# Patient Record
Sex: MALE | Race: WHITE | Employment: OTHER | URBAN - METROPOLITAN AREA
[De-identification: names, ages, dates, MRNs, and addresses within clinical notes are randomized per-mention and may not be internally consistent; named-entity substitution may affect disease eponyms.]

---

## 2021-01-27 ENCOUNTER — HOSPITAL ENCOUNTER (INPATIENT)
Age: 76
LOS: 11 days | Discharge: HOME | DRG: 871 | End: 2021-02-07
Attending: EMERGENCY MEDICINE | Admitting: INTERNAL MEDICINE
Payer: MEDICARE

## 2021-01-27 ENCOUNTER — APPOINTMENT (OUTPATIENT)
Dept: ULTRASOUND IMAGING | Age: 76
DRG: 871 | End: 2021-01-27
Payer: MEDICARE

## 2021-01-27 ENCOUNTER — APPOINTMENT (OUTPATIENT)
Dept: CT IMAGING | Age: 76
DRG: 871 | End: 2021-01-27
Payer: MEDICARE

## 2021-01-27 ENCOUNTER — APPOINTMENT (OUTPATIENT)
Dept: GENERAL RADIOLOGY | Age: 76
DRG: 871 | End: 2021-01-27
Payer: MEDICARE

## 2021-01-27 DIAGNOSIS — R19.7 DIARRHEA, UNSPECIFIED TYPE: Primary | ICD-10-CM

## 2021-01-27 DIAGNOSIS — K80.20 GALLSTONES: ICD-10-CM

## 2021-01-27 DIAGNOSIS — K76.9 LIVER LESION: ICD-10-CM

## 2021-01-27 DIAGNOSIS — I48.91 ATRIAL FIBRILLATION, NEW ONSET (HCC): ICD-10-CM

## 2021-01-27 DIAGNOSIS — E87.6 HYPOKALEMIA: ICD-10-CM

## 2021-01-27 DIAGNOSIS — K80.50 CHOLEDOCHOLITHIASIS: ICD-10-CM

## 2021-01-27 PROBLEM — K59.1 FUNCTIONAL DIARRHEA: Status: ACTIVE | Noted: 2021-01-27

## 2021-01-27 PROBLEM — I10 ESSENTIAL HYPERTENSION: Status: ACTIVE | Noted: 2021-01-27

## 2021-01-27 LAB
ACETAMINOPHEN LEVEL: 49.1 MCG/ML (ref 10–30)
ALBUMIN SERPL-MCNC: 2.8 G/DL (ref 3.5–5.2)
ALP BLD-CCNC: 115 U/L (ref 40–129)
ALT SERPL-CCNC: 24 U/L (ref 0–40)
AMMONIA: 35.3 UMOL/L (ref 16–60)
ANION GAP SERPL CALCULATED.3IONS-SCNC: 11 MMOL/L (ref 7–16)
ANION GAP SERPL CALCULATED.3IONS-SCNC: 7 MMOL/L (ref 7–16)
AST SERPL-CCNC: 22 U/L (ref 0–39)
BACTERIA: NORMAL /HPF
BASOPHILS ABSOLUTE: 0.06 E9/L (ref 0–0.2)
BASOPHILS RELATIVE PERCENT: 0.5 % (ref 0–2)
BILIRUB SERPL-MCNC: 0.9 MG/DL (ref 0–1.2)
BILIRUBIN URINE: NEGATIVE
BLOOD, URINE: NEGATIVE
BUN BLDV-MCNC: 7 MG/DL (ref 8–23)
BUN BLDV-MCNC: 9 MG/DL (ref 8–23)
C DIFF TOXIN/ANTIGEN: NORMAL
C-REACTIVE PROTEIN: 14.6 MG/DL (ref 0–0.4)
CALCIUM SERPL-MCNC: 8.1 MG/DL (ref 8.6–10.2)
CALCIUM SERPL-MCNC: 8.5 MG/DL (ref 8.6–10.2)
CHLORIDE BLD-SCNC: 92 MMOL/L (ref 98–107)
CHLORIDE BLD-SCNC: 95 MMOL/L (ref 98–107)
CHOLESTEROL, TOTAL: 96 MG/DL (ref 0–199)
CLARITY: CLEAR
CO2: 31 MMOL/L (ref 22–29)
CO2: 32 MMOL/L (ref 22–29)
COLOR: YELLOW
CREAT SERPL-MCNC: 0.6 MG/DL (ref 0.7–1.2)
CREAT SERPL-MCNC: 0.7 MG/DL (ref 0.7–1.2)
EKG ATRIAL RATE: 76 BPM
EKG Q-T INTERVAL: 278 MS
EKG QRS DURATION: 84 MS
EKG QTC CALCULATION (BAZETT): 306 MS
EKG R AXIS: 3 DEGREES
EKG T AXIS: 134 DEGREES
EKG VENTRICULAR RATE: 73 BPM
EOSINOPHILS ABSOLUTE: 0.1 E9/L (ref 0.05–0.5)
EOSINOPHILS RELATIVE PERCENT: 0.8 % (ref 0–6)
EPITHELIAL CELLS, UA: NORMAL /HPF
ETHANOL: <10 MG/DL (ref 0–0.08)
GFR AFRICAN AMERICAN: >60
GFR AFRICAN AMERICAN: >60
GFR NON-AFRICAN AMERICAN: >60 ML/MIN/1.73
GFR NON-AFRICAN AMERICAN: >60 ML/MIN/1.73
GIARDIA ANTIGEN STOOL: NORMAL
GLUCOSE BLD-MCNC: 106 MG/DL (ref 74–99)
GLUCOSE BLD-MCNC: 149 MG/DL (ref 74–99)
GLUCOSE URINE: NEGATIVE MG/DL
HBA1C MFR BLD: 5.7 % (ref 4–5.6)
HCT VFR BLD CALC: 37.5 % (ref 37–54)
HDLC SERPL-MCNC: 23 MG/DL
HEMOGLOBIN: 12.8 G/DL (ref 12.5–16.5)
IMMATURE GRANULOCYTES #: 0.11 E9/L
IMMATURE GRANULOCYTES %: 0.8 % (ref 0–5)
INR BLD: 1.3
KETONES, URINE: NEGATIVE MG/DL
LACTIC ACID: 1.3 MMOL/L (ref 0.5–2.2)
LDL CHOLESTEROL CALCULATED: 54 MG/DL (ref 0–99)
LEUKOCYTE ESTERASE, URINE: NEGATIVE
LIPASE: 38 U/L (ref 13–60)
LYMPHOCYTES ABSOLUTE: 0.76 E9/L (ref 1.5–4)
LYMPHOCYTES RELATIVE PERCENT: 5.8 % (ref 20–42)
MAGNESIUM: 1.9 MG/DL (ref 1.6–2.6)
MCH RBC QN AUTO: 30 PG (ref 26–35)
MCHC RBC AUTO-ENTMCNC: 34.1 % (ref 32–34.5)
MCV RBC AUTO: 88 FL (ref 80–99.9)
MONOCYTES ABSOLUTE: 0.79 E9/L (ref 0.1–0.95)
MONOCYTES RELATIVE PERCENT: 6 % (ref 2–12)
NEUTROPHILS ABSOLUTE: 11.38 E9/L (ref 1.8–7.3)
NEUTROPHILS RELATIVE PERCENT: 86.1 % (ref 43–80)
NITRITE, URINE: NEGATIVE
OCCULT BLOOD SCREENING: NORMAL
PDW BLD-RTO: 13.3 FL (ref 11.5–15)
PH UA: 5 (ref 5–9)
PLATELET # BLD: 368 E9/L (ref 130–450)
PMV BLD AUTO: 10.3 FL (ref 7–12)
POTASSIUM REFLEX MAGNESIUM: 2.6 MMOL/L (ref 3.5–5)
POTASSIUM SERPL-SCNC: 3.6 MMOL/L (ref 3.5–5)
PROCALCITONIN: 0.47 NG/ML (ref 0–0.08)
PROTEIN UA: NEGATIVE MG/DL
PROTHROMBIN TIME: 15.4 SEC (ref 9.3–12.4)
RBC # BLD: 4.26 E12/L (ref 3.8–5.8)
RBC UA: NORMAL /HPF (ref 0–2)
ROTAVIRUS ANTIGEN: NORMAL
SALICYLATE, SERUM: <0.3 MG/DL (ref 0–30)
SARS-COV-2, NAAT: NOT DETECTED
SEDIMENTATION RATE, ERYTHROCYTE: 60 MM/HR (ref 0–15)
SODIUM BLD-SCNC: 134 MMOL/L (ref 132–146)
SODIUM BLD-SCNC: 134 MMOL/L (ref 132–146)
SPECIFIC GRAVITY UA: 1.01 (ref 1–1.03)
TOTAL PROTEIN: 6.7 G/DL (ref 6.4–8.3)
TRICYCLIC ANTIDEPRESSANTS SCREEN SERUM: NEGATIVE NG/ML
TRIGL SERPL-MCNC: 94 MG/DL (ref 0–149)
TROPONIN: <0.01 NG/ML (ref 0–0.03)
UROBILINOGEN, URINE: 0.2 E.U./DL
VLDLC SERPL CALC-MCNC: 19 MG/DL
WBC # BLD: 13.2 E9/L (ref 4.5–11.5)
WBC UA: NORMAL /HPF (ref 0–5)

## 2021-01-27 PROCEDURE — 82705 FATS/LIPIDS FECES QUAL: CPT

## 2021-01-27 PROCEDURE — 2580000003 HC RX 258: Performed by: CLINICAL NURSE SPECIALIST

## 2021-01-27 PROCEDURE — 2060000000 HC ICU INTERMEDIATE R&B

## 2021-01-27 PROCEDURE — 6360000002 HC RX W HCPCS: Performed by: STUDENT IN AN ORGANIZED HEALTH CARE EDUCATION/TRAINING PROGRAM

## 2021-01-27 PROCEDURE — 84145 PROCALCITONIN (PCT): CPT

## 2021-01-27 PROCEDURE — 93005 ELECTROCARDIOGRAM TRACING: CPT | Performed by: STUDENT IN AN ORGANIZED HEALTH CARE EDUCATION/TRAINING PROGRAM

## 2021-01-27 PROCEDURE — 81001 URINALYSIS AUTO W/SCOPE: CPT

## 2021-01-27 PROCEDURE — 82140 ASSAY OF AMMONIA: CPT

## 2021-01-27 PROCEDURE — 87329 GIARDIA AG IA: CPT

## 2021-01-27 PROCEDURE — 80179 DRUG ASSAY SALICYLATE: CPT

## 2021-01-27 PROCEDURE — 99223 1ST HOSP IP/OBS HIGH 75: CPT | Performed by: INTERNAL MEDICINE

## 2021-01-27 PROCEDURE — 84484 ASSAY OF TROPONIN QUANT: CPT

## 2021-01-27 PROCEDURE — 96375 TX/PRO/DX INJ NEW DRUG ADDON: CPT

## 2021-01-27 PROCEDURE — 93010 ELECTROCARDIOGRAM REPORT: CPT | Performed by: INTERNAL MEDICINE

## 2021-01-27 PROCEDURE — 36415 COLL VENOUS BLD VENIPUNCTURE: CPT

## 2021-01-27 PROCEDURE — 87324 CLOSTRIDIUM AG IA: CPT

## 2021-01-27 PROCEDURE — 87425 ROTAVIRUS AG IA: CPT

## 2021-01-27 PROCEDURE — APPSS45 APP SPLIT SHARED TIME 31-45 MINUTES: Performed by: CLINICAL NURSE SPECIALIST

## 2021-01-27 PROCEDURE — 83036 HEMOGLOBIN GLYCOSYLATED A1C: CPT

## 2021-01-27 PROCEDURE — 82077 ASSAY SPEC XCP UR&BREATH IA: CPT

## 2021-01-27 PROCEDURE — 80061 LIPID PANEL: CPT

## 2021-01-27 PROCEDURE — 6370000000 HC RX 637 (ALT 250 FOR IP): Performed by: INTERNAL MEDICINE

## 2021-01-27 PROCEDURE — 99285 EMERGENCY DEPT VISIT HI MDM: CPT

## 2021-01-27 PROCEDURE — 6370000000 HC RX 637 (ALT 250 FOR IP): Performed by: CLINICAL NURSE SPECIALIST

## 2021-01-27 PROCEDURE — 6360000004 HC RX CONTRAST MEDICATION: Performed by: RADIOLOGY

## 2021-01-27 PROCEDURE — 80307 DRUG TEST PRSMV CHEM ANLYZR: CPT

## 2021-01-27 PROCEDURE — 80048 BASIC METABOLIC PNL TOTAL CA: CPT

## 2021-01-27 PROCEDURE — 71045 X-RAY EXAM CHEST 1 VIEW: CPT

## 2021-01-27 PROCEDURE — 96365 THER/PROPH/DIAG IV INF INIT: CPT

## 2021-01-27 PROCEDURE — U0002 COVID-19 LAB TEST NON-CDC: HCPCS

## 2021-01-27 PROCEDURE — 83690 ASSAY OF LIPASE: CPT

## 2021-01-27 PROCEDURE — 80053 COMPREHEN METABOLIC PANEL: CPT

## 2021-01-27 PROCEDURE — 74177 CT ABD & PELVIS W/CONTRAST: CPT

## 2021-01-27 PROCEDURE — 6370000000 HC RX 637 (ALT 250 FOR IP): Performed by: EMERGENCY MEDICINE

## 2021-01-27 PROCEDURE — 92526 ORAL FUNCTION THERAPY: CPT | Performed by: SPEECH-LANGUAGE PATHOLOGIST

## 2021-01-27 PROCEDURE — 85651 RBC SED RATE NONAUTOMATED: CPT

## 2021-01-27 PROCEDURE — 87449 NOS EACH ORGANISM AG IA: CPT

## 2021-01-27 PROCEDURE — 6360000002 HC RX W HCPCS: Performed by: CLINICAL NURSE SPECIALIST

## 2021-01-27 PROCEDURE — 87045 FECES CULTURE AEROBIC BACT: CPT

## 2021-01-27 PROCEDURE — 86140 C-REACTIVE PROTEIN: CPT

## 2021-01-27 PROCEDURE — G0328 FECAL BLOOD SCRN IMMUNOASSAY: HCPCS

## 2021-01-27 PROCEDURE — 92610 EVALUATE SWALLOWING FUNCTION: CPT | Performed by: SPEECH-LANGUAGE PATHOLOGIST

## 2021-01-27 PROCEDURE — 85025 COMPLETE CBC W/AUTO DIFF WBC: CPT

## 2021-01-27 PROCEDURE — 83735 ASSAY OF MAGNESIUM: CPT

## 2021-01-27 PROCEDURE — 85610 PROTHROMBIN TIME: CPT

## 2021-01-27 PROCEDURE — 76705 ECHO EXAM OF ABDOMEN: CPT

## 2021-01-27 PROCEDURE — 80143 DRUG ASSAY ACETAMINOPHEN: CPT

## 2021-01-27 PROCEDURE — 83605 ASSAY OF LACTIC ACID: CPT

## 2021-01-27 RX ORDER — PROMETHAZINE HYDROCHLORIDE 25 MG/1
12.5 TABLET ORAL EVERY 6 HOURS PRN
Status: DISCONTINUED | OUTPATIENT
Start: 2021-01-27 | End: 2021-02-07 | Stop reason: HOSPADM

## 2021-01-27 RX ORDER — SODIUM CHLORIDE 0.9 % (FLUSH) 0.9 %
10 SYRINGE (ML) INJECTION EVERY 12 HOURS SCHEDULED
Status: DISCONTINUED | OUTPATIENT
Start: 2021-01-27 | End: 2021-02-07 | Stop reason: HOSPADM

## 2021-01-27 RX ORDER — LORAZEPAM 1 MG/1
2 TABLET ORAL
Status: DISCONTINUED | OUTPATIENT
Start: 2021-01-27 | End: 2021-02-07 | Stop reason: HOSPADM

## 2021-01-27 RX ORDER — LORAZEPAM 1 MG/1
4 TABLET ORAL
Status: DISCONTINUED | OUTPATIENT
Start: 2021-01-27 | End: 2021-02-07 | Stop reason: HOSPADM

## 2021-01-27 RX ORDER — LORAZEPAM 2 MG/ML
3 INJECTION INTRAMUSCULAR
Status: DISCONTINUED | OUTPATIENT
Start: 2021-01-27 | End: 2021-02-07 | Stop reason: HOSPADM

## 2021-01-27 RX ORDER — ACETAMINOPHEN 650 MG/1
650 SUPPOSITORY RECTAL EVERY 6 HOURS PRN
Status: DISCONTINUED | OUTPATIENT
Start: 2021-01-27 | End: 2021-02-07 | Stop reason: HOSPADM

## 2021-01-27 RX ORDER — FOLIC ACID 1 MG/1
1 TABLET ORAL DAILY
Status: DISCONTINUED | OUTPATIENT
Start: 2021-01-27 | End: 2021-02-07 | Stop reason: HOSPADM

## 2021-01-27 RX ORDER — LISINOPRIL 10 MG/1
10 TABLET ORAL DAILY
Status: DISCONTINUED | OUTPATIENT
Start: 2021-01-27 | End: 2021-02-07 | Stop reason: HOSPADM

## 2021-01-27 RX ORDER — ACETAMINOPHEN 325 MG/1
650 TABLET ORAL EVERY 6 HOURS PRN
Status: DISCONTINUED | OUTPATIENT
Start: 2021-01-27 | End: 2021-02-07 | Stop reason: HOSPADM

## 2021-01-27 RX ORDER — LORAZEPAM 1 MG/1
3 TABLET ORAL
Status: DISCONTINUED | OUTPATIENT
Start: 2021-01-27 | End: 2021-02-07 | Stop reason: HOSPADM

## 2021-01-27 RX ORDER — SODIUM CHLORIDE 9 MG/ML
INJECTION, SOLUTION INTRAVENOUS CONTINUOUS
Status: DISCONTINUED | OUTPATIENT
Start: 2021-01-27 | End: 2021-01-28

## 2021-01-27 RX ORDER — LORAZEPAM 1 MG/1
1 TABLET ORAL
Status: DISCONTINUED | OUTPATIENT
Start: 2021-01-27 | End: 2021-02-07 | Stop reason: HOSPADM

## 2021-01-27 RX ORDER — POTASSIUM CHLORIDE 7.45 MG/ML
10 INJECTION INTRAVENOUS
Status: DISPENSED | OUTPATIENT
Start: 2021-01-27 | End: 2021-01-27

## 2021-01-27 RX ORDER — POLYETHYLENE GLYCOL 3350 17 G/17G
17 POWDER, FOR SOLUTION ORAL DAILY PRN
Status: DISCONTINUED | OUTPATIENT
Start: 2021-01-27 | End: 2021-02-07 | Stop reason: HOSPADM

## 2021-01-27 RX ORDER — MAGNESIUM SULFATE IN WATER 40 MG/ML
2000 INJECTION, SOLUTION INTRAVENOUS ONCE
Status: COMPLETED | OUTPATIENT
Start: 2021-01-27 | End: 2021-01-27

## 2021-01-27 RX ORDER — SODIUM CHLORIDE 0.9 % (FLUSH) 0.9 %
10 SYRINGE (ML) INJECTION PRN
Status: DISCONTINUED | OUTPATIENT
Start: 2021-01-27 | End: 2021-02-07 | Stop reason: HOSPADM

## 2021-01-27 RX ORDER — MULTIVITAMIN WITH IRON
1 TABLET ORAL DAILY
Status: DISCONTINUED | OUTPATIENT
Start: 2021-01-27 | End: 2021-02-07 | Stop reason: HOSPADM

## 2021-01-27 RX ORDER — HYDRALAZINE HYDROCHLORIDE 20 MG/ML
5 INJECTION INTRAMUSCULAR; INTRAVENOUS EVERY 6 HOURS PRN
Status: DISCONTINUED | OUTPATIENT
Start: 2021-01-27 | End: 2021-02-07 | Stop reason: HOSPADM

## 2021-01-27 RX ORDER — LANOLIN ALCOHOL/MO/W.PET/CERES
100 CREAM (GRAM) TOPICAL DAILY
Status: DISCONTINUED | OUTPATIENT
Start: 2021-01-27 | End: 2021-02-07 | Stop reason: HOSPADM

## 2021-01-27 RX ORDER — HYDROCODONE BITARTRATE AND ACETAMINOPHEN 5; 325 MG/1; MG/1
1 TABLET ORAL EVERY 6 HOURS PRN
Status: DISCONTINUED | OUTPATIENT
Start: 2021-01-27 | End: 2021-02-07 | Stop reason: HOSPADM

## 2021-01-27 RX ORDER — LORAZEPAM 2 MG/ML
1 INJECTION INTRAMUSCULAR
Status: DISCONTINUED | OUTPATIENT
Start: 2021-01-27 | End: 2021-02-07 | Stop reason: HOSPADM

## 2021-01-27 RX ORDER — ONDANSETRON 2 MG/ML
4 INJECTION INTRAMUSCULAR; INTRAVENOUS EVERY 6 HOURS PRN
Status: DISCONTINUED | OUTPATIENT
Start: 2021-01-27 | End: 2021-02-07 | Stop reason: HOSPADM

## 2021-01-27 RX ORDER — LORAZEPAM 2 MG/ML
2 INJECTION INTRAMUSCULAR
Status: DISCONTINUED | OUTPATIENT
Start: 2021-01-27 | End: 2021-02-07 | Stop reason: HOSPADM

## 2021-01-27 RX ORDER — FENTANYL CITRATE 50 UG/ML
25 INJECTION, SOLUTION INTRAMUSCULAR; INTRAVENOUS ONCE
Status: COMPLETED | OUTPATIENT
Start: 2021-01-27 | End: 2021-01-27

## 2021-01-27 RX ORDER — LORAZEPAM 2 MG/ML
4 INJECTION INTRAMUSCULAR
Status: DISCONTINUED | OUTPATIENT
Start: 2021-01-27 | End: 2021-02-07 | Stop reason: HOSPADM

## 2021-01-27 RX ORDER — POTASSIUM CHLORIDE 20 MEQ/1
40 TABLET, EXTENDED RELEASE ORAL ONCE
Status: COMPLETED | OUTPATIENT
Start: 2021-01-27 | End: 2021-01-27

## 2021-01-27 RX ORDER — MORPHINE SULFATE 4 MG/ML
4 INJECTION, SOLUTION INTRAMUSCULAR; INTRAVENOUS ONCE
Status: COMPLETED | OUTPATIENT
Start: 2021-01-27 | End: 2021-01-27

## 2021-01-27 RX ADMIN — HYDROCODONE BITARTRATE AND ACETAMINOPHEN 1 TABLET: 5; 325 TABLET ORAL at 09:17

## 2021-01-27 RX ADMIN — POTASSIUM CHLORIDE 10 MEQ: 10 INJECTION, SOLUTION INTRAVENOUS at 10:57

## 2021-01-27 RX ADMIN — ENOXAPARIN SODIUM 40 MG: 40 INJECTION SUBCUTANEOUS at 12:54

## 2021-01-27 RX ADMIN — Medication 100 MG: at 12:52

## 2021-01-27 RX ADMIN — SODIUM CHLORIDE: 9 INJECTION, SOLUTION INTRAVENOUS at 11:08

## 2021-01-27 RX ADMIN — Medication 10 ML: at 21:26

## 2021-01-27 RX ADMIN — METOPROLOL TARTRATE 25 MG: 25 TABLET, FILM COATED ORAL at 14:05

## 2021-01-27 RX ADMIN — FENTANYL CITRATE 25 MCG: 50 INJECTION, SOLUTION INTRAMUSCULAR; INTRAVENOUS at 04:54

## 2021-01-27 RX ADMIN — SODIUM CHLORIDE: 9 INJECTION, SOLUTION INTRAVENOUS at 21:26

## 2021-01-27 RX ADMIN — ACETAMINOPHEN 650 MG: 325 TABLET ORAL at 20:18

## 2021-01-27 RX ADMIN — IOPAMIDOL 75 ML: 755 INJECTION, SOLUTION INTRAVENOUS at 05:13

## 2021-01-27 RX ADMIN — MAGNESIUM SULFATE HEPTAHYDRATE 2000 MG: 40 INJECTION, SOLUTION INTRAVENOUS at 05:57

## 2021-01-27 RX ADMIN — METOPROLOL TARTRATE 25 MG: 25 TABLET, FILM COATED ORAL at 21:25

## 2021-01-27 RX ADMIN — MORPHINE SULFATE 4 MG: 4 INJECTION, SOLUTION INTRAMUSCULAR; INTRAVENOUS at 06:23

## 2021-01-27 RX ADMIN — POTASSIUM CHLORIDE 10 MEQ: 10 INJECTION, SOLUTION INTRAVENOUS at 05:59

## 2021-01-27 RX ADMIN — POTASSIUM CHLORIDE 10 MEQ: 10 INJECTION, SOLUTION INTRAVENOUS at 07:12

## 2021-01-27 RX ADMIN — POTASSIUM CHLORIDE 40 MEQ: 20 TABLET, EXTENDED RELEASE ORAL at 06:23

## 2021-01-27 RX ADMIN — MULTIVITAMIN TABLET 1 TABLET: TABLET at 12:54

## 2021-01-27 RX ADMIN — LISINOPRIL 10 MG: 10 TABLET ORAL at 12:52

## 2021-01-27 RX ADMIN — POTASSIUM CHLORIDE 10 MEQ: 10 INJECTION, SOLUTION INTRAVENOUS at 14:02

## 2021-01-27 RX ADMIN — POTASSIUM CHLORIDE 10 MEQ: 10 INJECTION, SOLUTION INTRAVENOUS at 12:41

## 2021-01-27 RX ADMIN — FOLIC ACID 1 MG: 1 TABLET ORAL at 12:54

## 2021-01-27 ASSESSMENT — ENCOUNTER SYMPTOMS
VOMITING: 0
DIARRHEA: 1
ABDOMINAL PAIN: 1
ABDOMINAL DISTENTION: 0
NAUSEA: 0
PHOTOPHOBIA: 0
SHORTNESS OF BREATH: 0
CONSTIPATION: 0
COUGH: 1

## 2021-01-27 ASSESSMENT — PAIN SCALES - GENERAL
PAINLEVEL_OUTOF10: 9
PAINLEVEL_OUTOF10: 2
PAINLEVEL_OUTOF10: 0
PAINLEVEL_OUTOF10: 9

## 2021-01-27 ASSESSMENT — PAIN DESCRIPTION - PAIN TYPE: TYPE: ACUTE PAIN

## 2021-01-27 ASSESSMENT — PAIN DESCRIPTION - FREQUENCY: FREQUENCY: CONTINUOUS

## 2021-01-27 NOTE — ED NOTES
Patient unable to urinate at this time; instructed to use call light when able to urinate; urinal at bedside. Patient unable to provide stool sample at this time; instructed to use call light when able to.      Jo Ann Gallardo RN  01/27/21 0034

## 2021-01-27 NOTE — ED NOTES
Bed: 04  Expected date:   Expected time:   Means of arrival:   Comments:  Atif Hernandez [id number], RN  35/60/85 [id number]

## 2021-01-27 NOTE — ED NOTES
Patient unable to urinate at this time; instructed to use call light when able to urinate; urinal at bedside  Patient unable to provide stool sample at this time; instructed to use call light when able to.      Jo Ann Gallardo RN  01/27/21 6013

## 2021-01-27 NOTE — ED PROVIDER NOTES
Brook Plummer is a 81-year-old male without any known PMH who present emergency department with concern for diffuse abdominal pain and diarrhea has been going on for 8 days and is worsening. Patient states that he is having diffuse abdominal pain that does not radiate. Abdominal pain feels like a cramping stabbing pain. Patient denies any symptoms of dysuria. Patient is also having watery diarrhea. Patient denies any blood in his stool. Patient denies any black stool. Patient does take NSAIDs. Patient also drinks alcohol every day. Patient states he drinks at least 30 pack of beer per week. Patient is eating and drinking less. Patient lives at home with his wife. Patient denies any sick contacts. Patient denies any cough, fever, chills, chest pain, or shortness of breath. Patient does not have history fo afib. Patient has not followed with a PCP in over 12 years. Patient has tried fluids and rest for diarrhea without improvement of symptoms. Patient typically has issues with constipation and uses stool softeners. The history is provided by the patient and medical records. Diarrhea  Quality:  Watery  Severity:  Severe  Onset quality:  Gradual  Number of episodes:  Over 5-10  Duration:  8 days  Timing:  Constant  Progression:  Worsening  Relieved by:  Nothing  Worsened by:  Nothing  Ineffective treatments:  None tried  Associated symptoms: abdominal pain    Associated symptoms: no chills, no diaphoresis, no fever, no headaches, no URI and no vomiting    Risk factors: no recent antibiotic use, no sick contacts, no suspicious food intake and no travel to endemic areas         Review of Systems   Constitutional: Negative for chills, diaphoresis, fatigue and fever. Eyes: Negative for photophobia and visual disturbance. Respiratory: Positive for cough. Negative for shortness of breath. Cardiovascular: Negative for chest pain, palpitations and leg swelling.    Gastrointestinal: Positive for abdominal pain and diarrhea. Negative for abdominal distention, constipation, nausea and vomiting. Genitourinary: Negative for dysuria. Musculoskeletal: Negative for neck pain and neck stiffness. Skin: Negative for pallor and rash. Allergic/Immunologic: Negative for immunocompromised state. Neurological: Negative for headaches. Psychiatric/Behavioral: Negative for confusion. Physical Exam  Vitals signs and nursing note reviewed. Constitutional:       General: He is not in acute distress. Appearance: Normal appearance. He is not ill-appearing or diaphoretic. HENT:      Head: Normocephalic and atraumatic. Eyes:      Pupils: Pupils are equal, round, and reactive to light. Neck:      Musculoskeletal: Normal range of motion and neck supple. No neck rigidity or muscular tenderness. Cardiovascular:      Rate and Rhythm: Normal rate and regular rhythm. Pulmonary:      Effort: Pulmonary effort is normal.      Breath sounds: Normal breath sounds. Abdominal:      General: Bowel sounds are normal. There is no distension. Palpations: Abdomen is soft. Tenderness: There is abdominal tenderness. There is no guarding or rebound. Comments: Diffuse mild tenderness     Musculoskeletal:      Right lower leg: No edema. Left lower leg: No edema. Skin:     General: Skin is warm and dry. Capillary Refill: Capillary refill takes less than 2 seconds. Neurological:      Mental Status: He is alert and oriented to person, place, and time. Psychiatric:         Mood and Affect: Mood normal.          Procedures     MDM  Number of Diagnoses or Management Options  Atrial fibrillation, new onset (HCC)  Diarrhea, unspecified type  Gallstones  Hypokalemia  Liver lesion  Diagnosis management comments: Tess Roth is a 70year old male with history of alcohol abuse who presented to ED with diarrhea and abdominal pain.  Patient was found to have potassium of 2.6, patient given potassium and magnesium replacement in ED. Patient did give stool sample and results pending. Patient will be admitted for diarrhea of unknown etiology with electrolyte disturbance. Patient's abdominal pain is non specific. Patient's CT scan is significant for possible CBD dilation. With normal LFT. RUQ u/s is pending. Discussed results and plan with patient and daughter they are agreeable to admission  Patient was found to have mildly elevated tylenol level, patient reported he was taking normal dose of nyquil before arrival to ED, patient believes there was tylenol in the Nyquil. Patient denies additional tylenol use. Patient stable at time of re-evaluation, patient given morphine for cramping abdominal pain. ED Course as of Jan 27 1626 Wed Jan 27, 2021 0404 EKG: This EKG is signed and interpreted by me. Rate: 73  Rhythm: Atrial fibrillation  Interpretation: A. Fib, new onset, nonspecific T wave abnormalities, normal QTc  Comparison: no previous EKG available      [JA]      ED Course User Index  [JA] Godwin Cheema MD        ED Course as of Jan 27 1626 Wed Jan 27, 2021 0404 EKG: This EKG is signed and interpreted by me. Rate: 73  Rhythm: Atrial fibrillation  Interpretation: A. Fib, new onset, nonspecific T wave abnormalities, normal QTc  Comparison: no previous EKG available      [JA]      ED Course User Index  [LAURA] Godwin Cheema MD       --------------------------------------------- PAST HISTORY ---------------------------------------------  Past Medical History:  has a past medical history of Bleeding from the nose and Skin cancer of face. Past Surgical History:  has a past surgical history that includes other surgical history (Left, 2009). Social History:  reports that he quit smoking about 14 years ago. He has never used smokeless tobacco. He reports current alcohol use of about 30.0 standard drinks of alcohol per week.  He reports that he does not use drugs. Family History: family history is not on file. The patients home medications have been reviewed. Allergies: Patient has no known allergies. -------------------------------------------------- RESULTS -------------------------------------------------    LABS:  Results for orders placed or performed during the hospital encounter of 01/27/21   Clostridium difficile, EIA    Specimen: Stool   Result Value Ref Range    C.diff Toxin/Antigen       C. Difficile Toxin A and/or B NOT detected  Normal Range: Not detected     Giardia antigen    Specimen: Stool   Result Value Ref Range    Giardia Ag, Stl       Giardia antigen test is negative  *  *  Reference range: negative  Giardia antigen testing is performed routinely in place of  [facility] and Parasite Examination on stool specimens. Additional  testing requires consultation with the laboratory. All stool  specimen preservative containers are held 10 days after the  performance of Giardia Antigen to accommodate any additional  testing.      CBC auto differential   Result Value Ref Range    WBC 13.2 (H) 4.5 - 11.5 E9/L    RBC 4.26 3.80 - 5.80 E12/L    Hemoglobin 12.8 12.5 - 16.5 g/dL    Hematocrit 37.5 37.0 - 54.0 %    MCV 88.0 80.0 - 99.9 fL    MCH 30.0 26.0 - 35.0 pg    MCHC 34.1 32.0 - 34.5 %    RDW 13.3 11.5 - 15.0 fL    Platelets 660 111 - 726 E9/L    MPV 10.3 7.0 - 12.0 fL    Neutrophils % 86.1 (H) 43.0 - 80.0 %    Immature Granulocytes % 0.8 0.0 - 5.0 %    Lymphocytes % 5.8 (L) 20.0 - 42.0 %    Monocytes % 6.0 2.0 - 12.0 %    Eosinophils % 0.8 0.0 - 6.0 %    Basophils % 0.5 0.0 - 2.0 %    Neutrophils Absolute 11.38 (H) 1.80 - 7.30 E9/L    Immature Granulocytes # 0.11 E9/L    Lymphocytes Absolute 0.76 (L) 1.50 - 4.00 E9/L    Monocytes Absolute 0.79 0.10 - 0.95 E9/L    Eosinophils Absolute 0.10 0.05 - 0.50 E9/L    Basophils Absolute 0.06 0.00 - 0.20 E9/L   Comprehensive Metabolic Panel w/ Reflex to MG   Result Value Ref Range    Sodium 134 132 - 146 mmol/L Potassium reflex Magnesium 2.6 (LL) 3.5 - 5.0 mmol/L    Chloride 92 (L) 98 - 107 mmol/L    CO2 31 (H) 22 - 29 mmol/L    Anion Gap 11 7 - 16 mmol/L    Glucose 106 (H) 74 - 99 mg/dL    BUN 9 8 - 23 mg/dL    CREATININE 0.7 0.7 - 1.2 mg/dL    GFR Non-African American >60 >=60 mL/min/1.73    GFR African American >60     Calcium 8.1 (L) 8.6 - 10.2 mg/dL    Total Protein 6.7 6.4 - 8.3 g/dL    Albumin 2.8 (L) 3.5 - 5.2 g/dL    Total Bilirubin 0.9 0.0 - 1.2 mg/dL    Alkaline Phosphatase 115 40 - 129 U/L    ALT 24 0 - 40 U/L    AST 22 0 - 39 U/L   Troponin   Result Value Ref Range    Troponin <0.01 0.00 - 0.03 ng/mL   Lactic Acid, Plasma   Result Value Ref Range    Lactic Acid 1.3 0.5 - 2.2 mmol/L   Lipase   Result Value Ref Range    Lipase 38 13 - 60 U/L   Urinalysis with Microscopic   Result Value Ref Range    Color, UA Yellow Straw/Yellow    Clarity, UA Clear Clear    Glucose, Ur Negative Negative mg/dL    Bilirubin Urine Negative Negative    Ketones, Urine Negative Negative mg/dL    Specific Gravity, UA 1.015 1.005 - 1.030    Blood, Urine Negative Negative    pH, UA 5.0 5.0 - 9.0    Protein, UA Negative Negative mg/dL    Urobilinogen, Urine 0.2 <2.0 E.U./dL    Nitrite, Urine Negative Negative    Leukocyte Esterase, Urine Negative Negative    WBC, UA 0-1 0 - 5 /HPF    RBC, UA 0-1 0 - 2 /HPF    Epithelial Cells, UA RARE /HPF    Bacteria, UA NONE SEEN None Seen /HPF   Rotavirus antigen, stool   Result Value Ref Range    Rotavirus       Rotavirus antigen result: Not detected  *  *  Normal range: Not detected     Blood occult stool screen #1   Result Value Ref Range    Occult Blood Screening       Occult Blood test result is negative.   *  *  Normal range: negative     Serum Drug Screen   Result Value Ref Range    Ethanol Lvl <10 mg/dL    Acetaminophen Level 49.1 (H) 10.0 - 71.8 mcg/mL    Salicylate, Serum <5.3 0.0 - 30.0 mg/dL    TCA Scrn NEGATIVE Cutoff:300 ng/mL   COVID-19   Result Value Ref Range    SARS-CoV-2, NAAT Not Detected Not Detected   Protime-INR   Result Value Ref Range    Protime 15.4 (H) 9.3 - 12.4 sec    INR 1.3    Magnesium   Result Value Ref Range    Magnesium 1.9 1.6 - 2.6 mg/dL   Lipid panel   Result Value Ref Range    Cholesterol, Total 96 0 - 199 mg/dL    Triglycerides 94 0 - 149 mg/dL    HDL 23 >40 mg/dL    LDL Calculated 54 0 - 99 mg/dL    VLDL Cholesterol Calculated 19 mg/dL   Hemoglobin A1c   Result Value Ref Range    Hemoglobin A1C 5.7 (H) 4.0 - 5.6 %   Procalcitonin   Result Value Ref Range    Procalcitonin 0.47 (H) 0.00 - 0.08 ng/mL   Sedimentation Rate   Result Value Ref Range    Sed Rate 60 (H) 0 - 15 mm/Hr   C-reactive protein   Result Value Ref Range    CRP 14.6 (H) 0.0 - 0.4 mg/dL   BASIC METABOLIC PANEL   Result Value Ref Range    Sodium 134 132 - 146 mmol/L    Potassium 3.6 3.5 - 5.0 mmol/L    Chloride 95 (L) 98 - 107 mmol/L    CO2 32 (H) 22 - 29 mmol/L    Anion Gap 7 7 - 16 mmol/L    Glucose 149 (H) 74 - 99 mg/dL    BUN 7 (L) 8 - 23 mg/dL    CREATININE 0.6 (L) 0.7 - 1.2 mg/dL    GFR Non-African American >60 >=60 mL/min/1.73    GFR African American >60     Calcium 8.5 (L) 8.6 - 10.2 mg/dL   Ammonia   Result Value Ref Range    Ammonia 35.3 16.0 - 60.0 umol/L   EKG 12 Lead   Result Value Ref Range    Ventricular Rate 73 BPM    Atrial Rate 76 BPM    QRS Duration 84 ms    Q-T Interval 278 ms    QTc Calculation (Bazett) 306 ms    R Axis 3 degrees    T Axis 134 degrees       RADIOLOGY:  US GALLBLADDER RUQ   Final Result   Cholecystolithiasis. Suggestion also of choledocholithiasis with common bile   duct distention   Solid-appearing mass adjacent to the left hepatic lobe, in the christopher hepatis   region, may be artifact from the duodenum. Differential includes enlarged   lymph node   Simple appearing right renal cyst      CT ABDOMEN PELVIS W IV CONTRAST Additional Contrast? None   Final Result   There are numerous liver lesions. Multiple small lesions are possibly   cystic.   Larger lesions in the left lobe are more nonspecific. The right   lobe is also heterogeneous. MRI correlation is recommended for   characterization of these abnormalities. Cholelithiasis. Biliary dilatation and probable multiple common bile duct   stones. Soft tissue nodule adjacent to the diaphragmatic deo and right intrarenal   gland measuring 2 cm. This this may be an adrenal adenoma or lymph node. Sigmoid diverticulosis. No acute diverticulitis. Small right pleural effusion with basilar atelectasis. XR CHEST PORTABLE   Final Result   There is no acute abnormality seen. ------------------------- NURSING NOTES AND VITALS REVIEWED ---------------------------  Date / Time Roomed:  1/27/2021  2:40 AM  ED Bed Assignment:  04/04    The nursing notes within the ED encounter and vital signs as below have been reviewed.      Patient Vitals for the past 24 hrs:   BP Temp Temp src Pulse Resp SpO2 Height Weight   01/27/21 1540 (!) 166/76 -- -- 74 16 95 % -- --   01/27/21 1413 (!) 154/78 -- -- 76 18 97 % -- --   01/27/21 1405 (!) 154/78 -- -- 68 -- -- -- --   01/27/21 1252 (!) 174/88 -- -- -- -- -- -- --   01/27/21 1055 (!) 170/82 -- -- 80 -- 95 % -- --   01/27/21 0919 (!) 149/75 -- -- 75 18 96 % -- --   01/27/21 0731 (!) 156/81 -- -- 71 16 94 % -- --   01/27/21 0623 (!) 160/83 -- -- 69 14 96 % -- --   01/27/21 0554 (!) 176/81 -- -- 70 14 96 % -- --   01/27/21 0453 (!) 156/84 -- -- 70 14 96 % -- --   01/27/21 0427 (!) 168/87 -- -- 67 14 97 % -- --   01/27/21 0348 (!) 165/97 -- -- 75 14 96 % -- --   01/27/21 0256 -- 97.5 °F (36.4 °C) Oral -- -- -- -- --   01/27/21 0250 (!) 160/89 -- -- 73 16 98 % 5' 11\" (1.803 m) 160 lb (72.6 kg)       Oxygen Saturation Interpretation: Normal    ------------------------------------------ PROGRESS NOTES ------------------------------------------  Re-evaluation(s):  Time: 4am  Patients symptoms show no change  Repeat physical examination is not changed    Counseling:  I have spoken with the patient and discussed todays results, in addition to providing specific details for the plan of care and counseling regarding the diagnosis and prognosis. Their questions are answered at this time and they are agreeable with the plan of admission.    --------------------------------- ADDITIONAL PROVIDER NOTES ---------------------------------  Consultations:  Spoke with Dr. Jazzmine Langford. Discussed case. They will admit the patient. This patient's ED course included: a personal history and physicial examination, re-evaluation prior to disposition, multiple bedside re-evaluations, IV medications and cardiac monitoring    This patient has remained hemodynamically stable during their ED course. Diagnosis:  1. Diarrhea, unspecified type    2. Hypokalemia    3. Atrial fibrillation, new onset ([address]    4. Gallstones    5. Liver lesion        Disposition:  Patient's disposition: Admit to telemetry  Patient's condition is stable.             Jose Daniel Garcia MD  Resident  01/27/21 5217

## 2021-01-27 NOTE — PROGRESS NOTES
SPEECH/LANGUAGE PATHOLOGY  CLINICAL ASSESSMENT OF SWALLOWING FUNCTION    PATIENT NAME:  Linwood Favre      :  1945      TODAY'S DATE:  2021  ROOM:      SUMMARY OF EVALUATION    Chart reviewed including chest radiograph which was clear. Pt presents with wet cough at baseline. C/o hx of difficulty swallowing meat due to poor dentition. Pt denies recent pneumonia. DYSPHAGIA DIAGNOSIS:  Oral dysphagia; pharyngeal dysphagia can not be r/o at bedside. Recommend video swallow eval to further assess. DIET RECOMMENDATIONS:  Dental soft (Easy to Chew) solids with  thin liquids as tolerated until video swallow eval completed. FEEDING RECOMMENDATIONS:     Assistance level:  No assistance needed      Compensatory strategies recommended: Small bites/sips and Alternate solids and liquids    THERAPY RECOMMENDATIONS:        A Video Swallow Study (MBSS) is recommended and requires a physician order                 PROCEDURE     Consistencies Administered During the Evaluation   Liquids: thin liquid   Solids:  pureed foods and solid foods      Method of Intake:   cup, straw, spoon  Self fed, Fed by clinician      Position:   Seated, upright                  RESULTS     Oral Stage:         Decreased mastication due to:  poor/missing dentition        Pharyngeal Stage:      Wet respirations were noted after presentation of thin liquid X1, Multiple swallows were noted after presentation of solid foods and Congested cough throughout evaluation                  The Speech Language Pathologist (SLP) completed education with the patient regarding results of evaluation. Explained that Speech Pathology intervention is warranted  at this time   Prognosis for improvements is fair     This plan will be re-evaluated and revised in 1 week  if warranted.     Patient stated goals: Agreed with above,   Treatment goals discussed with Patient   The Patient understand(s) the diagnosis, prognosis and plan of care INTERVENTION/EDUCATION    Pt educated on above results and plan of care. Pt trained on compensatory strategies for safe swallow with good outcome. Pt encouraged to engage in question/answer session. All questions answered and pt verbalized understanding of above. CPT code:  24664  bedside swallow eval, 25262 dysphagia therapy 15 mins      [x]The admitting diagnosis and active problem list, as listed below have been reviewed prior to initiation of this evaluation.      ADMITTING DIAGNOSIS: Diarrhea [R19.7]     ACTIVE PROBLEM LIST:   Patient Active Problem List   Diagnosis    Diarrhea    Essential hypertension    Atrial fibrillation (HCC)    Hypokalemia    Functional diarrhea    Calculus of gallbladder

## 2021-01-27 NOTE — ED NOTES
Speech therapy down for bedside study. Recommends dental soft diet and small sips of thin liquids and swallow study tomorrow.       Meagan Baires RN  01/27/21 4143
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
## 2021-01-28 ENCOUNTER — APPOINTMENT (OUTPATIENT)
Dept: MRI IMAGING | Age: 76
DRG: 871 | End: 2021-01-28
Payer: MEDICARE

## 2021-01-28 LAB
ALBUMIN SERPL-MCNC: 2 G/DL (ref 3.5–5.2)
ALP BLD-CCNC: 103 U/L (ref 40–129)
ALT SERPL-CCNC: 17 U/L (ref 0–40)
ANION GAP SERPL CALCULATED.3IONS-SCNC: 6 MMOL/L (ref 7–16)
AST SERPL-CCNC: 18 U/L (ref 0–39)
BASOPHILS ABSOLUTE: 0.06 E9/L (ref 0–0.2)
BASOPHILS RELATIVE PERCENT: 0.4 % (ref 0–2)
BILIRUB SERPL-MCNC: 0.6 MG/DL (ref 0–1.2)
BUN BLDV-MCNC: 9 MG/DL (ref 8–23)
CALCIUM SERPL-MCNC: 6.8 MG/DL (ref 8.6–10.2)
CEA: 0.8 NG/ML (ref 0–5.2)
CHLORIDE BLD-SCNC: 103 MMOL/L (ref 98–107)
CO2: 27 MMOL/L (ref 22–29)
CREAT SERPL-MCNC: 0.6 MG/DL (ref 0.7–1.2)
EOSINOPHILS ABSOLUTE: 0.02 E9/L (ref 0.05–0.5)
EOSINOPHILS RELATIVE PERCENT: 0.1 % (ref 0–6)
GFR AFRICAN AMERICAN: >60
GFR NON-AFRICAN AMERICAN: >60 ML/MIN/1.73
GLUCOSE BLD-MCNC: 97 MG/DL (ref 74–99)
HCT VFR BLD CALC: 29.3 % (ref 37–54)
HEMOGLOBIN: 9.6 G/DL (ref 12.5–16.5)
IMMATURE GRANULOCYTES #: 0.17 E9/L
IMMATURE GRANULOCYTES %: 1.1 % (ref 0–5)
LV EF: 65 %
LVEF MODALITY: NORMAL
LYMPHOCYTES ABSOLUTE: 1.55 E9/L (ref 1.5–4)
LYMPHOCYTES RELATIVE PERCENT: 9.8 % (ref 20–42)
MAGNESIUM: 1.8 MG/DL (ref 1.6–2.6)
MCH RBC QN AUTO: 29.8 PG (ref 26–35)
MCHC RBC AUTO-ENTMCNC: 32.8 % (ref 32–34.5)
MCV RBC AUTO: 91 FL (ref 80–99.9)
MONOCYTES ABSOLUTE: 1.42 E9/L (ref 0.1–0.95)
MONOCYTES RELATIVE PERCENT: 9 % (ref 2–12)
NEUTROPHILS ABSOLUTE: 12.58 E9/L (ref 1.8–7.3)
NEUTROPHILS RELATIVE PERCENT: 79.6 % (ref 43–80)
PDW BLD-RTO: 13.8 FL (ref 11.5–15)
PLATELET # BLD: 304 E9/L (ref 130–450)
PMV BLD AUTO: 10.1 FL (ref 7–12)
POTASSIUM REFLEX MAGNESIUM: 3.2 MMOL/L (ref 3.5–5)
RBC # BLD: 3.22 E12/L (ref 3.8–5.8)
SODIUM BLD-SCNC: 136 MMOL/L (ref 132–146)
TOTAL PROTEIN: 5.3 G/DL (ref 6.4–8.3)
WBC # BLD: 15.8 E9/L (ref 4.5–11.5)

## 2021-01-28 PROCEDURE — 99223 1ST HOSP IP/OBS HIGH 75: CPT | Performed by: INTERNAL MEDICINE

## 2021-01-28 PROCEDURE — 86301 IMMUNOASSAY TUMOR CA 19-9: CPT

## 2021-01-28 PROCEDURE — 6370000000 HC RX 637 (ALT 250 FOR IP): Performed by: INTERNAL MEDICINE

## 2021-01-28 PROCEDURE — 82105 ALPHA-FETOPROTEIN SERUM: CPT

## 2021-01-28 PROCEDURE — 2580000003 HC RX 258: Performed by: CLINICAL NURSE SPECIALIST

## 2021-01-28 PROCEDURE — 99233 SBSQ HOSP IP/OBS HIGH 50: CPT | Performed by: INTERNAL MEDICINE

## 2021-01-28 PROCEDURE — 99222 1ST HOSP IP/OBS MODERATE 55: CPT | Performed by: TRANSPLANT SURGERY

## 2021-01-28 PROCEDURE — 74183 MRI ABD W/O CNTR FLWD CNTR: CPT

## 2021-01-28 PROCEDURE — 2580000003 HC RX 258: Performed by: SURGERY

## 2021-01-28 PROCEDURE — 82378 CARCINOEMBRYONIC ANTIGEN: CPT

## 2021-01-28 PROCEDURE — 83735 ASSAY OF MAGNESIUM: CPT

## 2021-01-28 PROCEDURE — 6370000000 HC RX 637 (ALT 250 FOR IP): Performed by: CLINICAL NURSE SPECIALIST

## 2021-01-28 PROCEDURE — 85025 COMPLETE CBC W/AUTO DIFF WBC: CPT

## 2021-01-28 PROCEDURE — 93306 TTE W/DOPPLER COMPLETE: CPT

## 2021-01-28 PROCEDURE — 86316 IMMUNOASSAY TUMOR OTHER: CPT

## 2021-01-28 PROCEDURE — 6360000002 HC RX W HCPCS: Performed by: CLINICAL NURSE SPECIALIST

## 2021-01-28 PROCEDURE — 80053 COMPREHEN METABOLIC PANEL: CPT

## 2021-01-28 PROCEDURE — 2060000000 HC ICU INTERMEDIATE R&B

## 2021-01-28 PROCEDURE — 36415 COLL VENOUS BLD VENIPUNCTURE: CPT

## 2021-01-28 PROCEDURE — 6360000002 HC RX W HCPCS: Performed by: INTERNAL MEDICINE

## 2021-01-28 PROCEDURE — 93005 ELECTROCARDIOGRAM TRACING: CPT | Performed by: INTERNAL MEDICINE

## 2021-01-28 PROCEDURE — 2500000003 HC RX 250 WO HCPCS: Performed by: SURGERY

## 2021-01-28 PROCEDURE — APPSS30 APP SPLIT SHARED TIME 16-30 MINUTES: Performed by: CLINICAL NURSE SPECIALIST

## 2021-01-28 PROCEDURE — 6360000004 HC RX CONTRAST MEDICATION: Performed by: RADIOLOGY

## 2021-01-28 PROCEDURE — 6360000002 HC RX W HCPCS: Performed by: SURGERY

## 2021-01-28 PROCEDURE — A9577 INJ MULTIHANCE: HCPCS | Performed by: RADIOLOGY

## 2021-01-28 RX ORDER — POTASSIUM CHLORIDE 20 MEQ/1
40 TABLET, EXTENDED RELEASE ORAL ONCE
Status: COMPLETED | OUTPATIENT
Start: 2021-01-28 | End: 2021-01-28

## 2021-01-28 RX ADMIN — METRONIDAZOLE 500 MG: 500 INJECTION, SOLUTION INTRAVENOUS at 23:02

## 2021-01-28 RX ADMIN — METOPROLOL TARTRATE 25 MG: 25 TABLET, FILM COATED ORAL at 12:44

## 2021-01-28 RX ADMIN — GADOBENATE DIMEGLUMINE 15 ML: 529 INJECTION, SOLUTION INTRAVENOUS at 11:48

## 2021-01-28 RX ADMIN — LORAZEPAM 1 MG: 2 INJECTION INTRAMUSCULAR; INTRAVENOUS at 00:37

## 2021-01-28 RX ADMIN — METOPROLOL TARTRATE 25 MG: 25 TABLET, FILM COATED ORAL at 21:04

## 2021-01-28 RX ADMIN — ENOXAPARIN SODIUM 40 MG: 40 INJECTION SUBCUTANEOUS at 12:44

## 2021-01-28 RX ADMIN — LISINOPRIL 10 MG: 10 TABLET ORAL at 12:44

## 2021-01-28 RX ADMIN — WATER 2000 MG: 1 INJECTION INTRAMUSCULAR; INTRAVENOUS; SUBCUTANEOUS at 15:33

## 2021-01-28 RX ADMIN — Medication 10 ML: at 12:45

## 2021-01-28 RX ADMIN — POTASSIUM CHLORIDE 40 MEQ: 20 TABLET, EXTENDED RELEASE ORAL at 12:43

## 2021-01-28 RX ADMIN — METRONIDAZOLE 500 MG: 500 INJECTION, SOLUTION INTRAVENOUS at 15:33

## 2021-01-28 RX ADMIN — Medication 10 ML: at 22:01

## 2021-01-28 RX ADMIN — LIDOCAINE HYDROCHLORIDE: 20 SOLUTION ORAL; TOPICAL at 21:58

## 2021-01-28 RX ADMIN — MULTIVITAMIN TABLET 1 TABLET: TABLET at 12:44

## 2021-01-28 RX ADMIN — FOLIC ACID 1 MG: 1 TABLET ORAL at 12:44

## 2021-01-28 RX ADMIN — Medication 100 MG: at 12:44

## 2021-01-28 RX ADMIN — Medication 10 ML: at 21:04

## 2021-01-28 ASSESSMENT — PAIN DESCRIPTION - PAIN TYPE: TYPE: ACUTE PAIN

## 2021-01-28 ASSESSMENT — PAIN DESCRIPTION - LOCATION: LOCATION: ABDOMEN

## 2021-01-28 ASSESSMENT — PAIN SCALES - GENERAL
PAINLEVEL_OUTOF10: 9
PAINLEVEL_OUTOF10: 0
PAINLEVEL_OUTOF10: 0

## 2021-01-28 ASSESSMENT — PAIN DESCRIPTION - DESCRIPTORS: DESCRIPTORS: SHARP;CRAMPING;DISCOMFORT

## 2021-01-28 NOTE — PROGRESS NOTES
Occupational Therapy      Occupational Therapy referral received.   Attempt x2  AM- out of room - test   PM- patient had just received tray and wanted to eat - daughter present in room

## 2021-01-28 NOTE — PROGRESS NOTES
[facility] Progress Note    Admitting Date and Time: 1/27/2021  2:40 AM  Admit Dx: Diarrhea [R19.7]    Subjective:  Patient is being followed for Diarrhea [R19.7]   Pt post MRCP. Comfortable. Per RN:     ROS: denies fever, chills, cp, sob, n/v, HA unless stated above.  cefTRIAXone (ROCEPHIN) IV  2,000 mg Intravenous Q24H    metroNIDAZOLE  500 mg Intravenous Q8H    sodium chloride flush  10 mL Intravenous 2 times per day    enoxaparin  40 mg Subcutaneous Daily    folic acid  1 mg Oral Daily    thiamine  100 mg Oral Daily    multivitamin  1 tablet Oral Daily    lisinopril  10 mg Oral Daily    metoprolol tartrate  25 mg Oral BID         perflutren lipid microspheres, 1.5 mL, ONCE PRN      HYDROcodone 5 mg - acetaminophen, 1 tablet, Q6H PRN      hydrALAZINE, 5 mg, Q6H PRN      sodium chloride flush, 10 mL, PRN      promethazine, 12.5 mg, Q6H PRN    Or      ondansetron, 4 mg, Q6H PRN      polyethylene glycol, 17 g, Daily PRN      acetaminophen, 650 mg, Q6H PRN    Or      acetaminophen, 650 mg, Q6H PRN      LORazepam, 1 mg, Q1H PRN    Or      LORazepam, 1 mg, Q1H PRN    Or      LORazepam, 2 mg, Q1H PRN    Or      LORazepam, 2 mg, Q1H PRN    Or      LORazepam, 3 mg, Q1H PRN    Or      LORazepam, 3 mg, Q1H PRN    Or      LORazepam, 4 mg, Q1H PRN    Or      LORazepam, 4 mg, Q1H PRN         Objective:    /67   Pulse 78   Temp 98.2 °F (36.8 °C) (Oral)   Resp 20   Ht 5' 11\" (1.803 m)   Wt 160 lb (72.6 kg)   SpO2 97%   BMI 22.32 kg/m²     General Appearance: alert and oriented to person, place and time and in no acute distress resting in bed  Skin: warm and dry.     Head: normocephalic and atraumatic  Eyes: pupils equal, round, and reactive to light, extraocular eye movements intact, conjunctivae normal  Neck: neck supple and non tender without mass   Pulmonary/Chest: clear to auscultation bilaterally- no wheezes, rales or rhonchi, normal air movement, no respiratory distress  Cardiovascular: normal rate, normal S1 and S2 and no carotid bruits  Abdomen: soft, non-tender, non-distended, normal bowel sounds, no masses or organomegaly  Extremities: no cyanosis, no clubbing and no edema  Neurologic: no cranial nerve deficit and speech normal        Recent Labs     01/27/21  0329 01/27/21  1415 01/28/21  0324    134 136   K 2.6* 3.6 3.2*   CL 92* 95* 103   CO2 31* 32* 27   BUN 9 7* 9   CREATININE 0.7 0.6* 0.6*   GLUCOSE 106* 149* 97   CALCIUM 8.1* 8.5* 6.8*       Recent Labs     01/27/21  0329 01/28/21  0324   WBC 13.2* 15.8*   RBC 4.26 3.22*   HGB 12.8 9.6*   HCT 37.5 29.3*   MCV 88.0 91.0   MCH 30.0 29.8   MCHC 34.1 32.8   RDW 13.3 13.8    304   MPV 10.3 10.1          1/27/2021  Lactic acid 1.3  Lipase 3.8  Stool-sent  C. difficile sent  Blood cultures pending  COVID-19 negative  Urine drug screen negative, acetaminophen level elevated at 49.1        Radiology:   CT ABDOMEN PELVIS W IV CONTRAST Additional Contrast? None   Final Result   There are numerous liver lesions. Multiple small lesions are possibly   cystic. Larger lesions in the left lobe are more nonspecific. The right   lobe is also heterogeneous. MRI correlation is recommended for   characterization of these abnormalities. Cholelithiasis. Biliary dilatation and probable multiple common bile duct   stones. Soft tissue nodule adjacent to the diaphragmatic deo and right intrarenal   gland measuring 2 cm. This this may be an adrenal adenoma or lymph node. Sigmoid diverticulosis. No acute diverticulitis.    Small right pleural effusion with basilar atelectasis.       XR CHEST PORTABLE   Final Result   There is no acute abnormality seen.             Assessment:    Active Problems:    Diarrhea    Essential hypertension    Atrial fibrillation (HCC)    Hypokalemia    Functional diarrhea    Calculus of gallbladder    Choledocholithiasis    Chronic obstructive pulmonary disease (HCC)    Alcohol reevaluate           Code Status: Full code  DVT prophylaxis: Lovenox  Diet-n.p.o. for now except meds   -General diet    NOTE: This report was transcribed using voice recognition software. Every effort was made to ensure accuracy; however, inadvertent computerized transcription errors may be present.   Electronically signed by KISHORE Bradley on 1/28/2021 at 3:43 PM

## 2021-01-28 NOTE — CONSULTS
[facility]  CONSULT NOTE            Date: 2021        Patient Name: Veda Rand     YOB: 1945      Age:  70 y.o. Consult by: Dr Elisa Fournier  Consult to: Dr Alyssia Jamison  Reason:  Liver lesions, dilated biliary tree    Chief Complaint     Chief Complaint   Patient presents with    Diarrhea     Pt states having cramps after diarrhea x8 days. Denies fever. History Obtained From   patient, electronic medical record    History of Present Illness   Veda Rand is a 70 y.o. male with ear skin cancer and alcohol use of 30 beers per week who presents for one week of abdominal pain, nausea, and nonbloody light green liquid diarrhea. He was taking laxatives during rather than before the diarrhea started. Pain started in [place] but is now diffuse. Denies tobacco/drugs. He was a  for plumbing. Denies prior liver issues or hepatitis/cirrhosis history that he knows of. Mom  of unknown cancer four years ago. Never had EGD/colonoscopy. Denies melena/hematochezia.     Past Medical History     Past Medical History:   Diagnosis Date    Bleeding from the nose     left nare 16    Skin cancer of face     Dx         Past Surgical History     Past Surgical History:   Procedure Laterality Date    OTHER SURGICAL HISTORY Left 2009    left sided ear surgery        Medications - Prior to Admission and Current Meds     Prior to Admission medications    Not on File        Inpatient:      HYDROcodone-acetaminophen (NORCO) 5-325 MG per tablet 1 tablet, Q6H PRN      hydrALAZINE (APRESOLINE) injection 5 mg, Q6H PRN      sodium chloride flush 0.9 % injection 10 mL, 2 times per day      sodium chloride flush 0.9 % injection 10 mL, PRN      enoxaparin (LOVENOX) injection 40 mg, Daily      promethazine (PHENERGAN) tablet 12.5 mg, Q6H PRN    Or      ondansetron (ZOFRAN) injection 4 mg, Q6H PRN      polyethylene glycol (GLYCOLAX) packet 17 g, Daily PRN      acetaminophen (TYLENOL) tablet 650 mg, Q6H PRN    Or      acetaminophen (TYLENOL) suppository 650 mg, Q6H PRN      0.9 % sodium chloride infusion, Continuous      folic acid (FOLVITE) tablet 1 mg, Daily      thiamine tablet 100 mg, Daily      multivitamin 1 tablet, Daily      lisinopril (PRINIVIL;ZESTRIL) tablet 10 mg, Daily      metoprolol tartrate (LOPRESSOR) tablet 25 mg, BID      LORazepam (ATIVAN) tablet 1 mg, Q1H PRN    Or      LORazepam (ATIVAN) injection 1 mg, Q1H PRN    Or      LORazepam (ATIVAN) tablet 2 mg, Q1H PRN    Or      LORazepam (ATIVAN) injection 2 mg, Q1H PRN    Or      LORazepam (ATIVAN) tablet 3 mg, Q1H PRN    Or      LORazepam (ATIVAN) injection 3 mg, Q1H PRN    Or      LORazepam (ATIVAN) tablet 4 mg, Q1H PRN    Or      LORazepam (ATIVAN) injection 4 mg, Q1H PRN        Allergies   Patient has no known allergies. Social History     Social History     Tobacco History     Smoking Status  Former Smoker Quit date  5/5/2006    Smokeless Tobacco Use  Never Used          Alcohol History     Alcohol Use Status  Yes Drinks/Week  30 Cans of beer per week Amount  30.0 standard drinks of alcohol/wk          Drug Use     Drug Use Status  No          Sexual Activity     Sexually Active  Not Currently Partners  Female                Family History   History reviewed. No pertinent family history. Review of Systems   Pertinent ROS listed in HPI, all others negative    Physical Exam   BP (!) 116/51   Pulse 80   Temp 98 °F (36.7 °C) (Oral)   Resp 16   Ht 5' 11\" (1.803 m)   Wt 160 lb (72.6 kg)   SpO2 92%   BMI 22.32 kg/m²     GENERAL:  No acute distress. Alert and conversational.   HEAD:  Normocephalic, atraumatic. EYES:  No scleral icterus. Conjugate gaze. ENT/NECK:  Trachea midline, mucous membranes dry. LUNGS:  Intermittent cough. Nonlabored breathing on room air. CARDIOVASC:  Normal rate, no cyanosis. ABDOMEN:  Soft, non-distended, non-tender. No guarding / rigidity / rebound. LIMBS:  No deformities, no edema. NEURO:  Face symmetric, moves all extremities  SKIN:  Warm, dry. Labs    CBC  Recent Labs     01/28/21  0324   WBC 15.8*   HGB 9.6*   HCT 29.3*        BMP  Recent Labs     01/28/21  0324      K 3.2*      CO2 27   BUN 9   CREATININE 0.6*   CALCIUM 6.8*     Liver Function  Recent Labs     01/27/21  0329 01/28/21  0324   LIPASE 38  --    BILITOT 0.9 0.6   AST 22 18   ALT 24 17   ALKPHOS 115 103   PROT 6.7 5.3*   LABALBU 2.8* 2.0*     No results for input(s): LACTATE in the last 72 hours. Recent Labs     01/27/21  0446   INR 1.3       Imaging/Diagnostics Last 24 Hours   Ct Abdomen Pelvis W Iv Contrast Additional Contrast? None    Result Date: 1/27/2021  EXAMINATION: CT OF THE ABDOMEN AND PELVIS WITH CONTRAST 1/27/2021 5:13 am TECHNIQUE: CT of the abdomen and pelvis was performed with the administration of intravenous contrast. Multiplanar reformatted images are provided for review. Dose modulation, iterative reconstruction, and/or weight based adjustment of the mA/kV was utilized to reduce the radiation dose to as low as reasonably achievable. COMPARISON: None. HISTORY: ORDERING SYSTEM PROVIDED HISTORY: abdominal pain TECHNOLOGIST PROVIDED HISTORY: Reason for exam:->abdominal pain Additional Contrast?->None FINDINGS: Lower Chest: There is a small right pleural effusion. There is right basilar atelectasis. There are coronary artery calcifications. Organs: There are numerous very small low-density liver lesions which are non-specific. They may represent multiple small cysts. In addition there are 2 low-density lesions in the left lobe which measures 3.3 and 2.6 cm. They is heterogeneous subtle lower density throughout the posterior right hepatic lobe as well. There is a 4.3 cm probable cyst in the caudate lobe. Along the lateral margin of the diaphragmatic deo abutting the right adrenal gland is a low-density nodule measuring about 2 cm. It does not clearly emanate from the adrenal gland. In any case it may be an adrenal adenoma or could be a lymph node or nonspecific cystic lesion. There is cholelithiasis. There is mild intrahepatic and extrahepatic biliary dilatation. CBD stones are suspected. The spleen, pancreas and left adrenal gland are unremarkable. There is bilateral nephrolithiasis. There are several right renal cysts. GI/Bowel: There are no findings of intestinal obstruction. The appendix is normal.  There is sigmoid diverticulosis. There is no diverticulitis seen. Pelvis:  Bladder is unremarkable in appearance. There is no abnormal pelvic mass or fluid collection seen. Peritoneum/Retroperitoneum: There are aortoiliac atherosclerotic calcification. There is no abdominal aortic aneurysm. There is no free intraperitoneal air. There is no abnormal fluid collection. Bones/Soft Tissues: There is no acute bony or soft tissue abnormality seen. There are numerous liver lesions. Multiple small lesions are possibly cystic. Larger lesions in the left lobe are more nonspecific. The right lobe is also heterogeneous. MRI correlation is recommended for characterization of these abnormalities. Cholelithiasis. Biliary dilatation and probable multiple common bile duct stones. Soft tissue nodule adjacent to the diaphragmatic deo and right intrarenal gland measuring 2 cm. This this may be an adrenal adenoma or lymph node. Sigmoid diverticulosis. No acute diverticulitis. Small right pleural effusion with basilar atelectasis. Us Gallbladder Ruq    Result Date: 1/27/2021  EXAMINATION: RIGHT UPPER QUADRANT ULTRASOUND 1/27/2021 10:30 am COMPARISON: AP CT 01/27/2021 HISTORY: ORDERING SYSTEM PROVIDED HISTORY: Diffuse stabbing and cramping abdominal pain and diarrhea on going for 8 days and is worsening TECHNOLOGIST PROVIDED HISTORY: Epigastric abdominal pain. Reason for exam:->evaluate What reading provider will be dictating this exam?->CRC FINDINGS: Normal-appearing pancreas.  No right upper quadrant ascites. Normal aorta and patent IVC. No definite ultrasound evidence of lesion within the liver parenchyma. Nonspecific nodular density in christopher hepatis region, near the left hepatic lobe, may be a 5 cm enlarged lymph node. A right renal non-specific, smoothly marginated, hypoechoic lesion is statistically most likely a cyst.  There is no evidence of mural nodularity, septation, wall thickening, or calcification. It measures 4.7 cm in the lateral midpole region, exophytic. Multiple shadowing gallstones in gallbladder lumen. No gallbladder wall thickening. No pericholecystic fluid. Distended common bile duct up to 12.5 mm diameter. Non-specific echogenic material in the common bile duct lumen may be sludge and/or calculi. Cholecystolithiasis. Suggestion also of choledocholithiasis with common bile duct distention Solid-appearing mass adjacent to the left hepatic lobe, in the christopher hepatis region, may be artifact from the duodenum. Differential includes enlarged lymph node Simple appearing right renal cyst    Xr Chest Portable    Result Date: 1/27/2021  EXAMINATION: ONE XRAY VIEW OF THE CHEST 1/27/2021 5:19 am COMPARISON: None. HISTORY: ORDERING SYSTEM PROVIDED HISTORY: cough TECHNOLOGIST PROVIDED HISTORY: Reason for exam:->cough FINDINGS: Heart size is normal. Pulmonary vasculature is not congested. Mediastinal and hilar contours are acceptable. The lungs are clear. The pleural spaces are clear. There is no pneumothorax. There is no acute abnormality seen.       Assessment/Plan      Hospital Problems           Last Modified POA    Diarrhea 1/27/2021 Yes    Essential hypertension 1/27/2021 Yes    Atrial fibrillation ([address] 1/27/2021 Yes    Hypokalemia 1/27/2021 Yes    Functional diarrhea 1/27/2021 Yes    Calculus of gallbladder 1/27/2021 Yes          70 y.o. male with portal vein thrombosis, choledocholithiasis and hepatic abscess    - IR biopsy - nothing to drain but need to rule out cancer  - ID consultation  - will likely need gallbladder removed electively - choledocholithiasis needs addressed    Electronically signed by Ángela Amezcua MD on 1/29/2021 at 6:54 AM

## 2021-01-28 NOTE — PROGRESS NOTES
3:36 PM  Consult placed to answering service for Dr. Antoine Reyna.   Piedad Trevino, [facility]  1/28/2021

## 2021-01-28 NOTE — CONSULTS
INPATIENT CARDIOLOGY CONSULT    Name: Veda Rand    Age: 70 y.o. Date of Admission: 1/27/2021  2:40 AM    Date of Service: 1/28/2021    Reason for Consultation: Abdominal pain, diarrhea, paroxysmal atrial fibrillation, hypokalemia, chronic obstructive lung disease, ethanol abuse    Referring Physician: Julissa Vera DO    History of Present Illness: The patient is a 82-year-old white male with no association to [id number] and no known structural heart disease. 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 He additionally has a history of significant ethanol consumption in excess of 8 ounces daily as well as that of a significant previous history of tobacco consumption associated chronic obstructive lung disease. He otherwise receives no routine medical care. He is recently developed predominantly left lower quadrant abdominal discomfort as well as the onset of diarrhea potentially contributed to by concerns of constipation and the use of stool softeners and laxatives. On the basis of these symptoms, he presented to the emergency room and during the course of assessment a resting electrocardiogram was performed with review at the time of assessment and findings of atrial fibrillation with acceptable rate control and nonspecific ST changes in the absence of medical management. Chest x-ray again reviewed demonstrated no evidence of cardiomegaly or infiltrate and laboratory studies demonstrating normal cardiac biomarkers with significant hypokalemia upon admission and a potassium of 2.6 mEq/L in the face of normal renal function as well as that of nutritional deficiency. Additional has been included that of a CT scan of the abdomen demonstrating biliary dilatation and cholelithiasis as well as multiple hepatic abnormalities and an abdominal ultrasound suggesting evidence of choledocholithiasis.   In discussion with he and his daughter, he relates no specific cardiovascular symptoms of anginal-like chest discomfort or other ischemic equivalents, decompensated left ventricular systolic dysfunction or volume overload. He has noted no arrhythmia related symptoms nor symptoms of a focal neurologic origin. .    Review of Systems: The remainder of a complete multisystem review including consitutional, central nervous, respiratory, circulatory, gastrointestinal, genitourinary, endocrinologic, hematologic, musculoskeletal and psychiatric are negative. Past Medical History:  Past Medical History:   Diagnosis Date    Bleeding from the nose     left nare 16    Skin cancer of face     Dx 2009       Past Surgical History:  Past Surgical History:   Procedure Laterality Date    OTHER SURGICAL HISTORY Left 2009    left sided ear surgery       Family History:  History reviewed. No pertinent family history. Social History:  Social History     Socioeconomic History    Marital status:      Spouse name: Not on file    Number of children: Not on file    Years of education: Not on file    Highest education level: Not on file   Occupational History    Not on file   Social Needs    Financial resource strain: Not on file    Food insecurity     Worry: Not on file     Inability: Not on file    Transportation needs     Medical: Not on file     Non-medical: Not on file   Tobacco Use    Smoking status: Former Smoker     Quit date: 2006     Years since quittin.7    Smokeless tobacco: Never Used   Substance and Sexual Activity    Alcohol use:  Yes     Alcohol/week: 30.0 standard drinks     Types: 30 Cans of beer per week    Drug use: No    Sexual activity: Not Currently     Partners: Female   Lifestyle    Physical activity     Days per week: Not on file     Minutes per session: Not on file    Stress: Not on file   Relationships    Social connections     Talks on phone: Not on file     Gets together: Not on file Attends Anabaptism service: Not on file     Active member of club or organization: Not on file     Attends meetings of clubs or organizations: Not on file     Relationship status: Not on file    Intimate partner violence     Fear of current or ex partner: Not on file     Emotionally abused: Not on file     Physically abused: Not on file     Forced sexual activity: Not on file   Other Topics Concern    Not on file   Social History Narrative    Not on file       Allergies:  No Known Allergies    Home Medications:  Prior to Admission medications    Not on File       Current Medications:  Current Facility-Administered Medications   Medication Dose Route Frequency Provider Last Rate Last Admin    potassium chloride (KLOR-CON M) extended release tablet 40 mEq  40 mEq Oral Once Leonela Watters APRN - ALECIA        perflutren lipid microspheres (DEFINITY) injection 1.65 mg  1.5 mL Intravenous ONCE PRN Get Cordoba MD        HYDROcodone-acetaminophen [facility]) 5-325 MG per tablet 1 tablet  1 tablet Oral Q6H PRN Rafiq Ascencio MD   1 tablet at 01/27/21 0917    hydrALAZINE (APRESOLINE) injection 5 mg  5 mg Intravenous Q6H PRN Leonela Watters APRN - CNS        sodium chloride flush 0.9 % injection 10 mL  10 mL Intravenous 2 times per day Leonela Watters APRN - CNS   10 mL at 01/27/21 2126    sodium chloride flush 0.9 % injection 10 mL  10 mL Intravenous PRN Leonela Watters APRN - CNS        enoxaparin (LOVENOX) injection 40 mg  40 mg Subcutaneous Daily Leonela Watters APRN - CNS   40 mg at 01/27/21 1254    promethazine (PHENERGAN) tablet 12.5 mg  12.5 mg Oral Q6H PRN KISHORE Zeng - CNS        Or    ondansetron (ZOFRAN) injection 4 mg  4 mg Intravenous Q6H PRN Leonela Watters APRN - ALECIA        polyethylene glycol (GLYCOLAX) packet 17 g  17 g Oral Daily PRN KISHORE Zeng - CNS        acetaminophen (TYLENOL) tablet 650 mg  650 mg Oral Q6H PRN Leonela Watters APRN - CNS   650 mg at 01/27/21 2018    Or    acetaminophen (TYLENOL) suppository 650 mg  650 mg Rectal Q6H PRN Eulogio Vincent, APRN - CNS        0.9 % sodium chloride infusion   Intravenous Continuous Eulogio Vincent APRN -  mL/hr at 01/27/21 2126 New Bag at 99/22/18 2090    folic acid (FOLVITE) tablet 1 mg  1 mg Oral Daily Eulogio Vincent, APRN - CNS   1 mg at 01/27/21 1254    thiamine tablet 100 mg  100 mg Oral Daily Eulogio Vincent, APRN - CNS   100 mg at 01/27/21 1252    multivitamin 1 tablet  1 tablet Oral Daily Eulogio Vincent, APRN - CNS   1 tablet at 01/27/21 1254    lisinopril (PRINIVIL;ZESTRIL) tablet 10 mg  10 mg Oral Daily Eulogio Vincent, APRN - CNS   10 mg at 01/27/21 1252    metoprolol tartrate (LOPRESSOR) tablet 25 mg  25 mg Oral BID Zach De Paz MD   25 mg at 01/27/21 2125    LORazepam (ATIVAN) tablet 1 mg  1 mg Oral Q1H PRN Hume Martinet, DO        Or    LORazepam (ATIVAN) injection 1 mg  1 mg Intravenous Q1H PRN Adam Martinet, DO   1 mg at 01/28/21 0037    Or    LORazepam (ATIVAN) tablet 2 mg  2 mg Oral Q1H PRN Hume Martinet, DO        Or    LORazepam (ATIVAN) injection 2 mg  2 mg Intravenous Q1H PRN Hume Martinet, DO        Or    LORazepam (ATIVAN) tablet 3 mg  3 mg Oral Q1H PRN Hume Martinet, DO        Or    LORazepam (ATIVAN) injection 3 mg  3 mg Intravenous Q1H PRN Hume Martinet, DO        Or    LORazepam (ATIVAN) tablet 4 mg  4 mg Oral Q1H PRN Adam Martinet, DO        Or    LORazepam (ATIVAN) injection 4 mg  4 mg Intravenous Q1H PRN Adam Martinet, DO          sodium chloride 100 mL/hr at 01/27/21 2126         Physical Exam:  BP (!) 162/76   Pulse 86   Temp 98 °F (36.7 °C) (Oral)   Resp 18   Ht 5' 11\" (1.803 m)   Wt 160 lb (72.6 kg)   SpO2 97%   BMI 22.32 kg/m²   Weight change: Wt Readings from Last 3 Encounters:   01/27/21 160 lb (72.6 kg)   05/05/16 180 lb (81.6 kg)     The patient is awake, alert and in no discomfort or distress. A moist cough is noted during assessment.   No gross musculoskeletal deformity or lymphadenopathy are present. No significant skin or nail changes are present. Gross examination of head, eyes, nose and throat are negative beyond that of the removal of his left ear and that of poor dentition. Jugular venous pressure is normal and no carotid bruits are present. No thyromegaly is noted. Normal respiratory effort is noted with no accessory muscle usage present. Lung fields are clear to ascultation. Cardiac examination is notable for a regular rate and rhythm with no palpable thrill. No gallop rhythm or cardiac murmur are identified. A benign abdominal examination is present with no masses or organomegaly. A normal abdominal aortic pulsation is present. Intact pulses are present throughout all extremities and no peripheral edema is present. No focal neurologic deficits are present. Intake/Output:    Intake/Output Summary (Last 24 hours) at 1/28/2021 1106  Last data filed at 1/27/2021 2021  Gross per 24 hour   Intake 2100 ml   Output --   Net 2100 ml     No intake/output data recorded. Laboratory Tests:  Lab Results   Component Value Date    CREATININE 0.6 (L) 01/28/2021    BUN 9 01/28/2021     01/28/2021    K 3.2 (L) 01/28/2021     01/28/2021    CO2 27 01/28/2021     No results for input(s): CKTOTAL, CKMB in the last 72 hours.     Invalid input(s): TROPONONI  No results found for: BNP  Lab Results   Component Value Date    WBC 15.8 01/28/2021    RBC 3.22 01/28/2021    HGB 9.6 01/28/2021    HCT 29.3 01/28/2021    MCV 91.0 01/28/2021    MCH 29.8 01/28/2021    MCHC 32.8 01/28/2021    RDW 13.8 01/28/2021     01/28/2021    MPV 10.1 01/28/2021     Recent Labs     01/27/21  0329 01/28/21  0324   ALKPHOS 115 103   ALT 24 17   AST 22 18   PROT 6.7 5.3*   BILITOT 0.9 0.6   LABALBU 2.8* 2.0*     Lab Results   Component Value Date    MG 1.8 01/28/2021     Lab Results   Component Value Date    PROTIME 15.4 01/27/2021    INR 1.3 01/27/2021     No results found for: TSH  No components found for: CHLPL  Lab Results   Component Value Date    TRIG 94 01/27/2021     Lab Results   Component Value Date    HDL 23 01/27/2021     Lab Results   Component Value Date    LDLCALC 54 01/27/2021         Cardiac Tests:  ECG: An initial electrocardiogram reviewed at time of evaluation demonstrated evidence of atrial fibrillation with a mean ventricular response of approximately 75 bpm with nonspecific ST changes  Telemetry findings reviewed: sinus rhythm, no new tachy/bradyarrhythmias overnight  Chest X-ray: A chest x-ray reviewed at the time evaluation demonstrates no evidence of cardiomegaly with chronic interstitial changes      ASSESSMENT / PLAN: On a clinical basis, the patient's presentation is predominantly related to that of noncardiovascular factors inclusive of abdominal pain and diarrhea with objective evidence suggesting evidence of choledocholithiasis and hepatic abnormalities in the face of his chronic ethanol consumption. This is in addition to his laxative usage and diarrhea have contributed to his metabolic derangements which may have precipitated his atrial fibrillation which is presently spontaneously resolved. He is remained hypertensive throughout his hospitalization with present therapy appropriate for blood pressure regulation as well as that of the use of a beta-blocker additionally providing rate control should recurrent atrial arrhythmias develop. An echocardiogram is presently pending for assessment of the presence or absence of structural heart disease and based on correctable factors and the paroxysmal nature of his atrial fibrillation, at present anticoagulation has not been recommended with needs of arrhythmia follow-up to assess the presence or absence of recurrences and the potential future needs of anticoagulation, especially if hypertension is persistent contributing to a [id number] risk score of 2.   His needs of abstinence of further ethanol consumption have been discussed with he and family. Presently we will wait and recommendations regarding the findings of his magnetic resonance imaging study to determine if additional intervention is necessary and we will further evaluate him during hospitalization should additional cardiovascular difficulties or concerns arise. Thank you for allowing me to participate in your patient's care. Please feel free to contact me if you have any questions or concerns. Note: This report was completed using computerized voice recognition software. Every effort has been made to ensure accuracy, however; inadvertent computerized transcription errors may be present. Brennan Anthony.  Emily Tuttle, [address] [place] [address]

## 2021-01-28 NOTE — CARE COORDINATION
1/28/2021  Social Work Discharge Planning:SW went to see Pt;however, Pt is out for an MRI. Will revisit again later. Pt has no PCP. SW put PCP Pre-service number in followup for Pt to call and get established with a PCP. Pt is on room air. Electronically signed by ANAYELI Silveira on 1/28/2021 at 11:20 AM    1/28/2021  Social Work Discharge Planning:SW went to see Pt;however he is sleeping deeply-SW will see at later time. Electronically signed by ANAYELI Silveira on 1/28/2021 at 2:50 PM

## 2021-01-28 NOTE — PROGRESS NOTES
Physical Therapy    Facility/Department: [address] INTERNAL MEDICINE 2    NAME: Jovana Sharma  : 1945  MRN: [id number]     Chart reviewed and PT eval attempted twice this date. First attempt pt out of the room and second attempt pt eating lunch. Will check back at later time/date.      Roshni Alves, [address]

## 2021-01-28 NOTE — ED NOTES
KEVIN faxed to floor. Spoke with Marilou who stated she received it. Pt ready.      Nirmala Bishop RN  01/27/21 2059

## 2021-01-29 ENCOUNTER — ANESTHESIA (OUTPATIENT)
Dept: ENDOSCOPY | Age: 76
DRG: 871 | End: 2021-01-29
Payer: MEDICARE

## 2021-01-29 ENCOUNTER — APPOINTMENT (OUTPATIENT)
Dept: GENERAL RADIOLOGY | Age: 76
DRG: 871 | End: 2021-01-29
Payer: MEDICARE

## 2021-01-29 ENCOUNTER — ANESTHESIA EVENT (OUTPATIENT)
Dept: ENDOSCOPY | Age: 76
DRG: 871 | End: 2021-01-29
Payer: MEDICARE

## 2021-01-29 VITALS — SYSTOLIC BLOOD PRESSURE: 144 MMHG | DIASTOLIC BLOOD PRESSURE: 75 MMHG | OXYGEN SATURATION: 96 %

## 2021-01-29 LAB
ADENOVIRUS BY PCR: NOT DETECTED
ALBUMIN SERPL-MCNC: 2.4 G/DL (ref 3.5–5.2)
ALP BLD-CCNC: 119 U/L (ref 40–129)
ALT SERPL-CCNC: 20 U/L (ref 0–40)
AMYLASE: 24 U/L (ref 20–100)
ANION GAP SERPL CALCULATED.3IONS-SCNC: 8 MMOL/L (ref 7–16)
AST SERPL-CCNC: 25 U/L (ref 0–39)
BASOPHILS ABSOLUTE: 0.04 E9/L (ref 0–0.2)
BASOPHILS RELATIVE PERCENT: 0.3 % (ref 0–2)
BILIRUB SERPL-MCNC: 0.6 MG/DL (ref 0–1.2)
BORDETELLA PARAPERTUSSIS BY PCR: NOT DETECTED
BORDETELLA PERTUSSIS BY PCR: NOT DETECTED
BUN BLDV-MCNC: 11 MG/DL (ref 8–23)
CALCIUM IONIZED: 1.2 MMOL/L (ref 1.15–1.33)
CALCIUM SERPL-MCNC: 7.9 MG/DL (ref 8.6–10.2)
CHLAMYDOPHILIA PNEUMONIAE BY PCR: NOT DETECTED
CHLORIDE BLD-SCNC: 98 MMOL/L (ref 98–107)
CO2: 25 MMOL/L (ref 22–29)
CORONAVIRUS 229E BY PCR: NOT DETECTED
CORONAVIRUS HKU1 BY PCR: NOT DETECTED
CORONAVIRUS NL63 BY PCR: NOT DETECTED
CORONAVIRUS OC43 BY PCR: NOT DETECTED
CREAT SERPL-MCNC: 0.6 MG/DL (ref 0.7–1.2)
CULTURE, STOOL: NORMAL
EKG ATRIAL RATE: 71 BPM
EKG P AXIS: 3 DEGREES
EKG P-R INTERVAL: 190 MS
EKG Q-T INTERVAL: 400 MS
EKG QRS DURATION: 94 MS
EKG QTC CALCULATION (BAZETT): 434 MS
EKG R AXIS: 23 DEGREES
EKG T AXIS: 44 DEGREES
EKG VENTRICULAR RATE: 71 BPM
EOSINOPHILS ABSOLUTE: 0.01 E9/L (ref 0.05–0.5)
EOSINOPHILS RELATIVE PERCENT: 0.1 % (ref 0–6)
FECAL NEUTRAL FAT: NORMAL
FECAL SPLIT FATS: NORMAL
GFR AFRICAN AMERICAN: >60
GFR NON-AFRICAN AMERICAN: >60 ML/MIN/1.73
GLUCOSE BLD-MCNC: 102 MG/DL (ref 74–99)
HCT VFR BLD CALC: 31.9 % (ref 37–54)
HEMOGLOBIN: 10.5 G/DL (ref 12.5–16.5)
HUMAN METAPNEUMOVIRUS BY PCR: NOT DETECTED
HUMAN RHINOVIRUS/ENTEROVIRUS BY PCR: NOT DETECTED
IMMATURE GRANULOCYTES #: 0.12 E9/L
IMMATURE GRANULOCYTES %: 0.8 % (ref 0–5)
INFLUENZA A BY PCR: NOT DETECTED
INFLUENZA B BY PCR: NOT DETECTED
INR BLD: 1.3
LIPASE: 39 U/L (ref 13–60)
LYMPHOCYTES ABSOLUTE: 1.08 E9/L (ref 1.5–4)
LYMPHOCYTES RELATIVE PERCENT: 7.5 % (ref 20–42)
MAGNESIUM: 1.8 MG/DL (ref 1.6–2.6)
MCH RBC QN AUTO: 29.6 PG (ref 26–35)
MCHC RBC AUTO-ENTMCNC: 32.9 % (ref 32–34.5)
MCV RBC AUTO: 89.9 FL (ref 80–99.9)
MONOCYTES ABSOLUTE: 1.15 E9/L (ref 0.1–0.95)
MONOCYTES RELATIVE PERCENT: 8 % (ref 2–12)
MYCOPLASMA PNEUMONIAE BY PCR: NOT DETECTED
NEUTROPHILS ABSOLUTE: 11.98 E9/L (ref 1.8–7.3)
NEUTROPHILS RELATIVE PERCENT: 83.3 % (ref 43–80)
PARAINFLUENZA VIRUS 1 BY PCR: NOT DETECTED
PARAINFLUENZA VIRUS 2 BY PCR: NOT DETECTED
PARAINFLUENZA VIRUS 3 BY PCR: NOT DETECTED
PARAINFLUENZA VIRUS 4 BY PCR: NOT DETECTED
PDW BLD-RTO: 13.7 FL (ref 11.5–15)
PHOSPHORUS: 2.5 MG/DL (ref 2.5–4.5)
PLATELET # BLD: 377 E9/L (ref 130–450)
PMV BLD AUTO: 10 FL (ref 7–12)
POTASSIUM SERPL-SCNC: 3.7 MMOL/L (ref 3.5–5)
PROTHROMBIN TIME: 15.4 SEC (ref 9.3–12.4)
RBC # BLD: 3.55 E12/L (ref 3.8–5.8)
RESPIRATORY SYNCYTIAL VIRUS BY PCR: NOT DETECTED
SARS-COV-2, PCR: NOT DETECTED
SODIUM BLD-SCNC: 131 MMOL/L (ref 132–146)
TOTAL PROTEIN: 6.1 G/DL (ref 6.4–8.3)
WBC # BLD: 14.4 E9/L (ref 4.5–11.5)

## 2021-01-29 PROCEDURE — 36415 COLL VENOUS BLD VENIPUNCTURE: CPT

## 2021-01-29 PROCEDURE — 82330 ASSAY OF CALCIUM: CPT

## 2021-01-29 PROCEDURE — 3609015200 HC ERCP REMOVE CALCULI/DEBRIS BILIARY/PANCREAS DUCT: Performed by: INTERNAL MEDICINE

## 2021-01-29 PROCEDURE — 6360000002 HC RX W HCPCS: Performed by: INTERNAL MEDICINE

## 2021-01-29 PROCEDURE — 99233 SBSQ HOSP IP/OBS HIGH 50: CPT | Performed by: INTERNAL MEDICINE

## 2021-01-29 PROCEDURE — 2500000003 HC RX 250 WO HCPCS: Performed by: INTERNAL MEDICINE

## 2021-01-29 PROCEDURE — 0FC98ZZ EXTIRPATION OF MATTER FROM COMMON BILE DUCT, VIA NATURAL OR ARTIFICIAL OPENING ENDOSCOPIC: ICD-10-PCS | Performed by: INTERNAL MEDICINE

## 2021-01-29 PROCEDURE — 6360000002 HC RX W HCPCS: Performed by: NURSE ANESTHETIST, CERTIFIED REGISTERED

## 2021-01-29 PROCEDURE — 82105 ALPHA-FETOPROTEIN SERUM: CPT

## 2021-01-29 PROCEDURE — 2580000003 HC RX 258: Performed by: NURSE PRACTITIONER

## 2021-01-29 PROCEDURE — 6370000000 HC RX 637 (ALT 250 FOR IP): Performed by: INTERNAL MEDICINE

## 2021-01-29 PROCEDURE — 0F798DZ DILATION OF COMMON BILE DUCT WITH INTRALUMINAL DEVICE, VIA NATURAL OR ARTIFICIAL OPENING ENDOSCOPIC: ICD-10-PCS | Performed by: INTERNAL MEDICINE

## 2021-01-29 PROCEDURE — 3700000001 HC ADD 15 MINUTES (ANESTHESIA): Performed by: INTERNAL MEDICINE

## 2021-01-29 PROCEDURE — 2580000003 HC RX 258: Performed by: INTERNAL MEDICINE

## 2021-01-29 PROCEDURE — 85610 PROTHROMBIN TIME: CPT

## 2021-01-29 PROCEDURE — 6360000002 HC RX W HCPCS: Performed by: NURSE PRACTITIONER

## 2021-01-29 PROCEDURE — 92526 ORAL FUNCTION THERAPY: CPT | Performed by: SPEECH-LANGUAGE PATHOLOGIST

## 2021-01-29 PROCEDURE — C1874 STENT, COATED/COV W/DEL SYS: HCPCS | Performed by: INTERNAL MEDICINE

## 2021-01-29 PROCEDURE — 2580000003 HC RX 258: Performed by: NURSE ANESTHETIST, CERTIFIED REGISTERED

## 2021-01-29 PROCEDURE — 0202U NFCT DS 22 TRGT SARS-COV-2: CPT

## 2021-01-29 PROCEDURE — 80053 COMPREHEN METABOLIC PANEL: CPT

## 2021-01-29 PROCEDURE — 93010 ELECTROCARDIOGRAM REPORT: CPT | Performed by: INTERNAL MEDICINE

## 2021-01-29 PROCEDURE — 83735 ASSAY OF MAGNESIUM: CPT

## 2021-01-29 PROCEDURE — 2500000003 HC RX 250 WO HCPCS: Performed by: SURGERY

## 2021-01-29 PROCEDURE — 6360000004 HC RX CONTRAST MEDICATION: Performed by: INTERNAL MEDICINE

## 2021-01-29 PROCEDURE — 84100 ASSAY OF PHOSPHORUS: CPT

## 2021-01-29 PROCEDURE — 97161 PT EVAL LOW COMPLEX 20 MIN: CPT

## 2021-01-29 PROCEDURE — 82150 ASSAY OF AMYLASE: CPT

## 2021-01-29 PROCEDURE — 2500000003 HC RX 250 WO HCPCS: Performed by: NURSE ANESTHETIST, CERTIFIED REGISTERED

## 2021-01-29 PROCEDURE — C9113 INJ PANTOPRAZOLE SODIUM, VIA: HCPCS | Performed by: NURSE PRACTITIONER

## 2021-01-29 PROCEDURE — 6360000002 HC RX W HCPCS: Performed by: SURGERY

## 2021-01-29 PROCEDURE — 6370000000 HC RX 637 (ALT 250 FOR IP): Performed by: CLINICAL NURSE SPECIALIST

## 2021-01-29 PROCEDURE — 7100000011 HC PHASE II RECOVERY - ADDTL 15 MIN: Performed by: INTERNAL MEDICINE

## 2021-01-29 PROCEDURE — 74330 X-RAY BILE/PANC ENDOSCOPY: CPT

## 2021-01-29 PROCEDURE — 2580000003 HC RX 258: Performed by: CLINICAL NURSE SPECIALIST

## 2021-01-29 PROCEDURE — 2720000010 HC SURG SUPPLY STERILE: Performed by: INTERNAL MEDICINE

## 2021-01-29 PROCEDURE — 2580000003 HC RX 258: Performed by: SURGERY

## 2021-01-29 PROCEDURE — 2060000000 HC ICU INTERMEDIATE R&B

## 2021-01-29 PROCEDURE — 71045 X-RAY EXAM CHEST 1 VIEW: CPT

## 2021-01-29 PROCEDURE — 3700000000 HC ANESTHESIA ATTENDED CARE: Performed by: INTERNAL MEDICINE

## 2021-01-29 PROCEDURE — 7100000010 HC PHASE II RECOVERY - FIRST 15 MIN: Performed by: INTERNAL MEDICINE

## 2021-01-29 PROCEDURE — 97165 OT EVAL LOW COMPLEX 30 MIN: CPT

## 2021-01-29 PROCEDURE — C1769 GUIDE WIRE: HCPCS | Performed by: INTERNAL MEDICINE

## 2021-01-29 PROCEDURE — 83690 ASSAY OF LIPASE: CPT

## 2021-01-29 PROCEDURE — 99232 SBSQ HOSP IP/OBS MODERATE 35: CPT | Performed by: INTERNAL MEDICINE

## 2021-01-29 PROCEDURE — 2709999900 HC NON-CHARGEABLE SUPPLY: Performed by: INTERNAL MEDICINE

## 2021-01-29 PROCEDURE — 85025 COMPLETE CBC W/AUTO DIFF WBC: CPT

## 2021-01-29 PROCEDURE — 6370000000 HC RX 637 (ALT 250 FOR IP): Performed by: NURSE PRACTITIONER

## 2021-01-29 DEVICE — STENT SYSTEM RMV
Type: IMPLANTABLE DEVICE | Status: NON-FUNCTIONAL
Brand: WALLFLEX BILIARY
Removed: 2021-03-22

## 2021-01-29 RX ORDER — SODIUM CHLORIDE 9 MG/ML
INJECTION, SOLUTION INTRAVENOUS CONTINUOUS PRN
Status: DISCONTINUED | OUTPATIENT
Start: 2021-01-29 | End: 2021-01-29 | Stop reason: SDUPTHER

## 2021-01-29 RX ORDER — ONDANSETRON 2 MG/ML
INJECTION INTRAMUSCULAR; INTRAVENOUS PRN
Status: DISCONTINUED | OUTPATIENT
Start: 2021-01-29 | End: 2021-01-29 | Stop reason: SDUPTHER

## 2021-01-29 RX ORDER — PROPOFOL 10 MG/ML
INJECTION, EMULSION INTRAVENOUS CONTINUOUS PRN
Status: DISCONTINUED | OUTPATIENT
Start: 2021-01-29 | End: 2021-01-29 | Stop reason: SDUPTHER

## 2021-01-29 RX ORDER — PANTOPRAZOLE SODIUM 40 MG/10ML
40 INJECTION, POWDER, LYOPHILIZED, FOR SOLUTION INTRAVENOUS 2 TIMES DAILY
Status: DISCONTINUED | OUTPATIENT
Start: 2021-01-29 | End: 2021-02-07 | Stop reason: HOSPADM

## 2021-01-29 RX ORDER — SODIUM CHLORIDE 9 MG/ML
10 INJECTION INTRAVENOUS 2 TIMES DAILY
Status: DISCONTINUED | OUTPATIENT
Start: 2021-01-29 | End: 2021-02-07 | Stop reason: HOSPADM

## 2021-01-29 RX ORDER — SODIUM CHLORIDE, SODIUM LACTATE, POTASSIUM CHLORIDE, AND CALCIUM CHLORIDE .6; .31; .03; .02 G/100ML; G/100ML; G/100ML; G/100ML
500 INJECTION, SOLUTION INTRAVENOUS ONCE
Status: COMPLETED | OUTPATIENT
Start: 2021-01-30 | End: 2021-01-29

## 2021-01-29 RX ORDER — FUROSEMIDE 10 MG/ML
20 INJECTION INTRAMUSCULAR; INTRAVENOUS ONCE
Status: COMPLETED | OUTPATIENT
Start: 2021-01-29 | End: 2021-01-29

## 2021-01-29 RX ADMIN — Medication 10 ML: at 08:56

## 2021-01-29 RX ADMIN — FOLIC ACID 1 MG: 1 TABLET ORAL at 08:55

## 2021-01-29 RX ADMIN — Medication 10 ML: at 20:37

## 2021-01-29 RX ADMIN — HYDROMORPHONE HYDROCHLORIDE 0.5 MG: 1 INJECTION, SOLUTION INTRAMUSCULAR; INTRAVENOUS; SUBCUTANEOUS at 15:14

## 2021-01-29 RX ADMIN — WATER 2000 MG: 1 INJECTION INTRAMUSCULAR; INTRAVENOUS; SUBCUTANEOUS at 15:14

## 2021-01-29 RX ADMIN — PROPOFOL 100 MCG/KG/MIN: 10 INJECTION, EMULSION INTRAVENOUS at 13:14

## 2021-01-29 RX ADMIN — IOPAMIDOL 14 ML: 612 INJECTION, SOLUTION INTRAVENOUS at 13:46

## 2021-01-29 RX ADMIN — FUROSEMIDE 20 MG: 10 INJECTION, SOLUTION INTRAMUSCULAR; INTRAVENOUS at 11:48

## 2021-01-29 RX ADMIN — METOPROLOL TARTRATE 25 MG: 25 TABLET, FILM COATED ORAL at 08:55

## 2021-01-29 RX ADMIN — INDOMETHACIN 100 MG: 50 SUPPOSITORY RECTAL at 12:03

## 2021-01-29 RX ADMIN — SODIUM CHLORIDE, PRESERVATIVE FREE 10 ML: 5 INJECTION INTRAVENOUS at 16:02

## 2021-01-29 RX ADMIN — SODIUM CHLORIDE, POTASSIUM CHLORIDE, SODIUM LACTATE AND CALCIUM CHLORIDE 125 ML: 600; 310; 30; 20 INJECTION, SOLUTION INTRAVENOUS at 11:58

## 2021-01-29 RX ADMIN — SODIUM CHLORIDE: 9 INJECTION, SOLUTION INTRAVENOUS at 13:09

## 2021-01-29 RX ADMIN — GLUCAGON HYDROCHLORIDE 0.25 MG: KIT at 13:28

## 2021-01-29 RX ADMIN — LISINOPRIL 10 MG: 10 TABLET ORAL at 08:55

## 2021-01-29 RX ADMIN — Medication 100 MG: at 08:58

## 2021-01-29 RX ADMIN — GLUCAGON HYDROCHLORIDE 0.25 MG: KIT at 13:20

## 2021-01-29 RX ADMIN — SODIUM CHLORIDE, PRESERVATIVE FREE 10 ML: 5 INJECTION INTRAVENOUS at 20:37

## 2021-01-29 RX ADMIN — METRONIDAZOLE 500 MG: 500 INJECTION, SOLUTION INTRAVENOUS at 06:48

## 2021-01-29 RX ADMIN — ONDANSETRON 4 MG: 2 INJECTION INTRAMUSCULAR; INTRAVENOUS at 13:17

## 2021-01-29 RX ADMIN — MULTIVITAMIN TABLET 1 TABLET: TABLET at 08:55

## 2021-01-29 RX ADMIN — PANTOPRAZOLE SODIUM 40 MG: 40 INJECTION, POWDER, FOR SOLUTION INTRAVENOUS at 20:37

## 2021-01-29 RX ADMIN — METOPROLOL TARTRATE 25 MG: 25 TABLET, FILM COATED ORAL at 20:37

## 2021-01-29 RX ADMIN — METRONIDAZOLE 500 MG: 500 INJECTION, SOLUTION INTRAVENOUS at 23:38

## 2021-01-29 RX ADMIN — PANTOPRAZOLE SODIUM 40 MG: 40 INJECTION, POWDER, FOR SOLUTION INTRAVENOUS at 16:02

## 2021-01-29 RX ADMIN — METRONIDAZOLE 500 MG: 500 INJECTION, SOLUTION INTRAVENOUS at 15:06

## 2021-01-29 ASSESSMENT — PAIN DESCRIPTION - DESCRIPTORS: DESCRIPTORS: CONSTANT;CRAMPING

## 2021-01-29 ASSESSMENT — PAIN SCALES - GENERAL
PAINLEVEL_OUTOF10: 5
PAINLEVEL_OUTOF10: 0
PAINLEVEL_OUTOF10: 8
PAINLEVEL_OUTOF10: 0

## 2021-01-29 ASSESSMENT — LIFESTYLE VARIABLES: SMOKING_STATUS: 0

## 2021-01-29 ASSESSMENT — PAIN DESCRIPTION - PAIN TYPE: TYPE: ACUTE PAIN

## 2021-01-29 ASSESSMENT — PAIN DESCRIPTION - ONSET: ONSET: ON-GOING

## 2021-01-29 ASSESSMENT — PAIN - FUNCTIONAL ASSESSMENT: PAIN_FUNCTIONAL_ASSESSMENT: ACTIVITIES ARE NOT PREVENTED

## 2021-01-29 NOTE — PROGRESS NOTES
[facility] Hospitalist   Progress Note    Admitting Date and Time: 1/27/2021  2:40 AM  Admit Dx: Diarrhea [R19.7]     Seen for follow on multiple problems as listed below    Subjective  He was seen when he was going for ERCP. Continues with abd discomfort /nausea . No CP or sob. D/w bedside nurse. Uneventful night otherwise. ROS: denies fever, chills, cp, sob, n/v, HA unless stated above.      [START ON 1/30/2021] lactated ringers bolus  500 mL Intravenous Once    cefTRIAXone (ROCEPHIN) IV  2,000 mg Intravenous Q24H    metroNIDAZOLE  500 mg Intravenous Q8H    sodium chloride flush  10 mL Intravenous 2 times per day    enoxaparin  40 mg Subcutaneous Daily    folic acid  1 mg Oral Daily    thiamine  100 mg Oral Daily    multivitamin  1 tablet Oral Daily    lisinopril  10 mg Oral Daily    metoprolol tartrate  25 mg Oral BID         perflutren lipid microspheres, 1.5 mL, ONCE PRN      HYDROcodone 5 mg - acetaminophen, 1 tablet, Q6H PRN      hydrALAZINE, 5 mg, Q6H PRN      sodium chloride flush, 10 mL, PRN      promethazine, 12.5 mg, Q6H PRN    Or      ondansetron, 4 mg, Q6H PRN      polyethylene glycol, 17 g, Daily PRN      acetaminophen, 650 mg, Q6H PRN    Or      acetaminophen, 650 mg, Q6H PRN      LORazepam, 1 mg, Q1H PRN    Or      LORazepam, 1 mg, Q1H PRN    Or      LORazepam, 2 mg, Q1H PRN    Or      LORazepam, 2 mg, Q1H PRN    Or      LORazepam, 3 mg, Q1H PRN    Or      LORazepam, 3 mg, Q1H PRN    Or      LORazepam, 4 mg, Q1H PRN    Or      LORazepam, 4 mg, Q1H PRN         Objective:    /68   Pulse 79   Temp 98.7 °F (37.1 °C) (Axillary)   Resp 16   Ht 5' 11\" (1.803 m)   Wt 163 lb 4.8 oz (74.1 kg)   SpO2 97%   BMI 22.78 kg/m²   General Appearance: alert and oriented to person, place and time, in no acute distress  Skin: warm and dry  Head: normocephalic and atraumatic  Eyes: pupils equal, round, and reactive to light, extraocular eye movements intact, conjunctivae normal  Neck: supple and non-tender without mass  Pulmonary/Chest: clear to auscultation bilaterally  Cardiovascular: normal rate,  normal S1 and S2  Abdomen: soft, non-tender, non-distended, normal bowel sounds, no masses or organomegaly  Extremities: no cyanosis, clubbing   Musculoskeletal: normal range of motion  Neurologic:  no cranial nerve deficit,  speech normal      Recent Labs     01/27/21  1415 01/28/21  0324 01/29/21  0450    136 131*   K 3.6 3.2* 3.7   CL 95* 103 98   CO2 32* 27 25   BUN 7* 9 11   CREATININE 0.6* 0.6* 0.6*   GLUCOSE 149* 97 102*   CALCIUM 8.5* 6.8* 7.9*       Recent Labs     01/27/21  0329 01/28/21  0324 01/29/21  0450   WBC 13.2* 15.8* 14.4*   RBC 4.26 3.22* 3.55*   HGB 12.8 9.6* 10.5*   HCT 37.5 29.3* 31.9*   MCV 88.0 91.0 89.9   MCH 30.0 29.8 29.6   MCHC 34.1 32.8 32.9   RDW 13.3 13.8 13.7    304 377   MPV 10.3 10.1 10.0       Labs and images reviewed     Radiology:   XR CHEST PORTABLE   Final Result   New interstitial and/or vascular prominence may reflect reactive airways   disease, vascular congestion, interstitial edema, viral process, or   interstitial pneumonitis. Slight cardiomegaly      MRI ABDOMEN W WO CONTRAST MRCP   Final Result   1. Choledocholithiasis associated with severe extrahepatic biliary dilation   and mild central intrahepatic biliary dilation. Evaluation for superimposed   cholangitis is limited. 2. Cholelithiasis with no definite findings of cholecystitis. 3. Limited evaluation of the liver due to motion artifact. Signal   abnormalities seen primarily in hepatic segment 7 correspond with the CT   appearance and are suspicious for microabscesses. However, an infiltrative   malignancy could appear similar. Consider evaluation with liver protocol   abdomen CT, which would be less affected by motion.    4. Complicated fluid collections along the caudate lobe of the liver, between   right adrenal gland right diaphragmatic deo, adjacent to the retrohepatic   inferior vena cava, and likely within the christopher hepatis are suspicious for   abscesses given the above findings. 5. Mild dilation of the downstream pancreatic duct potentially due to mass   effect from stones in the distal common bile duct. Alternatively, this could   be related to moderate parenchymal atrophy. 6. Small right pleural effusion. US GALLBLADDER RUQ   Final Result   Cholecystolithiasis. Suggestion also of choledocholithiasis with common bile   duct distention   Solid-appearing mass adjacent to the left hepatic lobe, in the christopher hepatis   region, may be artifact from the duodenum. Differential includes enlarged   lymph node   Simple appearing right renal cyst      CT ABDOMEN PELVIS W IV CONTRAST Additional Contrast? None   Final Result   There are numerous liver lesions. Multiple small lesions are possibly   cystic. Larger lesions in the left lobe are more nonspecific. The right   lobe is also heterogeneous. MRI correlation is recommended for   characterization of these abnormalities. Cholelithiasis. Biliary dilatation and probable multiple common bile duct   stones. Soft tissue nodule adjacent to the diaphragmatic deo and right intrarenal   gland measuring 2 cm. This this may be an adrenal adenoma or lymph node. Sigmoid diverticulosis. No acute diverticulitis. Small right pleural effusion with basilar atelectasis. XR CHEST PORTABLE   Final Result   There is no acute abnormality seen.       IR Interventional Radiology Procedure Request    (Results Pending)   FL ERCP BILIARY AND PANCREATIC S&I    (Results Pending)       Assessment:    Active Problems:    Diarrhea    Essential hypertension    Atrial fibrillation (HCC)    Hypokalemia    Functional diarrhea    Gallstones    Choledocholithiasis    Chronic obstructive pulmonary disease (HCC)    Alcohol abuse    Atrial fibrillation, new onset ([address]    Liver lesion  Resolved Problems:    * No resolved hospital problems. *      Plan:  Abdominal pain with diarrhea-CT multiple liver lesions, cholelithiasis, CBD dilatation concerning for stones. Also has adrenal lesions. Ultrasound of gallbladder with solid appearing mass adjacent to left hepatic lobe. MRCP with cholelithiasis, multiple liver lesions with fluid collection suspicious of microabscesses. GI,GS  and IR consulted following. Continue supportive treatment. As above at present going for ERCP. Liver abscesses as per MRI-ID consulted and following. Has Leukocytosis and elevated pro Niranjan.  On IV ceftriaxone plus Flagyl. He is scheduled  for CT-guided biopsy of liver lesion. Hypokalemia-replete and follow    A. Fib-cardiology is consulted. As per cardiology will follow on echo, present anticoagulation has not been recommended with need of arrhythmia follow-up to assess presence or absence of recurrence and potential future needs of anticoagulation especially if hypertension persist in this will contribute to his [id number] score of 2. He will need outpatient follow-up with cardiology. Hypertension-on lisinopril, hydralazine as needed    EtOH use-on IV fluids, MVI, thiamine, folic acid. Monitor if needed placed on CIWA scale. Dysphagia-speech  therapy consulted. For MBS.     History of squamous cell skin cancer-s/p left ureter resection, follows with [facility]    On Lovenox for dvt prophy  Full code          Electronically signed by Servando Mckeon MD on 1/29/2021 at 1:30 PM

## 2021-01-29 NOTE — PROGRESS NOTES
Nursing Transfer Note    Data:  Summary of patients progress: S/P ERCP  Reason for transfer: inpatient    Action:  Explained reason for transfer to Patient and Family. Report given to: 4th floor RN, using RN Handoff Navigator. Mode of transportation: cart    Response:  RN Recommendations: see orders/notes/MAR.

## 2021-01-29 NOTE — CARE COORDINATION
1/29/2021 Social Work Discharge Planning: This worker met with Pt to discuss  role and transition of care/discharge planning. Pt and his spouse reside together in a 2 story home. Pt stays on the first floor-sleeps on the couch. Pt is on room air and IV ATB. Pt to have a liver biopsy today. Awaiting  PT eval; however, Pt is agreeable to [facility] if needed at discharge. JOSÉ made a referral to  Pt has no DME. Electronically signed by ANAYELI Rush on 1/29/2021 at 9:16 AM      1/29/2021  Social Work Discharge Planning:HC unable to follow. JOSÉ made a referral to karly with [facility]. She will also notify Jennifer Garber with [employer] if necessary. Electronically signed by ANAYELI Rush on 1/29/2021 at 10:39 AM

## 2021-01-29 NOTE — PROGRESS NOTES
Sent for patient to come to department for Liver biopsy. Nurse called from floor stating Per Dr. Almaz Boss ERCP needs to be done prior to Liver biopsy. Pt is scheduled for ERCP at 1300. Per Dr. Renetta Velez will be appropriate to schedule on Monday for biopsy due to post sedation, as well as discussing risk with patient. Dr. Martha Castillo covering for ordering physician for Dr. Pravin Abernathy made aware placed on schedule for Monday 2-1-2021. Floor called and notified, spoke with [facility].

## 2021-01-29 NOTE — PROGRESS NOTES
Occupational Therapy  OCCUPATIONAL THERAPY INITIAL EVALUATION      Date:2021  Patient Name: Celia Iglesias  MRN: [id number]  : 1945  Room: [address] [place] [address]    Referring Provider: KISHORE Mendiola - CNS    Evaluating OT: Hebert Muir OTR/L [id number]    AM-PAC Daily Activity Raw Score: 17/24    Recommended Adaptive Equipment: TBD    Diagnosis: diarrhea. Pt presents to ED from home with abdominal cramping and diarrhea. Pertinent Medical History: ETOH abuse   Precautions:  falls     Home Living: Pt is a questionable historian reports lives with wife in a 2 story home with 1st floor set up, pt reports he sleeps on the couch.  bath 1st floor. Bathroom setup: tub/shower 2nd floor     Prior Level of Function: Per pt report Mod I with ADLs, family assists PRN with IADLs; completed functional mobility with no AD  Driving: Per pt yes    Pain Level: no reported pain    Cognition: A&O: -. Oriented to self and January. Confusion regarding place and situation.     Problem solving:  poor   Judgement/safety:  poor     Functional Assessment:   Initial Eval Status  Date: 21 Treatment session:  Short Term Goals     Feeding SBA     Grooming SBA  Standing sink level for hand hygiene  Independent   UB Dressing Min A  [facility]  Independent   LB Dressing Mod A  Management of B socks  Mod I    Bathing Min A  Mod I   Toileting Min A  Use of grab bar for support in transfer and to maintain standing balance  Assist in posterior jorge care to ensure cleanliness  Mod I   Bed Mobility  Supine to sit: SBA     Functional Transfers STS: SBA  Mod I   Functional Mobility CGA with HHA  Hallway distance  Slow pace  Progressively forward flexed at trunk  Mild unsteadiness  Mod I during ADLs   Balance Sitting: fair plus    Standing: fair/fair minus     Activity Tolerance Fair minus  standing ghassan x6-7 min with fair plus balance during self care tasks             Treatment: Patient educated on techniques for completion of ADL, safe functional transfers and functional mobility. Patient required cues for follow through with proper hand/foot placement, pacing, safety, compensatory strategies, breathing, attention, sequencing and technique in bed mobility, functional transfers, functional mobility, toileting, grooming, UB dressing and LB dressing in preparation for maximum independence in all self care tasks.      Hand Dominance: Right []  Left []   Strength ROM Additional Info:    RUE  4-/5 WFL good  and FMC/dexterity noted during ADL tasks     LUE 4-/5 WFL good  and FMC/dexterity noted during ADL tasks         Hearing: WFL   Vision: WFL   Sensation:  No c/o numbness or tingling   Tone: WFL   Edema: none                             Long Term Goal (1-3 wks): Pt will maximize functional performance in all self care tasks/functional transfers with good follow through of all trained techniques for safe transition to next level of care    Assessment of current deficits   Functional mobility [x]  ADLs [x] Strength [x]  Cognition []  Functional transfers  [x] IADLs [x] Safety Awareness [x]  Endurance [x]  Fine Motor Coordination [] Balance [x] Vision/perception [] Sensation []   Gross Motor Coordination [] ROM [] Delirium []                  Motor Control []    Plan of Care: 2-5 days/week for 1-2 weeks PRN   [x]ADL retraining/adaptive techniques and AE recommendations to increase functional independence within precautions                    [x]Energy conservation techniques to improve tolerance for ADL/IADLs  [x]Functional transfer/mobility training/DME recommendations for increased independence, safety and fall prevention         [x]Patient/family education to increase safety and functional independence during daily routine          [x]Environmental modifications for safe mobility and completion of ADLs                             []Cognitive retraining to improve problem solving skills & safe participation in ADLs/IADLs []Sensory re-education techniques to improve extremity awareness, maintain skin integrity and improve hand function                             []Visual/Perceptual retraining to improve body awareness and safety during transfers and ADLs  []Splinting/positioning needs to maintain joint/skin integrity and contracture prevention  [x]Therapeutic activity to improve functional performance during ADLs                                        [x]Therapeutic exercise to improve tolerance and functional strength for ADLs   [x]Balance retraining/tolerance tasks for facilitation of postural control with dynamic challenges during ADLs  []Neuromuscular re-education to facilitate righting/equilibrium reactions, midline orientation, scapular stability/mobility, normalize muscle tone and facilitate active functional movement                        []Delirium prevention/treatment    [x]Positioning to improve functional independence and decrease risk of skin breakdown  []Other:     Rehab Potential: Good for established goals     Patient/Family Goal: To get home. Patient and/or family were instructed on functional diagnosis, prognosis/goals and OT plan of care. Pt verbalized poor understanding. Upon arrival, patient supine in bed. At end of session, patient supine in bed with call light and phone within reach, all lines and tubes intact. Pt would benefit from continued skilled OT to increase safety and independence with completion of ADL/IADL tasks for functional independence and quality of life.  Bed/chair alarm: ON    Low Evaluation [id number]  Time In: 830   Time Out: 846      Evaluation time includes thorough review of current medical information, gathering information on past medical history/social history and prior level of function, completion of standardized testing/informal observation of tasks, assessment of data, and development of POC/Goals    Nae Bustamante OTR/L  [id number]

## 2021-01-29 NOTE — ANESTHESIA PRE PROCEDURE
Department of Anesthesiology  Preprocedure Note       Name:  Tam Vidal   Age:  70 y.o.  :  1945                                          MRN:  [id number]         Date:  2021      Surgeon: Ed Monahan):  Brad Prader, MD    Procedure: Procedure(s):  ERCP STENT INSERTION    Medications prior to admission:   Prior to Admission medications    Not on File       Current medications:    Current Facility-Administered Medications   Medication Dose Route Frequency Provider Last Rate Last Admin    [START ON 2021] lactated ringers bolus  500 mL Intravenous Once KISHORE Haskins  mL/hr at 21 1158 125 mL at 21 1158    perflutren lipid microspheres (DEFINITY) injection 1.65 mg  1.5 mL Intravenous ONCE PRN Srinivasa Lund MD        cefTRIAXone (ROCEPHIN) 2,000 mg in sterile water 20 mL IV syringe  2,000 mg Intravenous Q24H Cruz Bhatia MD   2,000 mg at 21 1533    metronidazole (FLAGYL) 500 mg in NaCl 100 mL IVPB premix  500 mg Intravenous Q8H Cruz Bhatia MD   Stopped at 21 0748    HYDROcodone-acetaminophen (NORCO) 5-325 MG per tablet 1 tablet  1 tablet Oral Q6H PRN Krystyna Oneil MD   1 tablet at 21 0917    hydrALAZINE (APRESOLINE) injection 5 mg  5 mg Intravenous Q6H PRN KISHORE Herrera - CNS        sodium chloride flush 0.9 % injection 10 mL  10 mL Intravenous 2 times per day KISHORE Herrera   10 mL at 21 0856    sodium chloride flush 0.9 % injection 10 mL  10 mL Intravenous PRN KISHORE Herrera        enoxaparin (LOVENOX) injection 40 mg  40 mg Subcutaneous Daily KISHORE Herrera   Stopped at 21 0900    promethazine (PHENERGAN) tablet 12.5 mg  12.5 mg Oral Q6H PRN KISHORE Herrera        Or    ondansetron (ZOFRAN) injection 4 mg  4 mg Intravenous Q6H PRN KISHORE Herrera        polyethylene glycol (GLYCOLAX) packet 17 g  17 g Oral Daily PRN KISHORE Herrera        acetaminophen (TYLENOL) tablet 650 mg  650 mg Oral Q6H PRN Leonela Watters APRN - CNS   650 mg at 01/27/21 2018    Or    acetaminophen (TYLENOL) suppository 650 mg  650 mg Rectal Q6H PRN Leonela Watters, APRN - CNS        folic acid (FOLVITE) tablet 1 mg  1 mg Oral Daily Leonela Watters, APRN - CNS   1 mg at 01/29/21 9789    thiamine tablet 100 mg  100 mg Oral Daily Leonela Watters, APRN - CNS   100 mg at 01/29/21 3719    multivitamin 1 tablet  1 tablet Oral Daily Leonela Watters, APRN - CNS   1 tablet at 01/29/21 0855    lisinopril (PRINIVIL;ZESTRIL) tablet 10 mg  10 mg Oral Daily Leonela Watters, APRN - CNS   10 mg at 01/29/21 0855    metoprolol tartrate (LOPRESSOR) tablet 25 mg  25 mg Oral BID Rafiq Ascencio MD   25 mg at 01/29/21 0855    LORazepam (ATIVAN) tablet 1 mg  1 mg Oral Q1H PRN Brenda Payer, DO        Or    LORazepam (ATIVAN) injection 1 mg  1 mg Intravenous Q1H PRN Brenda Payer, DO   1 mg at 01/28/21 0037    Or    LORazepam (ATIVAN) tablet 2 mg  2 mg Oral Q1H PRN Brenda Payer, DO        Or    LORazepam (ATIVAN) injection 2 mg  2 mg Intravenous Q1H PRN Brenda Payer, DO        Or    LORazepam (ATIVAN) tablet 3 mg  3 mg Oral Q1H PRN Brenda Payer, DO        Or    LORazepam (ATIVAN) injection 3 mg  3 mg Intravenous Q1H PRN Brenda Payer, DO        Or    LORazepam (ATIVAN) tablet 4 mg  4 mg Oral Q1H PRN Brenda Payer, DO        Or    LORazepam (ATIVAN) injection 4 mg  4 mg Intravenous Q1H PRN Brenda Payer, DO           Allergies:  No Known Allergies    Problem List:    Patient Active Problem List   Diagnosis Code    Diarrhea R19.7    Essential hypertension I10    Atrial fibrillation (HCC) I48.91    Hypokalemia E87.6    Functional diarrhea K59.1    Gallstones K80.20    Choledocholithiasis K80.50    Chronic obstructive pulmonary disease ([facility] J44.9    Alcohol abuse F10.10    [facility] I48.91    Liver lesion K76.9       Past Medical History:        Diagnosis Date    Bleeding from the nose     left nare 16    Skin cancer of face     Dx 2009       Past Surgical History:        Procedure Laterality Date    OTHER SURGICAL HISTORY Left 2009    left sided ear surgery       Social History:    Social History     Tobacco Use    Smoking status: Former Smoker     Quit date: 2006     Years since quittin.7    Smokeless tobacco: Never Used   Substance Use Topics    Alcohol use: Yes     Alcohol/week: 30.0 standard drinks     Types: 30 Cans of beer per week                                Counseling given: Not Answered      Vital Signs (Current):   Vitals:    21 0359 21 0506 21 0824 21 0852   BP:  (!) 146/77  138/68   Pulse:  70 70 79   Resp:       Temp:    98.7 °F (37.1 °C)   TempSrc:    Axillary   SpO2:    97%   Weight: 163 lb 4.8 oz (74.1 kg)      Height:                                                  BP Readings from Last 3 Encounters:   21 138/68   16 138/88       NPO Status:                                                                                 BMI:   Wt Readings from Last 3 Encounters:   21 163 lb 4.8 oz (74.1 kg)   16 180 lb (81.6 kg)     Body mass index is 22.78 kg/m². CBC:   Lab Results   Component Value Date    WBC 14.4 2021    RBC 3.55 2021    HGB 10.5 2021    HCT 31.9 2021    MCV 89.9 2021    RDW 13.7 2021     2021       CMP:   Lab Results   Component Value Date     2021    K 3.7 2021    K 3.2 2021    CL 98 2021    CO2 25 2021    BUN 11 2021    CREATININE 0.6 2021    GFRAA >60 2021    LABGLOM >60 2021    GLUCOSE 102 2021    PROT 6.1 2021    CALCIUM 7.9 2021    BILITOT 0.6 2021    ALKPHOS 119 2021    AST 25 2021    ALT 20 2021       POC Tests: No results for input(s): POCGLU, POCNA, POCK, POCCL, POCBUN, POCHEMO, POCHCT in the last 72 hours.     Coags:   Lab Results Component Value Date    PROTIME 15.4 01/29/2021    INR 1.3 01/29/2021       HCG (If Applicable): No results found for: PREGTESTUR, PREGSERUM, HCG, HCGQUANT     ABGs: No results found for: PHART, PO2ART, OMA1RPA, AZB6NWA, BEART, N5YCPSKD     Type & Screen (If Applicable):  No results found for: LABABO, LABRH    Drug/Infectious Status (If Applicable):  No results found for: HIV, HEPCAB    COVID-19 Screening (If Applicable):   Lab Results   Component Value Date    COVID19 Not Detected 01/27/2021         Anesthesia Evaluation  Patient summary reviewed and Nursing notes reviewed no history of anesthetic complications:   Airway: Mallampati: III  TM distance: >3 FB   Neck ROM: full  Mouth opening: > = 3 FB Dental:      Comment: Missing most    Pulmonary:   (+) COPD:  decreased breath sounds,      (-) not a current smoker                          ROS comment: Chronic cough   Cardiovascular:  Exercise tolerance: poor (<4 METS),   (+) hypertension:, dysrhythmias: atrial fibrillation,         Rhythm: regular  Rate: normal           Beta Blocker:  Not on Beta Blocker         Neuro/Psych:   (+) psychiatric history:            GI/Hepatic/Renal:            ROS comment: gallstones. Endo/Other:    (+) malignancy/cancer. Abdominal:           Vascular:                                        Anesthesia Plan      MAC     ASA 3       Induction: intravenous. Anesthetic plan and risks discussed with patient and child/children.                       Ana Laura Parisi MD   1/29/2021

## 2021-01-29 NOTE — PROGRESS NOTES
IR nurse Milad Dorado says plan for liver biopsy this morning. Updated plans for possible ERCP today.

## 2021-01-29 NOTE — PROGRESS NOTES
Spoke with Dr. Simone Olivarez, patient is medically cleared for ERCP pending cardiac clearance. Sean Bradford NP updated that we need cardiac clearance for possible ERCP today. 1106- Patient given cardiac clearance for ERCP by Dr. Denilson Denise.

## 2021-01-29 NOTE — PROGRESS NOTES
IR notified patient will need ERCP prior to liver biopsy. IR nurse said she spoke with her manager and biopsy will be moved to Monday.

## 2021-01-29 NOTE — PROGRESS NOTES
INPATIENT CARDIOLOGY FOLLOW-UP    Name: Ora Haq    Age: 70 y.o. Date of Admission: 1/27/2021  2:40 AM    Date of Service: 1/29/2021    Chief Complaint: Follow-up for abdominal pain, paroxysmal atrial fibrillation, multifocal atrial tachycardia, chronic obstructive lung disease, ethanol abuse preoperative cardiovascular evaluation    Interim History: The patient presently appears compensated from a cardiovascular standpoint with no recurrent atrial fibrillation and telemetry demonstrating transient episodes of atrial arrhythmias consistent with that of multifocal atrial tachycardia. The results of his gastroenterology evaluation have been noted with evidence of choledocholithiasis and asending cholangitis suggested. He has developed a somewhat moist cough with adequate oxygen saturations and a low-grade fever as well as that of a persistent leukocytosis. Earlier hypokalemia has resolved. An echocardiogram demonstrates normal left ventricular systolic function with right ventricular dilatation in the absence of right ventricular dysfunction or valvular pathology. Review of Systems: The remainder of a complete multisystem review including consitutional, central nervous, respiratory, circulatory, gastrointestinal, genitourinary, endocrinologic, hematologic, musculoskeletal and psychiatric are negative.     Problem List:  Patient Active Problem List   Diagnosis    Diarrhea    Essential hypertension    Atrial fibrillation (HCC)    Hypokalemia    Functional diarrhea    Gallstones    Choledocholithiasis    Chronic obstructive pulmonary disease (HCC)    Alcohol abuse    Atrial fibrillation, new onset (HCC)    Liver lesion       Allergies:  No Known Allergies    Current Medications:  Current Facility-Administered Medications   Medication Dose Route Frequency Provider Last Rate Last Admin    furosemide (LASIX) injection 20 mg  20 mg Intravenous Once Naun Haynes MD        perflutren lipid microspheres (DEFINITY) injection 1.65 mg  1.5 mL Intravenous ONCE PRN Juan Toussaint MD        cefTRIAXone (ROCEPHIN) 2,000 mg in sterile water 20 mL IV syringe  2,000 mg Intravenous Q24H Anibal Santiago MD   2,000 mg at 01/28/21 1533    metronidazole (FLAGYL) 500 mg in NaCl 100 mL IVPB premix  500 mg Intravenous Q8H Anibal Santiago MD   Stopped at 01/29/21 0748    HYDROcodone-acetaminophen (NORCO) 5-325 MG per tablet 1 tablet  1 tablet Oral Q6H PRN Carl Hemphill MD   1 tablet at 01/27/21 0917    hydrALAZINE (APRESOLINE) injection 5 mg  5 mg Intravenous Q6H PRN Marvine Conch, APRN - CNS        sodium chloride flush 0.9 % injection 10 mL  10 mL Intravenous 2 times per day Marvine Conch, APRN - CNS   10 mL at 01/29/21 0856    sodium chloride flush 0.9 % injection 10 mL  10 mL Intravenous PRN Marvine Conch, APRN - CNS        enoxaparin (LOVENOX) injection 40 mg  40 mg Subcutaneous Daily Marvine Conch, APRN - CNS   Stopped at 01/29/21 0900    promethazine (PHENERGAN) tablet 12.5 mg  12.5 mg Oral Q6H PRN Marvine Conch, APRN - CNS        Or    ondansetron (ZOFRAN) injection 4 mg  4 mg Intravenous Q6H PRN Marvine Conch, APRN - CNS        polyethylene glycol (GLYCOLAX) packet 17 g  17 g Oral Daily PRN Marvine Conch, APRN - CNS        acetaminophen (TYLENOL) tablet 650 mg  650 mg Oral Q6H PRN Marvine Conch, APRN - CNS   650 mg at 01/27/21 2018    Or    acetaminophen (TYLENOL) suppository 650 mg  650 mg Rectal Q6H PRN Marvine Conch, APRN - CNS        folic acid (FOLVITE) tablet 1 mg  1 mg Oral Daily Marvine Conch, APRN - CNS   1 mg at 01/29/21 7795    thiamine tablet 100 mg  100 mg Oral Daily Marvine Conch, APRN - CNS   100 mg at 01/29/21 4180    multivitamin 1 tablet  1 tablet Oral Daily Marvine Conch, APRN - CNS   1 tablet at 01/29/21 0855    lisinopril (PRINIVIL;ZESTRIL) tablet 10 mg  10 mg Oral Daily KISHORE Zuniga   10 mg at 01/29/21 0855    metoprolol tartrate (LOPRESSOR) tablet 25 mg  25 mg Oral BID Nina Harris MD   25 mg at 01/29/21 0855    LORazepam (ATIVAN) tablet 1 mg  1 mg Oral Q1H PRN Diannah Greaser, DO        Or    LORazepam (ATIVAN) injection 1 mg  1 mg Intravenous Q1H PRN Diannah Greaser, DO   1 mg at 01/28/21 0037    Or    LORazepam (ATIVAN) tablet 2 mg  2 mg Oral Q1H PRN Diannah Greaser, DO        Or    LORazepam (ATIVAN) injection 2 mg  2 mg Intravenous Q1H PRN Diannah Greaser, DO        Or    LORazepam (ATIVAN) tablet 3 mg  3 mg Oral Q1H PRN Diannah Greaser, DO        Or    LORazepam (ATIVAN) injection 3 mg  3 mg Intravenous Q1H PRN Diannah Greaser, DO        Or    LORazepam (ATIVAN) tablet 4 mg  4 mg Oral Q1H PRN Diannah Greaser, DO        Or    LORazepam (ATIVAN) injection 4 mg  4 mg Intravenous Q1H PRN Diannah Greaser, DO             Physical Exam:  /68   Pulse 79   Temp 98.7 °F (37.1 °C) (Axillary)   Resp 16   Ht 5' 11\" (1.803 m)   Wt 163 lb 4.8 oz (74.1 kg)   SpO2 97%   BMI 22.78 kg/m²   Weight change: Wt Readings from Last 3 Encounters:   01/29/21 163 lb 4.8 oz (74.1 kg)   05/05/16 180 lb (81.6 kg)     The patient is awake, alert and in no discomfort or distress. He appears mildly confused. No gross musculoskeletal deformity is present. No significant skin or nail changes are present. Gross examination of head, eyes, nose and throat are negative with the exception of removal of his left ear. Jugular venous pressure is normal and no carotid bruits are present. Normal respiratory effort is noted with no accessory muscle usage present. Lung fields are clear to ascultation. Cardiac examination is notable for a regular rate and rhythm with no palpable thrill. No gallop rhythm or cardiac murmur are identified. A benign abdominal examination is present with no masses or organomegaly. Intact pulses are present throughout all extremities and no peripheral edema is present. No focal neurologic deficits are present.     Intake/Output:  No intake or output data in the 24 hours ending 01/29/21 1022  No intake/output data recorded. Laboratory Tests:  Lab Results   Component Value Date    CREATININE 0.6 (L) 01/29/2021    BUN 11 01/29/2021     (L) 01/29/2021    K 3.7 01/29/2021    CL 98 01/29/2021    CO2 25 01/29/2021     No results for input(s): CKTOTAL, CKMB in the last 72 hours.     Invalid input(s): TROPONONI  No results found for: BNP  Lab Results   Component Value Date    WBC 14.4 01/29/2021    RBC 3.55 01/29/2021    HGB 10.5 01/29/2021    HCT 31.9 01/29/2021    MCV 89.9 01/29/2021    MCH 29.6 01/29/2021    MCHC 32.9 01/29/2021    RDW 13.7 01/29/2021     01/29/2021    MPV 10.0 01/29/2021     Recent Labs     01/27/21  0329 01/28/21  0324 01/29/21  0450   ALKPHOS 115 103 119   ALT 24 17 20   AST 22 18 25   PROT 6.7 5.3* 6.1*   BILITOT 0.9 0.6 0.6   LABALBU 2.8* 2.0* 2.4*     Lab Results   Component Value Date    MG 1.8 01/29/2021     Lab Results   Component Value Date    PROTIME 15.4 01/29/2021    INR 1.3 01/29/2021     No results found for: TSH  No components found for: CHLPL  Lab Results   Component Value Date    TRIG 94 01/27/2021     Lab Results   Component Value Date    HDL 23 01/27/2021     Lab Results   Component Value Date    LDLCALC 54 01/27/2021       Cardiac Tests:  ECG: A repeat electrocardiogram reviewed at the time of evaluation demonstrates evidence of sinus rhythm with nonspecific ST changes  Telemetry findings reviewed: Sinus rhythm with transient paroxysmal supraventricular tachyarrhythmias including that of suggested multifocal atrial tachycardia, no new tachy/bradyarrhythmias overnight  Chest X-ray: A chest x-ray reviewed at the time of evaluation demonstrates no evidence of cardiomegaly with mild interstitial changes slightly more prominent than time of previous assessment  Last Echocardiogram: An echocardiogram demonstrated evidence of a normal-sized left ventricular chamber with borderline concentric left ventricular hypertrophy and normal left ventricular systolic function with borderline right ventricular dilatation no right ventricular dysfunction or valvular pathology      ASSESSMENT / PLAN: On a clinical basis, the patient presently appears compensated from a cardiovascular standpoint with his productive cough nonspecific in the face of previous chronic obstructive lung disease and no evidence of hypoxia in spite of his interstitial infiltrates with multiple origins for his low-grade fever and leukocytosis present. In view of a positive fluid balance documented on limited maintenance of intake and output a single dose of diuretics will be administered with plans of continuation of existing therapy in the face of his hypertension and atrial arrhythmias with the most recent episode suggestive of multifocal atrial tachycardia and no documented recurrences of atrial fibrillation following correction of his metabolic derangements. On this basis, he would be an acceptable candidate to proceed with his proposed gastroenterology assessment in the face of his cholangitis as well as that of his hepatic biopsy with needs of continued careful monitoring of his volume status. Additional management will be deferred primarily to the primary care and gastroenterology service with cardiovascular assessment during the coming weekend should additional cardiovascular difficulties or concerns arise. At the time of evaluation, his condition was extensively discussed with the gastroenterology service as well as that of family with duration of discussion counseling exceeding 35 minutes      Note: This report was completed utilizing computer voice recognition software. Every effort has been made to ensure accuracy, however; inadvertent computerized transcription errors may be present. Sima Sanchez.  [facility], [id number] [facility]

## 2021-01-29 NOTE — PROGRESS NOTES
SPEECH LANGUAGE PATHOLOGY  DAILY PROGRESS NOTE        PATIENT NAME:  Jovana Sharma      :  1945          TODAY'S DATE:  2021 ROOM:  [address] [place], [place] [address]        SWALLOWING    RN contacted SLP due to pt and daughter having questions regarding video swallow study which will be completed tomorrow. All questions answered. 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 Reports radiation was completed. DYSPHAGIA DIAGNOSIS:  Oral dysphagia; pharyngeal dysphagia can not be r/o at bedside. Recommend video swallow eval to further assess. DIET RECOMMENDATIONS:  Dental soft (Easy to Chew) solids with  thin liquids as tolerated until video swallow eval completed.                  FEEDING RECOMMENDATIONS:                           Assistance level:  No assistance needed                             Compensatory strategies recommended: Small bites/sips and Alternate solids and liquids      Education- Pt educated on results and POC. Pt trained on compensatory strategies for safe swallow with good outcome. Questions answered. Will continue SP intervention as per previously established POC. Susan PRITCHARD CCC/SLP [id number]  Speech Pathologist              CPT code(s) 23547  dysphagia tx  Total minutes :  15 minutes

## 2021-01-29 NOTE — CONSULTS
duodenum. Differential includes enlarged lymph node Simple appearing right renal cyst. Mri Abdomen W Wo Contrast Mrcp-1. Choledocholithiasis associated with severe extrahepatic biliary dilation and mild central intrahepatic biliary dilation. Evaluation for superimposed cholangitis is limited. 2. Cholelithiasis with no definite findings of cholecystitis. 3. Limited evaluation of the liver due to motion artifact. Signal abnormalities seen primarily in hepatic segment 7 correspond with the CT appearance and are suspicious for microabscesses. However, an infiltrative malignancy could appear similar. Consider evaluation with liver protocol abdomen CT, which would be less affected by motion. 4. Complicated fluid collections along the caudate lobe of the liver, between right adrenal gland right diaphragmatic deo, adjacent to the retrohepatic inferior vena cava, and likely within the christopher hepatis are suspicious for abscesses given the above findings. 5. Mild dilation of the downstream pancreatic duct potentially due to mass effect from stones in the distal common bile duct. Alternatively, this could be related to moderate parenchymal atrophy. 6. Small right pleural effusion. Consultation for abdominal pain and diarrhea. Currently, patient reports continued lower abdominal pain rated 3/10. Stating it has been better since he is not eating today. Bowel movement today described as brown and loose. Labs today albumin 2.4, WBC 14.4, hemoglobin 10.5, sodium 131, calcium 7.9, glucose 7.9.     Past Medical History:        Diagnosis Date    Bleeding from the nose     left nare 5/5/16    Skin cancer of face     Dx 2009     Past Surgical History:        Procedure Laterality Date    OTHER SURGICAL HISTORY Left 2009    left sided ear surgery     Current Medications:    Current Facility-Administered Medications: perflutren lipid microspheres (DEFINITY) injection 1.65 mg, 1.5 mL, Intravenous, ONCE PRN  cefTRIAXone (ROCEPHIN) 2,000 mg in sterile water 20 mL IV syringe, 2,000 mg, Intravenous, Q24H  metronidazole (FLAGYL) 500 mg in NaCl 100 mL IVPB premix, 500 mg, Intravenous, Q8H  aluminum & magnesium hydroxide-simethicone (MAALOX) 30 mL, lidocaine viscous hcl (XYLOCAINE) 5 mL (GI COCKTAIL), , Oral, Once PRN  HYDROcodone-acetaminophen (NORCO) 5-325 MG per tablet 1 tablet, 1 tablet, Oral, Q6H PRN  hydrALAZINE (APRESOLINE) injection 5 mg, 5 mg, Intravenous, Q6H PRN  sodium chloride flush 0.9 % injection 10 mL, 10 mL, Intravenous, 2 times per day  sodium chloride flush 0.9 % injection 10 mL, 10 mL, Intravenous, PRN  enoxaparin (LOVENOX) injection 40 mg, 40 mg, Subcutaneous, Daily  promethazine (PHENERGAN) tablet 12.5 mg, 12.5 mg, Oral, Q6H PRN **OR** ondansetron (ZOFRAN) injection 4 mg, 4 mg, Intravenous, Q6H PRN  polyethylene glycol (GLYCOLAX) packet 17 g, 17 g, Oral, Daily PRN  acetaminophen (TYLENOL) tablet 650 mg, 650 mg, Oral, Q6H PRN **OR** acetaminophen (TYLENOL) suppository 650 mg, 650 mg, Rectal, Q3X PRN  folic acid (FOLVITE) tablet 1 mg, 1 mg, Oral, Daily  thiamine tablet 100 mg, 100 mg, Oral, Daily  multivitamin 1 tablet, 1 tablet, Oral, Daily  lisinopril (PRINIVIL;ZESTRIL) tablet 10 mg, 10 mg, Oral, Daily  metoprolol tartrate (LOPRESSOR) tablet 25 mg, 25 mg, Oral, BID  LORazepam (ATIVAN) tablet 1 mg, 1 mg, Oral, Q1H PRN **OR** LORazepam (ATIVAN) injection 1 mg, 1 mg, Intravenous, Q1H PRN **OR** LORazepam (ATIVAN) tablet 2 mg, 2 mg, Oral, Q1H PRN **OR** LORazepam (ATIVAN) injection 2 mg, 2 mg, Intravenous, Q1H PRN **OR** LORazepam (ATIVAN) tablet 3 mg, 3 mg, Oral, Q1H PRN **OR** LORazepam (ATIVAN) injection 3 mg, 3 mg, Intravenous, Q1H PRN **OR** LORazepam (ATIVAN) tablet 4 mg, 4 mg, Oral, Q1H PRN **OR** LORazepam (ATIVAN) injection 4 mg, 4 mg, Intravenous, Q1H PRN    Allergies:  Patient has no known allergies. Social History:    Tobacco:  Pt reports quit 1 years ago.  Previously smoked 1 pack a day for 3 years  Alcohol:  Pt reports heavy consumption daily for 4 years. For the last 4 years drinks a 30 pack of beer a week  Illicit Drugs: Pt dnies     Family History: Mother: Razia Avila; [place]-unknown cause  Father: ; epilepsy  4 brother- all , unknown causes  3 Children- all alive and healthy      REVIEW OF SYSTEMS:    Aside from what was mentioned in the PMH and HPI, essentially unremarkable, all others negative. PHYSICAL EXAM:      Vitals:    /67   Pulse 78   Temp 98.2 °F (36.8 °C) (Oral)   Resp 20   Ht 5' 11\" (1.803 m)   Wt 160 lb (72.6 kg)   SpO2 97%   BMI 22.32 kg/m²       CONSTITUTIONAL: awake, fatigued, ill appreaing, cooperative, no apparent distress, and appears stated age  EYES:  pupils equal, round and reactive to light, sclera anicteric and conjunctiva normal  ENT:  normocephalic, oral pharynx with moist mucous membranes  NECK:  supple   HEMATOLOGIC/LYMPHATICS:  no cervical lymphadenopathy and no supraclavicular lymphadenopathy  LUNGS:  No increased work of breathing, good air exchange. Scattered rhonchi throughout with diminished bases.  Moist, productive cough  CARDIOVASCULAR:  Normal apical impulse, regular rate and rhythm, no murmur noted; 2+ pulses; no edema  ABDOMEN:  normal bowel sounds, softly distended, tender upon palpatiion, no masses palpated, no hepatosplenomegally  MUSCULOSKELETAL:  full range of motion noted  motor strength is 5 out of 5 all extremities bilaterally  NEUROLOGIC:  Mental Status Exam: Level of Alertness:  awake  Orientation:  person, place, time  Motor Exam:  Motor exam is symmetrical 5 out of 5 all extremities bilaterally  SKIN:  Pale and dry with tenting tugor    DATA:    CBC with Differential:    Lab Results   Component Value Date    WBC 15.8 2021    RBC 3.22 2021    HGB 9.6 2021    HCT 29.3 2021     2021    MCV 91.0 2021    MCH 29.8 2021    MCHC 32.8 2021    RDW 13.8 2021    LYMPHOPCT 9.8 2021 MONOPCT 9.0 01/28/2021    BASOPCT 0.4 01/28/2021    MONOSABS 1.42 01/28/2021    LYMPHSABS 1.55 01/28/2021    EOSABS 0.02 01/28/2021    BASOSABS 0.06 01/28/2021     CMP:    Lab Results   Component Value Date     01/28/2021    K 3.2 01/28/2021     01/28/2021    CO2 27 01/28/2021    BUN 9 01/28/2021    CREATININE 0.6 01/28/2021    GFRAA >60 01/28/2021    LABGLOM >60 01/28/2021    GLUCOSE 97 01/28/2021    PROT 5.3 01/28/2021    LABALBU 2.0 01/28/2021    CALCIUM 6.8 01/28/2021    BILITOT 0.6 01/28/2021    ALKPHOS 103 01/28/2021    AST 18 01/28/2021    ALT 17 01/28/2021     Hepatic Function Panel:    Lab Results   Component Value Date    ALKPHOS 103 01/28/2021    ALT 17 01/28/2021    AST 18 01/28/2021    PROT 5.3 01/28/2021    BILITOT 0.6 01/28/2021    LABALBU 2.0 01/28/2021     PT/INR:    Lab Results   Component Value Date    PROTIME 15.4 01/27/2021    INR 1.3 01/27/2021     PTT:  No results found for: APTT, PTT[APTT}  Last 3 Troponin:    Lab Results   Component Value Date    TROPONINI <0.01 01/27/2021     Component Value Date    HDL 23 01/27/2021     Lab Results   Component Value Date    LDLCALC 54 01/27/2021     Lab Results   Component Value Date    LABVLDL 19 01/27/2021        Echo Complete    Result Date: 1/28/2021  Transthoracic Echocardiography Report (TTE)  Demographics   Patient Name    Kyrie Jimenez Gender            Male                  K   Medical Record  [id number]      Room Number       2852  Number   Account #       [de-identified]     Procedure Date    01/28/2021   Corporate ID                  Ordering          Marisela Bridges MD                                Physician   Accession       [id number]    Referring  Number                        Physician   Date of Birth   1945    Sonographer       Roel Rodriguez RCS   Age             70 year(s)    Interpreting      [address] [place] [address]                                Physician         Physician Cardiology Separation: 1.3 cmLA  Dimension: 3.7  cm                           Dimension: 4.5 cmAO Root  cm              LV Volume Diastolic: 27.0 ml Dimension: 2.8 cm  LV FS:35.1 %    LV Volume Systolic: 59.5 ml  LV PW           LV EDV/LV EDV Index: 70.4  Diastolic: 1.2  IT/95 GD/G^7IN ESV/LV ESV  cm              Index: 20.3 ml/11ml/ m^2     RV Diastolic Dimension: 3.3  LV PW Systolic: EF Calculated: 93.2 %        cm  1.4 cm          LV Mass Index: 82 l/min*m^2  Septum                                       LA/Aorta: 0.58  Diastolic: 1.3                               Ascending Aorta: 3.4 cm  cm              LVOT: 2 cm                   LA volume/Index: 51 ml  Septum                                       /92AN/P^1  Systolic: 1.4                                RA Area: 17.7 cm^2  cm  CO: 5.62 l/min                               IVC Expiration: 1.3 cm  CI: 2.96  l/m*m^2  LV Mass: 156.75  g  Doppler Measurements & Calculations   MV Peak E-Wave: 1.17 AV Peak Velocity: 1.66 LVOT Peak Velocity: 1.33 m/s  m/s                  m/s                    LVOT Mean Velocity: 0.93 m/s  MV Peak A-Wave: 0.88 AV Peak Gradient:      LVOT Peak Gradient: 7.1  m/s                  10.98 mmHg             mmHgLVOT Mean Gradient: 3.9  MV E/A Ratio: 1.34   AV Mean Velocity: 1.1  mmHg  MV Peak Gradient:    m/s                    Estimated RVSP: 37.2 mmHg  7.9 mmHg             AV Mean Gradient: 5.8  Estimated RAP:3 mmHg  MV Mean Gradient:    mmHg  2.8 mmHg             AV VTI: 29.4 cm  MV Mean Velocity:    AV Area                TR Velocity:2.92 m/s  0.78 m/s             (Continuity):2.69 cm^2 TR Gradient:34.2 mmHg  MV Deceleration                             PV Peak Velocity: 1.32 m/s  Time: 281.7 msec     LVOT VTI: 25.2 cm      PV Peak Gradient: 6.99 mmHg  MV P1/2t: 104.1 msec IVRT: 64.6 msec        PV Mean Velocity: 0.94 m/s  MVA by PHT:2.11 cm^2 Estimated PASP: 37.2   PV Mean Gradient: 3.9 mmHg  MV Area              mmHg  (continuity): 2.1  cm^2  MV E' obstruction. The appendix is normal.  There is sigmoid diverticulosis. There is no diverticulitis seen. Pelvis:  Bladder is unremarkable in appearance. There is no abnormal pelvic mass or fluid collection seen. Peritoneum/Retroperitoneum: There are aortoiliac atherosclerotic calcification. There is no abdominal aortic aneurysm. There is no free intraperitoneal air. There is no abnormal fluid collection. Bones/Soft Tissues: There is no acute bony or soft tissue abnormality seen. There are numerous liver lesions. Multiple small lesions are possibly cystic. Larger lesions in the left lobe are more nonspecific. The right lobe is also heterogeneous. MRI correlation is recommended for characterization of these abnormalities. Cholelithiasis. Biliary dilatation and probable multiple common bile duct stones. Soft tissue nodule adjacent to the diaphragmatic deo and right intrarenal gland measuring 2 cm. This this may be an adrenal adenoma or lymph node. Sigmoid diverticulosis. No acute diverticulitis. Small right pleural effusion with basilar atelectasis. Us Gallbladder Ruq    Result Date: 1/27/2021  EXAMINATION: RIGHT UPPER QUADRANT ULTRASOUND 1/27/2021 10:30 am COMPARISON: AP CT 01/27/2021 HISTORY: ORDERING SYSTEM PROVIDED HISTORY: Diffuse stabbing and cramping abdominal pain and diarrhea on going for 8 days and is worsening TECHNOLOGIST PROVIDED HISTORY: Epigastric abdominal pain. Reason for exam:->evaluate What reading provider will be dictating this exam?->CRC FINDINGS: Normal-appearing pancreas. No right upper quadrant ascites. Normal aorta and patent IVC. No definite ultrasound evidence of lesion within the liver parenchyma. Nonspecific nodular density in christopher hepatis region, near the left hepatic lobe, may be a 5 cm enlarged lymph node.  A right renal non-specific, smoothly marginated, hypoechoic lesion is statistically most likely a cyst.  There is no evidence of mural nodularity, septation, wall thickening, or calcification. It measures 4.7 cm in the lateral midpole region, exophytic. Multiple shadowing gallstones in gallbladder lumen. No gallbladder wall thickening. No pericholecystic fluid. Distended common bile duct up to 12.5 mm diameter. Non-specific echogenic material in the common bile duct lumen may be sludge and/or calculi. Cholecystolithiasis. Suggestion also of choledocholithiasis with common bile duct distention Solid-appearing mass adjacent to the left hepatic lobe, in the christopher hepatis region, may be artifact from the duodenum. Differential includes enlarged lymph node Simple appearing right renal cyst    Xr Chest Portable    Result Date: 1/27/2021  EXAMINATION: ONE XRAY VIEW OF THE CHEST 1/27/2021 5:19 am COMPARISON: None. HISTORY: ORDERING SYSTEM PROVIDED HISTORY: cough TECHNOLOGIST PROVIDED HISTORY: Reason for exam:->cough FINDINGS: Heart size is normal. Pulmonary vasculature is not congested. Mediastinal and hilar contours are acceptable. The lungs are clear. The pleural spaces are clear. There is no pneumothorax. There is no acute abnormality seen. Mri Abdomen W Wo Contrast Mrcp    Result Date: 1/28/2021  EXAMINATION: MRI OF THE ABDOMEN WITH AND WITHOUT CONTRAST AND MRCP 1/28/2021 11:00 am TECHNIQUE: Multiplanar multisequence MRI of the abdomen was performed with and without the administration of intravenous contrast. COMPARISON: Limited abdominal sonogram 01/27/2021, abdomen and pelvis CT 01/27/2021 HISTORY: ORDERING SYSTEM PROVIDED HISTORY: evaluate liver lesions- body radiologist to read only TECHNOLOGIST PROVIDED HISTORY: Reason for exam:->evaluate liver lesions- body radiologist to read only FINDINGS: Patient motion in some areas, partially limiting evaluation of those areas. LOWER CHEST:  Small right pleural effusion with associated minimal passive atelectasis in the right lower lobe. Minimal gravity dependent atelectasis in the left lower lobe. Normal heart size.   No pericardial nor left pleural effusions. LIVER:  Mildly enlarged. Indistinct intermediate T2 signal primarily in segment 7 with associated hypoenhancement, diffusion restriction, and possible washout. No definite steatosis nor findings of cirrhosis. Likely subcapsular thick-walled fluid collection with suspected proteinaceous fluid along the medial portion segment. Nearby collection of similar appearance between the right adrenal gland and right diaphragmatic deo, measuring approximately 2.3 cm x 1.9 cm x 2.6 cm. Additional collections suspected but not well seen adjacent to the retrohepatic inferior vena cava and in the christopher hepatis near the portal veins. GALLBLADDER/BILE DUCTS:  Gallstones filling much of the partially decompressed gallbladder lumen. No gallbladder wall thickening nor pericholecystic stranding. Mild central intrahepatic biliary dilation and severe extrahepatic biliary dilation. Multiple stones in the common duct to the level of the ampulla. PANCREAS:  Typical duct configuration. Mild dilation of the downstream duct. Moderate parenchymal atrophy. SPLEEN:  No obvious abnormality. ADRENAL GLANDS:  No obvious abnormality. KIDNEYS:  4.1 cm exophytic cyst arising from the lateral right upper pole with layering blood products (Bosniak category 2). 3.0 cm exophytic simple cyst arising from the posterior right lower pole (Bosniak category 1). No obvious abnormality of the left kidney. GI/BOWEL:  Normal course and caliber of the stomach, small bowel, and colon without obstruction. Normal appendix. At least minimal colonic diverticulosis with no evident findings of acute diverticulitis. PELVIS:  Not included. PERITONEUM/RETROPERITONEUM:  No abdominal lymphadenopathy. No free intraperitoneal fluid. VESSELS:  Suspected typical hepatic arterial anatomy better seen CT. Limited evaluation of the portal and hepatic veins. Apparent patency of the inferior vena cava.  SOFT TISSUES/BONES:  No suspicious marrow lesions. Multilevel degenerative disc disease. 1. Choledocholithiasis associated with severe extrahepatic biliary dilation and mild central intrahepatic biliary dilation. Evaluation for superimposed cholangitis is limited. 2. Cholelithiasis with no definite findings of cholecystitis. 3. Limited evaluation of the liver due to motion artifact. Signal abnormalities seen primarily in hepatic segment 7 correspond with the CT appearance and are suspicious for microabscesses. However, an infiltrative malignancy could appear similar. Consider evaluation with liver protocol abdomen CT, which would be less affected by motion. 4. Complicated fluid collections along the caudate lobe of the liver, between right adrenal gland right diaphragmatic deo, adjacent to the retrohepatic inferior vena cava, and likely within the christopher hepatis are suspicious for abscesses given the above findings. 5. Mild dilation of the downstream pancreatic duct potentially due to mass effect from stones in the distal common bile duct. Alternatively, this could be related to moderate parenchymal atrophy. 6. Small right pleural effusion. IMPRESSION:  · Choledocholithiasis with ascending cholangitis  · Cholelithiasis- surgery following   · Liver lesions, suspicious for microabscesses- ID and surgery following  · Abdominal pain-lower  · Diarrhea  · Portal vein thrombosis- recommend vascular consult  · Dysphagia  · Anemia, normocytic  · Leukocytosis- secondary to above  · Alcohol abuse  · Hx of skin cancer    RECOMMENDATIONS:    · CXR STAT  · ERCP to be done by Dr. Tono Waite, today when cleared with PCP and cardiology. Procedure details for ERCP drawn in detail. Complications including but not limited to pancreatitis, perforation, bleeding or infection are discussed in great detail. Risks, benefits, and alternatives have been explained. Pt and daughter has understood the information and has agreed to proceed.   · Amylase and lipase now  · Liver biopsy today as ordered per surgery- to be done after ERCP to prevent risk of bleeding with position of procedure  · Antibiotics per ID  · IV fluids per PCP  · Medical management per PCP  · Monitor amylase, lipase CMP and CBC daily  · Supportive Care  · Continue to monitor    Note: This report was completed utilizing computer voice recognition software. Every effort has been made to ensure accuracy, however; inadvertent computerized transcription errors may be present. Thank you very much for your consultation. We will follow closely with you.     Discussed with Dr. Vidhya Sanders developed by Dr. Zahira Gar, APRN, NP-C 1/28/2021 8:51 PM for Dr. Tono Waite

## 2021-01-29 NOTE — PROGRESS NOTES
Speech Language Pathology      NAME:  Ora Hqa  :  1945  DATE: 2021  ROOM:  [address] [place] [address]A    Pt unavailable at this time d/t other procedure. Did discuss with charge RN and floor RN, recommended MBSS on initial eval. Order for MBSS placed, to be completed as Pt is able to have PO not conflicting with other procedures.      Diarrhea [R19.7]

## 2021-01-29 NOTE — PROGRESS NOTES
Patient is cleared from a cardiac standpoint and is not an excessive risk for this procedure as discussed with Dr. Lc Lofton

## 2021-01-29 NOTE — PROGRESS NOTES
[facility] SURGERY  DAILY PROGRESS NOTE  1/29/2021    Subjective:  Reports having abdominal pain this morning. Objective:  BP (!) 146/77   Pulse 70   Temp 98.8 °F (37.1 °C) (Oral)   Resp 16   Ht 5' 11\" (1.803 m)   Wt 163 lb 4.8 oz (74.1 kg)   SpO2 95%   BMI 22.78 kg/m²     GENERAL:  Laying in bed, awake, alert, cooperative, no apparent distress  HEAD: Normocephalic, atraumatic  EYES: No sclera icterus, pupils equal  LUNGS:  No increased work of breathing  CARDIOVASCULAR:  Warm and well perfused  ABDOMEN:  Soft, grossly tender to palpation throughout without rebound or guarding, mildly distended  EXTREMITIES: No edema or swelling  SKIN: Warm and dry    Assessment/Plan:  70 y.o. male with multiple liver lesions. ERCP today, IR biopsy Monday. Further plans/additional recommendations pending the results of these studies. Portal venous thrombosis is seen on imaging.  We recommend anticoagulation for this at some point per the primary team.    Electronically signed by Ulysses Cid, MD on 1/29/2021 at 6:43 AM

## 2021-01-29 NOTE — CONSULTS
[address] [place] [address] [facility]  Consultation Note     Admit Date: 1/27/2021  2:40 AM    Reason for Consult: Liver abscess    Attending Physician:  Richard Mcmahon MD    HISTORY OF PRESENT ILLNESS:             The history is obtained from extensive review of available past medical records. The patient is a 70 y.o. male who presented to the ED on 1/27/2029 complaining of diffuse abdominal pain and diarrhea for over a week. C. difficile was negative. White count was elevated. CT of the abdomen and pelvis showed multiple small lesions. He was seen by oncology. They felt he had a liver abscess and recommended ID consultation. Gastroenterology is following him for ERCP. Seen by cardiology and cleared for ERCP. patient is confused and cannot give reliable information. Past Medical History:        Diagnosis Date    Bleeding from the nose     left nare 5/5/16    Skin cancer of face     Dx 2009     Past Surgical History:        Procedure Laterality Date    OTHER SURGICAL HISTORY Left 2009    left sided ear surgery     Current Medications:   Scheduled Meds:   cefTRIAXone (ROCEPHIN) IV  2,000 mg Intravenous Q24H    metroNIDAZOLE  500 mg Intravenous Q8H    sodium chloride flush  10 mL Intravenous 2 times per day    enoxaparin  40 mg Subcutaneous Daily    folic acid  1 mg Oral Daily    thiamine  100 mg Oral Daily    multivitamin  1 tablet Oral Daily    lisinopril  10 mg Oral Daily    metoprolol tartrate  25 mg Oral BID     Continuous Infusions:  PRN Meds:perflutren lipid microspheres, HYDROcodone 5 mg - acetaminophen, hydrALAZINE, sodium chloride flush, promethazine **OR** ondansetron, polyethylene glycol, acetaminophen **OR** acetaminophen, LORazepam **OR** LORazepam **OR** LORazepam **OR** LORazepam **OR** LORazepam **OR** LORazepam **OR** LORazepam **OR** LORazepam    Allergies:  Patient has no known allergies.     Social History:   Social History     Socioeconomic History    Marital status:      Spouse name: None    Number of children: None    Years of education: None    Highest education level: None   Occupational History    None   Social Needs    Financial resource strain: None    Food insecurity     Worry: None     Inability: None    Transportation needs     Medical: None     Non-medical: None   Tobacco Use    Smoking status: Former Smoker     Quit date: 2006     Years since quittin.7    Smokeless tobacco: Never Used   Substance and Sexual Activity    Alcohol use: Yes     Alcohol/week: 30.0 standard drinks     Types: 30 Cans of beer per week    Drug use: No    Sexual activity: Not Currently     Partners: Female   Lifestyle    Physical activity     Days per week: None     Minutes per session: None    Stress: None   Relationships    Social connections     Talks on phone: None     Gets together: None     Attends Hindu service: None     Active member of club or organization: None     Attends meetings of clubs or organizations: None     Relationship status: None    Intimate partner violence     Fear of current or ex partner: None     Emotionally abused: None     Physically abused: None     Forced sexual activity: None   Other Topics Concern    None   Social History Narrative    None      Pets: None  Travel: None  The patient retired as a     Family History:   History reviewed. No pertinent family history. . Otherwise non-pertinent to the chief complaint. REVIEW OF SYSTEMS:    A 10 point review of systems cannot be obtained from the patient given his status of confusion and withdrawal from alcohol. PHYSICAL EXAM:    Vitals:   /68   Pulse 79   Temp 98.7 °F (37.1 °C) (Axillary)   Resp 16   Ht 5' 11\" (1.803 m)   Wt 163 lb 4.8 oz (74.1 kg)   SpO2 97%   BMI 22.78 kg/m²   Constitutional: The patient is awake, alert, and partially oriented. Daughter in room. Skin: Warm and dry. No rashes were noted.    HEENT: Eyes show round, and reactive pupils. No jaundice. Moist mucous membranes, no ulcerations, no thrush. Poor dentition. Neck: Supple to movements. No lymphadenopathy. Chest: No use of accessory muscles to breathe. Symmetrical expansion. Auscultation reveals no wheezing, crackles, or rhonchi. Cardiovascular: S1 and S2 are rhythmic and regular. No murmurs appreciated. Abdomen: Positive bowel sounds to auscultation. Soft and relatively benign to palpation. Liver slightly enlarged. Extremities: No clubbing, no cyanosis, no edema.   Lines: peripheral      CBC+dif:  Recent Labs     01/27/21  0329 01/28/21  0324 01/29/21  0450   WBC 13.2* 15.8* 14.4*   HGB 12.8 9.6* 10.5*   HCT 37.5 29.3* 31.9*   MCV 88.0 91.0 89.9    304 377   NEUTROABS 11.38* 12.58* 11.98*     Lab Results   Component Value Date    CRP 14.6 (H) 01/27/2021      No results found for: CRPHS  Lab Results   Component Value Date    SEDRATE 60 (H) 01/27/2021     Lab Results   Component Value Date    ALT 20 01/29/2021    AST 25 01/29/2021    ALKPHOS 119 01/29/2021    BILITOT 0.6 01/29/2021     Lab Results   Component Value Date     01/29/2021    K 3.7 01/29/2021    K 3.2 01/28/2021    CL 98 01/29/2021    CO2 25 01/29/2021    BUN 11 01/29/2021    CREATININE 0.6 01/29/2021    GFRAA >60 01/29/2021    LABGLOM >60 01/29/2021    GLUCOSE 102 01/29/2021    PROT 6.1 01/29/2021    LABALBU 2.4 01/29/2021    CALCIUM 7.9 01/29/2021    BILITOT 0.6 01/29/2021    ALKPHOS 119 01/29/2021    AST 25 01/29/2021    ALT 20 01/29/2021       Lab Results   Component Value Date    PROTIME 15.4 01/29/2021    INR 1.3 01/29/2021       No results found for: TSH    Lab Results   Component Value Date    COLORU Yellow 01/27/2021    PHUR 5.0 01/27/2021    WBCUA 0-1 01/27/2021    RBCUA 0-1 01/27/2021    BACTERIA NONE SEEN 01/27/2021    CLARITYU Clear 01/27/2021    SPECGRAV 1.015 01/27/2021    LEUKOCYTESUR Negative 01/27/2021    UROBILINOGEN 0.2 01/27/2021    BILIRUBINUR Negative 01/27/2021 BLOODU Negative 01/27/2021    GLUCOSEU Negative 01/27/2021       No results found for: HCO3, BE, O2SAT, PH, THGB, PCO2, PO2, TCO2  Radiology:  Noted    Microbiology:  Pending  No results for input(s): BC in the last 72 hours. No results for input(s): ORG in the last 72 hours. No results for input(s): [place] in the last 72 hours. No results for input(s): STREPNEUMAGU in the last 72 hours. No results for input(s): LP1UAG in the last 72 hours. No results for input(s): ASO in the last 72 hours. No results for input(s): CULTRESP in the last 72 hours. Recent Labs     01/27/21  1054   PROCAL 0.47*       Assessment:  · Multiple liver abscesses, mostly right liver lobe  · Leukocytosis and elevation of procalcitonin associated to the above   · Fever secondary to the above  · Alcoholism, in withdrawal    Plan:    · Continue Ceftriaxone and Metronidazole for now  · Check cultures, baseline ESR, CRP  · CT-guided biopsy and culture of liver lesion today  · Will follow with you    Thank you for having us see this patient in consultation. I will be discussing this case with the treating physicians.     Romayne Evan  9:11 AM  1/29/2021

## 2021-01-29 NOTE — PROGRESS NOTES
Physical Therapy    Facility/Department: [address] INTERNAL MEDICINE 2  Initial Assessment    NAME: Luisa Enriquez  : 1945  MRN: [id number]    Date of Service: 2021    Patient Diagnosis(es): The primary encounter diagnosis was Diarrhea, unspecified type. Diagnoses of Hypokalemia, Atrial fibrillation, new onset ([address] Gallstones, and Liver lesion were also pertinent to this visit. has a past medical history of Bleeding from the nose and Skin cancer of face. has a past surgical history that includes other surgical history (Left, 2009). Referring Provider:  KISHORE Crain CNS      Evaluating Therapist: Mckenzie Ryder PT    Room #: 404  DIAGNOSIS: Diarrhea  PRECAUTIONS: falls    Social:  Pt lives with wife and son in a 2 floor plan. States sleeps on couch on first floor. Prior to admission independent without device     Initial Evaluation  Date: 21 Treatment      Short Term/ Long Term   Goals   Was pt agreeable to Eval/treatment? yes     Does pt have pain? Bed Mobility  Rolling: SBA  Supine to sit: SBA  Sit to supine: SBA  Scooting: SBA  independent   Transfers Sit to stand: CGA  Stand to sit: CGA    independent   Ambulation    120 feet with no device with CGA  200 feet with no devcie with supervison   Stair Negotiation  Ascended and descended  NT   4-12 steps with 1 rail with supervision   LE strength     4-/5    4/5   balance      Fair+     AM-PAC Raw score               18/24         Pt is alert and Oriented to person. LE ROM: WFL  Sensation: intact  Edema: none  Endurance: fair+     ASSESSMENT  Pt displays functional ability as noted in the objective portion of this evaluation. Patient education  Pt educated on PT objectives    Patient response to education:   Pt verbalized understanding Pt demonstrated skill Pt requires further education in this area   yes         Pt's/ family goals   1.  None stated    Patient and or family understand(s) diagnosis, prognosis, and plan of care. no        PLAN OF CARE:    Current Treatment Recommendations     [x] Strengthening     [] ROM   [x] Balance Training   [x] Endurance Training   [x] Transfer Training   [x] Gait Training   [x] Stair Training   [] Positioning   [x] Safety and Education Training   [x] Patient/Caregiver Education   [] HEP  [] Other     Frequency of treatments: 2-5x/week x 5.days    Time in  0830  Time out  0845      Evaluation Time includes thorough review of current medical information, gathering information on past medical history/social history and prior level of function, completion of standardized testing/informal observation of tasks, assessment of data and education on plan of care and goals.     CPT codes:  [x] Low Complexity PT evaluation 99319  [] Moderate Complexity PT evaluation 98645  [] High Complexity PT evaluation 88419  [] PT Re-evaluation 62106  [] Gait training 65423 minutes  [] Manual therapy 71663 minutes  [] Therapeutic activities 17890 minutes  [] Therapeutic exercises 52845 minutes  [] Neuromuscular reeducation 59335 minutes     [facility] PT [id number]

## 2021-01-29 NOTE — BRIEF OP NOTE
Brief Postoperative Note    Jimmy Vela  YOB: 1945  [id number]    Procedure:  ERCP    Anesthesia: [facility]      Surgeon:  Sherie Flores MD      Findings: CBD: MASSIVE DILATATION WITH STRICTURE AT DISTAL CBD. LOW TAKE OFF DILATED CYSTIC DUCT WITH STONES IN IT.MULTIPLE GALL SONES IN GB .                            MULTIPLE GASTRIC ULCERS.                             PAPILLOTOMY WAS DONE AND 3 LARGE STONES WERE REMOVED AS WELL AS LARGE AMOUNT OF SLUDGE AND PUS WERE RECOVERED . 10X60 FULLY COVERED STENT WAS PLACED                      PD: NOT ENTERED                                                    Complications: None      Estimated blood loss: none        Adolph Rollins MD

## 2021-01-30 ENCOUNTER — APPOINTMENT (OUTPATIENT)
Dept: GENERAL RADIOLOGY | Age: 76
DRG: 871 | End: 2021-01-30
Payer: MEDICARE

## 2021-01-30 LAB
AFP-TUMOR MARKER: 2 NG/ML (ref 0–9)
ALBUMIN SERPL-MCNC: 2.3 G/DL (ref 3.5–5.2)
ALP BLD-CCNC: 103 U/L (ref 40–129)
ALT SERPL-CCNC: 16 U/L (ref 0–40)
AMYLASE: 871 U/L (ref 20–100)
ANION GAP SERPL CALCULATED.3IONS-SCNC: 7 MMOL/L (ref 7–16)
AST SERPL-CCNC: 19 U/L (ref 0–39)
BASOPHILS ABSOLUTE: 0.03 E9/L (ref 0–0.2)
BASOPHILS RELATIVE PERCENT: 0.2 % (ref 0–2)
BILIRUB SERPL-MCNC: 0.5 MG/DL (ref 0–1.2)
BILIRUBIN DIRECT: 0.3 MG/DL (ref 0–0.3)
BILIRUBIN, INDIRECT: 0.2 MG/DL (ref 0–1)
BUN BLDV-MCNC: 11 MG/DL (ref 8–23)
CA 19-9: 11 U/ML (ref 0–37)
CALCIUM SERPL-MCNC: 8 MG/DL (ref 8.6–10.2)
CHLORIDE BLD-SCNC: 100 MMOL/L (ref 98–107)
CO2: 27 MMOL/L (ref 22–29)
CREAT SERPL-MCNC: 0.6 MG/DL (ref 0.7–1.2)
EOSINOPHILS ABSOLUTE: 0.01 E9/L (ref 0.05–0.5)
EOSINOPHILS RELATIVE PERCENT: 0.1 % (ref 0–6)
GFR AFRICAN AMERICAN: >60
GFR NON-AFRICAN AMERICAN: >60 ML/MIN/1.73
GLUCOSE BLD-MCNC: 87 MG/DL (ref 74–99)
HCT VFR BLD CALC: 31.7 % (ref 37–54)
HEMOGLOBIN: 10.2 G/DL (ref 12.5–16.5)
IMMATURE GRANULOCYTES #: 0.08 E9/L
IMMATURE GRANULOCYTES %: 0.6 % (ref 0–5)
LIPASE: 2296 U/L (ref 13–60)
LYMPHOCYTES ABSOLUTE: 1.03 E9/L (ref 1.5–4)
LYMPHOCYTES RELATIVE PERCENT: 8.1 % (ref 20–42)
MAGNESIUM: 1.7 MG/DL (ref 1.6–2.6)
MCH RBC QN AUTO: 29.2 PG (ref 26–35)
MCHC RBC AUTO-ENTMCNC: 32.2 % (ref 32–34.5)
MCV RBC AUTO: 90.8 FL (ref 80–99.9)
MONOCYTES ABSOLUTE: 0.75 E9/L (ref 0.1–0.95)
MONOCYTES RELATIVE PERCENT: 5.9 % (ref 2–12)
NEUTROPHILS ABSOLUTE: 10.86 E9/L (ref 1.8–7.3)
NEUTROPHILS RELATIVE PERCENT: 85.1 % (ref 43–80)
PDW BLD-RTO: 13.6 FL (ref 11.5–15)
PHOSPHORUS: 3.9 MG/DL (ref 2.5–4.5)
PLATELET # BLD: 375 E9/L (ref 130–450)
PMV BLD AUTO: 10.1 FL (ref 7–12)
POTASSIUM SERPL-SCNC: 3.2 MMOL/L (ref 3.5–5)
RBC # BLD: 3.49 E12/L (ref 3.8–5.8)
SODIUM BLD-SCNC: 134 MMOL/L (ref 132–146)
TOTAL PROTEIN: 5.7 G/DL (ref 6.4–8.3)
WBC # BLD: 12.8 E9/L (ref 4.5–11.5)

## 2021-01-30 PROCEDURE — 80053 COMPREHEN METABOLIC PANEL: CPT

## 2021-01-30 PROCEDURE — 2580000003 HC RX 258: Performed by: INTERNAL MEDICINE

## 2021-01-30 PROCEDURE — 99232 SBSQ HOSP IP/OBS MODERATE 35: CPT | Performed by: INTERNAL MEDICINE

## 2021-01-30 PROCEDURE — 6360000002 HC RX W HCPCS: Performed by: INTERNAL MEDICINE

## 2021-01-30 PROCEDURE — 2500000003 HC RX 250 WO HCPCS: Performed by: INTERNAL MEDICINE

## 2021-01-30 PROCEDURE — 84100 ASSAY OF PHOSPHORUS: CPT

## 2021-01-30 PROCEDURE — 99232 SBSQ HOSP IP/OBS MODERATE 35: CPT | Performed by: TRANSPLANT SURGERY

## 2021-01-30 PROCEDURE — 6370000000 HC RX 637 (ALT 250 FOR IP): Performed by: INTERNAL MEDICINE

## 2021-01-30 PROCEDURE — 92611 MOTION FLUOROSCOPY/SWALLOW: CPT

## 2021-01-30 PROCEDURE — 82248 BILIRUBIN DIRECT: CPT

## 2021-01-30 PROCEDURE — 6360000002 HC RX W HCPCS: Performed by: NURSE PRACTITIONER

## 2021-01-30 PROCEDURE — 83735 ASSAY OF MAGNESIUM: CPT

## 2021-01-30 PROCEDURE — 82150 ASSAY OF AMYLASE: CPT

## 2021-01-30 PROCEDURE — C9113 INJ PANTOPRAZOLE SODIUM, VIA: HCPCS | Performed by: NURSE PRACTITIONER

## 2021-01-30 PROCEDURE — 36415 COLL VENOUS BLD VENIPUNCTURE: CPT

## 2021-01-30 PROCEDURE — 2500000003 HC RX 250 WO HCPCS: Performed by: RADIOLOGY

## 2021-01-30 PROCEDURE — 74230 X-RAY XM SWLNG FUNCJ C+: CPT

## 2021-01-30 PROCEDURE — 83690 ASSAY OF LIPASE: CPT

## 2021-01-30 PROCEDURE — 2060000000 HC ICU INTERMEDIATE R&B

## 2021-01-30 PROCEDURE — 85025 COMPLETE CBC W/AUTO DIFF WBC: CPT

## 2021-01-30 PROCEDURE — 87040 BLOOD CULTURE FOR BACTERIA: CPT

## 2021-01-30 PROCEDURE — 2580000003 HC RX 258: Performed by: NURSE PRACTITIONER

## 2021-01-30 RX ORDER — SODIUM CHLORIDE 9 MG/ML
INJECTION, SOLUTION INTRAVENOUS CONTINUOUS
Status: DISCONTINUED | OUTPATIENT
Start: 2021-01-30 | End: 2021-01-30

## 2021-01-30 RX ORDER — POTASSIUM BICARBONATE 25 MEQ/1
50 TABLET, EFFERVESCENT ORAL ONCE
Status: DISCONTINUED | OUTPATIENT
Start: 2021-01-30 | End: 2021-01-30 | Stop reason: CLARIF

## 2021-01-30 RX ADMIN — METRONIDAZOLE 500 MG: 500 INJECTION, SOLUTION INTRAVENOUS at 06:16

## 2021-01-30 RX ADMIN — Medication 10 ML: at 21:30

## 2021-01-30 RX ADMIN — FOLIC ACID 1 MG: 1 TABLET ORAL at 09:48

## 2021-01-30 RX ADMIN — PANTOPRAZOLE SODIUM 40 MG: 40 INJECTION, POWDER, FOR SOLUTION INTRAVENOUS at 21:30

## 2021-01-30 RX ADMIN — PANTOPRAZOLE SODIUM 40 MG: 40 INJECTION, POWDER, FOR SOLUTION INTRAVENOUS at 09:48

## 2021-01-30 RX ADMIN — SODIUM CHLORIDE: 9 INJECTION, SOLUTION INTRAVENOUS at 00:15

## 2021-01-30 RX ADMIN — BARIUM SULFATE 10 ML: 400 PASTE ORAL at 09:52

## 2021-01-30 RX ADMIN — SODIUM CHLORIDE, PRESERVATIVE FREE 10 ML: 5 INJECTION INTRAVENOUS at 21:31

## 2021-01-30 RX ADMIN — POTASSIUM BICARBONATE 40 MEQ: 782 TABLET, EFFERVESCENT ORAL at 15:43

## 2021-01-30 RX ADMIN — SODIUM CHLORIDE: 9 INJECTION, SOLUTION INTRAVENOUS at 14:49

## 2021-01-30 RX ADMIN — BARIUM SULFATE 120 ML: 0.81 POWDER, FOR SUSPENSION ORAL at 09:52

## 2021-01-30 RX ADMIN — METRONIDAZOLE 500 MG: 500 INJECTION, SOLUTION INTRAVENOUS at 21:36

## 2021-01-30 RX ADMIN — ACETAMINOPHEN 650 MG: 325 TABLET ORAL at 09:48

## 2021-01-30 RX ADMIN — METOPROLOL TARTRATE 25 MG: 25 TABLET, FILM COATED ORAL at 09:48

## 2021-01-30 RX ADMIN — METOPROLOL TARTRATE 25 MG: 25 TABLET, FILM COATED ORAL at 21:30

## 2021-01-30 RX ADMIN — MULTIVITAMIN TABLET 1 TABLET: TABLET at 11:16

## 2021-01-30 RX ADMIN — BARIUM SULFATE 70 ML: 400 SUSPENSION ORAL at 09:52

## 2021-01-30 RX ADMIN — Medication 100 MG: at 09:48

## 2021-01-30 RX ADMIN — LISINOPRIL 10 MG: 10 TABLET ORAL at 09:48

## 2021-01-30 RX ADMIN — WATER 2000 MG: 1 INJECTION INTRAMUSCULAR; INTRAVENOUS; SUBCUTANEOUS at 15:04

## 2021-01-30 RX ADMIN — METRONIDAZOLE 500 MG: 500 INJECTION, SOLUTION INTRAVENOUS at 15:04

## 2021-01-30 RX ADMIN — ENOXAPARIN SODIUM 40 MG: 40 INJECTION SUBCUTANEOUS at 09:48

## 2021-01-30 ASSESSMENT — PAIN SCALES - GENERAL: PAINLEVEL_OUTOF10: 0

## 2021-01-30 NOTE — PLAN OF CARE
Problem: Falls - Risk of:  Goal: Will remain free from falls  Description: Will remain free from falls  Outcome: Met This Shift  Goal: Absence of physical injury  Description: Absence of physical injury  Outcome: Met This Shift     Problem: Skin Integrity:  Goal: Absence of new skin breakdown  Description: Absence of new skin breakdown  Outcome: Met This Shift     Problem: Pain:  Goal: Pain level will decrease  Description: Pain level will decrease  Outcome: Met This Shift  Goal: Control of acute pain  Description: Control of acute pain  Outcome: Met This Shift  Goal: Control of chronic pain  Description: Control of chronic pain  Outcome: Met This Shift     Problem: Skin Integrity:  Goal: Will show no infection signs and symptoms  Description: Will show no infection signs and symptoms  Outcome: Not Met This Shift     Problem: Skin Integrity:  Goal: Will show no infection signs and symptoms  Description: Will show no infection signs and symptoms  Outcome: Not Met This Shift

## 2021-01-30 NOTE — PROGRESS NOTES
Dr. Yuni Calderon notified of swallow evaluation dietary recommendations, note left on treatment team, as patient in on clears currently

## 2021-01-30 NOTE — PROGRESS NOTES
[address] [place] [address] [facility]  Progress Note    SUBJECTIVE:  Chief Complaint   Patient presents with    Diarrhea     Pt states having cramps after diarrhea x8 days. Denies fever. Nursing reports that the patient had a fever. He denies abdominal pain. Appetite is good. He had ERCP. Review of systems:  As stated above in the chief complaint, otherwise negative. Medications:  Scheduled Meds:   pantoprazole  40 mg Intravenous BID    And    sodium chloride (PF)  10 mL Intravenous BID    cefTRIAXone (ROCEPHIN) IV  2,000 mg Intravenous Q24H    metroNIDAZOLE  500 mg Intravenous Q8H    sodium chloride flush  10 mL Intravenous 2 times per day    enoxaparin  40 mg Subcutaneous Daily    folic acid  1 mg Oral Daily    thiamine  100 mg Oral Daily    multivitamin  1 tablet Oral Daily    lisinopril  10 mg Oral Daily    metoprolol tartrate  25 mg Oral BID     Continuous Infusions:   sodium chloride 75 mL/hr at 21 0015     PRN Meds:HYDROmorphone, perflutren lipid microspheres, HYDROcodone 5 mg - acetaminophen, hydrALAZINE, sodium chloride flush, promethazine **OR** ondansetron, polyethylene glycol, acetaminophen **OR** acetaminophen, LORazepam **OR** LORazepam **OR** LORazepam **OR** LORazepam **OR** LORazepam **OR** LORazepam **OR** LORazepam **OR** LORazepam    OBJECTIVE:  BP (!) 147/68   Pulse 74   Temp 98.4 °F (36.9 °C) (Axillary)   Resp 16   Ht 5' 11\" (1.803 m)   Wt 161 lb (73 kg)   SpO2 92%   BMI 22.45 kg/m²   Temp  Av.7 °F (37.1 °C)  Min: 96.8 °F (36 °C)  Max: 102.8 °F (39.3 °C)  Constitutional: The patient is awake, alert, and better oriented. Sitting up in bed and eating lunch. Skin: Warm and dry. No rashes were noted. HEENT: Round and reactive pupils. Moist mucous membranes. No ulcerations or thrush. Left ear is missing. Neck: Supple to movements. Chest: No use of accessory muscles to breathe. Symmetrical expansion.   No wheezing, crackles or rhonchi. Cardiovascular: S1 and S2 are rhythmic and regular. No murmurs appreciated. Abdomen: Slightly distended. Positive bowel sounds to auscultation. Benign to palpation. Extremities: No clubbing, no cyanosis, no edema. Lines: peripheral    Laboratory and Tests Review:  Lab Results   Component Value Date    WBC 12.8 (H) 01/30/2021    WBC 14.4 (H) 01/29/2021    WBC 15.8 (H) 01/28/2021    HGB 10.2 (L) 01/30/2021    HCT 31.7 (L) 01/30/2021    MCV 90.8 01/30/2021     01/30/2021     Lab Results   Component Value Date    NEUTROABS 10.86 (H) 01/30/2021    NEUTROABS 11.98 (H) 01/29/2021    NEUTROABS 12.58 (H) 01/28/2021     No results found for: CRPHS  Lab Results   Component Value Date    ALT 16 01/30/2021    AST 19 01/30/2021    ALKPHOS 103 01/30/2021    BILITOT 0.5 01/30/2021     Lab Results   Component Value Date     01/30/2021    K 3.2 01/30/2021    K 3.2 01/28/2021     01/30/2021    CO2 27 01/30/2021    BUN 11 01/30/2021    CREATININE 0.6 01/30/2021    CREATININE 0.6 01/29/2021    CREATININE 0.6 01/28/2021    GFRAA >60 01/30/2021    LABGLOM >60 01/30/2021    GLUCOSE 87 01/30/2021    PROT 5.7 01/30/2021    LABALBU 2.3 01/30/2021    CALCIUM 8.0 01/30/2021    BILITOT 0.5 01/30/2021    ALKPHOS 103 01/30/2021    AST 19 01/30/2021    ALT 16 01/30/2021     Lab Results   Component Value Date    CRP 14.6 (H) 01/27/2021     Lab Results   Component Value Date    SEDRATE 60 (H) 01/27/2021     Radiology:      Microbiology:   C. difficile toxin assay: Not detected  Stool cultures: Negative  Giardia: None  Respiratory panel, including SARS-CoV-2: Not detected    No results for input(s): PROCAL in the last 72 hours.     ASSESSMENT:  · Possible liver abscesses  · Choledocholithiasis with ascending cholangitis  · Status post ERCP with papillotomy, stone extraction and Wallstent placement 1/29/2021  · Leukocytosis associated to the above  · Fever associated to the above  · Alcohol withdrawal  · Dysphagia    PLAN:  · Continue Metronidazole and Ceftriaxone  · Blood cultures x2  · Repeat ESR, procalcitonin and CRP  · CT-guided aspiration of liver lesions was postponed    Yung Saunders  12:27 PM  1/30/2021

## 2021-01-30 NOTE — PROGRESS NOTES
[facility] Cardiology Inpatient Progress Note    Patient is a 70 y.o. male of No primary care provider on file. seen in hospital follow up. Chief complaint: ABD pain/PAF/MAT/COPD/Pre-op    HPI: Some SOB. No CP.      Patient Active Problem List   Diagnosis    Diarrhea    Essential hypertension    Atrial fibrillation (HCC)    Hypokalemia    Functional diarrhea    Gallstones    Choledocholithiasis    Chronic obstructive pulmonary disease ([facility]    Alcohol abuse    Atrial fibrillation, new onset ([facility]    Liver lesion       No Known Allergies    Current Facility-Administered Medications   Medication Dose Route Frequency Provider Last Rate Last Admin    0.9 % sodium chloride infusion   Intravenous Continuous KISHORE Sheikh - CNP 75 mL/hr at 01/30/21 0015 New Bag at 01/30/21 0015    HYDROmorphone (DILAUDID) injection 0.5 mg  0.5 mg Intravenous Q4H PRN Dionna Tristan MD   0.5 mg at 01/29/21 1514    pantoprazole (PROTONIX) injection 40 mg  40 mg Intravenous BID KISHORE Sheikh - CNP   40 mg at 01/30/21 0948    And    sodium chloride (PF) 0.9 % injection 10 mL  10 mL Intravenous BID KISHORE Sheikh - CNP   10 mL at 01/29/21 2037    perflutren lipid microspheres (DEFINITY) injection 1.65 mg  1.5 mL Intravenous ONCE PRN Dionna Tristan MD        cefTRIAXone (ROCEPHIN) 2,000 mg in sterile water 20 mL IV syringe  2,000 mg Intravenous Q24H Dionna Tristan MD   2,000 mg at 01/29/21 1514    metronidazole (FLAGYL) 500 mg in NaCl 100 mL IVPB premix  500 mg Intravenous Q8H Dionna Tristan MD   Stopped at 01/30/21 0806    HYDROcodone-acetaminophen (NORCO) 5-325 MG per tablet 1 tablet  1 tablet Oral Q6H PRN Dionna Tristan MD   1 tablet at 01/27/21 0917    hydrALAZINE (APRESOLINE) injection 5 mg  5 mg Intravenous Q6H PRN Dionna Tristan MD        sodium chloride flush 0.9 % injection 10 mL  10 mL Intravenous 2 times per day Dionna Tristan MD   10 mL at 01/29/21 2037    sodium chloride flush 0.9 % injection 10 mL  10 mL Intravenous PRN Tin Peña MD        enoxaparin (LOVENOX) injection 40 mg  40 mg Subcutaneous Daily Tin Peña MD   40 mg at 01/30/21 0948    promethazine (PHENERGAN) tablet 12.5 mg  12.5 mg Oral Q6H PRN Tin Peña MD        Or    ondansetron [facility]) injection 4 mg  4 mg Intravenous Q6H PRN Tin Peña MD        polyethylene glycol [facility]) packet 17 g  17 g Oral Daily PRN Tin Peña MD        acetaminophen (TYLENOL) tablet 650 mg  650 mg Oral Q6H PRN Tin Peña MD   650 mg at 01/30/21 0948    Or    acetaminophen (TYLENOL) suppository 650 mg  650 mg Rectal Q6H PRN Tin Peña MD        folic acid (FOLVITE) tablet 1 mg  1 mg Oral Daily Tin Peña MD   1 mg at 01/30/21 0948    thiamine tablet 100 mg  100 mg Oral Daily Tin Peña MD   100 mg at 01/30/21 0948    multivitamin 1 tablet  1 tablet Oral Daily Tin Peña MD   1 tablet at 01/29/21 0855    lisinopril (PRINIVIL;ZESTRIL) tablet 10 mg  10 mg Oral Daily Tin Peña MD   10 mg at 01/30/21 0948    metoprolol tartrate (LOPRESSOR) tablet 25 mg  25 mg Oral BID Tin Peña MD   25 mg at 01/30/21 0948    LORazepam (ATIVAN) tablet 1 mg  1 mg Oral Q1H PRN Tin Peña MD        Or    LORazepam (ATIVAN) injection 1 mg  1 mg Intravenous Q1H MART Peña MD   1 mg at 01/28/21 0037    Or    LORazepam (ATIVAN) tablet 2 mg  2 mg Oral Q1H PRN Tin Peña MD        Or    LORazepam (ATIVAN) injection 2 mg  2 mg Intravenous Q1H PRN Tin Peña MD        Or    LORazepam (ATIVAN) tablet 3 mg  3 mg Oral Q1H PRN Tin Peña MD        Or    LORazepam (ATIVAN) injection 3 mg  3 mg Intravenous Q1H PRN Tin Peña MD        Or    LORazepam (ATIVAN) tablet 4 mg  4 mg Oral Q1H PRN Tin Peña MD        Or    LORazepam (ATIVAN) injection 4 mg  4 mg Intravenous Q1H PRN Tin Peña MD           Review of systems:   Heart: as above   Lungs: as above   Eyes: denies changes in vision or discharge. Ears: denies changes in hearing or pain. Nose: denies epistaxis or masses   Throat: denies sore throat or trouble swallowing. Neuro: denies numbness, tingling, tremors. Skin: denies rashes or itching. : denies hematuria, dysuria   GI: denies vomiting, diarrhea   Psych: denies mood changed, anxiety, depression. Physical Exam   BP (!) 147/68   Pulse 74   Temp 102.8 °F (39.3 °C) (Oral)   Resp 16   Ht 5' 11\" (1.803 m)   Wt 161 lb (73 kg)   SpO2 92%   BMI 22.45 kg/m²   Constitutional: Oriented to person, place, and time. No distress. Well developed. Head: Normocephalic and atraumatic. Neck: Neck supple. No hepatojugular reflux. No JVD present. Carotid bruit is not present. No tracheal deviation present. No thyromegaly present. Cardiovascular: Normal rate, regular rhythm, normal heart sounds. intact distal pulses. No gallop and no friction rub. No murmur heard. Pulmonary: Breath sounds normal. No respiratory distress. No wheezes. No rales. Chest: Effort normal. No tenderness. Abdominal: Soft. Bowel sounds are normal. No distension or mass. No tenderness, rebound or guarding. Musculoskeletal: . No tenderness. No clubbing or cyanosis. Extremitites: Intact distal pulses. No edema  Neurological: Alert and oriented to person, place, and time. Skin: Skin is warm and dry. No rash noted. Not diaphoretic. No erythema. Psychiatric: Normal mood and affect. Behavior is normal.   Lymphadenopathy: No cervical adenopathy. No groin adenopathy.       CBC:   Lab Results   Component Value Date    WBC 12.8 01/30/2021    RBC 3.49 01/30/2021    HGB 10.2 01/30/2021    HCT 31.7 01/30/2021    MCV 90.8 01/30/2021    MCH 29.2 01/30/2021    MCHC 32.2 01/30/2021    RDW 13.6 01/30/2021     01/30/2021    MPV 10.1 01/30/2021     BMP:   Lab Results   Component Value Date     01/30/2021    K 3.2 01/30/2021    K 3.2 01/28/2021     01/30/2021    CO2 27 01/30/2021

## 2021-01-30 NOTE — OP NOTE
[address] [address]                                OPERATIVE REPORT    PATIENT NAME: Mirian Thrasher                    :        1945  MED REC NO:   [id number]                            ROOM:       0404  ACCOUNT NO:   [de-identified]                           ADMIT DATE: 2021  PROVIDER:     Marine Mena MD    DATE OF PROCEDURE:  2021    PROCEDURES PERFORMED:  ERCP with papillotomy, stones extraction, and  Wallstent placement. PREOPERATIVE DIAGNOSES:  Choledocholithiasis and ascending cholangitis. POSTOPERATIVE DIAGNOSES:  Markedly dilated common bile duct with filling  defect consistent with stones. Papillotomy was done, and multiple three  large stones as well as pus and gravel were extracted. Small area of  stricture in the intrapapillary portion of the common bile duct. A  Wallstent 10 x 60 was placed without difficulty. Excellent drainage was  obtained. ANESTHESIA:  LMAC. ESTIMATED BLOOD LOSS:  N/A    DESCRIPTION OF PROCEDURE:  With the patient in his left lateral  decubitus position, the Olympus side-viewing video duodenoscope was  introduced into the esophagus, advanced through the GE junction into the  gastric body. Evaluation of the gastric body showed multiple, somewhat  large gastric ulcers. The scope was then advanced through the pylorus  into duodenal bulb, second portion of the duodenum where duodenitis was  seen and the papilla was visualized and cannulated into the common bile  duct axis. Evaluation of the common bile duct showed markedly dilated  common bile duct with a haziness and multiple filling defects consistent  with stones. Over the guidewire, the papillotome was then adjusted, and  an adequately sized papillotomy was performed.   Over the guidewire, the  papillotome was then exchanged with number 9 to 12 Filipino balloon, and  balloon sweeping progressively resulted in three large stones extracted,  and a large amount of gravel and purulent drainage was also obtained. Over the guidewire, the balloon was then exchanged with 10-Urdu 60-mm  fully covered Wallstent, which was placed with excellent drainage  continued. The scope was then retrieved, area decompressed, and  procedure was terminated. The patient tolerated the procedure well.         Dada Gipson MD    D: 01/29/2021 17:11:08       T: 01/29/2021 17:20:50     [id number]  Job#: [id number]     Doc#: [id number]    CC:  Melanie Callejas MD

## 2021-01-30 NOTE — PROGRESS NOTES
[facility]   Progress Note    Admitting Date and Time: 1/27/2021  2:40 AM  Admit Dx: Diarrhea [R19.7]     Seen for follow on multiple problems as listed below    Subjective  At present comfortable , denies CP ,sob or abd pain. D/w nursing . ROS: denies fever, chills, cp, sob, n/v, HA unless stated above.      pantoprazole  40 mg Intravenous BID    And    sodium chloride (PF)  10 mL Intravenous BID    cefTRIAXone (ROCEPHIN) IV  2,000 mg Intravenous Q24H    metroNIDAZOLE  500 mg Intravenous Q8H    sodium chloride flush  10 mL Intravenous 2 times per day    enoxaparin  40 mg Subcutaneous Daily    folic acid  1 mg Oral Daily    thiamine  100 mg Oral Daily    multivitamin  1 tablet Oral Daily    lisinopril  10 mg Oral Daily    metoprolol tartrate  25 mg Oral BID         HYDROmorphone, 0.5 mg, Q4H PRN      perflutren lipid microspheres, 1.5 mL, ONCE PRN      HYDROcodone 5 mg - acetaminophen, 1 tablet, Q6H PRN      hydrALAZINE, 5 mg, Q6H PRN      sodium chloride flush, 10 mL, PRN      promethazine, 12.5 mg, Q6H PRN    Or      ondansetron, 4 mg, Q6H PRN      polyethylene glycol, 17 g, Daily PRN      acetaminophen, 650 mg, Q6H PRN    Or      acetaminophen, 650 mg, Q6H PRN      LORazepam, 1 mg, Q1H PRN    Or      LORazepam, 1 mg, Q1H PRN    Or      LORazepam, 2 mg, Q1H PRN    Or      LORazepam, 2 mg, Q1H PRN    Or      LORazepam, 3 mg, Q1H PRN    Or      LORazepam, 3 mg, Q1H PRN    Or      LORazepam, 4 mg, Q1H PRN    Or      LORazepam, 4 mg, Q1H PRN         Objective:    BP (!) 147/68   Pulse 74   Temp 98.4 °F (36.9 °C) (Axillary)   Resp 16   Ht 5' 11\" (1.803 m)   Wt 161 lb (73 kg)   SpO2 92%   BMI 22.45 kg/m²   General Appearance: alert and oriented to person, place and time, in no acute distress  Skin: warm and dry  Head: normocephalic and atraumatic  Eyes: pupils equal, round, and reactive to light, extraocular eye movements intact, conjunctivae superimposed   cholangitis is limited. 2. Cholelithiasis with no definite findings of cholecystitis. 3. Limited evaluation of the liver due to motion artifact. Signal   abnormalities seen primarily in hepatic segment 7 correspond with the CT   appearance and are suspicious for microabscesses. However, an infiltrative   malignancy could appear similar. Consider evaluation with liver protocol   abdomen CT, which would be less affected by motion. 4. Complicated fluid collections along the caudate lobe of the liver, between   right adrenal gland right diaphragmatic deo, adjacent to the retrohepatic   inferior vena cava, and likely within the christopher hepatis are suspicious for   abscesses given the above findings. 5. Mild dilation of the downstream pancreatic duct potentially due to mass   effect from stones in the distal common bile duct. Alternatively, this could   be related to moderate parenchymal atrophy. 6. Small right pleural effusion. US GALLBLADDER RUQ   Final Result   Cholecystolithiasis. Suggestion also of choledocholithiasis with common bile   duct distention   Solid-appearing mass adjacent to the left hepatic lobe, in the christopher hepatis   region, may be artifact from the duodenum. Differential includes enlarged   lymph node   Simple appearing right renal cyst      CT ABDOMEN PELVIS W IV CONTRAST Additional Contrast? None   Final Result   There are numerous liver lesions. Multiple small lesions are possibly   cystic. Larger lesions in the left lobe are more nonspecific. The right   lobe is also heterogeneous. MRI correlation is recommended for   characterization of these abnormalities. Cholelithiasis. Biliary dilatation and probable multiple common bile duct   stones. Soft tissue nodule adjacent to the diaphragmatic deo and right intrarenal   gland measuring 2 cm. This this may be an adrenal adenoma or lymph node. Sigmoid diverticulosis. No acute diverticulitis.    Small right pleural effusion with basilar atelectasis. XR CHEST PORTABLE   Final Result   There is no acute abnormality seen. IR Interventional Radiology Procedure Request    (Results Pending)       Assessment:    Active Problems:    Diarrhea    Essential hypertension    Atrial fibrillation (HCC)    Hypokalemia    Functional diarrhea    Gallstones    Choledocholithiasis    Chronic obstructive pulmonary disease (HCC)    Alcohol abuse    Atrial fibrillation, new onset ([address]    Liver lesion  Resolved Problems:    * No resolved hospital problems. *      Plan:  Abdominal pain with diarrhea sec to  choledocholithiasis , cholangitis,resulting in rt hepatic lobe abscesses & portal vein thrombosis. GI,GS  and IR consulted following. Continue abx , IVF  supportive treatment. s/p ERCP with papillotomy, stones extraction and Wallstent placement on 1/29/2021. As per surg will need elective cholecystectomy when abscesses heals. Diarrhea has resolved. As per GI on clear liquids, once pancreatitis resolves will advance to dental soft if barium swallow allows. Currently lipase still elevated. Liver abscesses as per MRI-ID consulted and following. Has Leukocytosis and elevated pro Niranjan.  On IV ceftriaxone plus Flagyl. He is scheduled  for CT-guided biopsy of liver lesion. Hypokalemia-replete and follow    A. Fib-cardiology is consulted & following , present anticoagulation has not been recommended with need of arrhythmia follow-up to assess presence or absence of recurrence and potential future needs of anticoagulation especially if hypertension persist in this will contribute to his [id number] score of 2. He will need outpatient follow-up with cardiology. Hypertension-on lisinopril, hydralazine as needed    EtOH use-on IV fluids, MVI, thiamine, folic acid. Monitor if needed placed on CIWA scale.     Dysphagia-speech  therapy consulted & following , await MBS     History of squamous cell skin cancer-s/p left ureter

## 2021-01-30 NOTE — PROGRESS NOTES
PROGRESS NOTE    Patient Presents with/Seen in Consultation For      *choledocholithiasis  CHIEF COMPLAINT: Abdominal pain and diarhhea    Subjective:     Patient states he feels fatigued, weak, and tired. Pt denies abdominal pain, nausea, or vomiting. Daughter at [facility]. Discussed ERCP results with pt and daughter with all questions answered. Discussed swallow study in progress, once results back ok to have clears, they agree. Pt with moist non-productive cough, denies shortness of breath. Pt with spike in temp 102.4° this am, Charge RN to call ID for notification. Pt states he was chilling prior to temp. Review of Systems  Aside from what was mentioned in the PMH and HPI, essentially unremarkable, all others negative. Objective:     BP (!) 147/68   Pulse 74   Temp 102.4 °F (39.1 °C)   Resp 16   Ht 5' 11\" (1.803 m)   Wt 161 lb (73 kg)   SpO2 92%   BMI 22.45 kg/m²     General appearance: alert, awake, pale, fatigued and ill appearing, laying in bed with daughter at [facility], and cooperative  Eyes: conjunctiva pale, sclera anicteric. PERRL. Lungs: coarse rhonchi throughout with diffuse wheezing to auscultation bilaterally  Heart: regular rate and rhythm, no murmur, 2+ pulses; no edema  Abdomen: softly distended, non-tender; bowel sounds normal; no masses,  no organomegaly  Extremities: extremities without edema  Pulses: 2+ and symmetric  Skin: Skin color pale, texture, turgor normal.   Neurologic: Alert, oriented x4. BOYKIN weak.         0.9 % sodium chloride infusion, Continuous      HYDROmorphone (DILAUDID) injection 0.5 mg, Q4H PRN      pantoprazole (PROTONIX) injection 40 mg, BID    And      sodium chloride (PF) 0.9 % injection 10 mL, BID      perflutren lipid microspheres (DEFINITY) injection 1.65 mg, ONCE PRN      cefTRIAXone (ROCEPHIN) 2,000 mg in sterile water 20 mL IV syringe, Q24H      metronidazole (FLAGYL) 500 mg in NaCl 100 mL IVPB premix, Q8H      HYDROcodone-acetaminophen (NORCO) 5-325 MG per tablet 1 tablet, Q6H PRN      hydrALAZINE (APRESOLINE) injection 5 mg, Q6H PRN      sodium chloride flush 0.9 % injection 10 mL, 2 times per day      sodium chloride flush 0.9 % injection 10 mL, PRN      enoxaparin (LOVENOX) injection 40 mg, Daily      promethazine (PHENERGAN) tablet 12.5 mg, Q6H PRN    Or      ondansetron (ZOFRAN) injection 4 mg, Q6H PRN      polyethylene glycol (GLYCOLAX) packet 17 g, Daily PRN      acetaminophen (TYLENOL) tablet 650 mg, Q6H PRN    Or      acetaminophen (TYLENOL) suppository 650 mg, F3K PRN      folic acid (FOLVITE) tablet 1 mg, Daily      thiamine tablet 100 mg, Daily      multivitamin 1 tablet, Daily      lisinopril (PRINIVIL;ZESTRIL) tablet 10 mg, Daily      metoprolol tartrate (LOPRESSOR) tablet 25 mg, BID      LORazepam (ATIVAN) tablet 1 mg, Q1H PRN    Or      LORazepam (ATIVAN) injection 1 mg, Q1H PRN    Or      LORazepam (ATIVAN) tablet 2 mg, Q1H PRN    Or      LORazepam (ATIVAN) injection 2 mg, Q1H PRN    Or      LORazepam (ATIVAN) tablet 3 mg, Q1H PRN    Or      LORazepam (ATIVAN) injection 3 mg, Q1H PRN    Or      LORazepam (ATIVAN) tablet 4 mg, Q1H PRN    Or      LORazepam (ATIVAN) injection 4 mg, Q1H PRN         Data Review  CBC:   Lab Results   Component Value Date    WBC 12.8 01/30/2021    RBC 3.49 01/30/2021    HGB 10.2 01/30/2021    HCT 31.7 01/30/2021    MCV 90.8 01/30/2021    MCH 29.2 01/30/2021    MCHC 32.2 01/30/2021    RDW 13.6 01/30/2021     01/30/2021    MPV 10.1 01/30/2021     CMP:    Lab Results   Component Value Date     01/30/2021    K 3.2 01/30/2021    K 3.2 01/28/2021     01/30/2021    CO2 27 01/30/2021    BUN 11 01/30/2021    CREATININE 0.6 01/30/2021    GFRAA >60 01/30/2021    LABGLOM >60 01/30/2021    GLUCOSE 87 01/30/2021    PROT 5.7 01/30/2021    LABALBU 2.3 01/30/2021    CALCIUM 8.0 01/30/2021    BILITOT 0.5 01/30/2021    ALKPHOS 103 01/30/2021    AST 19 01/30/2021    ALT 16 01/30/2021     Hepatic Function if Barium swallow result allows  ? Alcohol cessation  ? Defer comorbidities to others  ? Supportive Care  ? Continue to monitor    Note: This report was completed utilizing computer voice recognition software. Every effort has been made to ensure accuracy, however; inadvertent computerized transcription errors may be present.      Discussed with Dr. Estela Islas per Dr. Carol Pereira [id number], FNP-BC 1/30/2021 11:41 AM For Dr. Willy Phalen

## 2021-01-30 NOTE — PROGRESS NOTES
SPEECH/LANGUAGE PATHOLOGY  VIDEOFLUOROSCOPIC STUDY OF SWALLOWING (MBS)      PATIENT NAME:  Marvin Romero      :  1945      TODAY'S DATE:  2021   ROOM:  [address] [place] [address]    SUMMARY OF EVALUATION     DYSPHAGIA DIAGNOSIS:  Severe pharyngeal dysphasia with straw, good swallow with spoon feed and chin tuck      DIET RECOMMENDATIONS:  Dysphagia 1, Pureed solids with  thin liquids     FEEDING RECOMMENDATIONS:     Assistance level:  Supervision is needed during all oral intake      Compensatory strategies recommended: Double swallow with thicker foods and Chin tuck with all swallows. Spoon feed only. NO STRAW    THERAPY RECOMMENDATIONS:      Dysphagia therapy is recommended 3-5 times per week for LOS or when goals are met. PROCEDURE     Consistencies Administered During the Evaluation   Liquids: thin liquid, nectar thick liquid, honey thick liquid and pudding thick liquid   Solids:  pureed foods      Method of Intake:   straw, spoon  Fed by clinician      Position:   Seated, upright    Current Respiratory Status   room air                RESULTS     ORAL STAGE       The oral stage of swallowing was within functional limits        PHARYNGEAL STAGE           ONSET TIME     Onset time of the pharyngeal swallow was adequate       PHARYNGEAL RESIDUALS          Vallecula/Pharyngeal Wall           No significant residuals were noted in the vallecula      Pyriform Sinuses      Residuals in the pyriform sinuses were noted due to pharyngeal weakness for nectar consistency liquid, honey consistency liquid and pudding consistency liquid  which  mostly cleared with cued double swallow    LARYNGEAL PENETRATION     Laryngeal penetration occurred prior to aspiration. Further details under aspiration section. ASPIRATION    Aspiration occurred DURING the swallow for thin liquid due to  inadequate laryngeal closure . Aspiration was severe and occurred only with use of a straw . an absent cough/throat clear was noted         COMPENSATORY STRATEGIES       Compensatory strategies that were beneficial included Double swallow, Chin tuck, Small bites/sips and No straw      STRUCTURAL/FUNCTIONAL ANOMALIES       No structural/functional anomalies were noted      CERVICAL ESOPHAGEAL STAGE :        Was not assessed                            The Speech Language Pathologist (SLP) completed education with the patient regarding results of evaluation. Explained that Speech Pathology intervention is warranted  at this time   Prognosis for improvements is fair     This plan will be re-evaluated and revised in 1 week  if warranted. Patient stated goals: Did not state,   Treatment goals discussed with Patient   The Patient did not demonstrate understanding of the diagnosis, prognosis and plan of care as the patient was very cold and was not able to talk about anything else. CPT code:  64044  dysphagia study        [x]The admitting diagnosis and active problem list, as listed below have been reviewed prior to initiation of this evaluation.      ADMITTING DIAGNOSIS: Diarrhea [R19.7]     ACTIVE PROBLEM LIST:   Patient Active Problem List   Diagnosis    Diarrhea    Essential hypertension    Atrial fibrillation (HCC)    Hypokalemia    Functional diarrhea    Gallstones    Choledocholithiasis    Chronic obstructive pulmonary disease (HCC)    Alcohol abuse    Atrial fibrillation, new onset ([facility]    Liver lesion

## 2021-01-30 NOTE — PROGRESS NOTES
[facility] SURGERY  DAILY PROGRESS NOTE  1/30/2021    CC: abdominal pain    Subjective:  Reports having abdominal pain this morning. States that he is feeling somewhat better.     Objective:  BP (!) 147/68   Pulse 74   Temp 102.4 °F (39.1 °C)   Resp 16   Ht 5' 11\" (1.803 m)   Wt 161 lb (73 kg)   SpO2 92%   BMI 22.45 kg/m²     GENERAL:  Laying in bed, awake, alert, cooperative, no apparent distress  HEAD: Normocephalic, atraumatic  EYES: No sclera icterus, pupils equal  LUNGS:  No increased work of breathing  CARDIOVASCULAR:  Warm and well perfused  ABDOMEN:  Soft, grossly tender to palpation throughout without rebound or guarding, mildly distended  EXTREMITIES: No edema or swelling  SKIN: Warm and dry    Assessment/Plan:  70 y.o. male with choledocholithiasis and cholangitis resulting in a right hepatic lobe abscess, portal vein thrombosis    - continue antibiotics  - diet per GI  - will need electively cholecystectomy when abscess heals  - will need eliquis 24 hours after liver biopsy performed to rule out underlying cancer    Electronically signed by Rayne Baron MD on 1/30/2021 at 11:53 AM

## 2021-01-31 ENCOUNTER — APPOINTMENT (OUTPATIENT)
Dept: CT IMAGING | Age: 76
DRG: 871 | End: 2021-01-31
Payer: MEDICARE

## 2021-01-31 LAB
AFP-TUMOR MARKER: 2 NG/ML (ref 0–9)
ALBUMIN SERPL-MCNC: 2.3 G/DL (ref 3.5–5.2)
ALP BLD-CCNC: 166 U/L (ref 40–129)
ALT SERPL-CCNC: 15 U/L (ref 0–40)
AMYLASE: 193 U/L (ref 20–100)
ANION GAP SERPL CALCULATED.3IONS-SCNC: 10 MMOL/L (ref 7–16)
AST SERPL-CCNC: 22 U/L (ref 0–39)
BASOPHILS ABSOLUTE: 0.09 E9/L (ref 0–0.2)
BASOPHILS RELATIVE PERCENT: 0.5 % (ref 0–2)
BILIRUB SERPL-MCNC: 0.5 MG/DL (ref 0–1.2)
BUN BLDV-MCNC: 12 MG/DL (ref 8–23)
C-REACTIVE PROTEIN: 12.2 MG/DL (ref 0–0.4)
CALCIUM SERPL-MCNC: 8 MG/DL (ref 8.6–10.2)
CHLORIDE BLD-SCNC: 100 MMOL/L (ref 98–107)
CHROMOGRANIN A: 53 NG/ML (ref 0–103)
CO2: 27 MMOL/L (ref 22–29)
CREAT SERPL-MCNC: 0.7 MG/DL (ref 0.7–1.2)
EOSINOPHILS ABSOLUTE: 0.07 E9/L (ref 0.05–0.5)
EOSINOPHILS RELATIVE PERCENT: 0.4 % (ref 0–6)
GFR AFRICAN AMERICAN: >60
GFR NON-AFRICAN AMERICAN: >60 ML/MIN/1.73
GLUCOSE BLD-MCNC: 105 MG/DL (ref 74–99)
HCT VFR BLD CALC: 32.4 % (ref 37–54)
HEMOGLOBIN: 10.2 G/DL (ref 12.5–16.5)
IMMATURE GRANULOCYTES #: 0.19 E9/L
IMMATURE GRANULOCYTES %: 1.1 % (ref 0–5)
LIPASE: 532 U/L (ref 13–60)
LYMPHOCYTES ABSOLUTE: 1.28 E9/L (ref 1.5–4)
LYMPHOCYTES RELATIVE PERCENT: 7.7 % (ref 20–42)
MAGNESIUM: 1.7 MG/DL (ref 1.6–2.6)
MCH RBC QN AUTO: 28.9 PG (ref 26–35)
MCHC RBC AUTO-ENTMCNC: 31.5 % (ref 32–34.5)
MCV RBC AUTO: 91.8 FL (ref 80–99.9)
MONOCYTES ABSOLUTE: 1.19 E9/L (ref 0.1–0.95)
MONOCYTES RELATIVE PERCENT: 7.2 % (ref 2–12)
NEUTROPHILS ABSOLUTE: 13.8 E9/L (ref 1.8–7.3)
NEUTROPHILS RELATIVE PERCENT: 83.1 % (ref 43–80)
PDW BLD-RTO: 14.1 FL (ref 11.5–15)
PHOSPHORUS: 3.1 MG/DL (ref 2.5–4.5)
PLATELET # BLD: 393 E9/L (ref 130–450)
PMV BLD AUTO: 10.3 FL (ref 7–12)
POTASSIUM SERPL-SCNC: 3.3 MMOL/L (ref 3.5–5)
PROCALCITONIN: 9.14 NG/ML (ref 0–0.08)
RBC # BLD: 3.53 E12/L (ref 3.8–5.8)
SEDIMENTATION RATE, ERYTHROCYTE: 62 MM/HR (ref 0–15)
SODIUM BLD-SCNC: 137 MMOL/L (ref 132–146)
TOTAL PROTEIN: 6.2 G/DL (ref 6.4–8.3)
WBC # BLD: 16.6 E9/L (ref 4.5–11.5)

## 2021-01-31 PROCEDURE — 85651 RBC SED RATE NONAUTOMATED: CPT

## 2021-01-31 PROCEDURE — 83690 ASSAY OF LIPASE: CPT

## 2021-01-31 PROCEDURE — 6360000004 HC RX CONTRAST MEDICATION: Performed by: RADIOLOGY

## 2021-01-31 PROCEDURE — 2580000003 HC RX 258: Performed by: INTERNAL MEDICINE

## 2021-01-31 PROCEDURE — 84145 PROCALCITONIN (PCT): CPT

## 2021-01-31 PROCEDURE — 6370000000 HC RX 637 (ALT 250 FOR IP): Performed by: INTERNAL MEDICINE

## 2021-01-31 PROCEDURE — 6360000002 HC RX W HCPCS: Performed by: INTERNAL MEDICINE

## 2021-01-31 PROCEDURE — 99232 SBSQ HOSP IP/OBS MODERATE 35: CPT | Performed by: TRANSPLANT SURGERY

## 2021-01-31 PROCEDURE — 6360000002 HC RX W HCPCS: Performed by: NURSE PRACTITIONER

## 2021-01-31 PROCEDURE — 36415 COLL VENOUS BLD VENIPUNCTURE: CPT

## 2021-01-31 PROCEDURE — 86140 C-REACTIVE PROTEIN: CPT

## 2021-01-31 PROCEDURE — 84100 ASSAY OF PHOSPHORUS: CPT

## 2021-01-31 PROCEDURE — 85025 COMPLETE CBC W/AUTO DIFF WBC: CPT

## 2021-01-31 PROCEDURE — 2580000003 HC RX 258: Performed by: NURSE PRACTITIONER

## 2021-01-31 PROCEDURE — 2060000000 HC ICU INTERMEDIATE R&B

## 2021-01-31 PROCEDURE — 74177 CT ABD & PELVIS W/CONTRAST: CPT

## 2021-01-31 PROCEDURE — 80053 COMPREHEN METABOLIC PANEL: CPT

## 2021-01-31 PROCEDURE — 2500000003 HC RX 250 WO HCPCS: Performed by: INTERNAL MEDICINE

## 2021-01-31 PROCEDURE — 82150 ASSAY OF AMYLASE: CPT

## 2021-01-31 PROCEDURE — 99232 SBSQ HOSP IP/OBS MODERATE 35: CPT | Performed by: INTERNAL MEDICINE

## 2021-01-31 PROCEDURE — 83735 ASSAY OF MAGNESIUM: CPT

## 2021-01-31 PROCEDURE — C9113 INJ PANTOPRAZOLE SODIUM, VIA: HCPCS | Performed by: NURSE PRACTITIONER

## 2021-01-31 RX ORDER — POTASSIUM CHLORIDE 20 MEQ/1
40 TABLET, EXTENDED RELEASE ORAL ONCE
Status: COMPLETED | OUTPATIENT
Start: 2021-01-31 | End: 2021-01-31

## 2021-01-31 RX ADMIN — FOLIC ACID 1 MG: 1 TABLET ORAL at 08:55

## 2021-01-31 RX ADMIN — LISINOPRIL 10 MG: 10 TABLET ORAL at 08:55

## 2021-01-31 RX ADMIN — IOPAMIDOL 75 ML: 755 INJECTION, SOLUTION INTRAVENOUS at 11:06

## 2021-01-31 RX ADMIN — Medication 100 MG: at 08:55

## 2021-01-31 RX ADMIN — PANTOPRAZOLE SODIUM 40 MG: 40 INJECTION, POWDER, FOR SOLUTION INTRAVENOUS at 08:55

## 2021-01-31 RX ADMIN — METRONIDAZOLE 500 MG: 500 INJECTION, SOLUTION INTRAVENOUS at 15:27

## 2021-01-31 RX ADMIN — ENOXAPARIN SODIUM 40 MG: 40 INJECTION SUBCUTANEOUS at 08:55

## 2021-01-31 RX ADMIN — PANTOPRAZOLE SODIUM 40 MG: 40 INJECTION, POWDER, FOR SOLUTION INTRAVENOUS at 20:29

## 2021-01-31 RX ADMIN — METRONIDAZOLE 500 MG: 500 INJECTION, SOLUTION INTRAVENOUS at 06:55

## 2021-01-31 RX ADMIN — Medication 10 ML: at 08:56

## 2021-01-31 RX ADMIN — Medication 10 ML: at 20:30

## 2021-01-31 RX ADMIN — METOPROLOL TARTRATE 25 MG: 25 TABLET, FILM COATED ORAL at 08:55

## 2021-01-31 RX ADMIN — METRONIDAZOLE 500 MG: 500 INJECTION, SOLUTION INTRAVENOUS at 23:29

## 2021-01-31 RX ADMIN — SODIUM CHLORIDE, PRESERVATIVE FREE 10 ML: 5 INJECTION INTRAVENOUS at 20:29

## 2021-01-31 RX ADMIN — SODIUM CHLORIDE, PRESERVATIVE FREE 10 ML: 5 INJECTION INTRAVENOUS at 08:55

## 2021-01-31 RX ADMIN — WATER 2000 MG: 1 INJECTION INTRAMUSCULAR; INTRAVENOUS; SUBCUTANEOUS at 15:27

## 2021-01-31 RX ADMIN — MULTIVITAMIN TABLET 1 TABLET: TABLET at 08:55

## 2021-01-31 RX ADMIN — POTASSIUM CHLORIDE 40 MEQ: 20 TABLET, EXTENDED RELEASE ORAL at 16:44

## 2021-01-31 RX ADMIN — METOPROLOL TARTRATE 25 MG: 25 TABLET, FILM COATED ORAL at 20:29

## 2021-01-31 ASSESSMENT — PAIN SCALES - GENERAL: PAINLEVEL_OUTOF10: 0

## 2021-01-31 NOTE — PROGRESS NOTES
Rosmery Mitchell Hospitalist   Progress Note    Admitting Date and Time: 1/27/2021  2:40 AM  Admit Dx: Diarrhea [R19.7]     Seen for follow on multiple problems as listed below    Subjective  Denies CP ,sob or abd pain , uneventful night otherwise. ROS: denies fever, chills, cp, sob, n/v, HA unless stated above.      pantoprazole  40 mg Intravenous BID    And    sodium chloride (PF)  10 mL Intravenous BID    cefTRIAXone (ROCEPHIN) IV  2,000 mg Intravenous Q24H    metroNIDAZOLE  500 mg Intravenous Q8H    sodium chloride flush  10 mL Intravenous 2 times per day    enoxaparin  40 mg Subcutaneous Daily    folic acid  1 mg Oral Daily    thiamine  100 mg Oral Daily    multivitamin  1 tablet Oral Daily    lisinopril  10 mg Oral Daily    metoprolol tartrate  25 mg Oral BID         HYDROmorphone, 0.5 mg, Q4H PRN      perflutren lipid microspheres, 1.5 mL, ONCE PRN      HYDROcodone 5 mg - acetaminophen, 1 tablet, Q6H PRN      hydrALAZINE, 5 mg, Q6H PRN      sodium chloride flush, 10 mL, PRN      promethazine, 12.5 mg, Q6H PRN    Or      ondansetron, 4 mg, Q6H PRN      polyethylene glycol, 17 g, Daily PRN      acetaminophen, 650 mg, Q6H PRN    Or      acetaminophen, 650 mg, Q6H PRN      LORazepam, 1 mg, Q1H PRN    Or      LORazepam, 1 mg, Q1H PRN    Or      LORazepam, 2 mg, Q1H PRN    Or      LORazepam, 2 mg, Q1H PRN    Or      LORazepam, 3 mg, Q1H PRN    Or      LORazepam, 3 mg, Q1H PRN    Or      LORazepam, 4 mg, Q1H PRN    Or      LORazepam, 4 mg, Q1H PRN         Objective:    /62   Pulse 61   Temp 98.2 °F (36.8 °C)   Resp 18   Ht 5' 11\" (1.803 m)   Wt 159 lb 9.6 oz (72.4 kg)   SpO2 95%   BMI 22.26 kg/m²   General Appearance: alert and oriented to person, place and time, in no acute distress  Skin: warm and dry  Head: normocephalic and atraumatic  Eyes: pupils equal, round, and reactive to light, extraocular eye movements intact, conjunctivae normal  Neck: supple and non-tender without mass  Pulmonary/Chest: clear to auscultation bilaterally  Cardiovascular: normal rate,  normal S1 and S2  Abdomen: soft, non-tender, non-distended, normal bowel sounds, no masses or organomegaly  Extremities: no cyanosis, clubbing   Musculoskeletal: normal range of motion  Neurologic:  no cranial nerve deficit,  speech normal      Recent Labs     01/29/21 0450 01/30/21 0302 01/31/21  0357   * 134 137   K 3.7 3.2* 3.3*   CL 98 100 100   CO2 25 27 27   BUN 11 11 12   CREATININE 0.6* 0.6* 0.7   GLUCOSE 102* 87 105*   CALCIUM 7.9* 8.0* 8.0*       Recent Labs     01/29/21 0450 01/30/21 0302 01/31/21  0357   WBC 14.4* 12.8* 16.6*   RBC 3.55* 3.49* 3.53*   HGB 10.5* 10.2* 10.2*   HCT 31.9* 31.7* 32.4*   MCV 89.9 90.8 91.8   MCH 29.6 29.2 28.9   MCHC 32.9 32.2 31.5*   RDW 13.7 13.6 14.1    375 393   MPV 10.0 10.1 10.3       Labs and images reviewed     Radiology:   CT ABDOMEN PELVIS W IV CONTRAST Additional Contrast? None   Final Result   1. Redemonstration of multiple foci of hypoattenuation associated with right   hepatic lobe which could suggest multiple abscesses similar in size compared   to prior. 2.  Fluid surrounds intrahepatic IVC appearing similar compared to prior. 3.  Cystic lesion associated with caudate lobe which has increased in size   slightly compared to prior which also may indicate abscess. 4.  Cystic lesion adjacent to left adrenal gland has also increased in size   slightly compared to previous examination. 5.  Cystic lesion with marginal enhancement seen near lesser curvature of the   stomach on axial image number 43 might also suggest abscess. 6.  Status post TIPS placement with pneumobilia in left hepatic lobe and   common bile duct. 7.  Moderate right pleural effusion which has increased in size compared to   prior. New small left pleural effusion. FL MODIFIED BARIUM SWALLOW W VIDEO   Final Result   1.  Significant penetration and aspiration with thin barium while drinking   through a straw only. 2. There is no evidence of aspiration or penetration while intake of the   barium with a spoon. 3. Minimal pooling of the honey and pudding consistency barium within the   piriform sinuses. Please see separate speech pathology report for full discussion of findings   and recommendations. FL ERCP BILIARY AND PANCREATIC S&I   Final Result   Unremarkable ERCP images. Please refer to the procedure report for further   details. XR CHEST PORTABLE   Final Result   New interstitial and/or vascular prominence may reflect reactive airways   disease, vascular congestion, interstitial edema, viral process, or   interstitial pneumonitis. Slight cardiomegaly      MRI ABDOMEN W WO CONTRAST MRCP   Final Result   1. Choledocholithiasis associated with severe extrahepatic biliary dilation   and mild central intrahepatic biliary dilation. Evaluation for superimposed   cholangitis is limited. 2. Cholelithiasis with no definite findings of cholecystitis. 3. Limited evaluation of the liver due to motion artifact. Signal   abnormalities seen primarily in hepatic segment 7 correspond with the CT   appearance and are suspicious for microabscesses. However, an infiltrative   malignancy could appear similar. Consider evaluation with liver protocol   abdomen CT, which would be less affected by motion. 4. Complicated fluid collections along the caudate lobe of the liver, between   right adrenal gland right diaphragmatic deo, adjacent to the retrohepatic   inferior vena cava, and likely within the christopher hepatis are suspicious for   abscesses given the above findings. 5. Mild dilation of the downstream pancreatic duct potentially due to mass   effect from stones in the distal common bile duct. Alternatively, this could   be related to moderate parenchymal atrophy. 6. Small right pleural effusion. US GALLBLADDER RUQ   Final Result   Cholecystolithiasis. Suggestion also of choledocholithiasis with common bile   duct distention   Solid-appearing mass adjacent to the left hepatic lobe, in the christopher hepatis   region, may be artifact from the duodenum. Differential includes enlarged   lymph node   Simple appearing right renal cyst      CT ABDOMEN PELVIS W IV CONTRAST Additional Contrast? None   Final Result   There are numerous liver lesions. Multiple small lesions are possibly   cystic. Larger lesions in the left lobe are more nonspecific. The right   lobe is also heterogeneous. MRI correlation is recommended for   characterization of these abnormalities. Cholelithiasis. Biliary dilatation and probable multiple common bile duct   stones. Soft tissue nodule adjacent to the diaphragmatic deo and right intrarenal   gland measuring 2 cm. This this may be an adrenal adenoma or lymph node. Sigmoid diverticulosis. No acute diverticulitis. Small right pleural effusion with basilar atelectasis. XR CHEST PORTABLE   Final Result   There is no acute abnormality seen. IR Interventional Radiology Procedure Request    (Results Pending)       Assessment:    Active Problems:    Diarrhea    Essential hypertension    Atrial fibrillation (HCC)    Hypokalemia    Functional diarrhea    Gallstones    Choledocholithiasis    Chronic obstructive pulmonary disease (HCC)    Alcohol abuse    Atrial fibrillation, new onset (HCC)    Liver lesion    Hepatic abscess    Portal vein thrombosis  Resolved Problems:    * No resolved hospital problems. *      Plan:  Abdominal pain with diarrhea sec to  choledocholithiasis , cholangitis,resulting in rt hepatic lobe abscesses & portal vein thrombosis. GI,GS  and IR consulted following. Continue IV ceftriax , flagyl , IVF  supportive treatment. s/p ERCP with papillotomy, stones extraction and Wallstent placement on 1/29/2021. As per surg will need elective cholecystectomy when abscesses heals. Diarrhea has resolved.   As per GI on clear liquids, once pancreatitis resolves will advance to dental soft if barium swallow allows. Post ERCP , lipase down trending. Follow up CT as per GS - may need drain . Liver abscesses as per MRI-ID consulted and following. Has Leukocytosis and elevated pro Niranjan.  On IV ceftriaxone plus Flagyl. He is scheduled  for CT-guided biopsy of liver lesion. Hypokalemia-replete and follow    A. Fib/MAT -cardiology is consulted & following , present anticoagulation has not been recommended with need of arrhythmia follow-up to assess presence or absence of recurrence and potential future needs of anticoagulation especially if hypertension persist in this will contribute to his [id number] score of 2. He will need outpatient follow-up with cardiology. Continue BB for rate control. Hypertension-on lisinopril, hydralazine as needed    EtOH use-on IV fluids, MVI, thiamine, folic acid. Monitor if needed placed on CIWA scale. Dysphagia-speech  therapy consulted & following, on pureed diet.     History of squamous cell skin cancer-s/p left ureter resection, follows with [facility]    On Lovenox for dvt prophy  Full code          Electronically signed by Alison Genao MD on 1/31/2021 at 3:40 PM

## 2021-01-31 NOTE — PROGRESS NOTES
[id number] [facility] Cardiology Inpatient Progress Note    Patient is a 70 y.o. male of No primary care provider on file. seen in hospital follow up. Chief complaint: ABD pain/PAF/MAT/COPD/Pre-op    HPI: Some SOB. No CP.      Patient Active Problem List   Diagnosis    Diarrhea    Essential hypertension    Atrial fibrillation (HCC)    Hypokalemia    Functional diarrhea    Gallstones    Choledocholithiasis    Chronic obstructive pulmonary disease ([facility] [address]    Alcohol abuse    Atrial fibrillation, new onset ([facility] [address]    Liver lesion       No Known Allergies    Current Facility-Administered Medications   Medication Dose Route Frequency Provider Last Rate Last Admin    HYDROmorphone (DILAUDID) injection 0.5 mg  0.5 mg Intravenous Q4H PRN Celia Rosales MD   0.5 mg at 01/29/21 1514    pantoprazole (PROTONIX) injection 40 mg  40 mg Intravenous BID Flora Serg, APRN - CNP   40 mg at 01/30/21 2130    And    sodium chloride (PF) 0.9 % injection 10 mL  10 mL Intravenous BID Flora Serg, APRN - CNP   10 mL at 01/30/21 2131    perflutren lipid microspheres (DEFINITY) injection 1.65 mg  1.5 mL Intravenous ONCE PRN Celia Rosales MD        cefTRIAXone (ROCEPHIN) 2,000 mg in sterile water 20 mL IV syringe  2,000 mg Intravenous Q24H Celia Rosales MD   2,000 mg at 01/30/21 1504    metronidazole (FLAGYL) 500 mg in NaCl 100 mL IVPB premix  500 mg Intravenous Q8H Celia Rosales  mL/hr at 01/31/21 0655 500 mg at 01/31/21 0655    HYDROcodone-acetaminophen (NORCO) 5-325 MG per tablet 1 tablet  1 tablet Oral Q6H PRN Celia Rosales MD   1 tablet at 01/27/21 0917    hydrALAZINE (APRESOLINE) injection 5 mg  5 mg Intravenous Q6H PRN Celia Rosales MD        sodium chloride flush 0.9 % injection 10 mL  10 mL Intravenous 2 times per day Celia Rosales MD   10 mL at 01/30/21 2130    sodium chloride flush 0.9 % injection 10 mL  10 mL Intravenous PRN Celia Rosales MD        enoxaparin (LOVENOX) injection 40 mg  40 mg Subcutaneous Daily Dayna Quigley MD   40 mg at 01/30/21 0948    promethazine (PHENERGAN) tablet 12.5 mg  12.5 mg Oral Q6H PRN Dayna Quigley MD        Or    [facility]dansetron [facility] injection 4 mg  4 mg Intravenous Q6H PRN Dayna Quigley MD        polyethylene glycol [facility]) packet 17 g  17 g Oral Daily PRN Dayna Quigley MD        acetaminophen (TYLENOL) tablet 650 mg  650 mg Oral Q6H PRN Dayna Quigley MD   650 mg at 01/30/21 0948    Or    acetaminophen (TYLENOL) suppository 650 mg  650 mg Rectal Q6H PRN Dayna Quigley MD        folic acid (FOLVITE) tablet 1 mg  1 mg Oral Daily Dayna Quigley MD   1 mg at 01/30/21 0948    thiamine tablet 100 mg  100 mg Oral Daily Dayna Quigley MD   100 mg at 01/30/21 0948    multivitamin 1 tablet  1 tablet Oral Daily Dayna Quigley MD   1 tablet at 01/30/21 1116    lisinopril (PRINIVIL;ZESTRIL) tablet 10 mg  10 mg Oral Daily Dayna Quigley MD   10 mg at 01/30/21 0948    metoprolol tartrate (LOPRESSOR) tablet 25 mg  25 mg Oral BID Dayna Quigley MD   25 mg at 01/30/21 2130    LORazepam (ATIVAN) tablet 1 mg  1 mg Oral Q1H PRN Dayna Quigley MD        Or    LORazepam (ATIVAN) injection 1 mg  1 mg Intravenous Q1H PRN Dayna Quigley MD   1 mg at 01/28/21 0037    Or    LORazepam (ATIVAN) tablet 2 mg  2 mg Oral Q1H PRN Dayna Quigley MD        Or    LORazepam (ATIVAN) injection 2 mg  2 mg Intravenous Q1H PRN Dayna Quigley MD        Or    LORazepam (ATIVAN) tablet 3 mg  3 mg Oral Q1H PRN Dayna Quigley MD        Or    LORazepam (ATIVAN) injection 3 mg  3 mg Intravenous Q1H PRN Dayna Quigley MD        Or    LORazepam (ATIVAN) tablet 4 mg  4 mg Oral Q1H PRN Dayna Quigley MD        Or    LORazepam (ATIVAN) injection 4 mg  4 mg Intravenous Q1H PRN Dayna Quigley MD           Review of systems:   Heart: as above   Lungs: as above   Eyes: denies changes in vision or discharge. Ears: denies changes in hearing or pain.    Nose: denies epistaxis or masses Throat: denies sore throat or trouble swallowing. Neuro: denies numbness, tingling, tremors. Skin: denies rashes or itching. : denies hematuria, dysuria   GI: denies vomiting, diarrhea   Psych: denies mood changed, anxiety, depression. Physical Exam   /64   Pulse 64   Temp 98.4 °F (36.9 °C) (Oral)   Resp 16   Ht 5' 11\" (1.803 m)   Wt 159 lb 9.6 oz (72.4 kg)   SpO2 95%   BMI 22.26 kg/m²   Constitutional: Oriented to person, place, and time. No distress. Well developed. Head: Normocephalic and atraumatic. Neck: Neck supple. No hepatojugular reflux. No JVD present. Carotid bruit is not present. No tracheal deviation present. No thyromegaly present. Cardiovascular: Normal rate, regular rhythm, normal heart sounds. intact distal pulses. No gallop and no friction rub. No murmur heard. Pulmonary: Breath sounds normal. No respiratory distress. No wheezes. No rales. Chest: Effort normal. No tenderness. Abdominal: Soft. Bowel sounds are normal. No distension or mass. No tenderness, rebound or guarding. Musculoskeletal: . No tenderness. No clubbing or cyanosis. Extremitites: Intact distal pulses. No edema  Neurological: Alert and oriented to person, place, and time. Skin: Skin is warm and dry. No rash noted. Not diaphoretic. No erythema. Psychiatric: Normal mood and affect. Behavior is normal.   Lymphadenopathy: No cervical adenopathy. No groin adenopathy.       CBC:   Lab Results   Component Value Date    WBC 16.6 01/31/2021    RBC 3.53 01/31/2021    HGB 10.2 01/31/2021    HCT 32.4 01/31/2021    MCV 91.8 01/31/2021    MCH 28.9 01/31/2021    MCHC 31.5 01/31/2021    RDW 14.1 01/31/2021     01/31/2021    MPV 10.3 01/31/2021     BMP:   Lab Results   Component Value Date     01/31/2021    K 3.3 01/31/2021    K 3.2 01/28/2021     01/31/2021    CO2 27 01/31/2021    BUN 12 01/31/2021    LABALBU 2.3 01/31/2021    CREATININE 0.7 01/31/2021    CALCIUM 8.0 01/31/2021 GFRAA >60 01/31/2021    LABGLOM >60 01/31/2021     Magnesium:    Lab Results   Component Value Date    MG 1.7 01/31/2021     Cardiac Enzymes:   Lab Results   Component Value Date    TROPONINI <0.01 01/27/2021      PT/INR:    Lab Results   Component Value Date    PROTIME 15.4 01/29/2021    INR 1.3 01/29/2021     TSH:  No results found for: TSH    Rhythm Strip: sinus tachycardia. Echo Summary 1/28/2021:   LVEF 65%   Left ventricular internal dimensions were normal in diastole and systole. Borderline concentric left ventricular hypertrophy. No regional wall motion abnormalities seen. Normal left ventricular ejection fraction. Borderline dilated right ventricle. ASSESSMENT & PLAN:    Patient Active Problem List   Diagnosis    Diarrhea    Essential hypertension    Atrial fibrillation (HCC)    Hypokalemia    Functional diarrhea    Gallstones    Choledocholithiasis    Chronic obstructive pulmonary disease (HCC)    Alcohol abuse    Atrial fibrillation, new onset ([facility] [address]    Liver lesion     1. PAF/MAT: Continue BB. Monitor. 2. COPD    3. Abd pain/Liver lesions: Per GI.     4. HTN: Observe. 5. Hx of ETOH    Ilene Glasgow D.O.   Cardiologist  Cardiology, [facility]

## 2021-01-31 NOTE — PROGRESS NOTES
PROGRESS NOTE    Patient Presents with/Seen in Consultation For      *choledocholithiasis  CHIEF COMPLAINT: Abdominal pain and diarhhea    Subjective:     Patient seen laying in bed, fatigued, weak, and ill-appearing. Decreased appetite. Denies abdominal pain, nausea, or vomiting. Patient stating he would like to go home. In-depth discussion about liver biopsy to be done tomorrow, Monday, and needs to stay for antibiotic therapy. Review of Systems  Aside from what was mentioned in the PMH and HPI, essentially unremarkable, all others negative. Objective:     BP (!) 144/68   Pulse 71   Temp 97.8 °F (36.6 °C) (Oral)   Resp 18   Ht 5' 11\" (1.803 m)   Wt 159 lb 9.6 oz (72.4 kg)   SpO2 95%   BMI 22.26 kg/m²     General appearance: alert, awake, pale, fatigued and ill appearing, and cooperative  Eyes: conjunctiva pale, sclera anicteric. PERRL. Lungs: coarse rhonchi throughout with diffuse wheezing and diminished to auscultation bilaterally  Heart: regular rate and rhythm, no murmur, 2+ pulses; no edema  Abdomen: softly distended, non-tender; bowel sounds normal; no masses,  no organomegaly  Extremities: extremities without edema  Pulses: 2+ and symmetric  Skin: Skin color pale, texture, turgor normal.   Neurologic: Alert, oriented x4. BOYKIN weak.         HYDROmorphone (DILAUDID) injection 0.5 mg, Q4H PRN      pantoprazole (PROTONIX) injection 40 mg, BID    And      sodium chloride (PF) 0.9 % injection 10 mL, BID      perflutren lipid microspheres (DEFINITY) injection 1.65 mg, ONCE PRN      cefTRIAXone (ROCEPHIN) 2,000 mg in sterile water 20 mL IV syringe, Q24H      metronidazole (FLAGYL) 500 mg in NaCl 100 mL IVPB premix, Q8H      HYDROcodone-acetaminophen (NORCO) 5-325 MG per tablet 1 tablet, Q6H PRN      hydrALAZINE (APRESOLINE) injection 5 mg, Q6H PRN      sodium chloride flush 0.9 % injection 10 mL, 2 times per day      sodium chloride flush 0.9 % injection 10 mL, PRN      enoxaparin LABALBU 2.3 01/31/2021     No components found for: CHLPL    Lab Results   Component Value Date    TRIG 94 01/27/2021       Lab Results   Component Value Date    HDL 23 01/27/2021       Lab Results   Component Value Date    LDLCALC 54 01/27/2021       Lab Results   Component Value Date    LABVLDL 19 01/27/2021      PT/INR:    Lab Results   Component Value Date    PROTIME 15.4 01/29/2021    INR 1.3 01/29/2021         Assessment:   Active Problems:  ? Choledocholithiasis with ascending cholangitis  ? Cholelithiasis- surgery following   ? Liver lesions, suspicious for microabscesses- ID and surgery following  ? Abdominal pain-lower - resolved  ? Diarrhea - resolved  ? Portal vein thrombosis- vascular consult recommended  ? Dysphagia - bedside swallow complete - await barium swallow  ? Anemia, normocytic  ? Leukocytosis- secondary to above  ? Fever and Chills - ID following  ? Alcohol abuse  ? Hx of skin cancer  ? ERCP with papillotomy, stones extraction, and Wallstent placement. 1/29/2021 - Markedly dilated common bile duct with filling defect consistent with stones.  Papillotomy was done, and multiple three large stones as well as pus and gravel were extracted. Small area of stricture in the intrapapillary portion of the common bile duct.  A Wallstent 10 x 60 was placed without difficulty.  Excellent drainage was obtained. ? Post ERCP Pancreatitis (by numbers, pt without pain)- doubt pancreatitis, number likely secondary to stent placement    Plan:     ? Liver biopsy Monday as ordered per surgery  ? Discussed with Dr. Cori Reaves  ? Antibiotics per ID  ? IV fluids per PCP  ? Consult dietary for nutritional supplements-weight loss  ? Monitor amylase, lipase CMP and CBC daily  ? Start low fat diet as tolerated  ? Alcohol cessation  ? Defer comorbidities to others  ? Supportive Care  ? Continue to monitor      Note: This report was completed utilizing computer voice recognition software.  Every effort has been made to ensure accuracy, however; inadvertent computerized transcription errors may be present.      Discussed with Dr. Florecita Hughes per Dr. Chelle Galvez, APRN-NP-C 1/31/2021  10:58 AM For Dr. Alex Pires

## 2021-01-31 NOTE — PROGRESS NOTES
[facility] SURGERY  DAILY PROGRESS NOTE  1/31/2021    CC: abdominal pain    Subjective:  Patient is feeling better but looks worse this morning. Wants to go home.     Objective:  BP (!) 144/68   Pulse 71   Temp 97.8 °F (36.6 °C) (Oral)   Resp 18   Ht 5' 11\" (1.803 m)   Wt 159 lb 9.6 oz (72.4 kg)   SpO2 95%   BMI 22.26 kg/m²     GENERAL:  Laying in bed, awake, alert, cooperative, no apparent distress  HEAD: Normocephalic, atraumatic  EYES: No sclera icterus, pupils equal  LUNGS:  No increased work of breathing  CARDIOVASCULAR:  Warm and well perfused  ABDOMEN:  Soft, minimall tender, mildly distended  EXTREMITIES: No edema or swelling  SKIN: Warm and dry    Assessment/Plan:  70 y.o. male with choledocholithiasis and cholangitis resulting in a right hepatic lobe abscess, portal vein thrombosis    - stat CT scan to evaluate for worsening right lobe abscess  - may need drain tomorrow pending CT results  - likely seeding from ERCP with residual abscess    Electronically signed by Shellie Rae MD on 1/31/2021 at 10:32 AM

## 2021-01-31 NOTE — PROGRESS NOTES
[address] [place] [address] [facility]  [facility]  Progress Note    SUBJECTIVE:  Chief Complaint   Patient presents with    Diarrhea     Pt states having cramps after diarrhea x8 days. Denies fever. Patient still having fevers. Still somewhat confused. No nausea or vomiting. Review of systems:  As stated above in the chief complaint, otherwise negative. Medications:  Scheduled Meds:   pantoprazole  40 mg Intravenous BID    And    sodium chloride (PF)  10 mL Intravenous BID    cefTRIAXone (ROCEPHIN) IV  2,000 mg Intravenous Q24H    metroNIDAZOLE  500 mg Intravenous Q8H    sodium chloride flush  10 mL Intravenous 2 times per day    enoxaparin  40 mg Subcutaneous Daily    folic acid  1 mg Oral Daily    thiamine  100 mg Oral Daily    multivitamin  1 tablet Oral Daily    lisinopril  10 mg Oral Daily    metoprolol tartrate  25 mg Oral BID     Continuous Infusions:    PRN Meds:HYDROmorphone, perflutren lipid microspheres, HYDROcodone 5 mg - acetaminophen, hydrALAZINE, sodium chloride flush, promethazine **OR** ondansetron, polyethylene glycol, acetaminophen **OR** acetaminophen, LORazepam **OR** LORazepam **OR** LORazepam **OR** LORazepam **OR** LORazepam **OR** LORazepam **OR** LORazepam **OR** LORazepam    OBJECTIVE:  BP (!) 144/68   Pulse 71   Temp 97.8 °F (36.6 °C) (Oral)   Resp 18   Ht 5' 11\" (1.803 m)   Wt 159 lb 9.6 oz (72.4 kg)   SpO2 95%   BMI 22.26 kg/m²   Temp  Av.8 °F (37.1 °C)  Min: 97.8 °F (36.6 °C)  Max: 102.4 °F (39.1 °C)  Constitutional: The patient is awake, alert, and partially oriented. Sitting up in bed. Daughter present. Skin: Warm and dry. No rashes were noted. HEENT: Round and reactive pupils. Moist mucous membranes. No ulcerations or thrush. Left ear is missing. Neck: Supple to movements. Chest: No respiratory distress. No crackles. Cardiovascular: Heart sounds rhythmic and regular. Abdomen: Slightly distended.   Positive bowel sounds to above    PLAN:  · Continue Metronidazole and Ceftriaxone  · CT-guided aspiration of liver lesions tomorrow    Shana Hendrickson  10:12 AM  1/31/2021

## 2021-02-01 ENCOUNTER — APPOINTMENT (OUTPATIENT)
Dept: CT IMAGING | Age: 76
DRG: 871 | End: 2021-02-01
Payer: MEDICARE

## 2021-02-01 LAB
ALBUMIN SERPL-MCNC: 2.2 G/DL (ref 3.5–5.2)
ALP BLD-CCNC: 138 U/L (ref 40–129)
ALT SERPL-CCNC: 11 U/L (ref 0–40)
AMMONIA: 11.7 UMOL/L (ref 16–60)
AMYLASE: 61 U/L (ref 20–100)
ANION GAP SERPL CALCULATED.3IONS-SCNC: 8 MMOL/L (ref 7–16)
APTT: 27.5 SEC (ref 24.5–35.1)
APTT: 29.2 SEC (ref 24.5–35.1)
AST SERPL-CCNC: 21 U/L (ref 0–39)
BASOPHILS ABSOLUTE: 0.09 E9/L (ref 0–0.2)
BASOPHILS RELATIVE PERCENT: 0.6 % (ref 0–2)
BILIRUB SERPL-MCNC: 0.5 MG/DL (ref 0–1.2)
BUN BLDV-MCNC: 8 MG/DL (ref 8–23)
CALCIUM SERPL-MCNC: 7.7 MG/DL (ref 8.6–10.2)
CHLORIDE BLD-SCNC: 105 MMOL/L (ref 98–107)
CO2: 25 MMOL/L (ref 22–29)
CREAT SERPL-MCNC: 0.6 MG/DL (ref 0.7–1.2)
EOSINOPHILS ABSOLUTE: 0.09 E9/L (ref 0.05–0.5)
EOSINOPHILS RELATIVE PERCENT: 0.6 % (ref 0–6)
GFR AFRICAN AMERICAN: >60
GFR NON-AFRICAN AMERICAN: >60 ML/MIN/1.73
GLUCOSE BLD-MCNC: 94 MG/DL (ref 74–99)
HCT VFR BLD CALC: 32.4 % (ref 37–54)
HCT VFR BLD CALC: 41 % (ref 37–54)
HEMOGLOBIN: 10.5 G/DL (ref 12.5–16.5)
HEMOGLOBIN: 12.7 G/DL (ref 12.5–16.5)
IMMATURE GRANULOCYTES #: 0.15 E9/L
IMMATURE GRANULOCYTES %: 1 % (ref 0–5)
INR BLD: 1.5
LIPASE: 237 U/L (ref 13–60)
LYMPHOCYTES ABSOLUTE: 1.43 E9/L (ref 1.5–4)
LYMPHOCYTES RELATIVE PERCENT: 9.2 % (ref 20–42)
MAGNESIUM: 1.9 MG/DL (ref 1.6–2.6)
MCH RBC QN AUTO: 28.8 PG (ref 26–35)
MCH RBC QN AUTO: 29.7 PG (ref 26–35)
MCHC RBC AUTO-ENTMCNC: 31 % (ref 32–34.5)
MCHC RBC AUTO-ENTMCNC: 32.4 % (ref 32–34.5)
MCV RBC AUTO: 91.8 FL (ref 80–99.9)
MCV RBC AUTO: 93 FL (ref 80–99.9)
MONOCYTES ABSOLUTE: 1.21 E9/L (ref 0.1–0.95)
MONOCYTES RELATIVE PERCENT: 7.8 % (ref 2–12)
NEUTROPHILS ABSOLUTE: 12.63 E9/L (ref 1.8–7.3)
NEUTROPHILS RELATIVE PERCENT: 80.8 % (ref 43–80)
PDW BLD-RTO: 14 FL (ref 11.5–15)
PDW BLD-RTO: 14.1 FL (ref 11.5–15)
PHOSPHORUS: 3.4 MG/DL (ref 2.5–4.5)
PLATELET # BLD: 409 E9/L (ref 130–450)
PLATELET # BLD: 482 E9/L (ref 130–450)
PMV BLD AUTO: 10.1 FL (ref 7–12)
PMV BLD AUTO: 10.3 FL (ref 7–12)
POTASSIUM SERPL-SCNC: 4.1 MMOL/L (ref 3.5–5)
PROTHROMBIN TIME: 17.7 SEC (ref 9.3–12.4)
RBC # BLD: 3.53 E12/L (ref 3.8–5.8)
RBC # BLD: 4.41 E12/L (ref 3.8–5.8)
SODIUM BLD-SCNC: 138 MMOL/L (ref 132–146)
TOTAL PROTEIN: 5.9 G/DL (ref 6.4–8.3)
WBC # BLD: 15.1 E9/L (ref 4.5–11.5)
WBC # BLD: 15.6 E9/L (ref 4.5–11.5)

## 2021-02-01 PROCEDURE — 85027 COMPLETE CBC AUTOMATED: CPT

## 2021-02-01 PROCEDURE — 2060000000 HC ICU INTERMEDIATE R&B

## 2021-02-01 PROCEDURE — 87075 CULTR BACTERIA EXCEPT BLOOD: CPT

## 2021-02-01 PROCEDURE — 2580000003 HC RX 258: Performed by: INTERNAL MEDICINE

## 2021-02-01 PROCEDURE — 2709999900 CT GUIDED NEEDLE PLACEMENT

## 2021-02-01 PROCEDURE — 2500000003 HC RX 250 WO HCPCS: Performed by: INTERNAL MEDICINE

## 2021-02-01 PROCEDURE — 88313 SPECIAL STAINS GROUP 2: CPT

## 2021-02-01 PROCEDURE — 6360000004 HC RX CONTRAST MEDICATION: Performed by: RADIOLOGY

## 2021-02-01 PROCEDURE — 99232 SBSQ HOSP IP/OBS MODERATE 35: CPT | Performed by: TRANSPLANT SURGERY

## 2021-02-01 PROCEDURE — 84100 ASSAY OF PHOSPHORUS: CPT

## 2021-02-01 PROCEDURE — 0FB13ZX EXCISION OF RIGHT LOBE LIVER, PERCUTANEOUS APPROACH, DIAGNOSTIC: ICD-10-PCS | Performed by: RADIOLOGY

## 2021-02-01 PROCEDURE — 6370000000 HC RX 637 (ALT 250 FOR IP): Performed by: INTERNAL MEDICINE

## 2021-02-01 PROCEDURE — 87070 CULTURE OTHR SPECIMN AEROBIC: CPT

## 2021-02-01 PROCEDURE — 92526 ORAL FUNCTION THERAPY: CPT | Performed by: SPEECH-LANGUAGE PATHOLOGIST

## 2021-02-01 PROCEDURE — 83690 ASSAY OF LIPASE: CPT

## 2021-02-01 PROCEDURE — 2580000003 HC RX 258: Performed by: NURSE PRACTITIONER

## 2021-02-01 PROCEDURE — 82150 ASSAY OF AMYLASE: CPT

## 2021-02-01 PROCEDURE — 47000 NEEDLE BIOPSY OF LIVER PERQ: CPT

## 2021-02-01 PROCEDURE — 85025 COMPLETE CBC W/AUTO DIFF WBC: CPT

## 2021-02-01 PROCEDURE — 6360000002 HC RX W HCPCS: Performed by: STUDENT IN AN ORGANIZED HEALTH CARE EDUCATION/TRAINING PROGRAM

## 2021-02-01 PROCEDURE — 88307 TISSUE EXAM BY PATHOLOGIST: CPT

## 2021-02-01 PROCEDURE — 2700000000 HC OXYGEN THERAPY PER DAY

## 2021-02-01 PROCEDURE — 85730 THROMBOPLASTIN TIME PARTIAL: CPT

## 2021-02-01 PROCEDURE — 82140 ASSAY OF AMMONIA: CPT

## 2021-02-01 PROCEDURE — 83735 ASSAY OF MAGNESIUM: CPT

## 2021-02-01 PROCEDURE — 36415 COLL VENOUS BLD VENIPUNCTURE: CPT

## 2021-02-01 PROCEDURE — 99232 SBSQ HOSP IP/OBS MODERATE 35: CPT | Performed by: INTERNAL MEDICINE

## 2021-02-01 PROCEDURE — 2500000003 HC RX 250 WO HCPCS: Performed by: RADIOLOGY

## 2021-02-01 PROCEDURE — 71260 CT THORAX DX C+: CPT

## 2021-02-01 PROCEDURE — 6360000002 HC RX W HCPCS: Performed by: NURSE PRACTITIONER

## 2021-02-01 PROCEDURE — 87205 SMEAR GRAM STAIN: CPT

## 2021-02-01 PROCEDURE — C9113 INJ PANTOPRAZOLE SODIUM, VIA: HCPCS | Performed by: NURSE PRACTITIONER

## 2021-02-01 PROCEDURE — 6360000002 HC RX W HCPCS: Performed by: INTERNAL MEDICINE

## 2021-02-01 PROCEDURE — 80053 COMPREHEN METABOLIC PANEL: CPT

## 2021-02-01 PROCEDURE — 85610 PROTHROMBIN TIME: CPT

## 2021-02-01 RX ORDER — LIDOCAINE HYDROCHLORIDE 20 MG/ML
10 INJECTION, SOLUTION INFILTRATION; PERINEURAL ONCE
Status: COMPLETED | OUTPATIENT
Start: 2021-02-01 | End: 2021-02-01

## 2021-02-01 RX ORDER — HEPARIN SODIUM 1000 [USP'U]/ML
4000 INJECTION, SOLUTION INTRAVENOUS; SUBCUTANEOUS PRN
Status: DISCONTINUED | OUTPATIENT
Start: 2021-02-01 | End: 2021-02-05 | Stop reason: ALTCHOICE

## 2021-02-01 RX ORDER — HEPARIN SODIUM 10000 [USP'U]/100ML
5-13.6 INJECTION, SOLUTION INTRAVENOUS CONTINUOUS
Status: DISCONTINUED | OUTPATIENT
Start: 2021-02-01 | End: 2021-02-05

## 2021-02-01 RX ORDER — HEPARIN SODIUM 1000 [USP'U]/ML
4000 INJECTION, SOLUTION INTRAVENOUS; SUBCUTANEOUS ONCE
Status: COMPLETED | OUTPATIENT
Start: 2021-02-01 | End: 2021-02-01

## 2021-02-01 RX ORDER — SODIUM CHLORIDE 0.9 % (FLUSH) 0.9 %
10 SYRINGE (ML) INJECTION PRN
Status: DISCONTINUED | OUTPATIENT
Start: 2021-02-01 | End: 2021-02-07 | Stop reason: HOSPADM

## 2021-02-01 RX ORDER — HEPARIN SODIUM 1000 [USP'U]/ML
2000 INJECTION, SOLUTION INTRAVENOUS; SUBCUTANEOUS PRN
Status: DISCONTINUED | OUTPATIENT
Start: 2021-02-01 | End: 2021-02-05 | Stop reason: ALTCHOICE

## 2021-02-01 RX ORDER — SODIUM CHLORIDE 0.9 % (FLUSH) 0.9 %
10 SYRINGE (ML) INJECTION PRN
Status: DISCONTINUED | OUTPATIENT
Start: 2021-02-01 | End: 2021-02-01 | Stop reason: CLARIF

## 2021-02-01 RX ADMIN — LISINOPRIL 10 MG: 10 TABLET ORAL at 09:54

## 2021-02-01 RX ADMIN — HEPARIN SODIUM 17.6 UNITS/KG/HR: 10000 INJECTION, SOLUTION INTRAVENOUS at 22:30

## 2021-02-01 RX ADMIN — HEPARIN SODIUM 4000 UNITS: 1000 INJECTION INTRAVENOUS; SUBCUTANEOUS at 15:47

## 2021-02-01 RX ADMIN — PANTOPRAZOLE SODIUM 40 MG: 40 INJECTION, POWDER, FOR SOLUTION INTRAVENOUS at 21:37

## 2021-02-01 RX ADMIN — SODIUM CHLORIDE, PRESERVATIVE FREE 10 ML: 5 INJECTION INTRAVENOUS at 21:37

## 2021-02-01 RX ADMIN — IOPAMIDOL 75 ML: 755 INJECTION, SOLUTION INTRAVENOUS at 21:20

## 2021-02-01 RX ADMIN — MULTIVITAMIN TABLET 1 TABLET: TABLET at 09:55

## 2021-02-01 RX ADMIN — METRONIDAZOLE 500 MG: 500 INJECTION, SOLUTION INTRAVENOUS at 22:30

## 2021-02-01 RX ADMIN — Medication 100 MG: at 09:54

## 2021-02-01 RX ADMIN — METOPROLOL TARTRATE 25 MG: 25 TABLET, FILM COATED ORAL at 21:37

## 2021-02-01 RX ADMIN — METRONIDAZOLE 500 MG: 500 INJECTION, SOLUTION INTRAVENOUS at 15:31

## 2021-02-01 RX ADMIN — Medication 10 ML: at 21:17

## 2021-02-01 RX ADMIN — Medication 10 ML: at 21:37

## 2021-02-01 RX ADMIN — FOLIC ACID 1 MG: 1 TABLET ORAL at 09:55

## 2021-02-01 RX ADMIN — HEPARIN SODIUM 13.6 UNITS/KG/HR: 10000 INJECTION, SOLUTION INTRAVENOUS at 15:47

## 2021-02-01 RX ADMIN — PANTOPRAZOLE SODIUM 40 MG: 40 INJECTION, POWDER, FOR SOLUTION INTRAVENOUS at 09:54

## 2021-02-01 RX ADMIN — HEPARIN SODIUM 4000 UNITS: 1000 INJECTION INTRAVENOUS; SUBCUTANEOUS at 22:31

## 2021-02-01 RX ADMIN — WATER 2000 MG: 1 INJECTION INTRAMUSCULAR; INTRAVENOUS; SUBCUTANEOUS at 15:37

## 2021-02-01 RX ADMIN — METOPROLOL TARTRATE 25 MG: 25 TABLET, FILM COATED ORAL at 09:55

## 2021-02-01 RX ADMIN — METRONIDAZOLE 500 MG: 500 INJECTION, SOLUTION INTRAVENOUS at 06:34

## 2021-02-01 RX ADMIN — LIDOCAINE HYDROCHLORIDE 10 ML: 20 INJECTION, SOLUTION INFILTRATION; PERINEURAL at 10:47

## 2021-02-01 RX ADMIN — SODIUM CHLORIDE, PRESERVATIVE FREE 10 ML: 5 INJECTION INTRAVENOUS at 09:55

## 2021-02-01 ASSESSMENT — PAIN SCALES - GENERAL
PAINLEVEL_OUTOF10: 0
PAINLEVEL_OUTOF10: 0

## 2021-02-01 NOTE — PROGRESS NOTES
[facility] Cardiology Inpatient Progress Note    Patient is a 70 y.o. male of No primary care provider on file. seen in hospital follow up. Chief complaint: ABD pain/PAF/MAT/COPD/Pre-op    HPI: Some SOB. No CP.      Patient Active Problem List   Diagnosis    Diarrhea    Essential hypertension    Atrial fibrillation (HCC)    Hypokalemia    Functional diarrhea    Gallstones    Choledocholithiasis    Chronic obstructive pulmonary disease (HCC)    Alcohol abuse    Atrial fibrillation, new onset ([facility] [address]    Liver lesion    Hepatic abscess    Portal vein thrombosis       No Known Allergies    Current Facility-Administered Medications   Medication Dose Route Frequency Provider Last Rate Last Admin    HYDROmorphone (DILAUDID) injection 0.5 mg  0.5 mg Intravenous Q4H PRN Shama Valerio MD   0.5 mg at 01/29/21 1514    pantoprazole (PROTONIX) injection 40 mg  40 mg Intravenous BID KISHORE Hoyos - CNP   40 mg at 01/31/21 2029    And    sodium chloride (PF) 0.9 % injection 10 mL  10 mL Intravenous BID KISHORE Hoyos - CNP   10 mL at 01/31/21 2029    perflutren lipid microspheres (DEFINITY) injection 1.65 mg  1.5 mL Intravenous ONCE PRN Shama Valerio MD        cefTRIAXone (ROCEPHIN) 2,000 mg in sterile water 20 mL IV syringe  2,000 mg Intravenous Q24H Shama Valerio MD   2,000 mg at 01/31/21 1527    metronidazole (FLAGYL) 500 mg in NaCl 100 mL IVPB premix  500 mg Intravenous Kristi Chawla MD   Stopped at 02/01/21 0803    HYDROcodone-acetaminophen (NORCO) 5-325 MG per tablet 1 tablet  1 tablet Oral Q6H PRN Shama Valerio MD   1 tablet at 01/27/21 0917    hydrALAZINE (APRESOLINE) injection 5 mg  5 mg Intravenous Q6H PRN Shama Valerio MD        sodium chloride flush 0.9 % injection 10 mL  10 mL Intravenous 2 times per day Shama Valerio MD   10 mL at 01/31/21 2030    sodium chloride flush 0.9 % injection 10 mL  10 mL Intravenous PRN Shama Valerio MD        enoxaparin (LOVENOX) injection 40 mg  40 mg Subcutaneous Daily Ratna Adan MD   40 mg at 01/31/21 0855    promethazine (PHENERGAN) tablet 12.5 mg  12.5 mg Oral Q6H PRN Ratna Adan MD        Or    ondansetron [facility]) injection 4 mg  4 mg Intravenous Q6H PRN Ratna Adan MD        polyethylene glycol [facility]) packet 17 g  17 g Oral Daily PRN Ratna Adan MD        acetaminophen (TYLENOL) tablet 650 mg  650 mg Oral Q6H PRN Ratna Adan MD   650 mg at 01/30/21 0948    Or    acetaminophen (TYLENOL) suppository 650 mg  650 mg Rectal Q6H PRN Ratna Adan MD        folic acid (FOLVITE) tablet 1 mg  1 mg Oral Daily Ratna Adan MD   1 mg at 01/31/21 0855    thiamine tablet 100 mg  100 mg Oral Daily Ratna Adan MD   100 mg at 01/31/21 0855    multivitamin 1 tablet  1 tablet Oral Daily Ratna Adan MD   1 tablet at 01/31/21 0855    lisinopril (PRINIVIL;ZESTRIL) tablet 10 mg  10 mg Oral Daily Ratna Adan MD   10 mg at 01/31/21 0855    metoprolol tartrate (LOPRESSOR) tablet 25 mg  25 mg Oral BID Ratna Adan MD   25 mg at 01/31/21 2029    LORazepam (ATIVAN) tablet 1 mg  1 mg Oral Q1H PRN Ratna Adan MD        Or    LORazepam (ATIVAN) injection 1 mg  1 mg Intravenous Q1H PRN Ratna Adan MD   1 mg at 01/28/21 0037    Or    LORazepam (ATIVAN) tablet 2 mg  2 mg Oral Q1H PRN Ratna Adan MD        Or    LORazepam (ATIVAN) injection 2 mg  2 mg Intravenous Q1H PRN Ratna Adan MD        Or    LORazepam (ATIVAN) tablet 3 mg  3 mg Oral Q1H PRN Ratna Adan MD        Or    LORazepam (ATIVAN) injection 3 mg  3 mg Intravenous Q1H PRN Ratna Adan MD        Or    LORazepam (ATIVAN) tablet 4 mg  4 mg Oral Q1H PRN Ratna Adan MD        Or    LORazepam (ATIVAN) injection 4 mg  4 mg Intravenous Q1H PRN Ratna Adan MD           Review of systems:   Heart: as above   Lungs: as above   Eyes: denies changes in vision or discharge. Ears: denies changes in hearing or pain.    Nose: denies epistaxis or masses   Throat: denies sore throat or trouble swallowing. Neuro: denies numbness, tingling, tremors. Skin: denies rashes or itching. : denies hematuria, dysuria   GI: denies vomiting, diarrhea   Psych: denies mood changed, anxiety, depression. Physical Exam   BP (!) 140/76   Pulse 67   Temp 99.1 °F (37.3 °C) (Oral)   Resp 18   Ht 5' 11\" (1.803 m)   Wt 162 lb 1.6 oz (73.5 kg)   SpO2 95%   BMI 22.61 kg/m²   Constitutional: Oriented to person, place, and time. No distress. Well developed. Head: Normocephalic and atraumatic. Neck: Neck supple. No hepatojugular reflux. No JVD present. Carotid bruit is not present. No tracheal deviation present. No thyromegaly present. Cardiovascular: Normal rate, regular rhythm, normal heart sounds. intact distal pulses. No gallop and no friction rub. No murmur heard. Pulmonary: Breath sounds normal. No respiratory distress. No wheezes. No rales. Chest: Effort normal. No tenderness. Abdominal: Soft. Bowel sounds are normal. No distension or mass. No tenderness, rebound or guarding. Musculoskeletal: . No tenderness. No clubbing or cyanosis. Extremitites: Intact distal pulses. No edema  Neurological: Alert and oriented to person, place, and time. Skin: Skin is warm and dry. No rash noted. Not diaphoretic. No erythema. Psychiatric: Normal mood and affect. Behavior is normal.   Lymphadenopathy: No cervical adenopathy. No groin adenopathy.       CBC:   Lab Results   Component Value Date    WBC 15.6 02/01/2021    RBC 3.53 02/01/2021    HGB 10.5 02/01/2021    HCT 32.4 02/01/2021    MCV 91.8 02/01/2021    MCH 29.7 02/01/2021    MCHC 32.4 02/01/2021    RDW 14.1 02/01/2021     02/01/2021    MPV 10.1 02/01/2021     BMP:   Lab Results   Component Value Date     02/01/2021    K 4.1 02/01/2021    K 3.2 01/28/2021     02/01/2021    CO2 25 02/01/2021    BUN 8 02/01/2021    LABALBU 2.2 02/01/2021    CREATININE 0.6 02/01/2021 CALCIUM 7.7 02/01/2021    GFRAA >60 02/01/2021    LABGLOM >60 02/01/2021     Magnesium:    Lab Results   Component Value Date    MG 1.9 02/01/2021     Cardiac Enzymes:   Lab Results   Component Value Date    TROPONINI <0.01 01/27/2021      PT/INR:    Lab Results   Component Value Date    PROTIME 17.7 02/01/2021    INR 1.5 02/01/2021     TSH:  No results found for: TSH    Rhythm Strip: sinus tachycardia. Echo Summary 1/28/2021:   LVEF 65%   Left ventricular internal dimensions were normal in diastole and systole. Borderline concentric left ventricular hypertrophy. No regional wall motion abnormalities seen. Normal left ventricular ejection fraction. Borderline dilated right ventricle. ASSESSMENT & PLAN:    Patient Active Problem List   Diagnosis    Diarrhea    Essential hypertension    Atrial fibrillation (HCC)    Hypokalemia    Functional diarrhea    Gallstones    Choledocholithiasis    Chronic obstructive pulmonary disease (HCC)    Alcohol abuse    Atrial fibrillation, new onset ([address]    Liver lesion    Hepatic abscess    Portal vein thrombosis     1. PAF/MAT: Continue BB. Monitor. 2. COPD    3. Abd pain/Liver lesions: Per GI.     4. HTN: Observe. 5. Hx of ETOH    Fam Osei D.O.   Cardiologist  Cardiology, [address] [facility]

## 2021-02-01 NOTE — PROGRESS NOTES
PROGRESS NOTE    Patient Presents with/Seen in Consultation For      *choledocholithiasis  CHIEF COMPLAINT: Abdominal pain and diarrhea    Subjective:     Patient seen laying in bed pale, fatigued and ill appearing. No bowel movement today. +flatus. Last bowel movement yesterday described as loose and brown to yellow. NPO this morning for liver biopsy. Denies abdominal pain, nausea, vomiting. In-depth discussion about plan of care. Review of Systems  Aside from what was mentioned in the PMH and HPI, essentially unremarkable, all others negative. Objective:     BP (!) 140/76   Pulse 67   Temp 99.1 °F (37.3 °C) (Oral)   Resp 18   Ht 5' 11\" (1.803 m)   Wt 162 lb 1.6 oz (73.5 kg)   SpO2 95%   BMI 22.61 kg/m²     General appearance: alert, awake, pale, fatigued and ill appearing, and cooperative  Eyes: conjunctiva pale, sclera anicteric. PERRL. Lungs: coarse rhonchi throughout  and diminished to auscultation bilaterally  Heart: regular rate and rhythm, no murmur, 2+ pulses; no edema  Abdomen: softly distended, non-tender; bowel sounds normal; no masses,  no organomegaly  Extremities: extremities without edema  Pulses: 2+ and symmetric  Skin: Skin color pale, texture, turgor normal.   Neurologic: Alert, oriented x4. BOYKIN weak.         HYDROmorphone (DILAUDID) injection 0.5 mg, Q4H PRN      pantoprazole (PROTONIX) injection 40 mg, BID    And      sodium chloride (PF) 0.9 % injection 10 mL, BID      perflutren lipid microspheres (DEFINITY) injection 1.65 mg, ONCE PRN      cefTRIAXone (ROCEPHIN) 2,000 mg in sterile water 20 mL IV syringe, Q24H      metronidazole (FLAGYL) 500 mg in NaCl 100 mL IVPB premix, Q8H      HYDROcodone-acetaminophen (NORCO) 5-325 MG per tablet 1 tablet, Q6H PRN      hydrALAZINE (APRESOLINE) injection 5 mg, Q6H PRN      sodium chloride flush 0.9 % injection 10 mL, 2 times per day      sodium chloride flush 0.9 % injection 10 mL, PRN      enoxaparin (LOVENOX) injection 40 mg, Daily      promethazine (PHENERGAN) tablet 12.5 mg, Q6H PRN    Or      ondansetron (ZOFRAN) injection 4 mg, Q6H PRN      polyethylene glycol (GLYCOLAX) packet 17 g, Daily PRN      acetaminophen (TYLENOL) tablet 650 mg, Q6H PRN    Or      acetaminophen (TYLENOL) suppository 650 mg, D5N PRN      folic acid (FOLVITE) tablet 1 mg, Daily      thiamine tablet 100 mg, Daily      multivitamin 1 tablet, Daily      lisinopril (PRINIVIL;ZESTRIL) tablet 10 mg, Daily      metoprolol tartrate (LOPRESSOR) tablet 25 mg, BID      LORazepam (ATIVAN) tablet 1 mg, Q1H PRN    Or      LORazepam (ATIVAN) injection 1 mg, Q1H PRN    Or      LORazepam (ATIVAN) tablet 2 mg, Q1H PRN    Or      LORazepam (ATIVAN) injection 2 mg, Q1H PRN    Or      LORazepam (ATIVAN) tablet 3 mg, Q1H PRN    Or      LORazepam (ATIVAN) injection 3 mg, Q1H PRN    Or      LORazepam (ATIVAN) tablet 4 mg, Q1H PRN    Or      LORazepam (ATIVAN) injection 4 mg, Q1H PRN         Data Review  CBC:   Lab Results   Component Value Date    WBC 15.6 02/01/2021    RBC 3.53 02/01/2021    HGB 10.5 02/01/2021    HCT 32.4 02/01/2021    MCV 91.8 02/01/2021    MCH 29.7 02/01/2021    MCHC 32.4 02/01/2021    RDW 14.1 02/01/2021     02/01/2021    MPV 10.1 02/01/2021     CMP:    Lab Results   Component Value Date     02/01/2021    K 4.1 02/01/2021    K 3.2 01/28/2021     02/01/2021    CO2 25 02/01/2021    BUN 8 02/01/2021    CREATININE 0.6 02/01/2021    GFRAA >60 02/01/2021    LABGLOM >60 02/01/2021    GLUCOSE 94 02/01/2021    PROT 5.9 02/01/2021    LABALBU 2.2 02/01/2021    CALCIUM 7.7 02/01/2021    BILITOT 0.5 02/01/2021    ALKPHOS 138 02/01/2021    AST 21 02/01/2021    ALT 11 02/01/2021     Hepatic Function Panel:    Lab Results   Component Value Date    ALKPHOS 138 02/01/2021    ALT 11 02/01/2021    AST 21 02/01/2021    PROT 5.9 02/01/2021    BILITOT 0.5 02/01/2021    BILIDIR 0.3 01/30/2021    IBILI 0.2 01/30/2021    LABALBU 2.2 02/01/2021     No components found for: CHLPL    Lab Results   Component Value Date    TRIG 94 01/27/2021       Lab Results   Component Value Date    HDL 23 01/27/2021       Lab Results   Component Value Date    LDLCALC 54 01/27/2021       Lab Results   Component Value Date    LABVLDL 19 01/27/2021      PT/INR:    Lab Results   Component Value Date    PROTIME 17.7 02/01/2021    INR 1.5 02/01/2021       Assessment:   Active Problems:  ? Choledocholithiasis with ascending cholangitis  ? Cholelithiasis- surgery following   ? Liver lesions, suspicious for microabscesses- ID and surgery following  ? Abdominal pain-lower - resolved  ? Diarrhea - resolved  ? Portal vein thrombosis- vascular consult recommended  ? Dysphagia   ? Anemia, normocytic  ? Leukocytosis- secondary to above  ? Fever and Chills - ID following  ? Alcohol abuse  ? Hx of skin cancer  ? ERCP with papillotomy, stones extraction, and Wallstent placement. 1/29/2021 - Markedly dilated common bile duct with filling defect consistent with stones.  Papillotomy was done, and multiple three large stones as well as pus and gravel were extracted. Small area of stricture in the intrapapillary portion of the common bile duct.  A Wallstent 10 x 60 was placed without difficulty.  Excellent drainage was obtained. ? Post ERCP Pancreatitis (by numbers, pt without pain)- doubt pancreatitis, number likely secondary to stent placement      Plan:     ? Liver biopsy today-possible drain placement  ? Discussed plan with Dr. Yamielt Garcia need outpatient cholecystectomy when abscess resolves  ? Antibiotics per ID  ? IV fluids per PCP  ? Consult dietary for nutritional supplements-weight loss  ? Monitor amylase, lipase CMP and CBC daily  ? Start low fat diet as tolerated  ? Alcohol cessation  ? Defer comorbidities to others  ? Supportive Care  ? Continue to monitor    Note: This report was completed utilizing computer voice recognition software.  Every effort has been made to ensure accuracy,

## 2021-02-01 NOTE — PROGRESS NOTES
Physical Therapy    Pt NA this AM, off the floor at a procedure. Will follow on another date/time.     Maria Victoria Agudelo PTA [id number]

## 2021-02-01 NOTE — PROGRESS NOTES
and Wallstent placement 1/29/2021  · Leukocytosis associated to cholangitis and probable liver abscess  · Intermittent fever associated to the above  · Alcohol withdrawal  · Dysphagia  · Right pleural effusion associated to the above    PLAN:  · Continue Metronidazole and Ceftriaxone  · Check liver biopsy and cultures  · The patient will need outpatient cholecystectomy    Spoke with daughter    Woo Tatum  10:33 AM  2/1/2021

## 2021-02-01 NOTE — PROGRESS NOTES
IRFLOWSHEET    Date: 2/1/2021    Time: 9:41 AM     Exam: Image guided liver biopsy    Radiologist Performing Procedure: Dr. Winston Gutiérrez    Nurse Reporting/Phone: [id number]     Nurse Receiving: Shanice Hills    Permit signed:      Yes    ID Band Checklist: Yes    Allergies: Patient has no known allergies. TIME OUT: 1046    PROCEDURE START TIME: 1046    Puncture Site: RUQ    Puncture Time: 9823    Catheters: Bard Endicott 18g x 16cm    LABS:   Lab Results   Component Value Date    INR 1.5 02/01/2021    PROTIME 17.7 (H) 02/01/2021           Lab Results   Component Value Date    CREATININE 0.6 (L) 02/01/2021    BUN 8 02/01/2021          Lab Results   Component Value Date    HGB 10.5 (L) 02/01/2021    HCT 32.4 (L) 02/01/2021     02/01/2021         PROCEDURE END TIME: 1110    Total Contrast: n/a    Fluoroscopy Time: Smartstep accumulated     Complications:  None    Comments:   Patient positioned supine, head first on Cat Scan table with O2,safety and monitoring devices attached. Patient scanned and images reviewed by Dr. Winston Gutiérrez.  Patient prepped and draped. With the guidance of Cat Scan and smart step, needle inserted and attempt made to aspirate, very small amount sent for ordered testing. Core biopsy taken x 4 by Dr. Winston Gutiérrez. Patient re-scanned and images reviewed by Dr. Winston Gutiérrez.    Puncture site cleansed and dry dressing applied. Procedure completed. Report called to floor nurse, Eliza Tamez RN. Patient transported back to floor via transport. Biopsy sample taken to laboratory.

## 2021-02-01 NOTE — PROGRESS NOTES
[facility] [employer] [facility]   Progress Note    Admitting Date and Time: 1/27/2021  2:40 AM  Admit Dx: Diarrhea [R19.7]     Seen for follow on multiple problems as listed below    Subjective  Denies CP ,sob or abd pain. No n/v/d. For IR drain / Bx today. Dtr at bedside and updated. D/w nursing. ROS: denies fever, chills, cp, sob, n/v, HA unless stated above.      pantoprazole  40 mg Intravenous BID    And    sodium chloride (PF)  10 mL Intravenous BID    cefTRIAXone (ROCEPHIN) IV  2,000 mg Intravenous Q24H    metroNIDAZOLE  500 mg Intravenous Q8H    sodium chloride flush  10 mL Intravenous 2 times per day    enoxaparin  40 mg Subcutaneous Daily    folic acid  1 mg Oral Daily    thiamine  100 mg Oral Daily    multivitamin  1 tablet Oral Daily    lisinopril  10 mg Oral Daily    metoprolol tartrate  25 mg Oral BID         HYDROmorphone, 0.5 mg, Q4H PRN      perflutren lipid microspheres, 1.5 mL, ONCE PRN      HYDROcodone 5 mg - acetaminophen, 1 tablet, Q6H PRN      hydrALAZINE, 5 mg, Q6H PRN      sodium chloride flush, 10 mL, PRN      promethazine, 12.5 mg, Q6H PRN    Or      ondansetron, 4 mg, Q6H PRN      polyethylene glycol, 17 g, Daily PRN      acetaminophen, 650 mg, Q6H PRN    Or      acetaminophen, 650 mg, Q6H PRN      LORazepam, 1 mg, Q1H PRN    Or      LORazepam, 1 mg, Q1H PRN    Or      LORazepam, 2 mg, Q1H PRN    Or      LORazepam, 2 mg, Q1H PRN    Or      LORazepam, 3 mg, Q1H PRN    Or      LORazepam, 3 mg, Q1H PRN    Or      LORazepam, 4 mg, Q1H PRN    Or      LORazepam, 4 mg, Q1H PRN         Objective:    BP (!) 140/76   Pulse 67   Temp 99.1 °F (37.3 °C) (Oral)   Resp 18   Ht 5' 11\" (1.803 m)   Wt 162 lb 1.6 oz (73.5 kg)   SpO2 95%   BMI 22.61 kg/m²   General Appearance: alert and oriented to person, place and time, in no acute distress  Skin: warm and dry  Head: normocephalic and atraumatic  Eyes: pupils equal, round, and reactive to light, extraocular eye Result   1. Significant penetration and aspiration with thin barium while drinking   through a straw only. 2. There is no evidence of aspiration or penetration while intake of the   barium with a spoon. 3. Minimal pooling of the honey and pudding consistency barium within the   piriform sinuses. Please see separate speech pathology report for full discussion of findings   and recommendations. FL ERCP BILIARY AND PANCREATIC S&I   Final Result   Unremarkable ERCP images. Please refer to the procedure report for further   details. XR CHEST PORTABLE   Final Result   New interstitial and/or vascular prominence may reflect reactive airways   disease, vascular congestion, interstitial edema, viral process, or   interstitial pneumonitis. Slight cardiomegaly      MRI ABDOMEN W WO CONTRAST MRCP   Final Result   1. Choledocholithiasis associated with severe extrahepatic biliary dilation   and mild central intrahepatic biliary dilation. Evaluation for superimposed   cholangitis is limited. 2. Cholelithiasis with no definite findings of cholecystitis. 3. Limited evaluation of the liver due to motion artifact. Signal   abnormalities seen primarily in hepatic segment 7 correspond with the CT   appearance and are suspicious for microabscesses. However, an infiltrative   malignancy could appear similar. Consider evaluation with liver protocol   abdomen CT, which would be less affected by motion. 4. Complicated fluid collections along the caudate lobe of the liver, between   right adrenal gland right diaphragmatic deo, adjacent to the retrohepatic   inferior vena cava, and likely within the christopher hepatis are suspicious for   abscesses given the above findings. 5. Mild dilation of the downstream pancreatic duct potentially due to mass   effect from stones in the distal common bile duct. Alternatively, this could   be related to moderate parenchymal atrophy. 6. Small right pleural effusion.       [address] [facility] GALLBLADDER RUQ   Final Result   Cholecystolithiasis. Suggestion also of choledocholithiasis with common bile   duct distention   Solid-appearing mass adjacent to the left hepatic lobe, in the christopher hepatis   region, may be artifact from the duodenum. Differential includes enlarged   lymph node   Simple appearing right renal cyst      CT ABDOMEN PELVIS W IV CONTRAST Additional Contrast? None   Final Result   There are numerous liver lesions. Multiple small lesions are possibly   cystic. Larger lesions in the left lobe are more nonspecific. The right   lobe is also heterogeneous. MRI correlation is recommended for   characterization of these abnormalities. Cholelithiasis. Biliary dilatation and probable multiple common bile duct   stones. Soft tissue nodule adjacent to the diaphragmatic deo and right intrarenal   gland measuring 2 cm. This this may be an adrenal adenoma or lymph node. Sigmoid diverticulosis. No acute diverticulitis. Small right pleural effusion with basilar atelectasis. XR CHEST PORTABLE   Final Result   There is no acute abnormality seen. IR Interventional Radiology Procedure Request    (Results Pending)       Assessment:    Active Problems:    Diarrhea    Essential hypertension    Atrial fibrillation (HCC)    Hypokalemia    Functional diarrhea    Gallstones    Choledocholithiasis    Chronic obstructive pulmonary disease (HCC)    Alcohol abuse    Atrial fibrillation, new onset (HCC)    Liver lesion    Hepatic abscess    Portal vein thrombosis  Resolved Problems:    * No resolved hospital problems. *      Plan:  Abdominal pain with diarrhea sec to  choledocholithiasis , cholangitis,resulting in rt hepatic lobe abscesses & portal vein thrombosis. GI,GS  and IR consulted following. Continue IV ceftriax , flagyl , IVF  supportive treatment. s/p ERCP with papillotomy, stones extraction and Wallstent placement on 1/29/2021. As per surg will need elective cholecystectomy when abscesses heals. Diarrhea has resolved. Post ERCP , lipase down trending. Follow up CT reviewed and scheduled for IR drain today. Liver abscesses as per MRI-ID consulted and following. Has Leukocytosis and elevated pro Niranjan.  On IV ceftriaxone plus Flagyl. He is scheduled  for CT-guided IR drain / BX  of liver lesion. Hypokalemia-replete and follow    A. Fib/MAT -cardiology is consulted & following , present anticoagulation has not been recommended with need of arrhythmia follow-up to assess presence or absence of recurrence and potential future needs of anticoagulation especially if hypertension persist in this will contribute to his [id number] score of 2. He will need outpatient follow-up with cardiology. Continue BB for rate control. Hypertension-on lisinopril, hydralazine as needed    EtOH use-on IV fluids, MVI, thiamine, folic acid. Monitor if needed placed on CIWA scale. Dysphagia-speech  therapy consulted & following, on pureed diet.     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

## 2021-02-01 NOTE — CONSULTS
2/1/2021  10:24 AM      Comprehensive Nutrition Assessment    Type and Reason for Visit:  Initial, Positive Nutrition Screen, Consult    Nutrition Recommendations/Plan: ADAT to Low Fat Diet Pureed diet with no Straws(as per SLP rec), when medically feasible and will add HP Ensure ONS (which is low fat)    Nutrition Assessment:  Pt s/p ERCP 1/29 w/stent and stone removal. NPO currently 2/1 for liver bx and poss drain placement for liver abcesses. Pt has had diarrhea, poor appetite PTA and now, also dysphagia per MBS 1/30. Will trial a Low Fat ONS (HP Ensure), when PO resumed. Malnutrition Assessment:  Malnutrition Status: At risk for malnutrition (Comment)    Context:  Acute Illness     Findings of the 6 clinical characteristics of malnutrition:  Energy Intake:  7 - 50% or less of estimated energy requirements for 5 or more days(per pt)  Weight Loss:  Unable to assess(no EMR hx to confirm - subjective loss)     Body Fat Loss:  Unable to assess     Muscle Mass Loss:  Unable to assess    Fluid Accumulation:  No significant fluid accumulation     Strength:  Not Performed    Estimated Daily Nutrient Needs:  Energy (kcal):  ; Weight Used for Energy Requirements:  Current     Protein (g):   (1.3-1.5 g/kg);  Weight Used for Protein Requirements:  Current        Fluid (ml/day):  ; Method Used for Fluid Requirements:  1 ml/kcal      Nutrition Related Findings:  A&Ox4, fatigue/weak, dysphagia, diarrhea, no edema, +I/O 3L,      Wounds:  (reddened heels, buttocks and rash on face)       Current Nutrition Therapies:    Diet NPO, After Midnight    Anthropometric Measures:  · Height: 5' 11\" (180.3 cm)  · Current Body Weight: 162 lb (73.5 kg)(2/1)   · [facility]: [facility]73.9 [facility]    · Usual Body Weight: (now EMR wt hx to confirm)     · Ideal Body Weight: 172 lbs; % Ideal Body Weight 94.2 %   · BMI: 22.6  · BMI Categories: Normal Weight (BMI 22.0 to 24.9) age over 72 Nutrition Diagnosis:   · Inadequate oral intake related to altered GI function(diarrhea and pain PTA) as evidenced by NPO or clear liquid status due to medical condition, poor intake prior to admission, GI abnormality, swallow study results      Nutrition Interventions:   Food and/or Nutrient Delivery:  Continue NPO(Will add HP Ensure ONS, when PO diet resumed)  Nutrition Education/Counseling:  No recommendation at this time   Coordination of Nutrition Care:  Continue to monitor while inpatient    Goals:  PO >75% at meals and ONS when diet adv       Nutrition Monitoring and Evaluation:   Behavioral-Environmental Outcomes:  None Identified   Food/Nutrient Intake Outcomes:  Diet Advancement/Tolerance  Physical Signs/Symptoms Outcomes:  GI Status, Biochemical Data, Fluid Status or Edema, Nutrition Focused Physical Findings, Skin, Weight     Discharge Planning:     Too soon to determine     Electronically signed by Farooq Nicholson RD, CNSC, LD on 2/1/21 at 10:24 AM EST    Contact: [phone]

## 2021-02-01 NOTE — PROGRESS NOTES
HP[facility] SURGERY  DAILY PROGRESS NOTE  2/1/2021    Chief Complaint   Patient presents with    Diarrhea     Pt states having cramps after diarrhea x8 days. Denies fever. Subjective:  Patient states he has been having diarrhea. He denies nausea, vomiting. denies abdominal pain. Objective:  BP (!) 140/76   Pulse 67   Temp 99.1 °F (37.3 °C) (Oral)   Resp 18   Ht 5' 11\" (1.803 m)   Wt 159 lb 9.6 oz (72.4 kg)   SpO2 95%   BMI 22.26 kg/m²     GENERAL:  Laying in bed, awake, alert, cooperative, no apparent distress  HEAD: Normocephalic, atraumatic  EYES: No sclera icterus, pupils equal  LUNGS:  No increased work of breathing, on room air  CARDIOVASCULAR:  Regular rate  ABDOMEN:  Soft, non-tender, non-distended  EXTREMITIES: No edema or swelling  SKIN: Warm and dry    Assessment/Plan:  70 y.o. male male with choledocholithiasis and cholangitis resulting in a right hepatic lobe abscess, portal vein thrombosis    - ID following, recs Flagyl, rocephin  - CT guided liver biopsy by IR  - GI following, diet per GI  - will need eliquis 24h after liver biopsy performed for portal vein thrombosis  - will need electively cholecystectomy when abscess heals    Electronically signed by Diana Chinchilla MD on 2/1/2021 at 6:24 AM     CT scan of his chest - productive cough  Persistent leukocytosis  Will ask Pulmonary to see Mr. Sav Sykes  Had liver biopsy today  Will need to have gallbladder removed with hepatic abscess resolves  Low grade fever    Electronically signed by Agustin Jean MD on 2/1/2021 at 4:19 PM

## 2021-02-01 NOTE — PLAN OF CARE
Problem: Inadequate oral food/beverage intake (NI-2.1)  Goal: Food and/or Nutrient Delivery  Pt tolerate nutrition progression with at least 75% intake  Description: Individualized approach for food/nutrient provision.   Outcome: Ongoing

## 2021-02-01 NOTE — ANESTHESIA POSTPROCEDURE EVALUATION
Department of Anesthesiology  Postprocedure Note    Patient: Lyndsay Harris  MRN: [id number]  YOB: 1945  Date of evaluation: 2/1/2021  Time:  12:09 PM     Procedure Summary     Date: 01/29/21 Room / Location: [facility]    Anesthesia Start: 7185 Anesthesia Stop: 6204    Procedure: ERCP STONE REMOVAL (N/A ) Diagnosis: (Choledocholithiasis)    Surgeons: Vianey Kulkarni MD Responsible Provider: Marky Gill MD    Anesthesia Type: MAC ASA Status: 3          Anesthesia Type: MAC    Cassi Phase I:      Cassi Phase II: Cassi Score: 10    Last vitals: Reviewed and per EMR flowsheets.        Anesthesia Post Evaluation    Patient location during evaluation: PACU  Patient participation: complete - patient participated  Level of consciousness: awake and alert  Airway patency: patent  Nausea & Vomiting: no vomiting and no nausea  Complications: no  Cardiovascular status: blood pressure returned to baseline  Respiratory status: acceptable  Hydration status: euvolemic

## 2021-02-01 NOTE — CARE COORDINATION
2/1/2021  Social Work Discharge [address] 18/24. Pt has no PCP. Liver biopsy today. On IVATB. No DC today. [facility] 18/24. Pt and his spouse reside together in a 2 story home. Pt stays on the first floor-sleeps on the couch. Pt is on room air. [facility] cant follow without PCP.  put PCP Pre-service number in followup for Pt to call to get an appointment to get established with a PCP.  Electronically signed by ANAYELI Ness on 2/1/2021 at 9:31 AM

## 2021-02-02 LAB
ALBUMIN SERPL-MCNC: 2.3 G/DL (ref 3.5–5.2)
ALP BLD-CCNC: 111 U/L (ref 40–129)
ALT SERPL-CCNC: 12 U/L (ref 0–40)
AMMONIA: 28.4 UMOL/L (ref 16–60)
AMYLASE: 41 U/L (ref 20–100)
ANION GAP SERPL CALCULATED.3IONS-SCNC: 8 MMOL/L (ref 7–16)
APTT: 36.6 SEC (ref 24.5–35.1)
APTT: 45.6 SEC (ref 24.5–35.1)
APTT: 49.8 SEC (ref 24.5–35.1)
AST SERPL-CCNC: 17 U/L (ref 0–39)
BASOPHILS ABSOLUTE: 0.11 E9/L (ref 0–0.2)
BASOPHILS RELATIVE PERCENT: 0.8 % (ref 0–2)
BILIRUB SERPL-MCNC: 0.5 MG/DL (ref 0–1.2)
BUN BLDV-MCNC: 5 MG/DL (ref 8–23)
CALCIUM SERPL-MCNC: 7.9 MG/DL (ref 8.6–10.2)
CHLORIDE BLD-SCNC: 103 MMOL/L (ref 98–107)
CO2: 25 MMOL/L (ref 22–29)
CREAT SERPL-MCNC: 0.7 MG/DL (ref 0.7–1.2)
EOSINOPHILS ABSOLUTE: 0.14 E9/L (ref 0.05–0.5)
EOSINOPHILS RELATIVE PERCENT: 1 % (ref 0–6)
GFR AFRICAN AMERICAN: >60
GFR NON-AFRICAN AMERICAN: >60 ML/MIN/1.73
GLUCOSE BLD-MCNC: 99 MG/DL (ref 74–99)
GRAM STAIN ORDERABLE: NORMAL
HCT VFR BLD CALC: 31.1 % (ref 37–54)
HEMOGLOBIN: 10.2 G/DL (ref 12.5–16.5)
IMMATURE GRANULOCYTES #: 0.13 E9/L
IMMATURE GRANULOCYTES %: 0.9 % (ref 0–5)
LIPASE: 171 U/L (ref 13–60)
LYMPHOCYTES ABSOLUTE: 1.68 E9/L (ref 1.5–4)
LYMPHOCYTES RELATIVE PERCENT: 11.9 % (ref 20–42)
MAGNESIUM: 1.6 MG/DL (ref 1.6–2.6)
MCH RBC QN AUTO: 29.6 PG (ref 26–35)
MCHC RBC AUTO-ENTMCNC: 32.8 % (ref 32–34.5)
MCV RBC AUTO: 90.1 FL (ref 80–99.9)
MONOCYTES ABSOLUTE: 1.01 E9/L (ref 0.1–0.95)
MONOCYTES RELATIVE PERCENT: 7.1 % (ref 2–12)
NEUTROPHILS ABSOLUTE: 11.07 E9/L (ref 1.8–7.3)
NEUTROPHILS RELATIVE PERCENT: 78.3 % (ref 43–80)
PDW BLD-RTO: 14.1 FL (ref 11.5–15)
PHOSPHORUS: 3.1 MG/DL (ref 2.5–4.5)
PLATELET # BLD: 407 E9/L (ref 130–450)
PMV BLD AUTO: 10.5 FL (ref 7–12)
POTASSIUM SERPL-SCNC: 3.5 MMOL/L (ref 3.5–5)
RBC # BLD: 3.45 E12/L (ref 3.8–5.8)
SODIUM BLD-SCNC: 136 MMOL/L (ref 132–146)
TOTAL PROTEIN: 5.9 G/DL (ref 6.4–8.3)
WBC # BLD: 14.1 E9/L (ref 4.5–11.5)

## 2021-02-02 PROCEDURE — 82378 CARCINOEMBRYONIC ANTIGEN: CPT

## 2021-02-02 PROCEDURE — 85025 COMPLETE CBC W/AUTO DIFF WBC: CPT

## 2021-02-02 PROCEDURE — 85730 THROMBOPLASTIN TIME PARTIAL: CPT

## 2021-02-02 PROCEDURE — 2060000000 HC ICU INTERMEDIATE R&B

## 2021-02-02 PROCEDURE — 80053 COMPREHEN METABOLIC PANEL: CPT

## 2021-02-02 PROCEDURE — 6360000002 HC RX W HCPCS: Performed by: NURSE PRACTITIONER

## 2021-02-02 PROCEDURE — 2580000003 HC RX 258: Performed by: INTERNAL MEDICINE

## 2021-02-02 PROCEDURE — 6360000002 HC RX W HCPCS: Performed by: INTERNAL MEDICINE

## 2021-02-02 PROCEDURE — 99232 SBSQ HOSP IP/OBS MODERATE 35: CPT | Performed by: TRANSPLANT SURGERY

## 2021-02-02 PROCEDURE — 82150 ASSAY OF AMYLASE: CPT

## 2021-02-02 PROCEDURE — 2500000003 HC RX 250 WO HCPCS: Performed by: INTERNAL MEDICINE

## 2021-02-02 PROCEDURE — 6360000002 HC RX W HCPCS: Performed by: STUDENT IN AN ORGANIZED HEALTH CARE EDUCATION/TRAINING PROGRAM

## 2021-02-02 PROCEDURE — 87077 CULTURE AEROBIC IDENTIFY: CPT

## 2021-02-02 PROCEDURE — 87449 NOS EACH ORGANISM AG IA: CPT

## 2021-02-02 PROCEDURE — 83735 ASSAY OF MAGNESIUM: CPT

## 2021-02-02 PROCEDURE — 6370000000 HC RX 637 (ALT 250 FOR IP): Performed by: INTERNAL MEDICINE

## 2021-02-02 PROCEDURE — 87206 SMEAR FLUORESCENT/ACID STAI: CPT

## 2021-02-02 PROCEDURE — 99232 SBSQ HOSP IP/OBS MODERATE 35: CPT | Performed by: INTERNAL MEDICINE

## 2021-02-02 PROCEDURE — 82140 ASSAY OF AMMONIA: CPT

## 2021-02-02 PROCEDURE — 92526 ORAL FUNCTION THERAPY: CPT | Performed by: SPEECH-LANGUAGE PATHOLOGIST

## 2021-02-02 PROCEDURE — 2580000003 HC RX 258: Performed by: NURSE PRACTITIONER

## 2021-02-02 PROCEDURE — 87186 SC STD MICRODIL/AGAR DIL: CPT

## 2021-02-02 PROCEDURE — 36415 COLL VENOUS BLD VENIPUNCTURE: CPT

## 2021-02-02 PROCEDURE — 83690 ASSAY OF LIPASE: CPT

## 2021-02-02 PROCEDURE — C9113 INJ PANTOPRAZOLE SODIUM, VIA: HCPCS | Performed by: NURSE PRACTITIONER

## 2021-02-02 PROCEDURE — 87070 CULTURE OTHR SPECIMN AEROBIC: CPT

## 2021-02-02 PROCEDURE — 97110 THERAPEUTIC EXERCISES: CPT

## 2021-02-02 PROCEDURE — 97530 THERAPEUTIC ACTIVITIES: CPT

## 2021-02-02 PROCEDURE — 84100 ASSAY OF PHOSPHORUS: CPT

## 2021-02-02 RX ADMIN — PANTOPRAZOLE SODIUM 40 MG: 40 INJECTION, POWDER, FOR SOLUTION INTRAVENOUS at 20:57

## 2021-02-02 RX ADMIN — LISINOPRIL 10 MG: 10 TABLET ORAL at 08:08

## 2021-02-02 RX ADMIN — HEPARIN SODIUM 4000 UNITS: 1000 INJECTION INTRAVENOUS; SUBCUTANEOUS at 05:05

## 2021-02-02 RX ADMIN — METOPROLOL TARTRATE 25 MG: 25 TABLET, FILM COATED ORAL at 08:08

## 2021-02-02 RX ADMIN — MULTIVITAMIN TABLET 1 TABLET: TABLET at 08:08

## 2021-02-02 RX ADMIN — METRONIDAZOLE 500 MG: 500 INJECTION, SOLUTION INTRAVENOUS at 14:45

## 2021-02-02 RX ADMIN — FOLIC ACID 1 MG: 1 TABLET ORAL at 08:08

## 2021-02-02 RX ADMIN — HEPARIN SODIUM 2000 UNITS: 1000 INJECTION INTRAVENOUS; SUBCUTANEOUS at 17:05

## 2021-02-02 RX ADMIN — SODIUM CHLORIDE, PRESERVATIVE FREE 10 ML: 5 INJECTION INTRAVENOUS at 08:08

## 2021-02-02 RX ADMIN — Medication 100 MG: at 08:08

## 2021-02-02 RX ADMIN — Medication 10 ML: at 08:11

## 2021-02-02 RX ADMIN — METOPROLOL TARTRATE 25 MG: 25 TABLET, FILM COATED ORAL at 20:58

## 2021-02-02 RX ADMIN — SODIUM CHLORIDE, PRESERVATIVE FREE 10 ML: 5 INJECTION INTRAVENOUS at 20:57

## 2021-02-02 RX ADMIN — Medication 10 ML: at 21:00

## 2021-02-02 RX ADMIN — HEPARIN SODIUM 21.6 UNITS/KG/HR: 10000 INJECTION, SOLUTION INTRAVENOUS at 05:05

## 2021-02-02 RX ADMIN — METRONIDAZOLE 500 MG: 500 INJECTION, SOLUTION INTRAVENOUS at 06:06

## 2021-02-02 RX ADMIN — HEPARIN SODIUM 21.63 UNITS/KG/HR: 10000 INJECTION, SOLUTION INTRAVENOUS at 11:51

## 2021-02-02 RX ADMIN — PANTOPRAZOLE SODIUM 40 MG: 40 INJECTION, POWDER, FOR SOLUTION INTRAVENOUS at 08:08

## 2021-02-02 RX ADMIN — WATER 2000 MG: 1 INJECTION INTRAMUSCULAR; INTRAVENOUS; SUBCUTANEOUS at 14:45

## 2021-02-02 RX ADMIN — METRONIDAZOLE 500 MG: 500 INJECTION, SOLUTION INTRAVENOUS at 22:40

## 2021-02-02 ASSESSMENT — PAIN SCALES - GENERAL
PAINLEVEL_OUTOF10: 0
PAINLEVEL_OUTOF10: 0

## 2021-02-02 NOTE — PROGRESS NOTES
Occupational Therapy  OT BEDSIDE TREATMENT NOTE      Date:2021  Patient Name: Deep Noyola  MRN: [id number]  : 1945  Room: [address] [place] [address]     Referring Provider: KISHORE Mora - CNS     Evaluating OT: Tiffany Mota OTR/L [id number]     AM-PAC Daily Activity Raw Score: 18/24     Recommended Adaptive Equipment: TBD     Diagnosis: diarrhea. Pt presents to ED from home with abdominal cramping and diarrhea. Pertinent Medical History: ETOH abuse   Precautions:  falls     Home Living: Pt is a questionable historian reports lives with wife in a 2 story home with 1st floor set up, pt reports he sleeps on the couch.  bath 1st floor.   Bathroom setup: tub/shower 2nd floor      Prior Level of Function: Per pt report Mod I with ADLs, family assists PRN with IADLs; completed functional mobility with no AD  Driving: Per pt yes     Pain Level: no c/o pain     Cognition: A&O: Grossly with cues for safety provided                Functional Assessment:    Initial Eval Status  Date: 21 Treatment session: 21 Short Term Goals      Feeding SBA       Grooming SBA  Standing sink level for hand hygiene   Independent   UB Dressing Min A  [facility]/doing [facility] gown   Independent   LB Dressing Mod A  Management of B socks   Mod I    Bathing Min A   Mod I   Toileting Min A  Use of grab bar for support in transfer and to maintain standing balance  Assist in posterior jorge care to ensure cleanliness Pt declined toileting at this time  Mod I   Bed Mobility  Supine to sit: SBA  Supine<> sit: SBA     Functional Transfers STS: SBA  STS: SBA from EOB Mod I   Functional Mobility CGA with HHA  Hallway distance  Slow pace  Progressively forward flexed at trunk  Mild unsteadiness   Mod I during ADLs   Balance Sitting: fair plus     Standing: fair/fair minus  Sitting: Independent  Standing: CGA- Min A     Activity Tolerance Fair minus Fair-  standing ghassan x6-7 min with fair plus balance during self care tasks                     Comments: Upon arrival pt was supine in bed. At end of session pt was transferred to bed per request with alarm on, all lines and tubes intact and call light within reach. Treatment: Instructed pt on B UE AROM exercises requiring mod vc and demos for correct form (1x10 reps). Pt also participated in 2x5 reps of STS exercises from arm chair and functional dynamic standing balance activities facilitating weight shifting to simulate ADLs    Education: Techniques to improve standing balance and benefits of OT to maximize tolerance to ADLs. · Pt has made good progress towards set goals. Plan of Care: 2-5 days/week for 1-2 weeks PRN   [x]? ?ADL retraining/adaptive techniques and AE recommendations to increase functional independence within precautions                    [x]? ? Energy conservation techniques to improve tolerance for ADL/IADLs  [x]? ? Functional transfer/mobility training/DME recommendations for increased independence, safety and fall prevention         [x]? ?Patient/family education to increase safety and functional independence during daily routine          [x]? ? Environmental modifications for safe mobility and completion of ADLs                             []? ? Cognitive retraining to improve problem solving skills & safe participation in ADLs/IADLs     []? ?Sensory re-education techniques to improve extremity awareness, maintain skin integrity and improve hand function                             []? ? Visual/Perceptual retraining to improve body awareness and safety during transfers and ADLs  []? ? Splinting/positioning needs to maintain joint/skin integrity and contracture prevention  [x]? ? Therapeutic activity to improve functional performance during ADLs                                        [x]? ? Therapeutic exercise to improve tolerance and functional strength for ADLs   [x]? ? Balance retraining/tolerance tasks for facilitation of postural control with dynamic challenges during ADLs  []? ?Neuromuscular re-education to facilitate righting/equilibrium reactions, midline orientation, scapular stability/mobility, normalize muscle tone and facilitate active functional movement                        []? ? Delirium prevention/treatment    [x]? Positioning to improve functional independence and decrease risk of skin breakdown  []? ?Other:        Treatment Charges: Mins Units   Ther Ex  24261 15 1   Manual Therapy 01421     Thera Activities 39159     ADL/Home Mgt 00354     Neuro Re-ed 39428     Group Therapy      Orthotic manage/training  13808     Non-Billable Time       Time In: 2:00  Time Out: 2:15  Total Time: [address] MOREIRA/L [id number]

## 2021-02-02 NOTE — PROGRESS NOTES
Spoke with pharmacist regarding ptt 49.8. Recommends no bolus no change on heparin drip and recheck APTT in 6 hours.

## 2021-02-02 NOTE — PROGRESS NOTES
SPEECH LANGUAGE PATHOLOGY  DAILY PROGRESS NOTE        PATIENT NAME:  Jimmy Vela      :  1945          TODAY'S DATE:  2021 ROOM:  [address] [place] [address]        SWALLOWING  Chart reviewed. Pt reports no swallowing problems on current diet. No trials administered during today's session. Noted gastoenterology is currently seeing pt. When reviewing video swallow test completed by covering SLP, pt may benefit from esophageal dilation. 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 After discussion with pt, it appears pt is not consistently following swallowing strategies as recommended. SLP reviewed all strategies with pt and encouraged use with all intake. DYSPHAGIA DIAGNOSIS:  Pharyngeal dysphagia                  DIET RECOMMENDATIONS:  Dysphagia 3, Soft/advanced (Soft & Bite-sized) solids with thin liquids (no straw)                 FEEDING RECOMMENDATIONS:                           Assistance level:  Supervision is needed during all oral intake                                                    Compensatory strategies recommended: Double swallow with thicker foods and Chin tuck with all swallows. Spoon feed only. NO STRAW. Intervention/Education- Pt participated in Shaker exercise with good outcome. Pt educated on results and POC. Pt trained on compensatory strategies via handout for safe swallow with good outcome. Questions answered. Will continue SP intervention as per previously established POC. Sim Banuelos   SLP Student       Oliver PRITCHARD CCC/SLP [id number]  Speech Pathologist              CPT code(s) 06912  dysphagia tx  Total minutes :  15 minutes

## 2021-02-02 NOTE — PROGRESS NOTES
mL/hr at 02/02/21 0606 500 mg at 02/02/21 0606    HYDROcodone-acetaminophen (NORCO) 5-325 MG per tablet 1 tablet  1 tablet Oral Q6H PRN Bao Clark MD   1 tablet at 01/27/21 0917    hydrALAZINE (APRESOLINE) injection 5 mg  5 mg Intravenous Q6H PRN Bao Clark MD        sodium chloride flush 0.9 % injection 10 mL  10 mL Intravenous 2 times per day Bao Clark MD   10 mL at 02/01/21 2137    sodium chloride flush 0.9 % injection 10 mL  10 mL Intravenous PRN Bao Clark MD   10 mL at 02/01/21 2117    promethazine (PHENERGAN) tablet 12.5 mg  12.5 mg Oral Q6H PRN Bao Clark MD        Or    ondansetron [facility]) injection 4 mg  4 mg Intravenous Q6H PRN Bao Clark MD        polyethylene glycol (GLYCOLAX) packet 17 g  17 g Oral Daily PRN Bao Clark MD        acetaminophen (TYLENOL) tablet 650 mg  650 mg Oral Q6H PRN Bao Clark MD   650 mg at 01/30/21 0948    Or    acetaminophen (TYLENOL) suppository 650 mg  650 mg Rectal Q6H PRN Bao Clark MD        folic acid (FOLVITE) tablet 1 mg  1 mg Oral Daily Bao Clark MD   1 mg at 02/01/21 0955    thiamine tablet 100 mg  100 mg Oral Daily Bao Clark MD   100 mg at 02/01/21 0954    multivitamin 1 tablet  1 tablet Oral Daily Bao Clark MD   1 tablet at 02/01/21 0955    lisinopril (PRINIVIL;ZESTRIL) tablet 10 mg  10 mg Oral Daily Bao Clark MD   10 mg at 02/01/21 0954    metoprolol tartrate (LOPRESSOR) tablet 25 mg  25 mg Oral BID Bao Clark MD   25 mg at 02/01/21 2137    LORazepam (ATIVAN) tablet 1 mg  1 mg Oral Q1H PRN Bao Clark MD        Or    LORazepam (ATIVAN) injection 1 mg  1 mg Intravenous Q1H PRN Bao Clark MD   1 mg at 01/28/21 0037    Or    LORazepam (ATIVAN) tablet 2 mg  2 mg Oral Q1H PRN Bao Clark MD        Or    LORazepam (ATIVAN) injection 2 mg  2 mg Intravenous Q1H PRN Bao Clark MD        Or    LORazepam (ATIVAN) tablet 3 mg  3 mg Oral Q1H PRN Bao Clark MD Or    LORazepam (ATIVAN) injection 3 mg  3 mg Intravenous Q1H PRN Bao Clark MD        Or    LORazepam (ATIVAN) tablet 4 mg  4 mg Oral Q1H PRN Bao Clark MD        Or    LORazepam (ATIVAN) injection 4 mg  4 mg Intravenous Q1H PRN Bao Clark MD           Review of systems:   Heart: as above   Lungs: as above   Eyes: denies changes in vision or discharge. Ears: denies changes in hearing or pain. Nose: denies epistaxis or masses   Throat: denies sore throat or trouble swallowing. Neuro: denies numbness, tingling, tremors. Skin: denies rashes or itching. : denies hematuria, dysuria   GI: denies vomiting, diarrhea   Psych: denies mood changed, anxiety, depression. Physical Exam   BP (!) 154/75   Pulse 66   Temp 98 °F (36.7 °C) (Oral)   Resp 18   Ht 5' 11\" (1.803 m)   Wt 162 lb (73.5 kg)   SpO2 96%   BMI 22.59 kg/m²   Constitutional: Oriented to person, place, and time. No distress. Well developed. Head: Normocephalic and atraumatic. Neck: Neck supple. No hepatojugular reflux. No JVD present. Carotid bruit is not present. No tracheal deviation present. No thyromegaly present. Cardiovascular: Normal rate, regular rhythm, normal heart sounds. intact distal pulses. No gallop and no friction rub. No murmur heard. Pulmonary: Breath sounds normal. No respiratory distress. No wheezes. No rales. Chest: Effort normal. No tenderness. Abdominal: Soft. Bowel sounds are normal. No distension or mass. No tenderness, rebound or guarding. Musculoskeletal: . No tenderness. No clubbing or cyanosis. Extremitites: Intact distal pulses. No edema  Neurological: Alert and oriented to person, place, and time. Skin: Skin is warm and dry. No rash noted. Not diaphoretic. No erythema. Psychiatric: Normal mood and affect. Behavior is normal.   Lymphadenopathy: No cervical adenopathy. No groin adenopathy.       CBC:   Lab Results   Component Value Date    WBC 14.1 02/02/2021    RBC 3.45 02/02/2021    HGB 10.2 02/02/2021    HCT 31.1 02/02/2021    MCV 90.1 02/02/2021    MCH 29.6 02/02/2021    MCHC 32.8 02/02/2021    RDW 14.1 02/02/2021     02/02/2021    MPV 10.5 02/02/2021     BMP:   Lab Results   Component Value Date     02/02/2021    K 3.5 02/02/2021    K 3.2 01/28/2021     02/02/2021    CO2 25 02/02/2021    BUN 5 02/02/2021    LABALBU 2.3 02/02/2021    CREATININE 0.7 02/02/2021    CALCIUM 7.9 02/02/2021    GFRAA >60 02/02/2021    LABGLOM >60 02/02/2021     Magnesium:    Lab Results   Component Value Date    MG 1.6 02/02/2021     Cardiac Enzymes:   Lab Results   Component Value Date    TROPONINI <0.01 01/27/2021      PT/INR:    Lab Results   Component Value Date    PROTIME 17.7 02/01/2021    INR 1.5 02/01/2021     TSH:  No results found for: TSH    Rhythm Strip: sinus tachycardia. Echo Summary 1/28/2021:   LVEF 65%   Left ventricular internal dimensions were normal in diastole and systole. Borderline concentric left ventricular hypertrophy. No regional wall motion abnormalities seen. Normal left ventricular ejection fraction. Borderline dilated right ventricle. ASSESSMENT & PLAN:    Patient Active Problem List   Diagnosis    Diarrhea    Essential hypertension    Atrial fibrillation (HCC)    Hypokalemia    Functional diarrhea    Gallstones    Choledocholithiasis    Chronic obstructive pulmonary disease (HCC)    Alcohol abuse    Atrial fibrillation, new onset ([facility] [address]    Liver lesion    Hepatic abscess    Portal vein thrombosis     1. PAF/MAT: Continue BB. Monitor. 2. COPD    3. Abd pain/Liver lesions: Per GI.     4. HTN: Observe. 5. Hx of ETOH    Minna Shah D.O.   Cardiologist  Cardiology, [address] [place] [address]

## 2021-02-02 NOTE — PROGRESS NOTES
Physical Therapy  Facility/Department: [address] INTERNAL MEDICINE 2  Daily Treatment Note  NAME: Marvin Romero  : 1945  MRN: [id number]    Date of Service: 2021    Patient Diagnosis(es): The primary encounter diagnosis was Diarrhea, unspecified type. Diagnoses of Hypokalemia, Atrial fibrillation, new onset ([address] Gallstones, and Liver lesion were also pertinent to this visit. has a past medical history of Bleeding from the nose and Skin cancer of face. has a past surgical history that includes other surgical history (Left, ); ERCP (N/A, 2021); and CT NEEDLE BIOPSY LIVER PERCUTANEOUS (2021). Referring Provider:  KISHORE Hamm Ra - CNS        Evaluating Therapist: Kulwant Pantoja PT     Room #: 721  DIAGNOSIS: Diarrhea  PRECAUTIONS: falls     Social:  Pt lives with wife and son in a 2 floor plan. States sleeps on couch on first floor. Prior to admission independent without device       Initial Evaluation  Date: 21 Treatment      Short Term/ Long Term   Goals   Was pt agreeable to Eval/treatment? yes yes      Does pt have pain? None reported      Bed Mobility  Rolling: SBA  Supine to sit: SBA  Sit to supine: SBA  Scooting: SBA  rolling:  Independent  Supine to sit:   Independent  Sit to supine:  Independent  Scooting:  Independent to sitting EOB independent   Transfers Sit to stand: CGA  Stand to sit: CGA     sit to stand:  Supervision  Sit to supine:  Supervision  Sit to supine:  supervision independent   Ambulation    120 feet with no device with CGA  120 feet without device  feet with no devcie with supervison   Stair Negotiation  Ascended and descended  NT    4-12 steps with 1 rail with supervision   LE strength     4-/5     4/5   balance      Fair+       AM-PAC Raw score                       Patient education  Pt educated on PT objectives during treatment session, calling for assistance    Patient response to education:   Pt verbalized understanding Pt demonstrated skill Pt requires further education in this area   Yes  yes yes     ASSESSMENT:    Comments:  Pt found and left in bed with call light in reach, bed alarm on and visitor present. Treatment:  Patient practiced and was instructed in the following treatment:    Functional mobility performed as documented above. No report of dizziness during functional mobility. Pt used railing at times in the hallway for increased stability. No LOB during ambulation. PLAN:    Patient is making good progress towards established goals. Will continue with current POC.      Time in  1008  Time out  1018    Total Treatment Time  10 minutes     CPT codes:    [x] Therapeutic activities 53085 10minutes  [] Therapeutic exercises 45120  minutes      Andrew Crews, [address]

## 2021-02-02 NOTE — PROGRESS NOTES
injection 10 mL, PRN      promethazine (PHENERGAN) tablet 12.5 mg, Q6H PRN    Or      ondansetron (ZOFRAN) injection 4 mg, Q6H PRN      polyethylene glycol (GLYCOLAX) packet 17 g, Daily PRN      acetaminophen (TYLENOL) tablet 650 mg, Q6H PRN    Or      acetaminophen (TYLENOL) suppository 650 mg, S5Q PRN      folic acid (FOLVITE) tablet 1 mg, Daily      thiamine tablet 100 mg, Daily      multivitamin 1 tablet, Daily      lisinopril (PRINIVIL;ZESTRIL) tablet 10 mg, Daily      metoprolol tartrate (LOPRESSOR) tablet 25 mg, BID      LORazepam (ATIVAN) tablet 1 mg, Q1H PRN    Or      LORazepam (ATIVAN) injection 1 mg, Q1H PRN    Or      LORazepam (ATIVAN) tablet 2 mg, Q1H PRN    Or      LORazepam (ATIVAN) injection 2 mg, Q1H PRN    Or      LORazepam (ATIVAN) tablet 3 mg, Q1H PRN    Or      LORazepam (ATIVAN) injection 3 mg, Q1H PRN    Or      LORazepam (ATIVAN) tablet 4 mg, Q1H PRN    Or      LORazepam (ATIVAN) injection 4 mg, Q1H PRN         Data Review  CBC:   Lab Results   Component Value Date    WBC 14.1 02/02/2021    RBC 3.45 02/02/2021    HGB 10.2 02/02/2021    HCT 31.1 02/02/2021    MCV 90.1 02/02/2021    MCH 29.6 02/02/2021    MCHC 32.8 02/02/2021    RDW 14.1 02/02/2021     02/02/2021    MPV 10.5 02/02/2021     CMP:    Lab Results   Component Value Date     02/02/2021    K 3.5 02/02/2021    K 3.2 01/28/2021     02/02/2021    CO2 25 02/02/2021    BUN 5 02/02/2021    CREATININE 0.7 02/02/2021    GFRAA >60 02/02/2021    LABGLOM >60 02/02/2021    GLUCOSE 99 02/02/2021    PROT 5.9 02/02/2021    LABALBU 2.3 02/02/2021    CALCIUM 7.9 02/02/2021    BILITOT 0.5 02/02/2021    ALKPHOS 111 02/02/2021    AST 17 02/02/2021    ALT 12 02/02/2021     Hepatic Function Panel:    Lab Results   Component Value Date    ALKPHOS 111 02/02/2021    ALT 12 02/02/2021    AST 17 02/02/2021    PROT 5.9 02/02/2021    BILITOT 0.5 02/02/2021    BILIDIR 0.3 01/30/2021    IBILI 0.2 01/30/2021    LABALBU 2.3 02/02/2021     No components found for: [facility]    Lab Results   Component Value Date    TRIG 94 01/27/2021       Lab Results   Component Value Date    HDL 23 01/27/2021       Lab Results   Component Value Date    LDLCALC 54 01/27/2021       Lab Results   Component Value Date    LABVLDL 19 01/27/2021      PT/INR:    Lab Results   Component Value Date    PROTIME 17.7 02/01/2021    INR 1.5 02/01/2021         Assessment:   Active Problems:  ? Choledocholithiasis with ascending cholangitis  ? Cholelithiasis- surgery following   ? Liver lesions, suspicious for microabscesses- ID and surgery following  ? Abdominal pain-lower - resolved  ? Diarrhea - resolved  ? Portal vein thrombosis- vascular consult recommended  ? Dysphagia   ? Anemia, normocytic  ? Leukocytosis- secondary to above  ? Fever and Chills - ID following  ? Alcohol abuse  ? Hx of skin cancer  ? ERCP with papillotomy, stones extraction, and Wallstent placement. 1/29/2021 - Markedly dilated common bile duct with filling defect consistent with stones.  Papillotomy was done, and multiple three large stones as well as pus and gravel were extracted. Small area of stricture in the intrapapillary portion of the common bile duct.  A Wallstent 10 x 60 was placed without difficulty.  Excellent drainage was obtained. ? Post ERCP Pancreatitis (by numbers, pt without pain)- doubt pancreatitis, number likely secondary to stent placement- resolved    Plan:     ? Discussed plan with Dr. Milly Saab need outpatient cholecystectomy when abscess resolves  ? Antibiotics per ID  ? IV fluids per PCP  ? Consult dietary for nutritional supplements-weight loss  ? Monitor amylase, lipase CMP and CBC daily  ? Start low fat diet as tolerated  ? Alcohol cessation  ? Defer comorbidities to others  ? Supportive Care  ? Continue to monitor    Note: This report was completed utilizing computer voice recognition software.  Every effort has been made to ensure accuracy, however; inadvertent computerized transcription errors may be present. Discussed with Dr. Hansa Hendrickson per Dr. Gini Cho, APRN-NP-C 2/2/2021  9:11 AM For Dr. Kwan Lee. I HAD A FACE TO FACE ENCOUNTER WITH THE PATIENT. AGREE WITH THE EXAM, ASSESSMENT, AND PLAN AS OUTLINED ABOVE. ADDITION AND CORRECTIONS WERE DONE AS DEEMED APPROPRIATE. MY EXAM AND PLAN INCLUDE:     APPEARS LESS TOXIC. LIVER BIOPSY WITHOUT PUSS. OVERALL IMPROVEMENT. DISCUSSED PLAN OF CARE WITH PATIENT AND DAUGHTER. WILL FOLLOW.     Carla Noguera M.D. 2/2/2021 5:00 PM

## 2021-02-02 NOTE — PROGRESS NOTES
[facility]  DAILY PROGRESS NOTE  2/2/2021    Chief Complaint   Patient presents with    Diarrhea     Pt states having cramps after diarrhea x8 days. Denies fever. Subjective:  States he doesn't want to eat because then he has a BM. He had a loose BM yesterday.     Objective:  BP (!) 154/75   Pulse 66   Temp 98 °F (36.7 °C) (Oral)   Resp 18   Ht 5' 11\" (1.803 m)   Wt 162 lb (73.5 kg)   SpO2 96%   BMI 22.59 kg/m²     GENERAL:  Laying in bed, awake, alert, cooperative, no apparent distress  HEAD: Normocephalic, atraumatic  EYES: No sclera icterus, pupils equal  LUNGS:  No increased work of breathing, on room air  CARDIOVASCULAR:  Regular rate  ABDOMEN:  Soft, non-tender, non-distended  EXTREMITIES: No edema or swelling  SKIN: Warm and dry    Assessment/Plan:  70 y.o. male male with choledocholithiasis and cholangitis resulting in a right hepatic lobe abscess, portal vein thrombosis    - ID following, recs Flagyl, rocephin  - s/p CT guided liver biopsy by IR  - Pulmonology consult, awaiting recs  - GI following, diet per GI  - on heparin gtt  - will need electively cholecystectomy when abscess heals    Electronically signed by Javier Lopez MD on 2/2/2021 at 6:19 AM    Agree with above  Afebrile for the past 24 hours  Await pathology results to see if this is truly an abscess    Electronically signed by Conchita Anderson MD on 2/2/2021 at 4:15 PM

## 2021-02-02 NOTE — PROGRESS NOTES
[address] [place] [address] [facility]  [facility]  Progress Note  Face to face encounter   SUBJECTIVE:  Chief Complaint   Patient presents with    Diarrhea     Pt states having cramps after diarrhea x8 days. Denies fever. No new complaints today. He had a liver biopsy done-2021. No drains were left in place. Tolerating current antibiotics. On heparin gtt  Daughter at bedside and updated. Had CT scan last night. Review of systems:  As stated above in the chief complaint, otherwise negative. Medications:  Scheduled Meds:   pantoprazole  40 mg Intravenous BID    And    sodium chloride (PF)  10 mL Intravenous BID    cefTRIAXone (ROCEPHIN) IV  2,000 mg Intravenous Q24H    metroNIDAZOLE  500 mg Intravenous Q8H    sodium chloride flush  10 mL Intravenous 2 times per day    folic acid  1 mg Oral Daily    thiamine  100 mg Oral Daily    multivitamin  1 tablet Oral Daily    lisinopril  10 mg Oral Daily    metoprolol tartrate  25 mg Oral BID     Continuous Infusions:   heparin (PORCINE) Infusion 21.6 Units/kg/hr (21 0505)     PRN Meds:sodium chloride flush, heparin (porcine), heparin (porcine), HYDROmorphone, perflutren lipid microspheres, HYDROcodone 5 mg - acetaminophen, hydrALAZINE, sodium chloride flush, promethazine **OR** ondansetron, polyethylene glycol, acetaminophen **OR** acetaminophen, LORazepam **OR** LORazepam **OR** LORazepam **OR** LORazepam **OR** LORazepam **OR** LORazepam **OR** LORazepam **OR** LORazepam    OBJECTIVE:  BP (!) 132/59   Pulse 77   Temp 98.1 °F (36.7 °C) (Oral)   Resp 18   Ht 5' 11\" (1.803 m)   Wt 162 lb (73.5 kg)   SpO2 93%   BMI 22.59 kg/m²   Temp  Av.6 °F (37 °C)  Min: 97.8 °F (36.6 °C)  Max: 100.1 °F (37.8 °C)  Constitutional: The patient is awake, alert, and partially oriented. Sitting up in bed. He is awake and in no distress. Daughter present. Skin: Warm and dry. No rashes were noted. HEENT: Round and reactive pupils.   Moist mucous membranes. No ulcerations or thrush. Left ear is missing. Neck: Supple to movements. Chest: No respiratory distress. No crackles. Cardiovascular: Heart sounds rhythmic and regular. Abdomen: Slightly distended. Positive bowel sounds to auscultation. Benign to palpation. Extremities: No edema. Lines: peripheral    Laboratory and Tests Review:  Lab Results   Component Value Date    WBC 14.1 (H) 02/02/2021    WBC 15.1 (H) 02/01/2021    WBC 15.6 (H) 02/01/2021    HGB 10.2 (L) 02/02/2021    HCT 31.1 (L) 02/02/2021    MCV 90.1 02/02/2021     02/02/2021     Lab Results   Component Value Date    NEUTROABS 11.07 (H) 02/02/2021    NEUTROABS 12.63 (H) 02/01/2021    NEUTROABS 13.80 (H) 01/31/2021     No results found for: [facility]  Lab Results   Component Value Date    ALT 12 02/02/2021    AST 17 02/02/2021    ALKPHOS 111 02/02/2021    BILITOT 0.5 02/02/2021     Lab Results   Component Value Date     02/02/2021    K 3.5 02/02/2021    K 3.2 01/28/2021     02/02/2021    CO2 25 02/02/2021    BUN 5 02/02/2021    CREATININE 0.7 02/02/2021    CREATININE 0.6 02/01/2021    CREATININE 0.7 01/31/2021    GFRAA >60 02/02/2021    LABGLOM >60 02/02/2021    GLUCOSE 99 02/02/2021    PROT 5.9 02/02/2021    LABALBU 2.3 02/02/2021    CALCIUM 7.9 02/02/2021    BILITOT 0.5 02/02/2021    ALKPHOS 111 02/02/2021    AST 17 02/02/2021    ALT 12 02/02/2021     Lab Results   Component Value Date    CRP 12.2 (H) 01/31/2021    CRP 14.6 (H) 01/27/2021     Lab Results   Component Value Date    SEDRATE 62 (H) 01/31/2021    SEDRATE 60 (H) 01/27/2021     Radiology:  CT of chest   Impression:        Moderate patchy infiltrates/atelectasis and pleural effusion the right lower   lobe concerning for pneumonia. Multiple infiltrative nodular densities in the left upper lobe, lingula and   upper lobe.  These are indeterminate.  Malignancy is a consideration.    Heterogeneous appearance of liver with pneumobilia with an indwelling biliary   stent and multiple ring-enhancing hypodense hepatic lesions concerning for   microabscesses versus malignancy. Multiple heterogeneous complex cystic masses in the caudate lobe, and upper   abdomen as noted which may be either inflammatory or malignant.  Surveillance   recommended. Microbiology:   C. difficile toxin assay: Not detected  Stool cultures: Negative  Giardia: None  Respiratory panel, including SARS-CoV-2: Not detected  Blood cultures 1/30/2021: Pending  Liver abscess 2/1/2021: Negative so far  Respiratory culture 2/2/2021: Pending  Urine antigens: Pending    Recent Labs     01/31/21  0357   PROCAL 9.14*       ASSESSMENT:  · Possible liver abscesses versus metastatic disease. Underwent liver biopsy 2/1/2021  · Choledocholithiasis with ascending cholangitis  · Status post ERCP with papillotomy, stone extraction and Wallstent placement 1/29/2021  · Leukocytosis associated to cholangitis and probable liver abscess  · Intermittent fever associated to the above  · Alcohol withdrawal  · Dysphagia  · Right pleural effusion associated to the above    PLAN:  · Continue Metronidazole and Ceftriaxone  · Check liver biopsy and cultures -pending  · Had CT of chest yesterday- Pulmonary to see   · The patient will need outpatient cholecystectomy  · Updated with daughter    Erin England  11:48 AM  2/2/2021    Patient seen and examined. I had a face to face encounter with the patient. Agree with exam.  Assessment and plan as outlined above and directed by me. Addition and corrections were done as deemed appropriate. My exam and plan include: The patient feels well. He has no new complaints. He is awake and alert. Still slightly confused. No family members in the room. He does have some thick brownish sputum and a specimen cup.     Romayne Evan  2/2/2021  1:32 PM

## 2021-02-02 NOTE — CARE COORDINATION
2/2/2021  Social Work Discharge Planning: Pt is on IV ATB. Viraj Valentin referral made to [facility]. [facility] will let SW know after arrangements are made with a phys. to follow. JOSÉ set up a PCP appt for Pt [facility] for Pt to see Dr. Adam Palumbo -[phone] - [address] [facility] on [facility] 2/4/21 at 12:30pm. Waiting to see if  will follow Pt in the interim, due to Viraj Valentin cannot follow without Pt having a PCP and will need IVATB at discharge. Electronically signed by ANAYELI Mcdowell on 2/2/2021 at 8:57 AM

## 2021-02-02 NOTE — PROGRESS NOTES
mL, PRN      promethazine (PHENERGAN) tablet 12.5 mg, Q6H PRN    Or      ondansetron (ZOFRAN) injection 4 mg, Q6H PRN      polyethylene glycol (GLYCOLAX) packet 17 g, Daily PRN      acetaminophen (TYLENOL) tablet 650 mg, Q6H PRN    Or      acetaminophen (TYLENOL) suppository 650 mg, M1R PRN      folic acid (FOLVITE) tablet 1 mg, Daily      thiamine tablet 100 mg, Daily      multivitamin 1 tablet, Daily      lisinopril (PRINIVIL;ZESTRIL) tablet 10 mg, Daily      metoprolol tartrate (LOPRESSOR) tablet 25 mg, BID      LORazepam (ATIVAN) tablet 1 mg, Q1H PRN    Or      LORazepam (ATIVAN) injection 1 mg, Q1H PRN    Or      LORazepam (ATIVAN) tablet 2 mg, Q1H PRN    Or      LORazepam (ATIVAN) injection 2 mg, Q1H PRN    Or      LORazepam (ATIVAN) tablet 3 mg, Q1H PRN    Or      LORazepam (ATIVAN) injection 3 mg, Q1H PRN    Or      LORazepam (ATIVAN) tablet 4 mg, Q1H PRN    Or      LORazepam (ATIVAN) injection 4 mg, Q1H PRN         Data Review  CBC:   Lab Results   Component Value Date    WBC 13.2 02/03/2021    RBC 3.81 02/03/2021    HGB 11.2 02/03/2021    HCT 34.6 02/03/2021    MCV 90.8 02/03/2021    MCH 29.4 02/03/2021    MCHC 32.4 02/03/2021    RDW 14.1 02/03/2021     02/03/2021    MPV 10.4 02/03/2021     CMP:    Lab Results   Component Value Date     02/03/2021    K 4.0 02/03/2021    K 3.2 01/28/2021     02/03/2021    CO2 27 02/03/2021    BUN 4 02/03/2021    CREATININE 0.6 02/03/2021    GFRAA >60 02/03/2021    LABGLOM >60 02/03/2021    GLUCOSE 94 02/03/2021    PROT 6.6 02/03/2021    LABALBU 2.5 02/03/2021    CALCIUM 8.3 02/03/2021    BILITOT 0.4 02/03/2021    ALKPHOS 118 02/03/2021    AST 16 02/03/2021    ALT 12 02/03/2021     Hepatic Function Panel:    Lab Results   Component Value Date    ALKPHOS 118 02/03/2021    ALT 12 02/03/2021    AST 16 02/03/2021    PROT 6.6 02/03/2021    BILITOT 0.4 02/03/2021    BILIDIR 0.3 01/30/2021    IBILI 0.2 01/30/2021    LABALBU 2.5 02/03/2021     No components found for: [facility]  Lab Results   Component Value Date    TRIG 94 01/27/2021   e    LDLCALC 54 01/27/2021             Lab Results   Component Value Date    HDL 23 01/27/2021    D  Lab Results   Component Value Date    LDLCALC 54 01/27/2021   ate    PROTIME 17.7 02/01/2021    INR 1.5 02/01/2021         Assessment:     Active Problems:  ? Choledocholithiasis with ascending cholangitis  ? Cholelithiasis- surgery following   ? Liver lesions, suspicious for microabscesses- ID and surgery following  ? Abdominal pain-lower - resolved  ? Diarrhea - resolved  ? Portal vein thrombosis- vascular consult recommended  ? Dysphagia   ? Anemia, normocytic  ? Leukocytosis- secondary to above  ? Fever and Chills - ID following  ? Alcohol abuse  ? Hx of skin cancer  ? ERCP with papillotomy, stones extraction, and Wallstent placement. 1/29/2021 - Markedly dilated common bile duct with filling defect consistent with stones.  Papillotomy was done, and multiple three large stones as well as pus and gravel were extracted. Small area of stricture in the intrapapillary portion of the common bile duct.  A Wallstent 10 x 60 was placed without difficulty.  Excellent drainage was obtained. ? Post ERCP Pancreatitis (by numbers, pt without pain)- doubt pancreatitis, number likely secondary to stent placement- resolved    Plan:     ? Tereso Medina need outpatient cholecystectomy when abscess resolves  ? Patient for thoracentesis today  ? Antibiotics per ID  ? IV fluids per PCP  ? Consult dietary for nutritional supplements-weight loss  ? Monitor amylase, lipase CMP and CBC daily  ? Low fat diet as tolerated per SLP recommendations   ? Alcohol cessation  ? Defer comorbidities to others  ? Supportive Care  ? Continue to monitor      Note: This report was completed utilizing computer voice recognition software.  Every effort has been made to ensure accuracy, however; inadvertent computerized transcription errors may be present.      Discussed with Dr. Eugenie Reinoso per Dr. Gricel Mesa APRN-NP-C 2/3/2021  10:33 AM For Dr. Nicolasa Jordan

## 2021-02-03 ENCOUNTER — APPOINTMENT (OUTPATIENT)
Dept: ULTRASOUND IMAGING | Age: 76
DRG: 871 | End: 2021-02-03
Payer: MEDICARE

## 2021-02-03 ENCOUNTER — APPOINTMENT (OUTPATIENT)
Dept: GENERAL RADIOLOGY | Age: 76
DRG: 871 | End: 2021-02-03
Payer: MEDICARE

## 2021-02-03 LAB
ALBUMIN SERPL-MCNC: 2.5 G/DL (ref 3.5–5.2)
ALP BLD-CCNC: 118 U/L (ref 40–129)
ALT SERPL-CCNC: 12 U/L (ref 0–40)
AMMONIA: 23.8 UMOL/L (ref 16–60)
AMYLASE FLUID: 51 U/L
AMYLASE: 71 U/L (ref 20–100)
ANION GAP SERPL CALCULATED.3IONS-SCNC: 6 MMOL/L (ref 7–16)
APTT: 53.6 SEC (ref 24.5–35.1)
APTT: 64.4 SEC (ref 24.5–35.1)
APTT: 79.7 SEC (ref 24.5–35.1)
AST SERPL-CCNC: 16 U/L (ref 0–39)
BASOPHILS ABSOLUTE: 0.14 E9/L (ref 0–0.2)
BASOPHILS RELATIVE PERCENT: 1.1 % (ref 0–2)
BILIRUB SERPL-MCNC: 0.4 MG/DL (ref 0–1.2)
BODY FLUID CULTURE, STERILE: NORMAL
BUN BLDV-MCNC: 4 MG/DL (ref 8–23)
CALCIUM SERPL-MCNC: 8.3 MG/DL (ref 8.6–10.2)
CEA,FLUID: 0.5 NG/ML
CEA: 0.9 NG/ML (ref 0–5.2)
CHLORIDE BLD-SCNC: 102 MMOL/L (ref 98–107)
CO2: 27 MMOL/L (ref 22–29)
CREAT SERPL-MCNC: 0.6 MG/DL (ref 0.7–1.2)
EOSINOPHILS ABSOLUTE: 0.22 E9/L (ref 0.05–0.5)
EOSINOPHILS RELATIVE PERCENT: 1.7 % (ref 0–6)
FLUID TYPE: NORMAL
GFR AFRICAN AMERICAN: >60
GFR NON-AFRICAN AMERICAN: >60 ML/MIN/1.73
GLUCOSE BLD-MCNC: 94 MG/DL (ref 74–99)
GLUCOSE, FLUID: 97 MG/DL
GRAM STAIN RESULT: NORMAL
HCT VFR BLD CALC: 34.6 % (ref 37–54)
HEMOGLOBIN: 11.2 G/DL (ref 12.5–16.5)
IMMATURE GRANULOCYTES #: 0.19 E9/L
IMMATURE GRANULOCYTES %: 1.4 % (ref 0–5)
L. PNEUMOPHILA SEROGP 1 UR AG: NORMAL
LD, FLUID: 97 U/L
LIPASE: 259 U/L (ref 13–60)
LYMPHOCYTES ABSOLUTE: 2.16 E9/L (ref 1.5–4)
LYMPHOCYTES RELATIVE PERCENT: 16.4 % (ref 20–42)
MAGNESIUM: 1.7 MG/DL (ref 1.6–2.6)
MCH RBC QN AUTO: 29.4 PG (ref 26–35)
MCHC RBC AUTO-ENTMCNC: 32.4 % (ref 32–34.5)
MCV RBC AUTO: 90.8 FL (ref 80–99.9)
MONOCYTES ABSOLUTE: 1.02 E9/L (ref 0.1–0.95)
MONOCYTES RELATIVE PERCENT: 7.7 % (ref 2–12)
NEUTROPHILS ABSOLUTE: 9.45 E9/L (ref 1.8–7.3)
NEUTROPHILS RELATIVE PERCENT: 71.7 % (ref 43–80)
PDW BLD-RTO: 14.1 FL (ref 11.5–15)
PHOSPHORUS: 3 MG/DL (ref 2.5–4.5)
PLATELET # BLD: 476 E9/L (ref 130–450)
PMV BLD AUTO: 10.4 FL (ref 7–12)
POTASSIUM SERPL-SCNC: 4 MMOL/L (ref 3.5–5)
PROTEIN FLUID: 1.9 G/DL
RBC # BLD: 3.81 E12/L (ref 3.8–5.8)
SODIUM BLD-SCNC: 135 MMOL/L (ref 132–146)
STREP PNEUMONIAE ANTIGEN, URINE: NORMAL
TOTAL PROTEIN: 6.6 G/DL (ref 6.4–8.3)
TRIGLYCERIDES FLUID: 9 MG/DL
WBC # BLD: 13.2 E9/L (ref 4.5–11.5)

## 2021-02-03 PROCEDURE — 2580000003 HC RX 258: Performed by: INTERNAL MEDICINE

## 2021-02-03 PROCEDURE — 82378 CARCINOEMBRYONIC ANTIGEN: CPT

## 2021-02-03 PROCEDURE — 82150 ASSAY OF AMYLASE: CPT

## 2021-02-03 PROCEDURE — 6360000002 HC RX W HCPCS: Performed by: INTERNAL MEDICINE

## 2021-02-03 PROCEDURE — C9113 INJ PANTOPRAZOLE SODIUM, VIA: HCPCS | Performed by: NURSE PRACTITIONER

## 2021-02-03 PROCEDURE — 85025 COMPLETE CBC W/AUTO DIFF WBC: CPT

## 2021-02-03 PROCEDURE — C1729 CATH, DRAINAGE: HCPCS

## 2021-02-03 PROCEDURE — 84100 ASSAY OF PHOSPHORUS: CPT

## 2021-02-03 PROCEDURE — 99232 SBSQ HOSP IP/OBS MODERATE 35: CPT | Performed by: TRANSPLANT SURGERY

## 2021-02-03 PROCEDURE — 80053 COMPREHEN METABOLIC PANEL: CPT

## 2021-02-03 PROCEDURE — 84478 ASSAY OF TRIGLYCERIDES: CPT

## 2021-02-03 PROCEDURE — 2500000003 HC RX 250 WO HCPCS: Performed by: INTERNAL MEDICINE

## 2021-02-03 PROCEDURE — 84157 ASSAY OF PROTEIN OTHER: CPT

## 2021-02-03 PROCEDURE — 83690 ASSAY OF LIPASE: CPT

## 2021-02-03 PROCEDURE — 6360000002 HC RX W HCPCS: Performed by: STUDENT IN AN ORGANIZED HEALTH CARE EDUCATION/TRAINING PROGRAM

## 2021-02-03 PROCEDURE — 82947 ASSAY GLUCOSE BLOOD QUANT: CPT

## 2021-02-03 PROCEDURE — 6370000000 HC RX 637 (ALT 250 FOR IP): Performed by: INTERNAL MEDICINE

## 2021-02-03 PROCEDURE — 87205 SMEAR GRAM STAIN: CPT

## 2021-02-03 PROCEDURE — 87070 CULTURE OTHR SPECIMN AEROBIC: CPT

## 2021-02-03 PROCEDURE — 82140 ASSAY OF AMMONIA: CPT

## 2021-02-03 PROCEDURE — 36415 COLL VENOUS BLD VENIPUNCTURE: CPT

## 2021-02-03 PROCEDURE — 99232 SBSQ HOSP IP/OBS MODERATE 35: CPT | Performed by: INTERNAL MEDICINE

## 2021-02-03 PROCEDURE — 71045 X-RAY EXAM CHEST 1 VIEW: CPT

## 2021-02-03 PROCEDURE — 88112 CYTOPATH CELL ENHANCE TECH: CPT

## 2021-02-03 PROCEDURE — 92526 ORAL FUNCTION THERAPY: CPT | Performed by: SPEECH-LANGUAGE PATHOLOGIST

## 2021-02-03 PROCEDURE — 6360000002 HC RX W HCPCS: Performed by: NURSE PRACTITIONER

## 2021-02-03 PROCEDURE — 83735 ASSAY OF MAGNESIUM: CPT

## 2021-02-03 PROCEDURE — 0W993ZZ DRAINAGE OF RIGHT PLEURAL CAVITY, PERCUTANEOUS APPROACH: ICD-10-PCS | Performed by: RADIOLOGY

## 2021-02-03 PROCEDURE — 2580000003 HC RX 258: Performed by: NURSE PRACTITIONER

## 2021-02-03 PROCEDURE — 85730 THROMBOPLASTIN TIME PARTIAL: CPT

## 2021-02-03 PROCEDURE — 83615 LACTATE (LD) (LDH) ENZYME: CPT

## 2021-02-03 PROCEDURE — 88305 TISSUE EXAM BY PATHOLOGIST: CPT

## 2021-02-03 PROCEDURE — 2060000000 HC ICU INTERMEDIATE R&B

## 2021-02-03 RX ADMIN — HEPARIN SODIUM 23.6 UNITS/KG/HR: 10000 INJECTION, SOLUTION INTRAVENOUS at 01:23

## 2021-02-03 RX ADMIN — HYDROMORPHONE HYDROCHLORIDE 0.5 MG: 1 INJECTION, SOLUTION INTRAMUSCULAR; INTRAVENOUS; SUBCUTANEOUS at 06:08

## 2021-02-03 RX ADMIN — FOLIC ACID 1 MG: 1 TABLET ORAL at 08:37

## 2021-02-03 RX ADMIN — HYDROCODONE BITARTRATE AND ACETAMINOPHEN 1 TABLET: 5; 325 TABLET ORAL at 17:22

## 2021-02-03 RX ADMIN — PANTOPRAZOLE SODIUM 40 MG: 40 INJECTION, POWDER, FOR SOLUTION INTRAVENOUS at 08:36

## 2021-02-03 RX ADMIN — HYDROMORPHONE HYDROCHLORIDE 0.5 MG: 1 INJECTION, SOLUTION INTRAMUSCULAR; INTRAVENOUS; SUBCUTANEOUS at 21:24

## 2021-02-03 RX ADMIN — MULTIVITAMIN TABLET 1 TABLET: TABLET at 08:37

## 2021-02-03 RX ADMIN — METRONIDAZOLE 500 MG: 500 INJECTION, SOLUTION INTRAVENOUS at 06:08

## 2021-02-03 RX ADMIN — METOPROLOL TARTRATE 25 MG: 25 TABLET, FILM COATED ORAL at 21:20

## 2021-02-03 RX ADMIN — WATER 2000 MG: 1 INJECTION INTRAMUSCULAR; INTRAVENOUS; SUBCUTANEOUS at 15:00

## 2021-02-03 RX ADMIN — PANTOPRAZOLE SODIUM 40 MG: 40 INJECTION, POWDER, FOR SOLUTION INTRAVENOUS at 21:20

## 2021-02-03 RX ADMIN — METRONIDAZOLE 500 MG: 500 INJECTION, SOLUTION INTRAVENOUS at 23:28

## 2021-02-03 RX ADMIN — METRONIDAZOLE 500 MG: 500 INJECTION, SOLUTION INTRAVENOUS at 15:30

## 2021-02-03 RX ADMIN — Medication 10 ML: at 08:38

## 2021-02-03 RX ADMIN — Medication 100 MG: at 08:36

## 2021-02-03 RX ADMIN — HEPARIN SODIUM 23.6 UNITS/KG/HR: 10000 INJECTION, SOLUTION INTRAVENOUS at 21:21

## 2021-02-03 RX ADMIN — HYDROMORPHONE HYDROCHLORIDE 0.5 MG: 1 INJECTION, SOLUTION INTRAMUSCULAR; INTRAVENOUS; SUBCUTANEOUS at 01:23

## 2021-02-03 RX ADMIN — HYDROCODONE BITARTRATE AND ACETAMINOPHEN 1 TABLET: 5; 325 TABLET ORAL at 11:05

## 2021-02-03 RX ADMIN — METOPROLOL TARTRATE 25 MG: 25 TABLET, FILM COATED ORAL at 08:36

## 2021-02-03 RX ADMIN — LISINOPRIL 10 MG: 10 TABLET ORAL at 08:37

## 2021-02-03 RX ADMIN — Medication 10 ML: at 21:45

## 2021-02-03 RX ADMIN — SODIUM CHLORIDE, PRESERVATIVE FREE 10 ML: 5 INJECTION INTRAVENOUS at 08:36

## 2021-02-03 RX ADMIN — HEPARIN SODIUM 23.54 UNITS/KG/HR: 10000 INJECTION, SOLUTION INTRAVENOUS at 11:02

## 2021-02-03 RX ADMIN — SODIUM CHLORIDE, PRESERVATIVE FREE 10 ML: 5 INJECTION INTRAVENOUS at 21:45

## 2021-02-03 ASSESSMENT — PAIN DESCRIPTION - PAIN TYPE
TYPE: ACUTE PAIN
TYPE: ACUTE PAIN;SURGICAL PAIN

## 2021-02-03 ASSESSMENT — PAIN DESCRIPTION - LOCATION
LOCATION: ABDOMEN;CHEST
LOCATION: ABDOMEN
LOCATION: CHEST

## 2021-02-03 ASSESSMENT — PAIN DESCRIPTION - PROGRESSION
CLINICAL_PROGRESSION: NOT CHANGED
CLINICAL_PROGRESSION: NOT CHANGED
CLINICAL_PROGRESSION: GRADUALLY WORSENING

## 2021-02-03 ASSESSMENT — PAIN SCALES - GENERAL
PAINLEVEL_OUTOF10: 8
PAINLEVEL_OUTOF10: 0
PAINLEVEL_OUTOF10: 8
PAINLEVEL_OUTOF10: 6
PAINLEVEL_OUTOF10: 8
PAINLEVEL_OUTOF10: 6
PAINLEVEL_OUTOF10: 0
PAINLEVEL_OUTOF10: 0
PAINLEVEL_OUTOF10: 3
PAINLEVEL_OUTOF10: 6

## 2021-02-03 ASSESSMENT — PAIN DESCRIPTION - DESCRIPTORS
DESCRIPTORS: DISCOMFORT
DESCRIPTORS: ACHING;SHARP;DISCOMFORT
DESCRIPTORS: CRAMPING
DESCRIPTORS: ACHING;DISCOMFORT

## 2021-02-03 ASSESSMENT — PAIN DESCRIPTION - ORIENTATION
ORIENTATION: ANTERIOR;RIGHT
ORIENTATION: RIGHT;MID

## 2021-02-03 ASSESSMENT — PAIN DESCRIPTION - ONSET
ONSET: ON-GOING
ONSET: AWAKENED FROM SLEEP
ONSET: ON-GOING
ONSET: ON-GOING

## 2021-02-03 ASSESSMENT — PAIN - FUNCTIONAL ASSESSMENT
PAIN_FUNCTIONAL_ASSESSMENT: PREVENTS OR INTERFERES SOME ACTIVE ACTIVITIES AND ADLS
PAIN_FUNCTIONAL_ASSESSMENT: ACTIVITIES ARE NOT PREVENTED

## 2021-02-03 ASSESSMENT — PAIN DESCRIPTION - FREQUENCY
FREQUENCY: CONTINUOUS
FREQUENCY: INTERMITTENT

## 2021-02-03 NOTE — ADT AUTH CERT
and follow   Hypertension-on lisinopril,metorpolol , hydralazine as needed   EtOH use-on IV fluids, MVI, thiamine, folic acid.  Monitor if needed placed on CIWA scale. Dysphagia-speech  therapy consulted & following, on pureed diet. 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 Choledocholithiasis with ascending cholangitis   ? Cholelithiasis- surgery following    ? Liver lesions, suspicious for microabscesses- ID and surgery following   ? Abdominal pain-lower - resolved   ? Diarrhea - resolved   ? Portal vein thrombosis- vascular consult recommended   ? Dysphagia    ? Anemia, normocytic   ? Leukocytosis- secondary to above   ? Fever and Chills - ID following   ? Alcohol abuse   ? Hx of skin cancer   ? ERCP with papillotomy, stones extraction, and Wallstent placement. 1/29/2021 - Markedly dilated common bile duct with filling defect consistent with stones.  Papillotomy was done, and multiple three large stones as well as pus and gravel were extracted. Small area of stricture in the intrapapillary portion of the common bile duct.  A Wallstent 10 x 60 was placed without difficulty.  Excellent drainage was obtained. ? Post ERCP Pancreatitis (by numbers, pt without pain)- doubt pancreatitis, number likely secondary to stent placement- resolved       Plan:   Arcadio Santos need outpatient cholecystectomy when abscess resolves   ? Patient for thoracentesis today   ? Antibiotics per ID   ? IV fluids per PCP   ? Consult dietary for nutritional supplements-weight loss   ? Monitor amylase, lipase CMP and CBC daily   ? Low fat diet as tolerated per SLP recommendations    ?  Alcohol cessation

## 2021-02-03 NOTE — PROGRESS NOTES
B SURGERY  DAILY PROGRESS NOTE  2/3/2021    Chief Complaint   Patient presents with    Diarrhea     Pt states having cramps after diarrhea x8 days. Denies fever. Subjective:  No new complaints. Had BM yesterday, denies abdominal pain    Objective:  BP (!) 148/79   Pulse 72   Temp 97.1 °F (36.2 °C) (Oral)   Resp 18   Ht 5' 11\" (1.803 m)   Wt 163 lb 8 oz (74.2 kg)   SpO2 95%   BMI 22.80 kg/m²     GENERAL:  Laying in bed, awake, alert, cooperative, no apparent distress  HEAD: Normocephalic, atraumatic  EYES: No sclera icterus, pupils equal  LUNGS:  No increased work of breathing, on room air  CARDIOVASCULAR:  Regular rate  ABDOMEN:  Soft, non-tender, non-distended  EXTREMITIES: No edema or swelling  SKIN: Warm and dry    Assessment/Plan:  70 y.o. male male with choledocholithiasis and cholangitis resulting in a right hepatic lobe abscess, portal vein thrombosis    - ID following, recs Flagyl, rocephin. Afebrile  - s/p CT guided liver biopsy by IR  - pathology results pending, ? abscess v mass  - Pulmonology consult, awaiting recs. Pt states may be planning thoracentesis  - GI following, low fat diet as tolerated per GI.  Recommend nutritional supplements  - on heparin gtt  - will need electively cholecystectomy when abscess heals    Electronically signed by Lyndsay Thompson DO on 2/3/2021 at 7:01 AM    Agree with above, had 5.6L removed from right lung  Path pending    Electronically signed by Jalil Acosta MD on 2/3/2021 at 2:19 PM

## 2021-02-03 NOTE — CARE COORDINATION
2/3/2021  Social Work Discharge [address] 20/24. Pt is on room air and IV ATB-[facility] referral made to [facility]. [facility] will let  know after arrangements are made with a phys. to follow. JOSÉ set up a PCP appt for Pt [facility] for Pt to see Dr. Shayan Kruse -[phone] - [address] [place] on [address] 2/4/21 at 12:30pm-( this will need to be changed if Pt is still here). Waiting to see if  will follow Pt in the interim, due to Viraj 78 cannot follow without Pt having a PCP and will need IVATB at discharge.  Electronically signed by ANAYELI Sibley on 2/3/2021 at 10:36 AM

## 2021-02-03 NOTE — PROGRESS NOTES
Rosmery Mitchell Hospitalist   Progress Note    Admitting Date and Time: 1/27/2021  2:40 AM  Admit Dx: Diarrhea [R19.7]     Seen for follow on multiple problems as listed below    Subjective  Comfortable, sob /cough about same , no new problems overnight, d/w nursing. Denies CP ,n/v/d/or abd pain. ROS: denies fever, chills, cp, n/v, HA unless stated above.      pantoprazole  40 mg Intravenous BID    And    sodium chloride (PF)  10 mL Intravenous BID    cefTRIAXone (ROCEPHIN) IV  2,000 mg Intravenous Q24H    metroNIDAZOLE  500 mg Intravenous Q8H    sodium chloride flush  10 mL Intravenous 2 times per day    folic acid  1 mg Oral Daily    thiamine  100 mg Oral Daily    multivitamin  1 tablet Oral Daily    lisinopril  10 mg Oral Daily    metoprolol tartrate  25 mg Oral BID         sodium chloride flush, 10 mL, PRN      heparin (porcine), 4,000 Units, PRN      heparin (porcine), 2,000 Units, PRN      HYDROmorphone, 0.5 mg, Q4H PRN      perflutren lipid microspheres, 1.5 mL, ONCE PRN      HYDROcodone 5 mg - acetaminophen, 1 tablet, Q6H PRN      hydrALAZINE, 5 mg, Q6H PRN      sodium chloride flush, 10 mL, PRN      promethazine, 12.5 mg, Q6H PRN    Or      ondansetron, 4 mg, Q6H PRN      polyethylene glycol, 17 g, Daily PRN      acetaminophen, 650 mg, Q6H PRN    Or      acetaminophen, 650 mg, Q6H PRN      LORazepam, 1 mg, Q1H PRN    Or      LORazepam, 1 mg, Q1H PRN    Or      LORazepam, 2 mg, Q1H PRN    Or      LORazepam, 2 mg, Q1H PRN    Or      LORazepam, 3 mg, Q1H PRN    Or      LORazepam, 3 mg, Q1H PRN    Or      LORazepam, 4 mg, Q1H PRN    Or      LORazepam, 4 mg, Q1H PRN         Objective:    BP (!) 173/57   Pulse 66   Temp 97.5 °F (36.4 °C) (Oral)   Resp 20   Ht 5' 11\" (1.803 m)   Wt 163 lb 8 oz (74.2 kg)   SpO2 98%   BMI 22.80 kg/m²   General Appearance: alert and oriented to person, place and time, in no acute distress  Skin: warm and dry  Head: normocephalic and atraumatic  Eyes: pupils equal, round, and reactive to light, extraocular eye movements intact, conjunctivae normal  Neck: supple and non-tender without mass  Pulmonary/Chest: clear to auscultation bilaterally  Cardiovascular: normal rate,  normal S1 and S2  Abdomen: soft, non-tender, non-distended, normal bowel sounds, no masses or organomegaly  Extremities: no cyanosis, clubbing   Musculoskeletal: normal range of motion  Neurologic:  no cranial nerve deficit,  speech normal      Recent Labs     02/01/21  0420 02/02/21  0305 02/03/21  0600    136 135   K 4.1 3.5 4.0    103 102   CO2 25 25 27   BUN 8 5* 4*   CREATININE 0.6* 0.7 0.6*   GLUCOSE 94 99 94   CALCIUM 7.7* 7.9* 8.3*       Recent Labs     02/01/21  1418 02/02/21  0305 02/03/21  0600   WBC 15.1* 14.1* 13.2*   RBC 4.41 3.45* 3.81   HGB 12.7 10.2* 11.2*   HCT 41.0 31.1* 34.6*   MCV 93.0 90.1 90.8   MCH 28.8 29.6 29.4   MCHC 31.0* 32.8 32.4   RDW 14.0 14.1 14.1   * 407 476*   MPV 10.3 10.5 10.4       Labs and images reviewed     Radiology:   XR CHEST PORTABLE   Final Result   1. There is no right pneumothorax status post right thoracentesis. US THORACENTESIS Which side should the procedure be performed? Right   Final Result   Successful ultrasound guided thoracentesis. CT CHEST W CONTRAST   Final Result   Moderate patchy infiltrates/atelectasis and pleural effusion the right lower   lobe concerning for pneumonia. Multiple infiltrative nodular densities in the left upper lobe, lingula and   upper lobe. These are indeterminate. Malignancy is a consideration. Heterogeneous appearance of liver with pneumobilia with an indwelling biliary   stent and multiple ring-enhancing hypodense hepatic lesions concerning for   microabscesses versus malignancy. Multiple heterogeneous complex cystic masses in the caudate lobe, and upper   abdomen as noted which may be either inflammatory or malignant. Surveillance   recommended.       CT GUIDED NEEDLE PLACEMENT   Final Result   Successful CT guided core biopsy of lesion in the posterior segment of the   right lobe of the liver. Needle aspiration revealed no evidence of purulence material.  Minimal amount   of bloody fluid was aspirated that was sent for cultures and Gram stain. CT NEEDLE BIOPSY LIVER PERCUTANEOUS   Final Result   Successful CT guided core biopsy of lesion in the posterior segment of the   right lobe of the liver. Needle aspiration revealed no evidence of purulence material.  Minimal amount   of bloody fluid was aspirated that was sent for cultures and Gram stain. CT ABDOMEN PELVIS W IV CONTRAST Additional Contrast? None   Final Result   1. Redemonstration of multiple foci of hypoattenuation associated with right   hepatic lobe which could suggest multiple abscesses similar in size compared   to prior. 2.  Fluid surrounds intrahepatic IVC appearing similar compared to prior. 3.  Cystic lesion associated with caudate lobe which has increased in size   slightly compared to prior which also may indicate abscess. 4.  Cystic lesion adjacent to left adrenal gland has also increased in size   slightly compared to previous examination. 5.  Cystic lesion with marginal enhancement seen near lesser curvature of the   stomach on axial image number 43 might also suggest abscess. 6.  Status post TIPS placement with pneumobilia in left hepatic lobe and   common bile duct. 7.  Moderate right pleural effusion which has increased in size compared to   prior. New small left pleural effusion. FL MODIFIED BARIUM SWALLOW W VIDEO   Final Result   1. Significant penetration and aspiration with thin barium while drinking   through a straw only. 2. There is no evidence of aspiration or penetration while intake of the   barium with a spoon. 3. Minimal pooling of the honey and pudding consistency barium within the   piriform sinuses.    Please see separate speech pathology report for full discussion of findings   and recommendations. FL ERCP BILIARY AND PANCREATIC S&I   Final Result   Unremarkable ERCP images. Please refer to the procedure report for further   details. XR CHEST PORTABLE   Final Result   New interstitial and/or vascular prominence may reflect reactive airways   disease, vascular congestion, interstitial edema, viral process, or   interstitial pneumonitis. Slight cardiomegaly      MRI ABDOMEN W WO CONTRAST MRCP   Final Result   1. Choledocholithiasis associated with severe extrahepatic biliary dilation   and mild central intrahepatic biliary dilation. Evaluation for superimposed   cholangitis is limited. 2. Cholelithiasis with no definite findings of cholecystitis. 3. Limited evaluation of the liver due to motion artifact. Signal   abnormalities seen primarily in hepatic segment 7 correspond with the CT   appearance and are suspicious for microabscesses. However, an infiltrative   malignancy could appear similar. Consider evaluation with liver protocol   abdomen CT, which would be less affected by motion. 4. Complicated fluid collections along the caudate lobe of the liver, between   right adrenal gland right diaphragmatic deo, adjacent to the retrohepatic   inferior vena cava, and likely within the christopher hepatis are suspicious for   abscesses given the above findings. 5. Mild dilation of the downstream pancreatic duct potentially due to mass   effect from stones in the distal common bile duct. Alternatively, this could   be related to moderate parenchymal atrophy. 6. Small right pleural effusion. US GALLBLADDER RUQ   Final Result   Cholecystolithiasis. Suggestion also of choledocholithiasis with common bile   duct distention   Solid-appearing mass adjacent to the left hepatic lobe, in the christopher hepatis   region, may be artifact from the duodenum.   Differential includes enlarged   lymph node   Simple appearing right renal cyst      CT ABDOMEN PELVIS W IV CONTRAST Additional Contrast? None   Final Result   There are numerous liver lesions. Multiple small lesions are possibly   cystic. Larger lesions in the left lobe are more nonspecific. The right   lobe is also heterogeneous. MRI correlation is recommended for   characterization of these abnormalities. Cholelithiasis. Biliary dilatation and probable multiple common bile duct   stones. Soft tissue nodule adjacent to the diaphragmatic deo and right intrarenal   gland measuring 2 cm. This this may be an adrenal adenoma or lymph node. Sigmoid diverticulosis. No acute diverticulitis. Small right pleural effusion with basilar atelectasis. XR CHEST PORTABLE   Final Result   There is no acute abnormality seen. Assessment:    Active Problems:    Diarrhea    Essential hypertension    Atrial fibrillation (HCC)    Hypokalemia    Functional diarrhea    Gallstones    Choledocholithiasis    Chronic obstructive pulmonary disease (HCC)    Alcohol abuse    Atrial fibrillation, new onset (HCC)    Liver lesion    Hepatic abscess    Portal vein thrombosis  Resolved Problems:    * No resolved hospital problems. *      Plan:  Abdominal pain with diarrhea sec to  choledocholithiasis , cholangitis,resulting in rt hepatic lobe abscesses & portal vein thrombosis. GI,GS  and IR consulted &  following. On  IV ceftriax , flagyl ,and other supportive treatment. s/p ERCP with papillotomy, stones extraction and Wallstent placement on 1/29/2021. As per surg will need elective cholecystectomy when abscesses heals. Diarrhea has resolved. Follow up CT reviewed. Further plans as per GI ,GS , ID. Liver abscesses as per MRI-ID consulted and following. Has Leukocytosis and elevated pro Niranjan.  On IV ceftriaxone plus Flagyl. S/p IR  Liver bx to r/o abscess or other path. Bx results pending.     RLL Pneumonia/effusion  as per CT , with cough &sob, Leucocytosis - pulm is consulted and following  , s/p thoracentesis which was done today. On abx as per ID. Portal vein thrombosis-on IV heparin as per general surgery. A. Fib/MAT -cardiology is consulted & following , present anticoagulation has not been recommended with need of arrhythmia follow-up to assess presence or absence of recurrence and potential future needs of anticoagulation especially if hypertension persist in this will contribute to his [id number] score of 2. He will need outpatient follow-up with cardiology. Continue BB for rate control. Hypokalemia-replete and follow    Hypertension-on lisinopril,metorpolol , hydralazine as needed    EtOH use-on IV fluids, MVI, thiamine, folic acid. Monitor if needed placed on CIWA scale. Dysphagia-speech  therapy consulted & following, on pureed diet.     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

## 2021-02-03 NOTE — PROGRESS NOTES
IRFLOWSHEET    Date: 2/3/2021    Time: 9:29 AM     Exam: Ultrasound Guided Right Thoracentesis    Radiologist Performing Procedure: Dr. Norman Madrid    Nurse Reporting/Phone: [phone]     Nurse Receiving: Butch Hameed    Permit signed:      Yes    ID Band Checklist: Yes    Allergies: Patient has no known allergies. TIME OUT: 7086    PROCEDURE START TIME: 6776    Puncture Site: Right Posterior Chest    Puncture Time: 8863    Catheters: 5Fr. LABS:   Lab Results   Component Value Date    INR 1.5 02/01/2021    PROTIME 17.7 (H) 02/01/2021           Lab Results   Component Value Date    CREATININE 0.6 (L) 02/03/2021    BUN 4 (L) 02/03/2021          Lab Results   Component Value Date    HGB 11.2 (L) 02/03/2021    HCT 34.6 (L) 02/03/2021     (H) 02/03/2021         PROCEDURE END TIME: 0948    Total Contrast: N/A    Fluoroscopy Time: N/A    Complications:  None    Comments: Pt arrived to department for thoracentesis from nursing unit. Pt is alert and oriented, pt denies any shortness of breath or chest pain prior to procedure, on room air. History and medications reviewed with patient/chart. Procedure is explained to patient, including possible risks by Dr. Norman Madrid. Pt verbalizes understanding and consent form signed. Pt positioned sitting on side of bed for procedure, ultrasound images taken prior to procedure and reviewed with physician. Procedure done under sterile technique and guidance of ultrasound. A total of 560 ml's of light red colored fluid drained during procedure. Sample obtained and sent to lab for ordered testing. Catheter removed and op-site dressing applied to site with no bleeding. Pt denies any chest pain or shortness of breath after procedure. A stat chest x ray done and read by Dr. Norman Madrid. Pt monitored with no complications. Pt report called to floor, pt transferred back to floor via cart by transport staff.

## 2021-02-03 NOTE — PROGRESS NOTES
SPEECH LANGUAGE PATHOLOGY  DAILY PROGRESS NOTE        PATIENT NAME:  Mere Breen      :  1945          TODAY'S DATE:  2/3/2021 ROOM:  [address] [place] [address]        SWALLOWING  Chart reviewed. Wife present for session. Pt reports no difficulty swallowing on current diet. Pt took thin liquid by cup with no clinical s/s of aspiration during today's session. Pt used chin tuck when SLP present, however pt does not report consistent use. Laryngeal exercises were attempted but pt became verbally aggressive and refused.         DIET RECOMMENDATIONS:  Dysphagia 3, Soft/advanced (Soft & Bite-sized) solids with thin liquids (no straw)                 FEEDING RECOMMENDATIONS:                           Assistance level:  Supervision is needed during all oral intake                                                    Compensatory strategies recommended: Double swallow with thicker foods and Chin tuck with all swallows. Spoon feed only. NO STRAW. Oral- adequate labial seal and A-P transfer, no oral residue      Pharyngeal- No overt s/s of aspiration, no multiple swallows      Intervention/Education- Pt refused to participate in exercises during today's session, encouraged to self-complete. Pt/wife educated on results and POC. Pt/wife trained on compensatory strategies for safe swallow via handouts with good outcome. Questions answered. Will continue SP intervention as per previously established POC. Harjit Alicea   SLP Student       Elsa PRITCHARD CCC/SLP [id number]  Speech Pathologist              CPT code(s) 26809  dysphagia tx  Total minutes :  30 minutes

## 2021-02-03 NOTE — PROGRESS NOTES
[id number] [facility] Cardiology Inpatient Progress Note    Patient is a 70 y.o. male of No primary care provider on file. seen in hospital follow up. Chief complaint: ABD pain/PAF/MAT/COPD/Pre-op    HPI: Some SOB. No CP.      Patient Active Problem List   Diagnosis    Diarrhea    Essential hypertension    Atrial fibrillation (HCC)    Hypokalemia    Functional diarrhea    Gallstones    Choledocholithiasis    Chronic obstructive [facility] disease [facility] [address]    Alcohol abuse    Atrial fibrillation, new onset ([facility] [address]    Liver lesion    Hepatic abscess    Portal vein thrombosis       No Known Allergies    Current Facility-Administered Medications   Medication Dose Route Frequency Provider Last Rate Last Admin    sodium chloride flush 0.9 % injection 10 mL  10 mL Intravenous PRN Renée Morse MD        heparin (porcine) injection 4,000 Units  4,000 Units Intravenous PRN Padmini Chapman MD   4,000 Units at 02/02/21 0505    heparin (porcine) injection 2,000 Units  2,000 Units Intravenous PRN Padmini Chapman MD   2,000 Units at 02/02/21 1705    heparin 25,000 units in dextrose 5% 250 mL (premix) infusion  5-13.6 Units/kg/hr Intravenous Continuous Padmini Chapman MD   Stopped at 02/03/21 0609    HYDROmorphone (DILAUDID) injection 0.5 mg  0.5 mg Intravenous Q4H PRN Vianey Kulkarni MD   0.5 mg at 02/03/21 1406    pantoprazole (PROTONIX) injection 40 mg  40 mg Intravenous BID KISHORE Lam - CNP   40 mg at 02/02/21 2057    And    sodium chloride (PF) 0.9 % injection 10 mL  10 mL Intravenous BID KISHORE Lam CNP   10 mL at 02/02/21 2057    perflutren lipid microspheres (DEFINITY) injection 1.65 mg  1.5 mL Intravenous ONCE PRN Vianey Kulkarni MD        cefTRIAXone (ROCEPHIN) 2,000 mg in sterile water 20 mL IV syringe  2,000 mg Intravenous Q24H Vianey Kulkarni MD   2,000 mg at 02/02/21 1445    metronidazole (FLAGYL) 500 mg in NaCl 100 mL IVPB premix  500 mg Intravenous Q8H Vianey Kulkarni  mL/hr at 02/03/21 0608 500 mg at 02/03/21 0608    HYDROcodone-acetaminophen (NORCO) 5-325 MG per tablet 1 tablet  1 tablet Oral Q6H PRN Shelbi Phillips MD   1 tablet at 01/27/21 0917    hydrALAZINE (APRESOLINE) injection 5 mg  5 mg Intravenous Q6H PRN Shelbi Phillips MD        sodium chloride flush 0.9 % injection 10 mL  10 mL Intravenous 2 times per day Shelbi Phillips MD   10 mL at 02/02/21 2100    sodium chloride flush 0.9 % injection 10 mL  10 mL Intravenous PRN Shelbi Phillips MD   10 mL at 02/01/21 2117    promethazine (PHENERGAN) tablet 12.5 mg  12.5 mg Oral Q6H PRN Shelbi Phillips MD        Or    ondansetron [facility]) injection 4 mg  4 mg Intravenous Q6H PRN Shelbi Phillips MD        polyethylene glycol (GLYCOLAX) packet 17 g  17 g Oral Daily PRN Shelbi Phillips MD        acetaminophen (TYLENOL) tablet 650 mg  650 mg Oral Q6H PRN Shelbi Phillips MD   650 mg at 01/30/21 0948    Or    acetaminophen (TYLENOL) suppository 650 mg  650 mg Rectal Q6H PRN Shelbi Phillips MD        folic acid (FOLVITE) tablet 1 mg  1 mg Oral Daily Shelbi Phillips MD   1 mg at 02/02/21 9829    thiamine tablet 100 mg  100 mg Oral Daily Shelbi Phillips MD   100 mg at 02/02/21 3885    multivitamin 1 tablet  1 tablet Oral Daily Shelbi Phillips MD   1 tablet at 02/02/21 0808    lisinopril (PRINIVIL;ZESTRIL) tablet 10 mg  10 mg Oral Daily Shelbi Phillips MD   10 mg at 02/02/21 6696    metoprolol tartrate (LOPRESSOR) tablet 25 mg  25 mg Oral BID Shelbi Phillips MD   25 mg at 02/02/21 2058    LORazepam (ATIVAN) tablet 1 mg  1 mg Oral Q1H PRN Shelbi Phillips MD        Or    LORazepam (ATIVAN) injection 1 mg  1 mg Intravenous Q1H PRN Shelbi Phillips MD   1 mg at 01/28/21 0037    Or    LORazepam (ATIVAN) tablet 2 mg  2 mg Oral Q1H PRN Shelbi Phillips MD        Or    LORazepam (ATIVAN) injection 2 mg  2 mg Intravenous Q1H PRN Shelbi Phillips MD        Or    LORazepam (ATIVAN) tablet 3 mg  3 mg Oral Q1H PRN Shelbi Phillips MD        Or  LORazepam (ATIVAN) injection 3 mg  3 mg Intravenous Q1H PRN Harjit Roman MD        Or    LORazepam (ATIVAN) tablet 4 mg  4 mg Oral Q1H PRN Harjit Roman MD        Or    LORazepam (ATIVAN) injection 4 mg  4 mg Intravenous Q1H PRN Harjit Roman MD           Review of systems:   Heart: as above   Lungs: as above   Eyes: denies changes in vision or discharge. Ears: denies changes in hearing or pain. Nose: denies epistaxis or masses   Throat: denies sore throat or trouble swallowing. Neuro: denies numbness, tingling, tremors. Skin: denies rashes or itching. : denies hematuria, dysuria   GI: denies vomiting, diarrhea   Psych: denies mood changed, anxiety, depression. Physical Exam   BP (!) 155/84   Pulse 77   Temp 97.1 °F (36.2 °C) (Oral)   Resp 18   Ht 5' 11\" (1.803 m)   Wt 163 lb 8 oz (74.2 kg)   SpO2 96%   BMI 22.80 kg/m²   Constitutional: Oriented to person, place, and time. No distress. Well developed. Head: Normocephalic and atraumatic. Neck: Neck supple. No hepatojugular reflux. No JVD present. Carotid bruit is not present. No tracheal deviation present. No thyromegaly present. Cardiovascular: Normal rate, regular rhythm, normal heart sounds. intact distal pulses. No gallop and no friction rub. No murmur heard. Pulmonary: Breath sounds normal. No respiratory distress. No wheezes. No rales. Chest: Effort normal. No tenderness. Abdominal: Soft. Bowel sounds are normal. No distension or mass. No tenderness, rebound or guarding. Musculoskeletal: . No tenderness. No clubbing or cyanosis. Extremitites: Intact distal pulses. No edema  Neurological: Alert and oriented to person, place, and time. Skin: Skin is warm and dry. No rash noted. Not diaphoretic. No erythema. Psychiatric: Normal mood and affect. Behavior is normal.   Lymphadenopathy: No cervical adenopathy. No groin adenopathy.       CBC:   Lab Results   Component Value Date    WBC 13.2 02/03/2021    RBC 3.81 02/03/2021    HGB 11.2 02/03/2021    HCT 34.6 02/03/2021    MCV 90.8 02/03/2021    MCH 29.4 02/03/2021    MCHC 32.4 02/03/2021    RDW 14.1 02/03/2021     02/03/2021    MPV 10.4 02/03/2021     BMP:   Lab Results   Component Value Date     02/03/2021    K 4.0 02/03/2021    K 3.2 01/28/2021     02/03/2021    CO2 27 02/03/2021    BUN 4 02/03/2021    LABALBU 2.5 02/03/2021    CREATININE 0.6 02/03/2021    CALCIUM 8.3 02/03/2021    GFRAA >60 02/03/2021    LABGLOM >60 02/03/2021     Magnesium:    Lab Results   Component Value Date    MG 1.7 02/03/2021     Cardiac Enzymes:   Lab Results   Component Value Date    TROPONINI <0.01 01/27/2021      PT/INR:    Lab Results   Component Value Date    PROTIME 17.7 02/01/2021    INR 1.5 02/01/2021     TSH:  No results found for: TSH    Rhythm Strip: sinus tachycardia. Echo Summary 1/28/2021:   LVEF 65%   Left ventricular internal dimensions were normal in diastole and systole. Borderline concentric left ventricular hypertrophy. No regional wall motion abnormalities seen. Normal left ventricular ejection fraction. Borderline dilated right ventricle. ASSESSMENT & PLAN:    Patient Active Problem List   Diagnosis    Diarrhea    Essential hypertension    Atrial fibrillation (HCC)    Hypokalemia    Functional diarrhea    Gallstones    Choledocholithiasis    Chronic obstructive pulmonary disease (HCC)    Alcohol abuse    Atrial fibrillation, new onset ([facility] [address]    Liver lesion    Hepatic abscess    Portal vein thrombosis     1. PAF/MAT: Continue BB. Monitor. 2. COPD    3. Abd pain/Liver lesions: Per GI.     4. HTN: Observe. 5. Hx of ETOH    Sherren Creek, D.O.   Cardiologist  Cardiology, [address] [place] [address]

## 2021-02-03 NOTE — CONSULTS
Susi Ortzi M.D.,[facility]  Kevin Noriega D.O., F.A.C.O.I., [place] M.D. Kieran Solis M.D., Dorina Narayanan M.D. Juan Celaya D.O. Patient:  Lucy Saunders 70 y.o. male MRN: [id number]     Date of Service: 2/2/2021      PULMONARY CONSULTATION    Reason for Consultation: Cough leukocytosis  Referring Physician: Godwin Delgado    Communication with the referring physician will be sent via the electronic medical record. Chief Complaint: Abdominal pain nausea and diarrhea    CODE STATUS: Full code    SUBJECTIVE:  HPI:  Lucy Saunders is a 70 y.o. 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 Since his admission patient has been seen by hepatobiliary surgery and also infectious disease. His imaging revealed multiple liver abscesses. He was diagnosed with choledocholithiasis and cholangitis and a right hepatic abscess and portal vein thrombosis he had a hepatic biopsy yesterday. He has been treated with Rocephin and metronidazole per infectious disease recommendations we have been asked to see him for a cough. His CT scan also shows a moderate-sized right pleural effusion. Patient currently laying in bed on room air in no acute respiratory distress has a moist cough with grayish phlegm. .      Past Medical History:   Diagnosis Date    Bleeding from the nose     left nare 5/5/16    Skin cancer of face     Dx 2009       Past Surgical History:   Procedure Laterality Date    CT NEEDLE BIOPSY LIVER PERCUTANEOUS  2/1/2021    CT NEEDLE BIOPSY LIVER PERCUTANEOUS 2/1/2021 [facility] CT    ERCP N/A 1/29/2021    ERCP STONE REMOVAL performed by Iris Morales MD at [address] [place] [address] Left 2009    left sided ear surgery History reviewed. No pertinent family history. Social History:   Social History     Socioeconomic History    Marital status:      Spouse name: Not on file    Number of children: Not on file    Years of education: Not on file    Highest education level: Not on file   Occupational History    Not on file   Social Needs    Financial resource strain: Not on file    Food insecurity     Worry: Not on file     Inability: Not on file    Transportation needs     Medical: Not on file     Non-medical: Not on file   Tobacco Use    Smoking status: Former Smoker     Quit date: 2006     Years since quittin.7    Smokeless tobacco: Never Used   Substance and Sexual Activity    Alcohol use: Yes     Alcohol/week: 30.0 standard drinks     Types: 30 Cans of beer per week    Drug use: No    Sexual activity: Not Currently     Partners: Female   Lifestyle    Physical activity     Days per week: Not on file     Minutes per session: Not on file    Stress: Not on file   Relationships    Social connections     Talks on phone: Not on file     Gets together: Not on file     Attends [employer] service: Not on file     Active member of club or organization: Not on file     Attends meetings of clubs or organizations: Not on file     Relationship status: Not on file    Intimate partner violence     Fear of current or ex partner: Not on file     Emotionally abused: Not on file     Physically abused: Not on file     Forced sexual activity: Not on file   Other Topics Concern    Not on file   Social History Narrative    Not on file     Smoking history: The patient is a Past smoker   . He has at least 90 pack years history of smoking started in his teens quit 14 years ago but tells me he smoked at least 3 packs a day. ETOH:   reports current alcohol use of about 30.0 standard drinks of alcohol per week. Exposures: There  is not history of TB or TB exposure. There is not asbestos or silica dust exposure. The patient reports does not have coal, foundry, quarry or [employer] exposure. Recent travel history none. There is not  history of recreational or IV drug use. There is not hot tub exposure. The patient does not have any exotic pets, turtles or exotic birds. Vaccines: There is no immunization history on file for this patient. Home Meds: No medications prior to admission. CURRENT MEDS :  Scheduled Meds:   pantoprazole  40 mg Intravenous BID    And    sodium chloride (PF)  10 mL Intravenous BID    cefTRIAXone (ROCEPHIN) IV  2,000 mg Intravenous Q24H    metroNIDAZOLE  500 mg Intravenous Q8H    sodium chloride flush  10 mL Intravenous 2 times per day    folic acid  1 mg Oral Daily    thiamine  100 mg Oral Daily    multivitamin  1 tablet Oral Daily    lisinopril  10 mg Oral Daily    metoprolol tartrate  25 mg Oral BID       Continuous Infusions:   heparin (PORCINE) Infusion 23.6 Units/kg/hr (02/02/21 1706)       No Known Allergies    REVIEW OF SYSTEMS:  Constitutional: Denies fever, weight loss, night sweats, and fatigue  Skin: Denies pigmentation, dark lesions, and rashes   HEENT: Denies hearing loss, tinnitus, ear drainage, epistaxis, sore throat, and hoarseness. Cardiovascular: Denies palpitations, chest pain, and chest pressure. Respiratory: Denies cough, dyspnea at rest, hemoptysis, apnea, and choking.   Gastrointestinal: Denies nausea, vomiting, poor appetite, diarrhea, heartburn or reflux  Genitourinary: Denies dysuria, frequency, urgency or hematuria  Musculoskeletal: Denies myalgias, muscle weakness, and bone pain  Neurological: Denies dizziness, vertigo, headache, and focal weakness  Psychological: Denies anxiety and depression  Endocrine: Denies heat intolerance and cold intolerance  Hematopoietic/Lymphatic: Denies bleeding problems and blood transfusions    OBJECTIVE:   BP (!) 132/59   Pulse 77   Temp 98.1 °F (36.7 °C) (Oral)   Resp 18   Ht 5' 11\" (1.803 m)   Wt 162 lb (73.5 kg)   SpO2 93%   BMI 22.59 kg/m²   SpO2 Readings from Last 1 Encounters:   02/02/21 93%        I/O:    Intake/Output Summary (Last 24 hours) at 2/2/2021 2011  Last data filed at 2/2/2021 1215  Gross per 24 hour   Intake 360 ml   Output 1 ml   Net 359 ml     Vent Information  SpO2: 93 %                Physical Exam:    General: The patient is lying in bed comfortably without any distress. Breathing is not labored  HEENT: Pupils are equal round and reactive to light, there are no oral lesions and no post-nasal drip , he has removal of his left ear with significant scarring  Neck: supple without adenopathy  Cardiovascular: regular rate and rhythm without murmur or gallop  Respiratory: Diminished breathing sounds on the left right side with few scattered bronchial rhonchi  abdomen: soft, non-tender, non-distended, normal bowel sounds  Extremities: warm, no edema, no clubbing    Neurologic: CN II-XII grossly intact, no focal deficits        Imaging personally reviewed:    Impression   Moderate patchy infiltrates/atelectasis and pleural effusion the right lower   lobe concerning for pneumonia. Multiple infiltrative nodular densities in the left upper lobe, lingula and   upper lobe.  These are indeterminate.  Malignancy is a consideration. Heterogeneous appearance of liver with pneumobilia with an indwelling biliary   stent and multiple ring-enhancing hypodense hepatic lesions concerning for   microabscesses versus malignancy. Multiple heterogeneous complex cystic masses in the caudate lobe, and upper   abdomen as noted which may be either inflammatory or malignant.  Surveillance   recommended.        Echo:      Labs:  Lab Results   Component Value Date    WBC 14.1 02/02/2021    HGB 10.2 02/02/2021    HCT 31.1 02/02/2021    MCV 90.1 02/02/2021    MCH 29.6 02/02/2021    MCHC 32.8 02/02/2021    RDW 14.1 02/02/2021     02/02/2021    MPV 10.5 02/02/2021     Lab Results   Component Value Date     02/02/2021    K 3.5 02/02/2021    K 3.2 01/28/2021     02/02/2021    CO2 25 02/02/2021    BUN 5 02/02/2021    CREATININE 0.7 02/02/2021    LABALBU 2.3 02/02/2021    CALCIUM 7.9 02/02/2021    GFRAA >60 02/02/2021    LABGLOM >60 02/02/2021     Lab Results   Component Value Date    PROTIME 17.7 02/01/2021    INR 1.5 02/01/2021     No results for input(s): PROBNP in the last 72 hours. No results for input(s): TROPONINI in the last 72 hours. Recent Labs     01/31/21  0357   PROCAL 9.14*     This SmartLink has not been configured with any valid records. Micro:  No results for input(s): CULTRESP in the last 72 hours. Recent Labs     02/01/21  1101   LABGRAM Refer to ordered Gram stain for results     No results for input(s): LEGUR in the last 72 hours. No results for input(s): STREPNEUMAGU in the last 72 hours. No results for input(s): LP1UAG in the last 72 hours. Assessment:  Moderate right side pleural effusion  Chronic cough  Probably underlying COPD unknown severity  PAF/MAT   Multiple hepatic lesions mostly abscesses  Choledocholithiasis with ascending cholangitis  ERCP with papillotomy, stone extractions and Wallstent placement on 1/29/2021. Stable post ERCP pancreatitis          Plan: We will schedule patient for diagnostic paracentesis. We will hold heparin 6 hours prior to the procedure. Orders have been placed. Follow respiratory cultures. Once patient stable will benefit from PFTs  Continue antibiotics per infectious disease  Follow liver biopsy results      Thank you for allowing me to participate in the care of Luisa Enriquez. Please feel free to call with questions. This plan of care was reviewed in collaboration with      Electronically signed by Dianne Liang MD on 2/2/2021 at 8:11 PM      Note: This report was completed utilizing computer voice recognition software.  Every effort has been made to ensure accuracy, however; inadvertent computerized transcription errors may be present      Eve Mccall MD

## 2021-02-03 NOTE — PROGRESS NOTES
Physical Therapy  Facility/Department: [address] INTERNAL MEDICINE 2  Daily Treatment Note  NAME: Celia Iglesias  : 1945  MRN: [id number]    Date of Service: 2/3/2021    Attempted to see pt for PT session. Pt off unit.    Ayleen HENNING [id number]

## 2021-02-04 LAB
ALBUMIN SERPL-MCNC: 2.2 G/DL (ref 3.5–5.2)
ALP BLD-CCNC: 83 U/L (ref 40–129)
ALT SERPL-CCNC: 9 U/L (ref 0–40)
AMMONIA: 32.2 UMOL/L (ref 16–60)
AMYLASE: 50 U/L (ref 20–100)
ANION GAP SERPL CALCULATED.3IONS-SCNC: 7 MMOL/L (ref 7–16)
APTT: 52.2 SEC (ref 24.5–35.1)
AST SERPL-CCNC: 13 U/L (ref 0–39)
BASOPHILS ABSOLUTE: 0.09 E9/L (ref 0–0.2)
BASOPHILS RELATIVE PERCENT: 0.7 % (ref 0–2)
BILIRUB SERPL-MCNC: 0.4 MG/DL (ref 0–1.2)
BLOOD CULTURE, ROUTINE: NORMAL
BUN BLDV-MCNC: 4 MG/DL (ref 8–23)
CALCIUM SERPL-MCNC: 7.8 MG/DL (ref 8.6–10.2)
CHLORIDE BLD-SCNC: 100 MMOL/L (ref 98–107)
CO2: 25 MMOL/L (ref 22–29)
CREAT SERPL-MCNC: 0.6 MG/DL (ref 0.7–1.2)
CULTURE, BLOOD 2: NORMAL
EOSINOPHILS ABSOLUTE: 0.2 E9/L (ref 0.05–0.5)
EOSINOPHILS RELATIVE PERCENT: 1.4 % (ref 0–6)
GFR AFRICAN AMERICAN: >60
GFR NON-AFRICAN AMERICAN: >60 ML/MIN/1.73
GLUCOSE BLD-MCNC: 92 MG/DL (ref 74–99)
GRAM STAIN ORDERABLE: NORMAL
HCT VFR BLD CALC: 31.1 % (ref 37–54)
HEMOGLOBIN: 10.3 G/DL (ref 12.5–16.5)
IMMATURE GRANULOCYTES #: 0.14 E9/L
IMMATURE GRANULOCYTES %: 1 % (ref 0–5)
LIPASE: 161 U/L (ref 13–60)
LYMPHOCYTES ABSOLUTE: 1.39 E9/L (ref 1.5–4)
LYMPHOCYTES RELATIVE PERCENT: 10.1 % (ref 20–42)
MAGNESIUM: 1.7 MG/DL (ref 1.6–2.6)
MCH RBC QN AUTO: 30.1 PG (ref 26–35)
MCHC RBC AUTO-ENTMCNC: 33.1 % (ref 32–34.5)
MCV RBC AUTO: 90.9 FL (ref 80–99.9)
MONOCYTES ABSOLUTE: 0.94 E9/L (ref 0.1–0.95)
MONOCYTES RELATIVE PERCENT: 6.8 % (ref 2–12)
NEUTROPHILS ABSOLUTE: 11.04 E9/L (ref 1.8–7.3)
NEUTROPHILS RELATIVE PERCENT: 80 % (ref 43–80)
PDW BLD-RTO: 14.2 FL (ref 11.5–15)
PHOSPHORUS: 3 MG/DL (ref 2.5–4.5)
PLATELET # BLD: 500 E9/L (ref 130–450)
PMV BLD AUTO: 10.4 FL (ref 7–12)
POTASSIUM SERPL-SCNC: 3.9 MMOL/L (ref 3.5–5)
RBC # BLD: 3.42 E12/L (ref 3.8–5.8)
SODIUM BLD-SCNC: 132 MMOL/L (ref 132–146)
TOTAL PROTEIN: 6 G/DL (ref 6.4–8.3)
WBC # BLD: 13.8 E9/L (ref 4.5–11.5)

## 2021-02-04 PROCEDURE — C9113 INJ PANTOPRAZOLE SODIUM, VIA: HCPCS | Performed by: NURSE PRACTITIONER

## 2021-02-04 PROCEDURE — 2060000000 HC ICU INTERMEDIATE R&B

## 2021-02-04 PROCEDURE — 2500000003 HC RX 250 WO HCPCS: Performed by: INTERNAL MEDICINE

## 2021-02-04 PROCEDURE — 99232 SBSQ HOSP IP/OBS MODERATE 35: CPT | Performed by: TRANSPLANT SURGERY

## 2021-02-04 PROCEDURE — 80053 COMPREHEN METABOLIC PANEL: CPT

## 2021-02-04 PROCEDURE — 82150 ASSAY OF AMYLASE: CPT

## 2021-02-04 PROCEDURE — 2580000003 HC RX 258: Performed by: NURSE PRACTITIONER

## 2021-02-04 PROCEDURE — 83690 ASSAY OF LIPASE: CPT

## 2021-02-04 PROCEDURE — 97535 SELF CARE MNGMENT TRAINING: CPT

## 2021-02-04 PROCEDURE — 99233 SBSQ HOSP IP/OBS HIGH 50: CPT | Performed by: INTERNAL MEDICINE

## 2021-02-04 PROCEDURE — 85025 COMPLETE CBC W/AUTO DIFF WBC: CPT

## 2021-02-04 PROCEDURE — 2580000003 HC RX 258: Performed by: SPECIALIST

## 2021-02-04 PROCEDURE — 2580000003 HC RX 258: Performed by: INTERNAL MEDICINE

## 2021-02-04 PROCEDURE — 6360000002 HC RX W HCPCS: Performed by: NURSE PRACTITIONER

## 2021-02-04 PROCEDURE — 6360000002 HC RX W HCPCS: Performed by: STUDENT IN AN ORGANIZED HEALTH CARE EDUCATION/TRAINING PROGRAM

## 2021-02-04 PROCEDURE — 99232 SBSQ HOSP IP/OBS MODERATE 35: CPT | Performed by: INTERNAL MEDICINE

## 2021-02-04 PROCEDURE — 6360000002 HC RX W HCPCS: Performed by: SPECIALIST

## 2021-02-04 PROCEDURE — 6370000000 HC RX 637 (ALT 250 FOR IP): Performed by: INTERNAL MEDICINE

## 2021-02-04 PROCEDURE — 85730 THROMBOPLASTIN TIME PARTIAL: CPT

## 2021-02-04 PROCEDURE — 82140 ASSAY OF AMMONIA: CPT

## 2021-02-04 PROCEDURE — 94669 MECHANICAL CHEST WALL OSCILL: CPT

## 2021-02-04 PROCEDURE — 83735 ASSAY OF MAGNESIUM: CPT

## 2021-02-04 PROCEDURE — 84100 ASSAY OF PHOSPHORUS: CPT

## 2021-02-04 PROCEDURE — 36415 COLL VENOUS BLD VENIPUNCTURE: CPT

## 2021-02-04 RX ORDER — ALBUTEROL SULFATE 2.5 MG/3ML
2.5 SOLUTION RESPIRATORY (INHALATION) EVERY 4 HOURS
Status: DISCONTINUED | OUTPATIENT
Start: 2021-02-04 | End: 2021-02-07 | Stop reason: HOSPADM

## 2021-02-04 RX ORDER — SODIUM CHLORIDE 9 MG/ML
INJECTION, SOLUTION INTRAVENOUS EVERY 8 HOURS
Status: DISCONTINUED | OUTPATIENT
Start: 2021-02-04 | End: 2021-02-07 | Stop reason: HOSPADM

## 2021-02-04 RX ADMIN — MULTIVITAMIN TABLET 1 TABLET: TABLET at 09:15

## 2021-02-04 RX ADMIN — METOPROLOL TARTRATE 25 MG: 25 TABLET, FILM COATED ORAL at 09:15

## 2021-02-04 RX ADMIN — HYDROCODONE BITARTRATE AND ACETAMINOPHEN 1 TABLET: 5; 325 TABLET ORAL at 10:11

## 2021-02-04 RX ADMIN — FOLIC ACID 1 MG: 1 TABLET ORAL at 09:15

## 2021-02-04 RX ADMIN — METOPROLOL TARTRATE 25 MG: 25 TABLET, FILM COATED ORAL at 21:31

## 2021-02-04 RX ADMIN — HYDROCODONE BITARTRATE AND ACETAMINOPHEN 1 TABLET: 5; 325 TABLET ORAL at 21:31

## 2021-02-04 RX ADMIN — METRONIDAZOLE 500 MG: 500 INJECTION, SOLUTION INTRAVENOUS at 06:15

## 2021-02-04 RX ADMIN — Medication 100 MG: at 09:15

## 2021-02-04 RX ADMIN — HYDROCODONE BITARTRATE AND ACETAMINOPHEN 1 TABLET: 5; 325 TABLET ORAL at 03:58

## 2021-02-04 RX ADMIN — Medication 10 ML: at 21:32

## 2021-02-04 RX ADMIN — LISINOPRIL 10 MG: 10 TABLET ORAL at 09:15

## 2021-02-04 RX ADMIN — PIPERACILLIN AND TAZOBACTAM 3375 MG: 3; .375 INJECTION, POWDER, FOR SOLUTION INTRAVENOUS at 13:09

## 2021-02-04 RX ADMIN — SODIUM CHLORIDE, PRESERVATIVE FREE 10 ML: 5 INJECTION INTRAVENOUS at 21:31

## 2021-02-04 RX ADMIN — PANTOPRAZOLE SODIUM 40 MG: 40 INJECTION, POWDER, FOR SOLUTION INTRAVENOUS at 21:31

## 2021-02-04 RX ADMIN — PIPERACILLIN AND TAZOBACTAM 3375 MG: 3; .375 INJECTION, POWDER, FOR SOLUTION INTRAVENOUS at 21:31

## 2021-02-04 RX ADMIN — PANTOPRAZOLE SODIUM 40 MG: 40 INJECTION, POWDER, FOR SOLUTION INTRAVENOUS at 09:16

## 2021-02-04 RX ADMIN — HEPARIN SODIUM 23.6 UNITS/KG/HR: 10000 INJECTION, SOLUTION INTRAVENOUS at 12:02

## 2021-02-04 RX ADMIN — SODIUM CHLORIDE, PRESERVATIVE FREE 10 ML: 5 INJECTION INTRAVENOUS at 09:16

## 2021-02-04 ASSESSMENT — PAIN DESCRIPTION - PAIN TYPE: TYPE: ACUTE PAIN

## 2021-02-04 ASSESSMENT — PAIN DESCRIPTION - ONSET: ONSET: ON-GOING

## 2021-02-04 ASSESSMENT — PAIN SCALES - GENERAL: PAINLEVEL_OUTOF10: 5

## 2021-02-04 ASSESSMENT — PAIN DESCRIPTION - DESCRIPTORS: DESCRIPTORS: ACHING;CONSTANT;DISCOMFORT

## 2021-02-04 ASSESSMENT — PAIN DESCRIPTION - ORIENTATION: ORIENTATION: RIGHT;MID

## 2021-02-04 ASSESSMENT — PAIN DESCRIPTION - LOCATION: LOCATION: ABDOMEN

## 2021-02-04 ASSESSMENT — PAIN - FUNCTIONAL ASSESSMENT: PAIN_FUNCTIONAL_ASSESSMENT: PREVENTS OR INTERFERES SOME ACTIVE ACTIVITIES AND ADLS

## 2021-02-04 NOTE — PROGRESS NOTES
[facility]  DAILY PROGRESS NOTE  2/4/2021    Chief Complaint   Patient presents with    Diarrhea     Pt states having cramps after diarrhea x8 days. Denies fever.         Subjective:  No new complaints, denies abdominal pain    Objective:  BP (!) 124/59   Pulse 87   Temp 98.2 °F (36.8 °C) (Oral)   Resp 16   Ht 5' 11\" (1.803 m)   Wt 164 lb 4.8 oz (74.5 kg)   SpO2 93%   BMI 22.92 kg/m²     GENERAL:  Laying in bed, awake, alert, cooperative, no apparent distress  HEAD: Normocephalic, atraumatic  EYES: No sclera icterus, pupils equal  LUNGS:  No increased work of breathing, on room air  CARDIOVASCULAR:  Regular rate  ABDOMEN:  Soft, non-tender, non-distended  EXTREMITIES: No edema or swelling  SKIN: Warm and dry    Assessment/Plan:  70 y.o. male male with choledocholithiasis and cholangitis resulting in a right hepatic lobe abscess, portal vein thrombosis    - pathology pending from CT guided biopsy  - thus far no organisms growing  - eventually needs cholecystectomy - however need to rule out malignancy in right lobe as this changes management    Electronically signed by Vivian Cordoba MD on 2/4/2021 at 9:32 AM

## 2021-02-04 NOTE — PROGRESS NOTES
conjunctivae normal  Neck: neck supple and non tender without mass   Pulmonary/Chest: clear to auscultation bilaterally- no wheezes, rales or rhonchi, normal air movement, no respiratory distress  Cardiovascular: normal rate, normal S1 and S2 and no carotid bruits  Abdomen: soft, non-tender, non-distended, normal bowel sounds, no masses or organomegaly  Extremities: no cyanosis, no clubbing and no edema  Neurologic: no cranial nerve deficit and speech normal        Recent Labs     02/02/21  0305 02/03/21  0600 02/04/21  0400    135 132   K 3.5 4.0 3.9    102 100   CO2 25 27 25   BUN 5* 4* 4*   CREATININE 0.7 0.6* 0.6*   GLUCOSE 99 94 92   CALCIUM 7.9* 8.3* 7.8*       Recent Labs     02/02/21  0305 02/03/21  0600 02/04/21  0400   WBC 14.1* 13.2* 13.8*   RBC 3.45* 3.81 3.42*   HGB 10.2* 11.2* 10.3*   HCT 31.1* 34.6* 31.1*   MCV 90.1 90.8 90.9   MCH 29.6 29.4 30.1   MCHC 32.8 32.4 33.1   RDW 14.1 14.1 14.2    476* 500*   MPV 10.5 10.4 10.4       Radiology:   MRCP:  1. Choledocholithiasis associated with severe extrahepatic biliary dilation   and mild central intrahepatic biliary dilation.  Evaluation for superimposed   cholangitis is limited. 2. Cholelithiasis with no definite findings of cholecystitis. 3. Limited evaluation of the liver due to motion artifact.  Signal   abnormalities seen primarily in hepatic segment 7 correspond with the CT   appearance and are suspicious for microabscesses.  However, an infiltrative   malignancy could appear similar.  Consider evaluation with liver protocol   abdomen CT, which would be less affected by motion. 4. Complicated fluid collections along the caudate lobe of the liver, between   right adrenal gland right diaphragmatic deo, adjacent to the retrohepatic   inferior vena cava, and likely within the christopher hepatis are suspicious for   abscesses given the above findings.    5. Mild dilation of the downstream pancreatic duct potentially due to mass effect from stones in the distal common bile duct.  Alternatively, this could   be related to moderate parenchymal atrophy. 6. Small right pleural effusion. Assessment:    Active Problems:    Diarrhea    Essential hypertension    Atrial fibrillation (HCC)    Hypokalemia    Functional diarrhea    Gallstones    Choledocholithiasis    Chronic obstructive pulmonary disease (HCC)    Alcohol abuse    Atrial fibrillation, new onset (HCC)    Liver lesion    Hepatic abscess    Portal vein thrombosis  Resolved Problems:    * No resolved hospital problems. *      Plan:  1. Abd pain, not sure about the etiology, with the presence of diarrhea, ruling out infectious etiology, send stool for c-diff,   2. Dilated biliary tree, no signs of obstruction, no elevated bilirubin, with the presence of liver lesions/cysts, need to rule out malignancy CT guided biopsy was done pending results, MRCP as above, s/p ERCP with papillotomy, stones extraction, and Wallstent placement. 1/29/2021 - Markedly dilated common bile duct with filling defect consistent with stones.  Papillotomy was done, and multiple three large stones as well as pus and gravel were extracted. Small area of stricture in the intrapapillary portion of the common bile duct.  A Wallstent 10 x 60 was placed without difficulty.  Excellent drainage was obtained. consulted surgery, outpatient cholecystectomy. 3.  Post-ERCP pancreatitis, treated with fluids and pain management, improved, will need a repeat ERCP for stent removal as outpatient  3. Afib? MAT, rate is controlled, patient hasn't been following with any doctor, and he is not aware of any medical problems, obtain an echo, start on metoprolol, hold Ac for possible biopsy. 4.  Dysphgia, we obtained speech eval. Dysphagia 3 diet  Low fat diet as tolerated per SLP recommendations   5. Alcohol abuse, about 5 beers a day, monitor for withdrawals.   6.  Liver abscesses appreciate ID input, on flagyl and IV ceftriaxone. Will discuss oral options for discharge. 7.  RLL pneumonia and effusion, s.p thoracentesis with pulm, pending cultures, continue flagyl and ceftriaxone     NOTE: This report was transcribed using voice recognition software. Every effort was made to ensure accuracy; however, inadvertent computerized transcription errors may be present.   Electronically signed by Kevon Nova MD on 2/4/2021 at 10:20 AM

## 2021-02-04 NOTE — PROGRESS NOTES
[address] [place] [address] [facility]  [facility]  Progress Note  Face to face encounter   SUBJECTIVE:  Chief Complaint   Patient presents with    Diarrhea     Pt states having cramps after diarrhea x8 days. Denies fever. Patient admits to having right upper quadrant discomfort. He says this is causing a cough. Tolerating antibiotics. Appetite is good. Review of systems:  As stated above in the chief complaint, otherwise negative. Medications:  Scheduled Meds:   pantoprazole  40 mg Intravenous BID    And    sodium chloride (PF)  10 mL Intravenous BID    cefTRIAXone (ROCEPHIN) IV  2,000 mg Intravenous Q24H    metroNIDAZOLE  500 mg Intravenous Q8H    sodium chloride flush  10 mL Intravenous 2 times per day    folic acid  1 mg Oral Daily    thiamine  100 mg Oral Daily    multivitamin  1 tablet Oral Daily    lisinopril  10 mg Oral Daily    metoprolol tartrate  25 mg Oral BID     Continuous Infusions:   heparin (PORCINE) Infusion 23.6 Units/kg/hr (21)     PRN Meds:sodium chloride flush, heparin (porcine), heparin (porcine), HYDROmorphone, perflutren lipid microspheres, HYDROcodone 5 mg - acetaminophen, hydrALAZINE, sodium chloride flush, promethazine **OR** ondansetron, polyethylene glycol, acetaminophen **OR** acetaminophen, LORazepam **OR** LORazepam **OR** LORazepam **OR** LORazepam **OR** LORazepam **OR** LORazepam **OR** LORazepam **OR** LORazepam    OBJECTIVE:  BP (!) 124/59   Pulse 87   Temp 98.2 °F (36.8 °C) (Oral)   Resp 16   Ht 5' 11\" (1.803 m)   Wt 164 lb 4.8 oz (74.5 kg)   SpO2 93%   BMI 22.92 kg/m²   Temp  Av.1 °F (36.7 °C)  Min: 97.6 °F (36.4 °C)  Max: 98.3 °F (36.8 °C)  Constitutional: The patient is sitting up in bed. Visitor present. He is in no distress. Skin: Warm and dry. No rashes were noted. HEENT: Round and reactive pupils. Moist mucous membranes. No ulcerations or thrush. Left ear is missing. Neck: Supple to movements.    Chest: No respiratory distress. No crackles. Cardiovascular: Heart sounds rhythmic and regular. Abdomen: Slightly distended. Tender right upper quadrant. Extremities: No edema. Lines: peripheral    Laboratory and Tests Review:  Lab Results   Component Value Date    WBC 13.8 (H) 02/04/2021    WBC 13.2 (H) 02/03/2021    WBC 14.1 (H) 02/02/2021    HGB 10.3 (L) 02/04/2021    HCT 31.1 (L) 02/04/2021    MCV 90.9 02/04/2021     (H) 02/04/2021     Lab Results   Component Value Date    NEUTROABS 11.04 (H) 02/04/2021    NEUTROABS 9.45 (H) 02/03/2021    NEUTROABS 11.07 (H) 02/02/2021     No results found for: [facility]  Lab Results   Component Value Date    ALT 9 02/04/2021    AST 13 02/04/2021    ALKPHOS 83 02/04/2021    BILITOT 0.4 02/04/2021     Lab Results   Component Value Date     02/04/2021    K 3.9 02/04/2021    K 3.2 01/28/2021     02/04/2021    CO2 25 02/04/2021    BUN 4 02/04/2021    CREATININE 0.6 02/04/2021    CREATININE 0.6 02/03/2021    CREATININE 0.7 02/02/2021    GFRAA >60 02/04/2021    LABGLOM >60 02/04/2021    GLUCOSE 92 02/04/2021    PROT 6.0 02/04/2021    LABALBU 2.2 02/04/2021    CALCIUM 7.8 02/04/2021    BILITOT 0.4 02/04/2021    ALKPHOS 83 02/04/2021    AST 13 02/04/2021    ALT 9 02/04/2021     Lab Results   Component Value Date    CRP 12.2 (H) 01/31/2021    CRP 14.6 (H) 01/27/2021     Lab Results   Component Value Date    SEDRATE 62 (H) 01/31/2021    SEDRATE 60 (H) 01/27/2021     Radiology:    Microbiology:   C. difficile toxin assay: Not detected  Stool cultures: Negative  Giardia: None  Respiratory panel, including SARS-CoV-2: Not detected  Blood cultures 1/30/2021: Pending  Liver abscess 2/1/2021: Negative so far  Respiratory culture 2/2/2021: Ox + GNR  Streptococcus pneumoniae/Legionella urine Ag: negative   pleural fluid 2/2/2021: Pending    No results for input(s): PROCAL in the last 72 hours. ASSESSMENT:  · Possible liver abscesses versus metastatic disease.   Underwent liver biopsy 2/1/2021  · Choledocholithiasis with ascending cholangitis  · Status post ERCP with papillotomy, stone extraction and Wallstent placement 1/29/2021  · Leukocytosis associated to cholangitis and probable liver abscess  · Intermittent fever associated to the above. These seem to be trending downwards  · Alcohol withdrawal  · Dysphagia  · Right pleural effusion associated to the above.   Underwent thoracentesis    PLAN:  · Consolidate antibiotics to Zosyn  · Check liver biopsy and cultures -pending    Randi Finley  12:02 PM  2/4/2021

## 2021-02-04 NOTE — PROGRESS NOTES
[facility] Inpatient Progress Note    Patient is a 70 y.o. male of No primary care provider on file. seen in hospital follow up. Chief complaint: ABD pain/PAF/MAT/COPD/Pre-op    HPI: Some SOB. No CP.      Patient Active Problem List   Diagnosis    Diarrhea    Essential hypertension    Atrial fibrillation (HCC)    Hypokalemia    Functional diarrhea    Gallstones    Choledocholithiasis    Chronic obstructive pulmonary disease ([facility] [address]    Alcohol abuse    Atrial fibrillation, new onset ([facility] [address]    Liver lesion    Hepatic abscess    Portal vein thrombosis       No Known Allergies    Current Facility-Administered Medications   Medication Dose Route Frequency Provider Last Rate Last Admin    sodium chloride flush 0.9 % injection 10 mL  10 mL Intravenous PRN Fito Harper MD        heparin (porcine) injection 4,000 Units  4,000 Units Intravenous PRN Enrique Licea MD   4,000 Units at 02/02/21 0505    heparin (porcine) injection 2,000 Units  2,000 Units Intravenous PRN Enrique Licea MD   2,000 Units at 02/02/21 1705    heparin 25,000 units in dextrose 5% 250 mL (premix) infusion  5-13.6 Units/kg/hr Intravenous Continuous Enrique Licea MD 17.3 mL/hr at 02/03/21 2121 23.6 Units/kg/hr at 02/03/21 2121    HYDROmorphone (DILAUDID) injection 0.5 mg  0.5 mg Intravenous Q4H PRN Adolph Rollins MD   0.5 mg at 02/03/21 2124    pantoprazole (PROTONIX) injection 40 mg  40 mg Intravenous BID KISHORE Mckinnon - CNP   40 mg at 02/03/21 2120    And    sodium chloride (PF) 0.9 % injection 10 mL  10 mL Intravenous BID KISHORE Mckinnon - CNP   10 mL at 02/03/21 2145    perflutren lipid microspheres (DEFINITY) injection 1.65 mg  1.5 mL Intravenous ONCE PRN Adolph Rollins MD        cefTRIAXone (ROCEPHIN) 2,000 mg in sterile water 20 mL IV syringe  2,000 mg Intravenous Q24H Adolph Rollins MD   2,000 mg at 02/03/21 1500    metronidazole (FLAGYL) 500 mg in NaCl 100 mL IVPB premix  500 mg Intravenous Q8H Shama Valerio MD   Stopped at 02/04/21 0806    HYDROcodone-acetaminophen (NORCO) 5-325 MG per tablet 1 tablet  1 tablet Oral Q6H PRN Shama Valerio MD   1 tablet at 02/04/21 0358    hydrALAZINE (APRESOLINE) injection 5 mg  5 mg Intravenous Q6H PRN Shama Valerio MD        sodium chloride flush 0.9 % injection 10 mL  10 mL Intravenous 2 times per day Shama Valerio MD   10 mL at 02/03/21 2145    sodium chloride flush 0.9 % injection 10 mL  10 mL Intravenous PRN Shama Valerio MD   10 mL at 02/01/21 2117    promethazine (PHENERGAN) tablet 12.5 mg  12.5 mg Oral Q6H PRN Shama Valerio MD        Or    ondansetron [facility]) injection 4 mg  4 mg Intravenous Q6H PRN Shama Valerio MD        polyethylene glycol (GLYCOLAX) packet 17 g  17 g Oral Daily PRN Shama Valerio MD        acetaminophen (TYLENOL) tablet 650 mg  650 mg Oral Q6H PRN Shama Valerio MD   650 mg at 01/30/21 0948    Or    acetaminophen (TYLENOL) suppository 650 mg  650 mg Rectal Q6H PRN Shama Valerio MD        folic acid (FOLVITE) tablet 1 mg  1 mg Oral Daily Shama Valerio MD   1 mg at 02/03/21 3364    thiamine tablet 100 mg  100 mg Oral Daily Shama Valerio MD   100 mg at 02/03/21 0836    multivitamin 1 tablet  1 tablet Oral Daily Shama Valerio MD   1 tablet at 02/03/21 0837    lisinopril (PRINIVIL;ZESTRIL) tablet 10 mg  10 mg Oral Daily Shama Valerio MD   10 mg at 02/03/21 0837    metoprolol tartrate (LOPRESSOR) tablet 25 mg  25 mg Oral BID Shama Valerio MD   25 mg at 02/03/21 2120    LORazepam (ATIVAN) tablet 1 mg  1 mg Oral Q1H PRN Shama Valerio MD        Or    LORazepam (ATIVAN) injection 1 mg  1 mg Intravenous Q1H PRN Shama Valerio MD   1 mg at 01/28/21 0037    Or    LORazepam (ATIVAN) tablet 2 mg  2 mg Oral Q1H PRN Shama Valerio MD        Or    LORazepam (ATIVAN) injection 2 mg  2 mg Intravenous Q1H PRN Shama Valerio MD        Or    LORazepam (ATIVAN) tablet 3 mg  3 mg Oral Q1H PRN Shama Valerio MD Or    LORazepam (ATIVAN) injection 3 mg  3 mg Intravenous Q1H PRN Luis Chapa MD        Or    LORazepam (ATIVAN) tablet 4 mg  4 mg Oral Q1H PRN Luis Chapa MD        Or    LORazepam (ATIVAN) injection 4 mg  4 mg Intravenous Q1H PRN Luis Chapa MD           Review of systems:   Heart: as above   Lungs: as above   Eyes: denies changes in vision or discharge. Ears: denies changes in hearing or pain. Nose: denies epistaxis or masses   Throat: denies sore throat or trouble swallowing. Neuro: denies numbness, tingling, tremors. Skin: denies rashes or itching. : denies hematuria, dysuria   GI: denies vomiting, diarrhea   Psych: denies mood changed, anxiety, depression. Physical Exam   BP (!) 124/59   Pulse 87   Temp 98.2 °F (36.8 °C) (Oral)   Resp 16   Ht 5' 11\" (1.803 m)   Wt 164 lb 4.8 oz (74.5 kg)   SpO2 93%   BMI 22.92 kg/m²   Constitutional: Oriented to person, place, and time. No distress. Well developed. Head: Normocephalic and atraumatic. Neck: Neck supple. No hepatojugular reflux. No JVD present. Carotid bruit is not present. No tracheal deviation present. No thyromegaly present. Cardiovascular: Normal rate, regular rhythm, normal heart sounds. intact distal pulses. No gallop and no friction rub. No murmur heard. Pulmonary: Breath sounds normal. No respiratory distress. No wheezes. No rales. Chest: Effort normal. No tenderness. Abdominal: Soft. Bowel sounds are normal. No distension or mass. No tenderness, rebound or guarding. Musculoskeletal: . No tenderness. No clubbing or cyanosis. Extremitites: Intact distal pulses. No edema  Neurological: Alert and oriented to person, place, and time. Skin: Skin is warm and dry. No rash noted. Not diaphoretic. No erythema. Psychiatric: Normal mood and affect. Behavior is normal.   Lymphadenopathy: No cervical adenopathy. No groin adenopathy.       CBC:   Lab Results   Component Value Date    WBC 13.8 02/04/2021 RBC 3.42 02/04/2021    HGB 10.3 02/04/2021    HCT 31.1 02/04/2021    MCV 90.9 02/04/2021    MCH 30.1 02/04/2021    MCHC 33.1 02/04/2021    RDW 14.2 02/04/2021     02/04/2021    MPV 10.4 02/04/2021     BMP:   Lab Results   Component Value Date     02/04/2021    K 3.9 02/04/2021    K 3.2 01/28/2021     02/04/2021    CO2 25 02/04/2021    BUN 4 02/04/2021    LABALBU 2.2 02/04/2021    CREATININE 0.6 02/04/2021    CALCIUM 7.8 02/04/2021    GFRAA >60 02/04/2021    LABGLOM >60 02/04/2021     Magnesium:    Lab Results   Component Value Date    MG 1.7 02/04/2021     Cardiac Enzymes:   Lab Results   Component Value Date    TROPONINI <0.01 01/27/2021      PT/INR:    Lab Results   Component Value Date    PROTIME 17.7 02/01/2021    INR 1.5 02/01/2021     TSH:  No results found for: TSH    Rhythm Strip: sinus tachycardia. Echo Summary 1/28/2021:   LVEF 65%   Left ventricular internal dimensions were normal in diastole and systole. Borderline concentric left ventricular hypertrophy. No regional wall motion abnormalities seen. Normal left ventricular ejection fraction. Borderline dilated right ventricle. ASSESSMENT & PLAN:    Patient Active Problem List   Diagnosis    Diarrhea    Essential hypertension    Atrial fibrillation (HCC)    Hypokalemia    Functional diarrhea    Gallstones    Choledocholithiasis    Chronic obstructive pulmonary disease (HCC)    Alcohol abuse    Atrial fibrillation, new onset ([facility] [address]    Liver lesion    Hepatic abscess    Portal vein thrombosis     1. PAF/MAT: Continue BB. Monitor. 2. COPD    3. Abd pain/Liver lesions: Per GI.     4. HTN: Observe. 5. Hx of ETOH    Patient stable from CV standpoint. Please call if needed. TYBOBBY. Shasha Garcia D.O.   Cardiologist  Cardiology, [facility]

## 2021-02-04 NOTE — PROGRESS NOTES
Sera Stern M.D.,[facility]  Karyle Seeds, D.O., F.A.C.O.I., Alecia Guillaume M.D. Eduardo Spears M.D., Stevo Santillan M.D. Baron Lucas D.O. Daily Pulmonary Progress Note    Patient:  Veda Rand 70 y.o. male MRN: [id number]     Date of Service: 2/3/2021      Synopsis     We are following patient for fatigue and weakness cough and right pleural effusion    \"CC\" ; abdominal pain and diarrhea    Code status: Full code      Subjective      Patient was seen and examined. Is laying in bed on room air no acute distress. Had thoracentesis earlier today. Review of Systems:  Constitutional: Denies fever, weight loss, night sweats, and fatigue  Skin: Denies pigmentation, dark lesions, and rashes   HEENT: Denies hearing loss, tinnitus, ear drainage, epistaxis, sore throat, and hoarseness. Cardiovascular: Denies palpitations, chest pain, and chest pressure. Respiratory: Denies cough, dyspnea at rest, hemoptysis, apnea, and choking.   Gastrointestinal: Denies nausea, vomiting, poor appetite, diarrhea, heartburn or reflux  Genitourinary: Denies dysuria, frequency, urgency or hematuria  Musculoskeletal: Denies myalgias, muscle weakness, and bone pain  Neurological: Denies dizziness, vertigo, headache, and focal weakness  Psychological: Denies anxiety and depression  Endocrine: Denies heat intolerance and cold intolerance  Hematopoietic/Lymphatic: Denies bleeding problems and blood transfusions    24-hour events:      Objective   Vitals: BP (!) 137/57   Pulse 70   Temp 98.2 °F (36.8 °C) (Oral)   Resp 20   Ht 5' 11\" (1.803 m)   Wt 163 lb 8 oz (74.2 kg)   SpO2 97%   BMI 22.80 kg/m²     I/O:    Intake/Output Summary (Last 24 hours) at 2/3/2021 1957  Last data filed at 2/3/2021 1239  Gross per 24 hour   Intake 120 ml   Output --   Net 120 ml       Vent Information  SpO2: 97 %                CURRENT MEDS :  Scheduled Meds:   pantoprazole  40 mg Intravenous BID    And    sodium chloride (PF)  10 mL Intravenous BID    cefTRIAXone (ROCEPHIN) IV  2,000 mg Intravenous Q24H    metroNIDAZOLE  500 mg Intravenous Q8H    sodium chloride flush  10 mL Intravenous 2 times per day    folic acid  1 mg Oral Daily    thiamine  100 mg Oral Daily    multivitamin  1 tablet Oral Daily    lisinopril  10 mg Oral Daily    metoprolol tartrate  25 mg Oral BID       Physical Exam:  General Appearance: appears comfortable in no acute distress. HEENT: Normocephalic atraumatic without obvious abnormality , has a left ear resection before  Neck: Lips, mucosa, and tongue normal.  Supple, symmetrical, trachea midline, no adenopathy;thyroid:  no enlargement/tenderness/nodules or JVD. Lung: Breath sounds CTA. Respirations   unlabored. Symmetrical expansion. Heart: RRR, normal S1, S2. No MRG  Abdomen: Soft, NT, ND. BS present x 4 quadrants. No bruit or organomegaly. Extremities: Pedal pulses 2+ symmetric b/l. Extremities normal, no cyanosis, clubbing, or edema. Neurologic: Mental status: Alert and Oriented X3 . Pertinent/ New Labs and Imaging Studies           Labs:  Lab Results   Component Value Date    WBC 13.2 02/03/2021    HGB 11.2 02/03/2021    HCT 34.6 02/03/2021    MCV 90.8 02/03/2021    MCH 29.4 02/03/2021    MCHC 32.4 02/03/2021    RDW 14.1 02/03/2021     02/03/2021    MPV 10.4 02/03/2021     Lab Results   Component Value Date     02/03/2021    K 4.0 02/03/2021    K 3.2 01/28/2021     02/03/2021    CO2 27 02/03/2021    BUN 4 02/03/2021    CREATININE 0.6 02/03/2021    LABALBU 2.5 02/03/2021    CALCIUM 8.3 02/03/2021    GFRAA >60 02/03/2021    LABGLOM >60 02/03/2021     Lab Results   Component Value Date    PROTIME 17.7 02/01/2021    INR 1.5 02/01/2021     No results for input(s): PROBNP in the last 72 hours. No results for input(s): PROCAL in the last 72 hours. This SmartLink has not been configured with any valid records.        Micro:  Recent Labs     02/02/21  7008 [address] [facility] Culture in progress     Recent Labs     02/01/21  1101   LABGRAM Refer to ordered Gram stain for results     No results for input(s): LEGUR in the last 72 hours. Recent Labs     02/02/21  1620   STREPNEUMAGU Presumptive negative- suggests no current or recent  pneumococcal infection. Infection due to Strep pneumoniae cannot be  ruled out since the antigen present in the sample  may be below the detection limit of the test.  Normal Range:Presumptive Negative       Recent Labs     02/02/21  1620   LP1UAG Presumptive Negative -suggesting no recent or current infections  with Legionella pneumophila serogroup 1. Infection to Legionella cannot be ruled out since other serogroups  and species may cause infection, antigen may not be present in  early infection, or level of antigen may be below the  detection limit. Normal Range: Presumptive Negative            Assessment:    1. Moderate right side pleural effusion status post thoracentesis  2. Chronic cough  3. Probably underlying COPD unknown severity  4. PAF/MAT   5. Multiple hepatic lesions mostly abscesses  6. Choledocholithiasis with ascending cholangitis  7. ERCP with papillotomy, stone extractions and Wallstent placement on 1/29/2021.  8. Stable post ERCP pancreatitis         Plan:   1. Continue pleural fluid chemistry and cytology  2. Follow respiratory cultures  3. Continue metronidazole and ceftriaxone per Dr. Smith Earing  4.  Incentive spirometry flutter valve       Electronically signed by Codey Bowser MD on 2/3/2021 at 7:57 PM

## 2021-02-04 NOTE — PLAN OF CARE
Problem: Inadequate oral food/beverage intake (NI-2.1)  Goal: Food and/or Nutrient Delivery  PO >75% at meals and ONS  Description: Individualized approach for food/nutrient provision.   Outcome: Met This Shift

## 2021-02-04 NOTE — PROGRESS NOTES
2/4/2021  1:57 PM      Comprehensive Nutrition Assessment    Type and Reason for Visit:  Reassess    Nutrition Recommendations/Plan: Continue diet and ONS, as tolerated and per SLP recommendations    Nutrition Assessment:  Pt tolerating Soft and Bite-sized dysphagia diet/no drinking straw. S/p post ERCP pancreatitis. Probable cholecystectomy after infection/abscess resolved. Will add  HP Ensure ONS, which is low fat and should be well tolerated. Malnutrition Assessment:  Malnutrition Status: At risk for malnutrition (Comment)    Context:  Acute Illness     Findings of the 6 clinical characteristics of malnutrition:  Energy Intake:  7 - 50% or less of estimated energy requirements for 5 or more days(per pt)  Weight Loss:  Unable to assess(no EMR hx to confirm - subjective loss)     Body Fat Loss:  Unable to assess     Muscle Mass Loss:  Unable to assess    Fluid Accumulation:  No significant fluid accumulation     Strength:  Not Performed    Estimated Daily Nutrient Needs:  Energy (kcal):  ; Weight Used for Energy Requirements:  Admission     Protein (g):   (1.3-1.5 g/kg); Weight Used for Protein Requirements:  Admission        Fluid (ml/day):  ; Method Used for Fluid Requirements:  1 ml/kcal      Nutrition Related Findings:  A&Ox4, dysphagia, no edema, +BS, I/O +4.9,      Wounds:  (reddened heels, buttocks and rash on face)       Current Nutrition Therapies:    DIET DYSPHAGIA SOFT AND BITE-SIZED; Dysphagia Soft and Bite-Sized;  No Drinking Straw  Dietary Nutrition Supplements: Low Calorie High Protein Supplement    Anthropometric Measures:  · Height: 5' 11\" (180.3 cm)  · Current Body Weight: 164 lb (74.4 kg)(2/4)   · Admission Body Weight: 163 lb (73.9 kg)(1/29 [facility])    · Usual Body Weight: (no EMR wt hx to confirm)     · Ideal Body Weight: 172 lbs; % Ideal Body Weight 95.3 %   · BMI: 22.9  · BMI Categories: Normal Weight (BMI 22.0 to 24.9) age over 72       Nutrition Diagnosis: · Inadequate oral intake related to altered GI function(diarrhea and pain PTA) as evidenced by NPO or clear liquid status due to medical condition, poor intake prior to admission, GI abnormality, swallow study results      Nutrition Interventions:   Food and/or Nutrient Delivery:  Start Oral Nutrition Supplement, Continue Current Diet  Nutrition Education/Counseling:  No recommendation at this time   Coordination of Nutrition Care:  Continue to monitor while inpatient    Goals:  PO >75% at meals and ONS       Nutrition Monitoring and Evaluation:   Behavioral-Environmental Outcomes:  None Identified   Food/Nutrient Intake Outcomes:  Food and Nutrient Intake, Supplement Intake  Physical Signs/Symptoms Outcomes:  GI Status, Biochemical Data, Chewing or Swallowing, Fluid Status or Edema, Weight, Skin, Nutrition Focused Physical Findings     Discharge Planning:     Too soon to determine     Electronically signed by Dwana Cranker, RD, CNSC, LD on 2/4/21 at 1:58 PM EST    Contact: [phone]

## 2021-02-04 NOTE — PROGRESS NOTES
Physician Progress Note      PATIENT:               Deznel Man  CSN #:                  [id number]  :                       1945  ADMIT DATE:       2021 2:40 AM  [address] DATE:  RESPONDING  PROVIDER #:        Venancio Real MD          QUERY TEXT:    Pt admitted with \". Aftab Dirk Aftab Dirk Choledocholithiasis with ascending cholangitis. Aftab Dirk Aftab Dirk \" per   ID. Pt noted to have temp 102.7; procal 0.47; CRP 14.6; WBC 13.2 on admission. If possible, please document in the progress notes and discharge summary if   you are evaluating and /or treating any of the following: The medical record reflects the following:  Risk Factors: cholangitis;  Clinical Indicators:  temp 102.7; procal 0.47; CRP 14.6; WBC 13.2;  per ID,   \". Aftab Dirk Aftab Dirk Possible liver abscesses versus metastatic disease. Aftab Dirk Aftab Dirk Aftab Dirk Choledocholithiasis   with ascending cholangitis. Aftab Dirk Aftab Dirk Continue Metronidazole and Ceftriaxone. Aftab Dirk Aftab Dirk \";  Treatment:  IV antibiotics; Options provided:  -- Sepsis, present on admission  -- No Sepsis, localized infection only  -- Other - I will add my own diagnosis  -- Disagree - Not applicable / Not valid  -- Disagree - Clinically unable to determine / Unknown  -- Refer to Clinical Documentation Reviewer    PROVIDER RESPONSE TEXT:    This patient has sepsis which was present on admission.     Query created by: Carry Search on 2021 4:25 PM      Electronically signed by:  Venancio Real MD 2021 5:13 PM

## 2021-02-04 NOTE — PROGRESS NOTES
Occupational Therapy  OT BEDSIDE TREATMENT NOTE      Date:2021  Patient Name: Alec Hein  MRN: [id number]  : 1945  Room: [address] [place] [address]-A     Referring Provider: KISHORE Pineda - CNS     Evaluating OT: Eva Aleman OTR/L [id number]     AM-PAC Daily Activity Raw Score: 18/24     Recommended Adaptive Equipment: TBD     Michel Rahul. Pt presents to ED from home with abdominal cramping and diarrhea.      Pertinent Medical History: ETOH abuse   Precautions:  falls     Home Living: Pt is a questionable historian reports lives with wife in a 2 story home with 1st floor set up, pt reports he sleeps on the couch.  bath 1st floor. Bathroom setup: tub/shower 2nd floor      Prior Level of Function: Per pt report Mod I with ADLs, family assists PRN with IADLs; completed functional mobility with no AD  Driving: Per pt yes     Pain Level: Abdomen; R foot- Mild     Cognition: A&O: Grossly with safety awareness deficits noted.                Functional Assessment:    Initial Eval Status  Date: 21 Treatment session: 21 Short Term Goals      Feeding SBA       Grooming SBA  Standing sink level for hand hygiene   Independent   UB Dressing Min A  [facility] gown   Independent   LB Dressing Mod A  Management of B socks   Mod I    Bathing Min A   Mod I   Toileting Min A  Use of grab bar for support in transfer and to maintain standing balance  Assist in posterior jorge care to ensure cleanliness CGA  Standing during clothing management. Hygiene completed seated Mod I   Bed Mobility  Supine to sit: SBA  Supine<> sit: SBA     Functional Transfers STS: SBA  STS: SBA from EOB and commode Mod I   Functional Mobility CGA with HHA  Hallway distance  Slow pace  Progressively forward flexed at trunk  Mild unsteadiness  CGA using IV pole and pt furniture gliding at times.   See comments Mod I during ADLs   Balance Sitting: fair plus     Standing: fair/fair minus  Sitting: Independent  Standing: CGA- Min A     Activity Tolerance Fair minus Fair-  standing ghassan x6-7 min with fair plus balance during self care tasks                       B UE were WFL during tasks. Comments: Upon arrival pt was supine in bed. At end of session pt was transferred to bed with alarm on, all lines and tubes intact and call light within reach. Treatment: Pt encouragement to use ww to decrease pain in L foot and increase safety; however, pt declined AD despite education on benefits of ww from therapist.  Pt declined additional activity due to abdominal pain. Cues for safety during toilet transfer provided. Extended time required during toileting. Education: Safety training during toileting and benefits of ww. · Pt has made good progress towards set goals. Plan of Care: 2-5 days/week for 1-2 weeks PRN   [x]? ?? ADL retraining/adaptive techniques and AE recommendations to increase functional independence within precautions                    [x]? ? ? Energy conservation techniques to improve tolerance for ADL/IADLs  [x]? ? ? Functional transfer/mobility training/DME recommendations for increased independence, safety and fall prevention         [x]? ? ? Patient/family education to increase safety and functional independence during daily routine          [x]? ? ? Environmental modifications for safe mobility and completion of ADLs                             []? ?? Cognitive retraining to improve problem solving skills & safe participation in ADLs/IADLs     []? ? ?Sensory re-education techniques to improve extremity awareness, maintain skin integrity and improve hand function                             []? ? ? Visual/Perceptual retraining to improve body awareness and safety during transfers and ADLs  []? ?? Splinting/positioning needs to maintain joint/skin integrity and contracture prevention  [x]? ? ? Therapeutic activity to improve functional performance during ADLs                                        [x]? ? ? Therapeutic exercise to improve tolerance and functional strength for ADLs   [x]? ? ? Balance retraining/tolerance tasks for facilitation of postural control with dynamic challenges during ADLs  []? ? ?Neuromuscular re-education to facilitate righting/equilibrium reactions, midline orientation, scapular stability/mobility, normalize muscle tone and facilitate active functional movement                        []? ? ? Delirium prevention/treatment    [x]? ? Positioning to improve functional independence and decrease risk of skin breakdown  []? ??Other:     Treatment Charges: Mins Units   Ther Ex  06214     Manual Therapy [id number]     Thera Activities [id number]   ADL/Home Mgt [id number] 10 1   Neuro Re-ed 97100     Group Therapy      Orthotic manage/training  55022     Non-Billable Time       Time In: 2:15  Time Out: 2:30  Total Time: [address] MOREIRA/L [id number]

## 2021-02-04 NOTE — PROGRESS NOTES
PRN      promethazine (PHENERGAN) tablet 12.5 mg, Q6H PRN    Or      ondansetron (ZOFRAN) injection 4 mg, Q6H PRN      polyethylene glycol (GLYCOLAX) packet 17 g, Daily PRN      acetaminophen (TYLENOL) tablet 650 mg, Q6H PRN    Or      acetaminophen (TYLENOL) suppository 650 mg, P0C PRN      folic acid (FOLVITE) tablet 1 mg, Daily      thiamine tablet 100 mg, Daily      multivitamin 1 tablet, Daily      lisinopril (PRINIVIL;ZESTRIL) tablet 10 mg, Daily      metoprolol tartrate (LOPRESSOR) tablet 25 mg, BID      LORazepam (ATIVAN) tablet 1 mg, Q1H PRN    Or      LORazepam (ATIVAN) injection 1 mg, Q1H PRN    Or      LORazepam (ATIVAN) tablet 2 mg, Q1H PRN    Or      LORazepam (ATIVAN) injection 2 mg, Q1H PRN    Or      LORazepam (ATIVAN) tablet 3 mg, Q1H PRN    Or      LORazepam (ATIVAN) injection 3 mg, Q1H PRN    Or      LORazepam (ATIVAN) tablet 4 mg, Q1H PRN    Or      LORazepam (ATIVAN) injection 4 mg, Q1H PRN         Data Review  CBC:   Lab Results   Component Value Date    WBC 13.8 02/04/2021    RBC 3.42 02/04/2021    HGB 10.3 02/04/2021    HCT 31.1 02/04/2021    MCV 90.9 02/04/2021    MCH 30.1 02/04/2021    MCHC 33.1 02/04/2021    RDW 14.2 02/04/2021     02/04/2021    MPV 10.4 02/04/2021     CMP:    Lab Results   Component Value Date     02/04/2021    K 3.9 02/04/2021    K 3.2 01/28/2021     02/04/2021    CO2 25 02/04/2021    BUN 4 02/04/2021    CREATININE 0.6 02/04/2021    GFRAA >60 02/04/2021    LABGLOM >60 02/04/2021    GLUCOSE 92 02/04/2021    PROT 6.0 02/04/2021    LABALBU 2.2 02/04/2021    CALCIUM 7.8 02/04/2021    BILITOT 0.4 02/04/2021    ALKPHOS 83 02/04/2021    AST 13 02/04/2021    ALT 9 02/04/2021     Hepatic Function Panel:    Lab Results   Component Value Date    ALKPHOS 83 02/04/2021    ALT 9 02/04/2021    AST 13 02/04/2021    PROT 6.0 02/04/2021    BILITOT 0.4 02/04/2021    BILIDIR 0.3 01/30/2021    IBILI 0.2 01/30/2021    LABALBU 2.2 02/04/2021     No components found for: CHLPL    Lab Results   Component Value Date    TRIG 94 01/27/2021       Lab Results   Component Value Date    HDL 23 01/27/2021       Lab Results   Component Value Date    LDLCALC 54 01/27/2021       Lab Results   Component Value Date    LABVLDL 19 01/27/2021      PT/INR:    Lab Results   Component Value Date    PROTIME 17.7 02/01/2021    INR 1.5 02/01/2021       Assessment:   Active Problems:  ? Choledocholithiasis with ascending cholangitis  ? Cholelithiasis- surgery following   ? Liver lesions, suspicious for microabscesses- ID and surgery following  ? Abdominal pain-lower - resolved  ? Diarrhea - resolved  ? Portal vein thrombosis- vascular consult recommended  ? Dysphagia   ? Anemia, normocytic  ? Leukocytosis- secondary to above  ? Fever and Chills - ID following  ? Alcohol abuse  ? Hx of skin cancer  ? ERCP with papillotomy, stones extraction, and Wallstent placement. 1/29/2021 - Markedly dilated common bile duct with filling defect consistent with stones.  Papillotomy was done, and multiple three large stones as well as pus and gravel were extracted. Small area of stricture in the intrapapillary portion of the common bile duct.  A Wallstent 10 x 60 was placed without difficulty.  Excellent drainage was obtained. ? Post ERCP Pancreatitis (by numbers, pt without pain)- doubt pancreatitis, number likely secondary to stent placement- resolved      Plan:       ? For outpatient cholecystectomy when abscess resolves with Dr. Jamarcus Adkins  ? Antibiotics per ID  ? IV fluids per PCP  ? Low fat diet as tolerated per SLP recommendations   ? Discharge per PCP, ok from GI POV with follow up visit. Patient to call office to arrange. Please have labs drawn 3 days prior to ERCP with stent removal.      Note: This report was completed utilizing computer voice recognition software. Every effort has been made to ensure accuracy, however; inadvertent computerized transcription errors may be present. Discussed with Dr. Tara Gautam per Dr. aYnique Turcios APRN-NP-C 2/4/2021  7:24 AM For Dr. Analilia Lobato

## 2021-02-04 NOTE — PROGRESS NOTES
Cristel Umanzor M.D.,[facility]  Uriah Day D.O., FJULIAOGloryI., Laura Paz M.D. Rita Soares M.D., Sergey Hernández M.D. Ann Farrell D.O. Daily Pulmonary Progress Note    Patient:  Subhash Santamaria 70 y.o. male MRN: [id number]     Date of Service: 2/4/2021      Synopsis     We are following patient for fatigue and weakness cough and right pleural effusion    \"CC\" ; abdominal pain and diarrhea    Code status: Full code      Subjective      2/3 Patient was seen and examined. Is laying in bed on room air no acute distress. Had thoracentesis earlier today. 2/4 : patient lying in bed, still has most cough. No evnts over night          Review of Systems:  Constitutional: Denies fever, weight loss, night sweats, and fatigue  Skin: Denies pigmentation, dark lesions, and rashes   HEENT: Denies hearing loss, tinnitus, ear drainage, epistaxis, sore throat, and hoarseness. Cardiovascular: Denies palpitations, chest pain, and chest pressure. Respiratory: Denies cough, dyspnea at rest, hemoptysis, apnea, and choking.   Gastrointestinal: Denies nausea, vomiting, poor appetite, diarrhea, heartburn or reflux  Genitourinary: Denies dysuria, frequency, urgency or hematuria  Musculoskeletal: Denies myalgias, muscle weakness, and bone pain  Neurological: Denies dizziness, vertigo, headache, and focal weakness  Psychological: Denies anxiety and depression  Endocrine: Denies heat intolerance and cold intolerance  Hematopoietic/Lymphatic: Denies bleeding problems and blood transfusions    24-hour events:      Objective   Vitals: BP (!) 124/59   Pulse 87   Temp 98.2 °F (36.8 °C) (Oral)   Resp 16   Ht 5' 11\" (1.803 m)   Wt 164 lb 4.8 oz (74.5 kg)   SpO2 93%   BMI 22.92 kg/m²     I/O:    Intake/Output Summary (Last 24 hours) at 2/4/2021 1433  Last data filed at 2/4/2021 0806  Gross per 24 hour   Intake 1220 ml   Output 200 ml   Net 1020 ml       Vent Information  SpO2: 93 % CURRENT MEDS :  Scheduled Meds:   piperacillin-tazobactam  3,375 mg Intravenous Q8H    pantoprazole  40 mg Intravenous BID    And    sodium chloride (PF)  10 mL Intravenous BID    sodium chloride flush  10 mL Intravenous 2 times per day    folic acid  1 mg Oral Daily    thiamine  100 mg Oral Daily    multivitamin  1 tablet Oral Daily    lisinopril  10 mg Oral Daily    metoprolol tartrate  25 mg Oral BID       Physical Exam:  General Appearance: appears comfortable in no acute distress. HEENT: Normocephalic atraumatic without obvious abnormality , has a left ear resection before  Neck: Lips, mucosa, and tongue normal.  Supple, symmetrical, trachea midline, no adenopathy;thyroid:  no enlargement/tenderness/nodules or JVD. Lung: Breath sounds CTA. Respirations   unlabored. Symmetrical expansion. Heart: RRR, normal S1, S2. No MRG  Abdomen: Soft, NT, ND. BS present x 4 quadrants. No bruit or organomegaly. Extremities: Pedal pulses 2+ symmetric b/l. Extremities normal, no cyanosis, clubbing, or edema. Neurologic: Mental status: Alert and Oriented X3 . Pertinent/ New Labs and Imaging Studies           Labs:  Lab Results   Component Value Date    WBC 13.8 02/04/2021    HGB 10.3 02/04/2021    HCT 31.1 02/04/2021    MCV 90.9 02/04/2021    MCH 30.1 02/04/2021    MCHC 33.1 02/04/2021    RDW 14.2 02/04/2021     02/04/2021    MPV 10.4 02/04/2021     Lab Results   Component Value Date     02/04/2021    K 3.9 02/04/2021    K 3.2 01/28/2021     02/04/2021    CO2 25 02/04/2021    BUN 4 02/04/2021    CREATININE 0.6 02/04/2021    LABALBU 2.2 02/04/2021    CALCIUM 7.8 02/04/2021    GFRAA >60 02/04/2021    LABGLOM >60 02/04/2021     Lab Results   Component Value Date    PROTIME 17.7 02/01/2021    INR 1.5 02/01/2021     No results for input(s): PROBNP in the last 72 hours. No results for input(s): PROCAL in the last 72 hours. This SmartLink has not been configured with any valid records. Micro:  Recent Labs     02/02/21  1620   CULTRESP Culture in progress  Oral Pharyngeal Leona reduced  Additional growth present, also evaluating for;  Yeast  *  Light growth  Identification and sensitivity to follow       Recent Labs     02/03/21  0945   LABGRAM Refer to ordered Gram stain for results     No results for input(s): LEGUR in the last 72 hours. Recent Labs     02/02/21  1620   STREPNEUMAGU Presumptive negative- suggests no current or recent  pneumococcal infection. Infection due to Strep pneumoniae cannot be  ruled out since the antigen present in the sample  may be below the detection limit of the test.  Normal Range:Presumptive Negative       Recent Labs     02/02/21  1620   LP1UAG Presumptive Negative -suggesting no recent or current infections  with Legionella pneumophila serogroup 1. Infection to Legionella cannot be ruled out since other serogroups  and species may cause infection, antigen may not be present in  early infection, or level of antigen may be below the  detection limit. Normal Range: Presumptive Negative       CULTURE, RESPIRATORY Abnormal  02/02/2021  4:20 PM [facility]   Culture in progress   Oral Pharyngeal Leona reduced   Additional growth present, also evaluating for;   Yeast    Smear, Respiratory 02/02/2021  4:20 PM [facility]   Group 6: <25 PMN's/LPF and <25 Epithelial cells/LPF   Polymorphonuclear leukocytes not seen   Rare Epithelial cells   Rare Gram negative rods   Few Gram positive rods    Organism Abnormal  02/02/2021  4:20 PM [facility]   GNR oxidase positive    CULTURE, RESPIRATORY 02/02/2021  4:20 PM [facility]   Light growth   Identification and sensitivity to follow          Assessment:    1. Moderate right side pleural effusion status post thoracentesis  2. Chronic cough, respiratory cultures now growing  GNR oxidase positive  3.  Probably underlying COPD unknown

## 2021-02-05 LAB
ALBUMIN SERPL-MCNC: 2.6 G/DL (ref 3.5–5.2)
ALP BLD-CCNC: 97 U/L (ref 40–129)
ALT SERPL-CCNC: 6 U/L (ref 0–40)
AMMONIA: 14.4 UMOL/L (ref 16–60)
AMYLASE: 52 U/L (ref 20–100)
ANION GAP SERPL CALCULATED.3IONS-SCNC: 7 MMOL/L (ref 7–16)
APTT: 43.7 SEC (ref 24.5–35.1)
APTT: 56.3 SEC (ref 24.5–35.1)
AST SERPL-CCNC: 15 U/L (ref 0–39)
BASOPHILS ABSOLUTE: 0.09 E9/L (ref 0–0.2)
BASOPHILS RELATIVE PERCENT: 0.7 % (ref 0–2)
BILIRUB SERPL-MCNC: 0.5 MG/DL (ref 0–1.2)
BUN BLDV-MCNC: 3 MG/DL (ref 8–23)
CALCIUM SERPL-MCNC: 8.4 MG/DL (ref 8.6–10.2)
CHLORIDE BLD-SCNC: 101 MMOL/L (ref 98–107)
CO2: 28 MMOL/L (ref 22–29)
CREAT SERPL-MCNC: 0.7 MG/DL (ref 0.7–1.2)
EOSINOPHILS ABSOLUTE: 0.2 E9/L (ref 0.05–0.5)
EOSINOPHILS RELATIVE PERCENT: 1.6 % (ref 0–6)
GFR AFRICAN AMERICAN: >60
GFR NON-AFRICAN AMERICAN: >60 ML/MIN/1.73
GLUCOSE BLD-MCNC: 123 MG/DL (ref 74–99)
HCT VFR BLD CALC: 31.9 % (ref 37–54)
HEMOGLOBIN: 10.3 G/DL (ref 12.5–16.5)
IMMATURE GRANULOCYTES #: 0.13 E9/L
IMMATURE GRANULOCYTES %: 1 % (ref 0–5)
LIPASE: 138 U/L (ref 13–60)
LYMPHOCYTES ABSOLUTE: 1.76 E9/L (ref 1.5–4)
LYMPHOCYTES RELATIVE PERCENT: 14.1 % (ref 20–42)
MAGNESIUM: 1.6 MG/DL (ref 1.6–2.6)
MCH RBC QN AUTO: 29.3 PG (ref 26–35)
MCHC RBC AUTO-ENTMCNC: 32.3 % (ref 32–34.5)
MCV RBC AUTO: 90.6 FL (ref 80–99.9)
MONOCYTES ABSOLUTE: 0.94 E9/L (ref 0.1–0.95)
MONOCYTES RELATIVE PERCENT: 7.5 % (ref 2–12)
NEUTROPHILS ABSOLUTE: 9.4 E9/L (ref 1.8–7.3)
NEUTROPHILS RELATIVE PERCENT: 75.1 % (ref 43–80)
PDW BLD-RTO: 14.3 FL (ref 11.5–15)
PHOSPHORUS: 3.6 MG/DL (ref 2.5–4.5)
PLATELET # BLD: 532 E9/L (ref 130–450)
PMV BLD AUTO: 10.2 FL (ref 7–12)
POTASSIUM SERPL-SCNC: 4.6 MMOL/L (ref 3.5–5)
RBC # BLD: 3.52 E12/L (ref 3.8–5.8)
SODIUM BLD-SCNC: 136 MMOL/L (ref 132–146)
TOTAL PROTEIN: 6.8 G/DL (ref 6.4–8.3)
WBC # BLD: 12.5 E9/L (ref 4.5–11.5)

## 2021-02-05 PROCEDURE — 6370000000 HC RX 637 (ALT 250 FOR IP): Performed by: INTERNAL MEDICINE

## 2021-02-05 PROCEDURE — 6360000002 HC RX W HCPCS: Performed by: SPECIALIST

## 2021-02-05 PROCEDURE — 2580000003 HC RX 258: Performed by: INTERNAL MEDICINE

## 2021-02-05 PROCEDURE — 99232 SBSQ HOSP IP/OBS MODERATE 35: CPT | Performed by: TRANSPLANT SURGERY

## 2021-02-05 PROCEDURE — 85025 COMPLETE CBC W/AUTO DIFF WBC: CPT

## 2021-02-05 PROCEDURE — 6360000002 HC RX W HCPCS: Performed by: STUDENT IN AN ORGANIZED HEALTH CARE EDUCATION/TRAINING PROGRAM

## 2021-02-05 PROCEDURE — 82140 ASSAY OF AMMONIA: CPT

## 2021-02-05 PROCEDURE — 99233 SBSQ HOSP IP/OBS HIGH 50: CPT | Performed by: INTERNAL MEDICINE

## 2021-02-05 PROCEDURE — 2580000003 HC RX 258: Performed by: SPECIALIST

## 2021-02-05 PROCEDURE — 84100 ASSAY OF PHOSPHORUS: CPT

## 2021-02-05 PROCEDURE — C9113 INJ PANTOPRAZOLE SODIUM, VIA: HCPCS | Performed by: NURSE PRACTITIONER

## 2021-02-05 PROCEDURE — 97530 THERAPEUTIC ACTIVITIES: CPT

## 2021-02-05 PROCEDURE — 92526 ORAL FUNCTION THERAPY: CPT | Performed by: SPEECH-LANGUAGE PATHOLOGIST

## 2021-02-05 PROCEDURE — 6360000002 HC RX W HCPCS: Performed by: INTERNAL MEDICINE

## 2021-02-05 PROCEDURE — 6360000002 HC RX W HCPCS: Performed by: NURSE PRACTITIONER

## 2021-02-05 PROCEDURE — 2060000000 HC ICU INTERMEDIATE R&B

## 2021-02-05 PROCEDURE — 36415 COLL VENOUS BLD VENIPUNCTURE: CPT

## 2021-02-05 PROCEDURE — 80074 ACUTE HEPATITIS PANEL: CPT

## 2021-02-05 PROCEDURE — 82150 ASSAY OF AMYLASE: CPT

## 2021-02-05 PROCEDURE — 80053 COMPREHEN METABOLIC PANEL: CPT

## 2021-02-05 PROCEDURE — 83690 ASSAY OF LIPASE: CPT

## 2021-02-05 PROCEDURE — 83735 ASSAY OF MAGNESIUM: CPT

## 2021-02-05 PROCEDURE — 94640 AIRWAY INHALATION TREATMENT: CPT

## 2021-02-05 PROCEDURE — 2580000003 HC RX 258: Performed by: NURSE PRACTITIONER

## 2021-02-05 PROCEDURE — 85730 THROMBOPLASTIN TIME PARTIAL: CPT

## 2021-02-05 RX ORDER — LIDOCAINE HYDROCHLORIDE 10 MG/ML
5 INJECTION, SOLUTION EPIDURAL; INFILTRATION; INTRACAUDAL; PERINEURAL ONCE
Status: COMPLETED | OUTPATIENT
Start: 2021-02-05 | End: 2021-02-06

## 2021-02-05 RX ORDER — LANOLIN ALCOHOL/MO/W.PET/CERES
100 CREAM (GRAM) TOPICAL DAILY
Qty: 30 TABLET | Refills: 3 | Status: SHIPPED | OUTPATIENT
Start: 2021-02-06 | End: 2021-04-20 | Stop reason: ALTCHOICE

## 2021-02-05 RX ORDER — FOLIC ACID 1 MG/1
1 TABLET ORAL DAILY
Qty: 30 TABLET | Refills: 3 | Status: SHIPPED | OUTPATIENT
Start: 2021-02-06 | End: 2021-06-22

## 2021-02-05 RX ORDER — SODIUM CHLORIDE 0.9 % (FLUSH) 0.9 %
10 SYRINGE (ML) INJECTION PRN
Status: DISCONTINUED | OUTPATIENT
Start: 2021-02-05 | End: 2021-02-07 | Stop reason: HOSPADM

## 2021-02-05 RX ORDER — PIPERACILLIN SODIUM, TAZOBACTAM SODIUM 3; .375 G/15ML; G/15ML
3.38 INJECTION, POWDER, LYOPHILIZED, FOR SOLUTION INTRAVENOUS EVERY 8 HOURS
Qty: 101250 MG | Refills: 0 | Status: SHIPPED | OUTPATIENT
Start: 2021-02-05 | End: 2021-02-15

## 2021-02-05 RX ORDER — MULTIVITAMIN WITH IRON
1 TABLET ORAL DAILY
Qty: 30 TABLET | Refills: 0 | Status: SHIPPED | OUTPATIENT
Start: 2021-02-06 | End: 2021-06-22

## 2021-02-05 RX ORDER — HEPARIN SODIUM (PORCINE) LOCK FLUSH IV SOLN 100 UNIT/ML 100 UNIT/ML
3 SOLUTION INTRAVENOUS EVERY 12 HOURS SCHEDULED
Status: DISCONTINUED | OUTPATIENT
Start: 2021-02-05 | End: 2021-02-07 | Stop reason: HOSPADM

## 2021-02-05 RX ORDER — HEPARIN SODIUM (PORCINE) LOCK FLUSH IV SOLN 100 UNIT/ML 100 UNIT/ML
3 SOLUTION INTRAVENOUS PRN
Status: DISCONTINUED | OUTPATIENT
Start: 2021-02-05 | End: 2021-02-07 | Stop reason: HOSPADM

## 2021-02-05 RX ADMIN — PANTOPRAZOLE SODIUM 40 MG: 40 INJECTION, POWDER, FOR SOLUTION INTRAVENOUS at 09:00

## 2021-02-05 RX ADMIN — METOPROLOL TARTRATE 25 MG: 25 TABLET, FILM COATED ORAL at 20:24

## 2021-02-05 RX ADMIN — SODIUM CHLORIDE, PRESERVATIVE FREE 300 UNITS: 5 INJECTION INTRAVENOUS at 20:24

## 2021-02-05 RX ADMIN — PIPERACILLIN AND TAZOBACTAM 3375 MG: 3; .375 INJECTION, POWDER, FOR SOLUTION INTRAVENOUS at 14:47

## 2021-02-05 RX ADMIN — Medication 10 ML: at 20:25

## 2021-02-05 RX ADMIN — ALBUTEROL SULFATE 2.5 MG: 2.5 SOLUTION RESPIRATORY (INHALATION) at 20:33

## 2021-02-05 RX ADMIN — SODIUM CHLORIDE, PRESERVATIVE FREE 10 ML: 5 INJECTION INTRAVENOUS at 08:45

## 2021-02-05 RX ADMIN — PIPERACILLIN AND TAZOBACTAM 3375 MG: 3; .375 INJECTION, POWDER, FOR SOLUTION INTRAVENOUS at 20:24

## 2021-02-05 RX ADMIN — METOPROLOL TARTRATE 25 MG: 25 TABLET, FILM COATED ORAL at 09:12

## 2021-02-05 RX ADMIN — SODIUM CHLORIDE: 9 INJECTION, SOLUTION INTRAVENOUS at 00:08

## 2021-02-05 RX ADMIN — SODIUM CHLORIDE: 9 INJECTION, SOLUTION INTRAVENOUS at 18:52

## 2021-02-05 RX ADMIN — Medication 10 ML: at 16:46

## 2021-02-05 RX ADMIN — HYDROCODONE BITARTRATE AND ACETAMINOPHEN 1 TABLET: 5; 325 TABLET ORAL at 18:52

## 2021-02-05 RX ADMIN — ALBUTEROL SULFATE 2.5 MG: 2.5 SOLUTION RESPIRATORY (INHALATION) at 12:24

## 2021-02-05 RX ADMIN — MULTIVITAMIN TABLET 1 TABLET: TABLET at 09:12

## 2021-02-05 RX ADMIN — ALBUTEROL SULFATE 2.5 MG: 2.5 SOLUTION RESPIRATORY (INHALATION) at 16:04

## 2021-02-05 RX ADMIN — HEPARIN SODIUM 23.6 UNITS/KG/HR: 10000 INJECTION, SOLUTION INTRAVENOUS at 04:32

## 2021-02-05 RX ADMIN — HYDROMORPHONE HYDROCHLORIDE 0.5 MG: 1 INJECTION, SOLUTION INTRAMUSCULAR; INTRAVENOUS; SUBCUTANEOUS at 16:46

## 2021-02-05 RX ADMIN — APIXABAN 5 MG: 5 TABLET, FILM COATED ORAL at 20:24

## 2021-02-05 RX ADMIN — Medication 100 MG: at 09:12

## 2021-02-05 RX ADMIN — PANTOPRAZOLE SODIUM 40 MG: 40 INJECTION, POWDER, FOR SOLUTION INTRAVENOUS at 20:24

## 2021-02-05 RX ADMIN — HYDROCODONE BITARTRATE AND ACETAMINOPHEN 1 TABLET: 5; 325 TABLET ORAL at 04:30

## 2021-02-05 RX ADMIN — ALBUTEROL SULFATE 2.5 MG: 2.5 SOLUTION RESPIRATORY (INHALATION) at 08:27

## 2021-02-05 RX ADMIN — LISINOPRIL 10 MG: 10 TABLET ORAL at 09:12

## 2021-02-05 RX ADMIN — FOLIC ACID 1 MG: 1 TABLET ORAL at 09:12

## 2021-02-05 RX ADMIN — SODIUM CHLORIDE, PRESERVATIVE FREE 10 ML: 5 INJECTION INTRAVENOUS at 20:24

## 2021-02-05 RX ADMIN — PIPERACILLIN AND TAZOBACTAM 3375 MG: 3; .375 INJECTION, POWDER, FOR SOLUTION INTRAVENOUS at 04:31

## 2021-02-05 RX ADMIN — HYDROMORPHONE HYDROCHLORIDE 0.5 MG: 1 INJECTION, SOLUTION INTRAMUSCULAR; INTRAVENOUS; SUBCUTANEOUS at 11:33

## 2021-02-05 RX ADMIN — HEPARIN SODIUM 2000 UNITS: 1000 INJECTION INTRAVENOUS; SUBCUTANEOUS at 06:49

## 2021-02-05 ASSESSMENT — PAIN SCALES - GENERAL
PAINLEVEL_OUTOF10: 8
PAINLEVEL_OUTOF10: 4

## 2021-02-05 ASSESSMENT — PAIN DESCRIPTION - LOCATION: LOCATION: ABDOMEN

## 2021-02-05 NOTE — PROGRESS NOTES
Four eyes skin assessment completed with primary RN, Donna.   Physical Therapy  Facility/Department: [address] INTERNAL MEDICINE 2  Treatment Note  NAME: Mallory Chaney  : 1945  MRN: [id number]    Date of Service: 2021    Patient Diagnosis(es): The primary encounter diagnosis was Diarrhea, unspecified type. Diagnoses of Hypokalemia, Atrial fibrillation, new onset ([address] Gallstones, Liver lesion, and Choledocholithiasis were also pertinent to this visit. has a past medical history of Bleeding from the nose and Skin cancer of face. has a past surgical history that includes other surgical history (Left, ); ERCP (N/A, 2021); and CT NEEDLE BIOPSY LIVER PERCUTANEOUS (2021). Referring Provider:  KISHORE Cortés CNS        Evaluating Therapist: T[facility] PT     Room #: 821  DIAGNOSIS: Diarrhea  PRECAUTIONS: falls     Social:  Pt lives with wife and son in a 2 floor plan. States sleeps on couch on first floor. Prior to admission independent without device       Initial Evaluation  Date: 21 Treatment    21  Short Term/ Long Term   Goals   Was pt agreeable to Eval/treatment? yes yes      Does pt have pain? R LE pain/edema      Bed Mobility  Rolling: SBA  Supine to sit: SBA  Sit to supine: SBA  Scooting: SBA  rolling:  SBA  Supine to sit:    SBA  Sit to supine:  SBA  Sit to supine: Min A  Scooting: Min A seated to EOB independent   Transfers Sit to stand: CGA  Stand to sit: CGA    Sit to stand:  Min A  Sit to supine:  Min A   independent   Ambulation    120 feet with no device with  feet x 1 using [employer] for support Min A.  See comments 200 feet with no devcie with supervison   Stair Negotiation  Ascended and descended  NT    4-12 steps with 1 rail with supervision   LE strength     4-/5     4/5   balance      Fair+       AM-PAC Raw score                         Pt is alert and able to follow instruction  Balance: poor dynamic using [employer] for support    Patient education  Pt was educated on UE usage to assist with transfers, gait promoting posture and sequence    Patient response to education:   Pt verbalized understanding Pt demonstrated skill Pt requires further education in this area   yes With repeated instruction yes     ASSESSMENT:   Comments: Nurse ok with rx. Pt found in bed, agreed to rx after encouragement, states R LE pain and swollen, visible edema noted. Pt sat EOB SBA. Pt stood off EOB without A/D, was unable to stand upright, required Mod A for standing balance. Gait then performed to the hallway using [employer] for support, tiffanie very slow, inconsistent and laboring, step to pattern used. Pt unsteady throughout, required constant hands on assist for balance and safety. Pt fatigued after activity. Treatment: Pt practiced and was instructed in the following treatment: transfers and gait     Pt was left in bed with call light in reach, R LE elevated for comfort    Chair/bed alarm: bed alarm active    Time in 1035   Time out 1048   Total Treatment Time 13 minutes   CPT codes:     Therapeutic activities 11646 13 minutes   Therapeutic exercises 83277 0 minutes       Pt is showing declining progress toward established Physical Therapy goals as per increased assist for gait with use of a [employer] today. Continue with physical therapy current plan of care.     Hunter Mariscal PTA   License Number: [id number]

## 2021-02-05 NOTE — CARE COORDINATION
2/5/2021  Social Work Discharge [address] 20/24. Pt is on room air and IV ATB. Awaiting ATB plan. Viraj Valentin referral made to [facility]. [facility] will let SW know after arrangements are made with a phys. to follow. JOSÉ rescheduled Pts  PCP appt for Pt with Dr. Rolan Wong -[phone] - [address] [facility] from Fri 2/5/21 at 3pm-to Wed. Feb. at 12:30pm ( this will need to be changed if Pt is still here). Waiting to see if  will follow Pt in the interim, due to Viraj Valentin cannot follow without Pt having a PCP if needs IVATB at discharge. SW requested therapy to assess Pt again if possible. Electronically signed by ANAYELI Whitehead on 2/5/2021 at 10:06 AM    2/5/2021 Social Work Discharge Planning:Referral was made to Josh Chew with [address] [facility]. They can accept and will start precert. Family and Pt are agreeable to [facility] after phys.'s encouragement to go to a [facility]. N-17 generated, hens completed and transport form is in chart.  Electronically signed by ANAYELI Whitehead on 2/5/2021 at 3:13 PM

## 2021-02-05 NOTE — PROGRESS NOTES
sodium chloride flush 0.9 % injection 10 mL, 2 times per day      sodium chloride flush 0.9 % injection 10 mL, PRN      promethazine (PHENERGAN) tablet 12.5 mg, Q6H PRN    Or      ondansetron (ZOFRAN) injection 4 mg, Q6H PRN      polyethylene glycol (GLYCOLAX) packet 17 g, Daily PRN      acetaminophen (TYLENOL) tablet 650 mg, Q6H PRN    Or      acetaminophen (TYLENOL) suppository 650 mg, D9N PRN      folic acid (FOLVITE) tablet 1 mg, Daily      thiamine tablet 100 mg, Daily      multivitamin 1 tablet, Daily      lisinopril (PRINIVIL;ZESTRIL) tablet 10 mg, Daily      metoprolol tartrate (LOPRESSOR) tablet 25 mg, BID      LORazepam (ATIVAN) tablet 1 mg, Q1H PRN    Or      LORazepam (ATIVAN) injection 1 mg, Q1H PRN    Or      LORazepam (ATIVAN) tablet 2 mg, Q1H PRN    Or      LORazepam (ATIVAN) injection 2 mg, Q1H PRN    Or      LORazepam (ATIVAN) tablet 3 mg, Q1H PRN    Or      LORazepam (ATIVAN) injection 3 mg, Q1H PRN    Or      LORazepam (ATIVAN) tablet 4 mg, Q1H PRN    Or      LORazepam (ATIVAN) injection 4 mg, Q1H PRN         Data Review  CBC:   Lab Results   Component Value Date    WBC 12.5 02/05/2021    RBC 3.52 02/05/2021    HGB 10.3 02/05/2021    HCT 31.9 02/05/2021    MCV 90.6 02/05/2021    MCH 29.3 02/05/2021    MCHC 32.3 02/05/2021    RDW 14.3 02/05/2021     02/05/2021    MPV 10.2 02/05/2021     CMP:    Lab Results   Component Value Date     02/05/2021    K 4.6 02/05/2021    K 3.2 01/28/2021     02/05/2021    CO2 28 02/05/2021    BUN 3 02/05/2021    CREATININE 0.7 02/05/2021    GFRAA >60 02/05/2021    LABGLOM >60 02/05/2021    GLUCOSE 123 02/05/2021    PROT 6.8 02/05/2021    LABALBU 2.6 02/05/2021    CALCIUM 8.4 02/05/2021    BILITOT 0.5 02/05/2021    ALKPHOS 97 02/05/2021    AST 15 02/05/2021    ALT 6 02/05/2021     Hepatic Function Panel:    Lab Results   Component Value Date    ALKPHOS 97 02/05/2021    ALT 6 02/05/2021    AST 15 02/05/2021    PROT 6.8 02/05/2021 BILITOT 0.5 02/05/2021    BILIDIR 0.3 01/30/2021    IBILI 0.2 01/30/2021    LABALBU 2.6 02/05/2021     No components found for: CHLPL    Lab Results   Component Value Date    TRIG 94 01/27/2021       Lab Results   Component Value Date    HDL 23 01/27/2021       Lab Results   Component Value Date    LDLCALC 54 01/27/2021       Lab Results   Component Value Date    LABVLDL 19 01/27/2021      PT/INR:    Lab Results   Component Value Date    PROTIME 17.7 02/01/2021    INR 1.5 02/01/2021       Assessment:   Active Problems:  ? Choledocholithiasis with ascending cholangitis  ? Cholelithiasis- surgery following   ? Liver lesions, suspicious for microabscesses- ID and surgery following  ? Abdominal pain-lower - resolved  ? Diarrhea - resolved  ? Portal vein thrombosis- vascular consult recommended  ? Dysphagia   ? Anemia, normocytic  ? Leukocytosis- secondary to above  ? Fever and Chills - ID following  ? Alcohol abuse  ? Hx of skin cancer  ? ERCP with papillotomy, stones extraction, and Wallstent placement. 1/29/2021 - Markedly dilated common bile duct with filling defect consistent with stones.  Papillotomy was done, and multiple three large stones as well as pus and gravel were extracted. Small area of stricture in the intrapapillary portion of the common bile duct.  A Wallstent 10 x 60 was placed without difficulty.  Excellent drainage was obtained. ? Post ERCP Pancreatitis (by numbers, pt without pain)- doubt pancreatitis, number likely secondary to stent placement- resolved      Plan:     ? Will sign off, please call again if needed. ? Discharge per PCP, ok from GI PO with follow up visit. Patient to call office to arrange. Please have labs drawn 3 days prior to ERCP with stent removal.      Note: This report was completed utilizing computer voice recognition software. Every effort has been made to ensure accuracy, however; inadvertent computerized transcription errors may be present.      Discussed with  [address] [place] [address] per Dr. Hung Ryan, APRN-NP-C 2/5/2021  9:46 AM For Dr. Linwood Viramontes

## 2021-02-05 NOTE — PROGRESS NOTES
[address] [place] [address] [facility]  Progress Note  Face to face encounter   SUBJECTIVE:  Chief Complaint   Patient presents with    Diarrhea     Pt states having cramps after diarrhea x8 days. Denies fever. The patient says he has right foot pain. Denies trauma or history of gout. Denies any dyspnea or abdominal pain today  Tolerating antibiotics. Review of systems:  As stated above in the chief complaint, otherwise negative. Medications:  Scheduled Meds:   piperacillin-tazobactam  3,375 mg Intravenous Q8H    albuterol  2.5 mg Nebulization Q4H    pantoprazole  40 mg Intravenous BID    And    sodium chloride (PF)  10 mL Intravenous BID    sodium chloride flush  10 mL Intravenous 2 times per day    folic acid  1 mg Oral Daily    thiamine  100 mg Oral Daily    multivitamin  1 tablet Oral Daily    lisinopril  10 mg Oral Daily    metoprolol tartrate  25 mg Oral BID     Continuous Infusions:   sodium chloride 12.5 mL/hr at 21 0008    heparin (PORCINE) Infusion 25.6 Units/kg/hr (21 0644)     PRN Meds:sodium chloride flush, heparin (porcine), heparin (porcine), HYDROmorphone, perflutren lipid microspheres, HYDROcodone 5 mg - acetaminophen, hydrALAZINE, sodium chloride flush, promethazine **OR** ondansetron, polyethylene glycol, acetaminophen **OR** acetaminophen, LORazepam **OR** LORazepam **OR** LORazepam **OR** LORazepam **OR** LORazepam **OR** LORazepam **OR** LORazepam **OR** LORazepam    OBJECTIVE:  /61   Pulse 78   Temp 98.1 °F (36.7 °C) (Oral)   Resp 16   Ht 5' 11\" (1.803 m)   Wt 165 lb 4.8 oz (75 kg)   SpO2 93%   BMI 23.05 kg/m²   Temp  Av.1 °F (36.7 °C)  Min: 97.8 °F (36.6 °C)  Max: 98.3 °F (36.8 °C)  Constitutional: The patient is sitting up in bed. Visitor present. He is in no distress. Skin: Warm and dry. No rashes were noted. HEENT: Round and reactive pupils. Moist mucous membranes. No ulcerations or thrush.   Left ear is missing. Neck: Supple to movements. Chest: No respiratory distress. No crackles. Cardiovascular: Heart sounds rhythmic and regular. Abdomen: Slightly distended. Tender right upper quadrant. Extremities: Edema over the right foot and ankle noted. No erythema.   Lines: peripheral    Laboratory and Tests Review:  Lab Results   Component Value Date    WBC 12.5 (H) 02/05/2021    WBC 13.8 (H) 02/04/2021    WBC 13.2 (H) 02/03/2021    HGB 10.3 (L) 02/05/2021    HCT 31.9 (L) 02/05/2021    MCV 90.6 02/05/2021     (H) 02/05/2021     Lab Results   Component Value Date    NEUTROABS 9.40 (H) 02/05/2021    NEUTROABS 11.04 (H) 02/04/2021    NEUTROABS 9.45 (H) 02/03/2021     No results found for: [facility]  Lab Results   Component Value Date    ALT 6 02/05/2021    AST 15 02/05/2021    ALKPHOS 97 02/05/2021    BILITOT 0.5 02/05/2021     Lab Results   Component Value Date     02/05/2021    K 4.6 02/05/2021    K 3.2 01/28/2021     02/05/2021    CO2 28 02/05/2021    BUN 3 02/05/2021    CREATININE 0.7 02/05/2021    CREATININE 0.6 02/04/2021    CREATININE 0.6 02/03/2021    GFRAA >60 02/05/2021    LABGLOM >60 02/05/2021    GLUCOSE 123 02/05/2021    PROT 6.8 02/05/2021    LABALBU 2.6 02/05/2021    CALCIUM 8.4 02/05/2021    BILITOT 0.5 02/05/2021    ALKPHOS 97 02/05/2021    AST 15 02/05/2021    ALT 6 02/05/2021     Lab Results   Component Value Date    CRP 12.2 (H) 01/31/2021    CRP 14.6 (H) 01/27/2021     Lab Results   Component Value Date    SEDRATE 62 (H) 01/31/2021    SEDRATE 60 (H) 01/27/2021     Radiology:    Microbiology:   C. difficile toxin assay: Not detected  Stool cultures: Negative  Giardia: None  Respiratory panel, including SARS-CoV-2: Not detected  Blood cultures 1/30/2021: Pending  Liver abscess 2/1/2021: Negative so far  Respiratory culture 2/2/2021: Ox + GNR  Streptococcus pneumoniae/Legionella urine Ag: negative   pleural fluid 2/2/2021: Pending    No results for input(s): PROCAL in the last 72 hours.    ASSESSMENT:  · Possible liver abscesses versus metastatic disease. Underwent liver biopsy 2/1/2021  · Choledocholithiasis with ascending cholangitis  · Status post ERCP with papillotomy, stone extraction and Wallstent placement 1/29/2021  · Leukocytosis associated to cholangitis and probable liver abscess  · Intermittent fever associated to the above. These seem to be trending downwards  · Alcohol withdrawal  · Dysphagia  · Right pleural effusion associated to the above. Underwent thoracentesis  · Right foot and leg edema. Possible DVT    PLAN:  · Consolidate antibiotics to Zosyn for another 10 days at least.  Reconciled  · Biopsy showed acute inflammation with necrosis and reactive change. Portal area showed moderate chronic inflammation. No granulomas. · PICC for IV antibiotics  · The patient can be discharged from ID standpoint.   He is to follow-up with us in the office in a couple of weeks    Martha Dutton  12:03 PM  2/5/2021

## 2021-02-05 NOTE — PROGRESS NOTES
[facility] SURGERY  DAILY PROGRESS NOTE  2/5/2021  CC: abdominal pain    Subjective:  C/o abdominal pain. Denies nausea, vomiting. Sore at site of liver biopsy    Objective:  /68   Pulse 75   Temp 98.2 °F (36.8 °C) (Oral)   Resp 16   Ht 5' 11\" (1.803 m)   Wt 165 lb 4.8 oz (75 kg)   SpO2 95%   BMI 23.05 kg/m²     GENERAL:  Laying in bed, awake, alert, cooperative, no apparent distress  HEAD: Normocephalic, atraumatic  EYES: No sclera icterus, pupils equal  LUNGS:  No increased work of breathing  CARDIOVASCULAR:  Regular rate  ABDOMEN:  Soft, mild TTP at site of liver biopsy. non-distended.   EXTREMITIES: No edema or swelling  SKIN: Warm and dry    Assessment/Plan:  70 y.o. male with choledocholithiasis and cholangitis resulting in a right hepatic lobe abscess, portal vein thrombosis    - liver biopsy showed chronic hepatitis, no malignancy  - no growth present in culture  - eventually needs cholecystectomy, likely outpatient  - f/u with Dr. Emilee Stinson in 2 weeks to discuss cholecystectomy    Electronically signed by Byron Theodore DO on 2/5/2021 at 6:18 AM       Improving  Biopsy shows micro abscesses with hepatitis  Check acute hepatitis panel  Will eventually need cholecystectomy    Electronically signed by Vivian Cordoba MD on 2/5/2021 at 4:09 PM

## 2021-02-05 NOTE — PROGRESS NOTES
Manju Adler M.D.,[facility]  Joby Farley D.O., F.A.C.O.I., Onel Ellis M.D. Beltran Chirinos M.D., Tran Talavera M.D. Kp Ferrell D.O. Daily Pulmonary Progress Note    Patient:  Isabel Flores 70 y.o. male MRN: [id number]     Date of Service: 2/5/2021      Synopsis     We are following patient for fatigue and weakness cough and right pleural effusion    \"CC\" ; abdominal pain and diarrhea    Code status: Full code      Subjective      2/3 Patient was seen and examined. Is laying in bed on room air no acute distress. Had thoracentesis earlier today. 2/4 : patient lying in bed, still has most cough. No evnts over night    2/5: Patient seen and examined. He is sitting up in bed working on his phone to dial a number. He is on room air looks much better and perked up denies any abdominal pain or shortness of breath cough is improved. Only complaint he has is swelling in his right leg with some pain. Review of Systems:  Constitutional: Denies fever, weight loss, night sweats, and fatigue  Skin: Denies pigmentation, dark lesions, and rashes   HEENT: Denies hearing loss, tinnitus, ear drainage, epistaxis, sore throat, and hoarseness. Cardiovascular: Denies palpitations, chest pain, and chest pressure. Respiratory: Denies cough, dyspnea at rest, hemoptysis, apnea, and choking.   Gastrointestinal: Denies nausea, vomiting, poor appetite, diarrhea, heartburn or reflux  Genitourinary: Denies dysuria, frequency, urgency or hematuria  Musculoskeletal: Denies myalgias, muscle weakness, and bone pain  Neurological: Denies dizziness, vertigo, headache, and focal weakness  Psychological: Denies anxiety and depression  Endocrine: Denies heat intolerance and cold intolerance  Hematopoietic/Lymphatic: Denies bleeding problems and blood transfusions    24-hour events:      Objective   Vitals: /61   Pulse 78   Temp 98.1 °F (36.7 °C) (Oral)   Resp 16   Ht 5' 11\" (1.803 m) Wt 165 lb 4.8 oz (75 kg)   SpO2 93%   BMI 23.05 kg/m²     I/O:    Intake/Output Summary (Last 24 hours) at 2/5/2021 1333  Last data filed at 2/5/2021 0429  Gross per 24 hour   Intake 429 ml   Output 150 ml   Net 279 ml       Vent Information  SpO2: 93 %                CURRENT MEDS :  Scheduled Meds:   lidocaine PF  5 mL Intradermal Once    heparin flush  3 mL Intravenous 2 times per day    piperacillin-tazobactam  3,375 mg Intravenous Q8H    albuterol  2.5 mg Nebulization Q4H    pantoprazole  40 mg Intravenous BID    And    sodium chloride (PF)  10 mL Intravenous BID    sodium chloride flush  10 mL Intravenous 2 times per day    folic acid  1 mg Oral Daily    thiamine  100 mg Oral Daily    multivitamin  1 tablet Oral Daily    lisinopril  10 mg Oral Daily    metoprolol tartrate  25 mg Oral BID       Physical Exam:  General Appearance: appears comfortable in no acute distress. HEENT: Normocephalic atraumatic without obvious abnormality , has a left ear resection before  Neck: Lips, mucosa, and tongue normal.  Supple, symmetrical, trachea midline, no adenopathy;thyroid:  no enlargement/tenderness/nodules or JVD. Lung: Breath sounds CTA. Respirations   unlabored. Symmetrical expansion. Heart: RRR, normal S1, S2. No MRG  Abdomen: Soft, NT, ND. BS present x 4 quadrants. No bruit or organomegaly. Extremities: Has edema in right lower extremity positive peripheral pulses equal bilaterally  Neurologic: Mental status: Alert and Oriented X3 .     Pertinent/ New Labs and Imaging Studies           Labs:  Lab Results   Component Value Date    WBC 12.5 02/05/2021    HGB 10.3 02/05/2021    HCT 31.9 02/05/2021    MCV 90.6 02/05/2021    MCH 29.3 02/05/2021    MCHC 32.3 02/05/2021    RDW 14.3 02/05/2021     02/05/2021    MPV 10.2 02/05/2021     Lab Results   Component Value Date     02/05/2021    K 4.6 02/05/2021    K 3.2 01/28/2021     02/05/2021    CO2 28 02/05/2021    BUN 3 02/05/2021 CREATININE 0.7 02/05/2021    LABALBU 2.6 02/05/2021    CALCIUM 8.4 02/05/2021    GFRAA >60 02/05/2021    LABGLOM >60 02/05/2021     Lab Results   Component Value Date    PROTIME 17.7 02/01/2021    INR 1.5 02/01/2021     No results for input(s): PROBNP in the last 72 hours. No results for input(s): PROCAL in the last 72 hours. This SmartLink has not been configured with any valid records. Micro:  Recent Labs     02/02/21  1620   CULTRESP Culture in progress  Oral Pharyngeal Leona reduced  Additional growth present, also evaluating for;  Yeast  Oral Pharyngeal Leona reduced  *  Light growth  Identification and sensitivity to follow       Recent Labs     02/03/21  0945   LABGRAM Refer to ordered Gram stain for results     No results for input(s): LEGUR in the last 72 hours. Recent Labs     02/02/21  1620   STREPNEUMAGU Presumptive negative- suggests no current or recent  pneumococcal infection. Infection due to Strep pneumoniae cannot be  ruled out since the antigen present in the sample  may be below the detection limit of the test.  Normal Range:Presumptive Negative       Recent Labs     02/02/21  1620   LP1UAG Presumptive Negative -suggesting no recent or current infections  with Legionella pneumophila serogroup 1. Infection to Legionella cannot be ruled out since other serogroups  and species may cause infection, antigen may not be present in  early infection, or level of antigen may be below the  detection limit.   Normal Range: Presumptive Negative       CULTURE, RESPIRATORY Abnormal  02/02/2021  4:20 PM [facility] - [address] [facility]   Culture in progress   Oral Pharyngeal Leona reduced   Additional growth present, also evaluating for;   Yeast    Smear, Respiratory 02/02/2021  4:20 PM [facility] - [address] [facility]   Group 6: <25 PMN's/LPF and <25 Epithelial cells/LPF   Polymorphonuclear leukocytes not seen   Rare Epithelial cells   Rare Gram negative rods   Few Gram positive rods    Organism Abnormal  02/02/2021  4:20 PM [facility]   GNR oxidase positive    CULTURE, RESPIRATORY 02/02/2021  4:20 PM [facility]   Light growth   Identification and sensitivity to follow          Assessment:    1. Moderate right side pleural effusion status post thoracentesis  2. Chronic cough, respiratory cultures now growing  GNR oxidase positive  3. Probably underlying COPD unknown severity  4. PAF/MAT   5. Multiple hepatic lesions mostly abscesses  6. Choledocholithiasis with ascending cholangitis  7. ERCP with papillotomy, stone extractions and Wallstent placement on 1/29/2021.  8. Stable post ERCP pancreatitis         Plan:   1. Pleural fluid chemistry consistent with transudate and cytology negative for malignancy  2. Antibiotics consolidated to zosyn per ID for another 10 days. PICC line has been ordered. 3. Will add flutter valve and albuterol  4. Liver biopsy results showed chronic active hepatitis with acute suppurative inflammation and necrosis consistent with abscess negative for malignancy  5. Right lower extremity Doppler has been ordered by primary team , although there is low probability for DVT since patient has been on anticoagulation with heparin    Patient is stable to be discharged from a pulmonary standpoint.     Electronically signed by Lynne Wright MD on 2/5/2021 at 1:33 PM

## 2021-02-05 NOTE — PROGRESS NOTES
[facility] Progress Note    Admitting Date and Time: 1/27/2021  2:40 AM  Admit Dx: Diarrhea [R19.7]    Subjective:  Patient is being followed for Diarrhea [R19.7]   Pt feels ok, denied having abd pain. ROS: denies fever, chills, cp, sob, n/v, HA unless stated above.      piperacillin-tazobactam  3,375 mg Intravenous Q8H    albuterol  2.5 mg Nebulization Q4H    pantoprazole  40 mg Intravenous BID    And    sodium chloride (PF)  10 mL Intravenous BID    sodium chloride flush  10 mL Intravenous 2 times per day    folic acid  1 mg Oral Daily    thiamine  100 mg Oral Daily    multivitamin  1 tablet Oral Daily    lisinopril  10 mg Oral Daily    metoprolol tartrate  25 mg Oral BID         sodium chloride flush, 10 mL, PRN      heparin (porcine), 4,000 Units, PRN      heparin (porcine), 2,000 Units, PRN      HYDROmorphone, 0.5 mg, Q4H PRN      perflutren lipid microspheres, 1.5 mL, ONCE PRN      HYDROcodone 5 mg - acetaminophen, 1 tablet, Q6H PRN      hydrALAZINE, 5 mg, Q6H PRN      sodium chloride flush, 10 mL, PRN      promethazine, 12.5 mg, Q6H PRN    Or      ondansetron, 4 mg, Q6H PRN      polyethylene glycol, 17 g, Daily PRN      acetaminophen, 650 mg, Q6H PRN    Or      acetaminophen, 650 mg, Q6H PRN      LORazepam, 1 mg, Q1H PRN    Or      LORazepam, 1 mg, Q1H PRN    Or      LORazepam, 2 mg, Q1H PRN    Or      LORazepam, 2 mg, Q1H PRN    Or      LORazepam, 3 mg, Q1H PRN    Or      LORazepam, 3 mg, Q1H PRN    Or      LORazepam, 4 mg, Q1H PRN    Or      LORazepam, 4 mg, Q1H PRN         Objective:    /61   Pulse 78   Temp 98.1 °F (36.7 °C) (Oral)   Resp 16   Ht 5' 11\" (1.803 m)   Wt 165 lb 4.8 oz (75 kg)   SpO2 93%   BMI 23.05 kg/m²     General Appearance: alert and oriented to person, place and time  Skin: warm and dry  Head: normocephalic and atraumatic  Eyes: pupils equal, round, and reactive to light, extraocular eye movements intact, conjunctivae normal  Neck: neck supple and non tender without mass   Pulmonary/Chest: clear to auscultation bilaterally- no wheezes, rales or rhonchi, normal air movement, no respiratory distress  Cardiovascular: normal rate, normal S1 and S2 and no carotid bruits  Abdomen: soft, non-tender, non-distended, normal bowel sounds, no masses or organomegaly  Extremities: no cyanosis, no clubbing and no edema  Neurologic: no cranial nerve deficit and speech normal        Recent Labs     02/03/21  0600 02/04/21  0400 02/05/21  0452    132 136   K 4.0 3.9 4.6    100 101   CO2 27 25 28   BUN 4* 4* 3*   CREATININE 0.6* 0.6* 0.7   GLUCOSE 94 92 123*   CALCIUM 8.3* 7.8* 8.4*       Recent Labs     02/03/21  0600 02/04/21  0400 02/05/21  0452   WBC 13.2* 13.8* 12.5*   RBC 3.81 3.42* 3.52*   HGB 11.2* 10.3* 10.3*   HCT 34.6* 31.1* 31.9*   MCV 90.8 90.9 90.6   MCH 29.4 30.1 29.3   MCHC 32.4 33.1 32.3   RDW 14.1 14.2 14.3   * 500* 532*   MPV 10.4 10.4 10.2       Radiology:   MRCP:  1. Choledocholithiasis associated with severe extrahepatic biliary dilation   and mild central intrahepatic biliary dilation.  Evaluation for superimposed   cholangitis is limited. 2. Cholelithiasis with no definite findings of cholecystitis. 3. Limited evaluation of the liver due to motion artifact.  Signal   abnormalities seen primarily in hepatic segment 7 correspond with the CT   appearance and are suspicious for microabscesses.  However, an infiltrative   malignancy could appear similar.  Consider evaluation with liver protocol   abdomen CT, which would be less affected by motion. 4. Complicated fluid collections along the caudate lobe of the liver, between   right adrenal gland right diaphragmatic deo, adjacent to the retrohepatic   inferior vena cava, and likely within the christopher hepatis are suspicious for   abscesses given the above findings.    5. Mild dilation of the downstream pancreatic duct potentially due to mass   effect from  Liver abscesses with cholangitis and choledocholithiasis appreciate ID input, was on flagyl and IV ceftriaxone. Switched to zosyn for 10 more days. Path was negative for malignancy, showed inflammation and necrosis  7.  RLL pneumonia and effusion, s.p thoracentesis with pulm, cultures GNR oxi positive ? pseudo, continue zosyn   8.  ? IVF thormbus, was on heparin, now switching to eliquis. 9.  Dispo, initially patient refused SNF, now agreeable, will need precert    NOTE: This report was transcribed using voice recognition software. Every effort was made to ensure accuracy; however, inadvertent computerized transcription errors may be present.   Electronically signed by Krystyna Oneil MD on 2/5/2021 at 8:57 AM

## 2021-02-05 NOTE — PLAN OF CARE
Problem: Falls - Risk of:  Goal: Will remain free from falls  Description: Will remain free from falls  Outcome: Met This Shift  Goal: Absence of physical injury  Description: Absence of physical injury  Outcome: Met This Shift     Problem: Skin Integrity:  Goal: Will show no infection signs and symptoms  Description: Will show no infection signs and symptoms  Outcome: Met This Shift  Goal: Absence of new skin breakdown  Description: Absence of new skin breakdown  Outcome: Met This Shift     Problem: Pain:  Goal: Pain level will decrease  Description: Pain level will decrease  Outcome: Met This Shift  Goal: Control of acute pain  Description: Control of acute pain  Outcome: Met This Shift  Goal: Control of chronic pain  Description: Control of chronic pain  Outcome: Met This Shift     Problem: Inadequate oral food/beverage intake (NI-2.1)  Goal: Food and/or Nutrient Delivery  Description: Individualized approach for food/nutrient provision.   2/4/2021 1357 by Renato Cheng RD, CNSC, LD  Outcome: Met This Shift

## 2021-02-05 NOTE — DISCHARGE SUMMARY
[facility] Physician Discharge Summary       Dimas Ramires MD  [place] [address]  [phone]    In 2 weeks  discuss cholecystectomy    Rosita Okeefe MD  [address] [place] [address]  [place] [address]  [phone]    Schedule an appointment as soon as possible for a visit in 2 weeks            Patient has a PCP appointment with Jen Clay -[phone] - [facility]. Feb. at 12:30pm . Need to follow up to get established with PCP for Viraj Valentin to continue. [facility] [address] [phone]  Formerly [facility] [address]  [phone]          Activity level: As tolerated     Dispo: home    Condition on discharge: Stable     Patient ID:  Zuleika Griffin  [id number]  89 y.o.  1945    Admit date: 1/27/2021    Discharge date and time:  2/7/2021  3:27 PM    Admission Diagnoses: Active Problems:    Diarrhea    Essential hypertension    Atrial fibrillation (HCC)    Hypokalemia    Functional diarrhea    Gallstones    Choledocholithiasis    Chronic obstructive pulmonary disease (HCC)    Alcohol abuse    Atrial fibrillation, new onset (HCC)    Liver lesion    Hepatic abscess    Portal vein thrombosis  Resolved Problems:    * No resolved hospital problems. *      Discharge Diagnoses: Active Problems:    Diarrhea    Essential hypertension    Atrial fibrillation (HCC)    Hypokalemia    Functional diarrhea    Gallstones    Choledocholithiasis    Chronic obstructive pulmonary disease (HCC)    Alcohol abuse    Atrial fibrillation, new onset (HCC)    Liver lesion    Hepatic abscess    Portal vein thrombosis  Resolved Problems:    * No resolved hospital problems.  *      Consults:  IP CONSULT TO CARDIOLOGY  IP CONSULT TO GENERAL SURGERY  IP CONSULT TO GENERAL SURGERY  IP CONSULT TO GI  IP CONSULT TO INFECTIOUS DISEASES  IP CONSULT TO DIETITIAN  IP CONSULT TO PULMONOLOGY  IP CONSULT TO IV TEAM  IP CONSULT TO IV TEAM  IP CONSULT TO IV TEAM  IP CONSULT TO [facility] Course:   Patient Lyndsay Harris is a 70 y.o. presented with abd pain and  Diarrhea [R19.7]    1.  Abd pain, not sure about the etiology, with the presence of diarrhea, ruled  c-diff, resolved  2.  Dilated biliary tree, no signs of obstruction, no elevated bilirubin, with the presence of liver lesions/cysts, need to rule out malignancy CT guided biopsy was done pending results, MRCP as above, s/p ERCP with papillotomy, stones extraction, and Wallstent placement. 1/29/2021 - Markedly dilated common bile duct with filling defect consistent with stones.  Papillotomy was done, and multiple three large stones as well as pus and gravel were extracted. Small area of stricture in the intrapapillary portion of the common bile duct.  A Wallstent 10 x 60 was placed without difficulty.  Excellent drainage was obtained. consulted surgery, outpatient cholecystectomy. Cholecystectomy as outpatient in 2 weeks  3. Post-ERCP pancreatitis, treated with fluids and pain management, improved, will need a repeat ERCP for stent removal as outpatient  3.  Afib? MAT, rate is controlled, patient hasn't been following with any doctor, and he is not aware of any medical problems, obtained an echo, start on metoprolol, [facility], will switch to eliquis, need to hold eliquis 2 days before cholecystectomy   4. Dysphgia, we obtained speech eval. Dysphagia 3 diet  Low fat diet as tolerated per SLP recommendations   5. Alcohol abuse, about 5 beers a day, monitor for withdrawals. 6.  Liver abscesses with cholangitis and choledocholithiasis appreciate ID input, was on flagyl and IV ceftriaxone. Switched to zosyn for 10 more days. Path was negative for malignancy, showed inflammation and necrosis  7.   RLL pneumonia and effusion, s.p thoracentesis with pulm, cultures [id number]     Procedure Date    01/28/2021   Corporate ID                  Ordering          Delvis Mchugh MD                                Physician   Accession       [id number]    Referring  Number                        Physician   Date of Birth   1945    Sonographer       Anjana FISHER   Age             70 year(s)    Interpreting      [address]                                Physician         Physician Cardiology                                                  Delvis Mchugh MD                                 Any Other  Procedure Type of Study   TTE procedure:Echo Complete W/Doppler & Color Flow. Procedure Date Date: 01/28/2021 Start: 11:48 AM Study Location: Echo Lab Technical Quality: Adequate visualization Indications:Atrial fibrillation. Patient Status: Routine Height: 70 inches Weight: 160 pounds BSA: 1.9 m^2 BMI: 22.96 kg/m^2 HR: 71 bpm BP: 162/76 mmHg  Findings   Left Ventricle  Left ventricular internal dimensions were normal in diastole and systole. Borderline concentric left ventricular hypertrophy. No regional wall motion abnormalities seen. Normal left ventricular ejection fraction. Ejection fraction is visually estimated at 65%. Normal left ventricular diastolic filling pattern. Right Ventricle  Borderline dilated right ventricle. Right ventricle global systolic function is normal .   Left Atrium  Normal sized left atrium. Interatrial septum appears intact. Right Atrium  Normal right atrium size. Mitral Valve  Normal mitral valve structure and function. Tricuspid Valve  The tricuspid valve appears structurally normal.   Aortic Valve  The aortic valve appears mildly sclerotic. Pulmonic Valve  The pulmonic valve was not well visualized. Pericardial Effusion  No evidence of pericardial effusion. Aorta  Aortic root dimension within normal limits. Conclusions   Summary  Left ventricular internal dimensions were normal in diastole and systole.   Borderline concentric left ventricular hypertrophy. No regional wall motion abnormalities seen. Normal left ventricular ejection fraction. Borderline dilated right ventricle.    Signature   ----------------------------------------------------------------  Electronically signed by Goldie Lopez MD(Interpreting  physician) on 01/28/2021 12:41 PM  ----------------------------------------------------------------  M-Mode/2D Measurements & Calculations   LV Diastolic    LV Systolic Dimension: 2.4   AV Cusp Separation: 1.3 cmLA  Dimension: 3.7  cm                           Dimension: 4.5 cmAO Root  cm              LV Volume Diastolic: 51.9 ml Dimension: 2.8 cm  LV FS:35.1 %    LV Volume Systolic: 38.1 ml  LV PW           LV EDV/LV EDV Index: 80.2  Diastolic: 1.2  LG/27 LF/U^9CU ESV/LV ESV  cm              Index: 20.3 ml/11ml/ m^2     RV Diastolic Dimension: 3.3  LV PW Systolic: EF Calculated: 40.0 %        cm  1.4 cm          LV Mass Index: 82 l/min*m^2  Septum                                       LA/Aorta: 1.01  Diastolic: 1.3                               Ascending Aorta: 3.4 cm  cm              LVOT: 2 cm                   LA volume/Index: 51 ml  Septum                                       /44MN/C^7  Systolic: 1.4                                RA Area: 17.7 cm^2  cm  CO: 5.62 l/min                               IVC Expiration: 1.3 cm  CI: 2.96  l/m*m^2  LV Mass: 156.75  g  Doppler Measurements & Calculations   MV Peak E-Wave: 1.17 AV Peak Velocity: 1.66 LVOT Peak Velocity: 1.33 m/s  m/s                  m/s                    LVOT Mean Velocity: 0.93 m/s  MV Peak A-Wave: 0.88 AV Peak Gradient:      LVOT Peak Gradient: 7.1  m/s                  10.98 mmHg             mmHgLVOT Mean Gradient: 3.9  MV E/A Ratio: 1.34   AV Mean Velocity: 1.1  mmHg  MV Peak Gradient:    m/s                    Estimated RVSP: 37.2 mmHg  7.9 mmHg             AV Mean Gradient: 5.8  Estimated RAP:3 mmHg  MV Mean Gradient:    mmHg  2.8 mmHg is a low-density nodule measuring about 2 cm. It does not clearly emanate from the adrenal gland. In any case it may be an adrenal adenoma or could be a lymph node or nonspecific cystic lesion. There is cholelithiasis. There is mild intrahepatic and extrahepatic biliary dilatation. CBD stones are suspected. The spleen, pancreas and left adrenal gland are unremarkable. There is bilateral nephrolithiasis. There are several right renal cysts. GI/Bowel: There are no findings of intestinal obstruction. The appendix is normal.  There is sigmoid diverticulosis. There is no diverticulitis seen. Pelvis:  Bladder is unremarkable in appearance. There is no abnormal pelvic mass or fluid collection seen. Peritoneum/Retroperitoneum: There are aortoiliac atherosclerotic calcification. There is no abdominal aortic aneurysm. There is no free intraperitoneal air. There is no abnormal fluid collection. Bones/Soft Tissues: There is no acute bony or soft tissue abnormality seen. There are numerous liver lesions. Multiple small lesions are possibly cystic. Larger lesions in the left lobe are more nonspecific. The right lobe is also heterogeneous. MRI correlation is recommended for characterization of these abnormalities. Cholelithiasis. Biliary dilatation and probable multiple common bile duct stones. Soft tissue nodule adjacent to the diaphragmatic deo and right intrarenal gland measuring 2 cm. This this may be an adrenal adenoma or lymph node. Sigmoid diverticulosis. No acute diverticulitis. Small right pleural effusion with basilar atelectasis. Us Gallbladder Ruq    Result Date: 1/27/2021  EXAMINATION: RIGHT UPPER QUADRANT ULTRASOUND 1/27/2021 10:30 am COMPARISON: AP CT 01/27/2021 HISTORY: ORDERING SYSTEM PROVIDED HISTORY: Diffuse stabbing and cramping abdominal pain and diarrhea on going for 8 days and is worsening TECHNOLOGIST PROVIDED HISTORY: Epigastric abdominal pain.  Reason for exam:->evaluate What reading provider will be dictating this exam?->CRC FINDINGS: Normal-appearing pancreas. No right upper quadrant ascites. Normal aorta and patent IVC. No definite ultrasound evidence of lesion within the liver parenchyma. Nonspecific nodular density in christopher hepatis region, near the left hepatic lobe, may be a 5 cm enlarged lymph node. A right renal non-specific, smoothly marginated, hypoechoic lesion is statistically most likely a cyst.  There is no evidence of mural nodularity, septation, wall thickening, or calcification. It measures 4.7 cm in the lateral midpole region, exophytic. Multiple shadowing gallstones in gallbladder lumen. No gallbladder wall thickening. No pericholecystic fluid. Distended common bile duct up to 12.5 mm diameter. Non-specific echogenic material in the common bile duct lumen may be sludge and/or calculi. Cholecystolithiasis. Suggestion also of choledocholithiasis with common bile duct distention Solid-appearing mass adjacent to the left hepatic lobe, in the christopher hepatis region, may be artifact from the duodenum. Differential includes enlarged lymph node Simple appearing right renal cyst    Xr Chest Portable    Result Date: 1/27/2021  EXAMINATION: ONE XRAY VIEW OF THE CHEST 1/27/2021 5:19 am COMPARISON: None. HISTORY: ORDERING SYSTEM PROVIDED HISTORY: cough TECHNOLOGIST PROVIDED HISTORY: Reason for exam:->cough FINDINGS: Heart size is normal. Pulmonary vasculature is not congested. Mediastinal and hilar contours are acceptable. The lungs are clear. The pleural spaces are clear. There is no pneumothorax. There is no acute abnormality seen.     Mri Abdomen W Wo Contrast Mrcp    Result Date: 1/28/2021  EXAMINATION: MRI OF THE ABDOMEN WITH AND WITHOUT CONTRAST AND MRCP 1/28/2021 11:00 am TECHNIQUE: Multiplanar multisequence MRI of the abdomen was performed with and without the administration of intravenous contrast. COMPARISON: Limited abdominal sonogram 01/27/2021, abdomen and pelvis CT 01/27/2021 HISTORY: ORDERING SYSTEM PROVIDED HISTORY: evaluate liver lesions- body radiologist to read only TECHNOLOGIST PROVIDED HISTORY: Reason for exam:->evaluate liver lesions- body radiologist to read only FINDINGS: Patient motion in some areas, partially limiting evaluation of those areas. LOWER CHEST:  Small right pleural effusion with associated minimal passive atelectasis in the right lower lobe. Minimal gravity dependent atelectasis in the left lower lobe. Normal heart size. No pericardial nor left pleural effusions. LIVER:  Mildly enlarged. Indistinct intermediate T2 signal primarily in segment 7 with associated hypoenhancement, diffusion restriction, and possible washout. No definite steatosis nor findings of cirrhosis. Likely subcapsular thick-walled fluid collection with suspected proteinaceous fluid along the medial portion segment. Nearby collection of similar appearance between the right adrenal gland and right diaphragmatic deo, measuring approximately 2.3 cm x 1.9 cm x 2.6 cm. Additional collections suspected but not well seen adjacent to the retrohepatic inferior vena cava and in the christopher hepatis near the portal veins. GALLBLADDER/BILE DUCTS:  Gallstones filling much of the partially decompressed gallbladder lumen. No gallbladder wall thickening nor pericholecystic stranding. Mild central intrahepatic biliary dilation and severe extrahepatic biliary dilation. Multiple stones in the common duct to the level of the ampulla. PANCREAS:  Typical duct configuration. Mild dilation of the downstream duct. Moderate parenchymal atrophy. SPLEEN:  No obvious abnormality. ADRENAL GLANDS:  No obvious abnormality. KIDNEYS:  4.1 cm exophytic cyst arising from the lateral right upper pole with layering blood products (Bosniak category 2). 3.0 cm exophytic simple cyst arising from the posterior right lower pole (Bosniak category 1). No obvious abnormality of the left kidney.  GI/BOWEL: Normal course and caliber of the stomach, small bowel, and colon without obstruction. Normal appendix. At least minimal colonic diverticulosis with no evident findings of acute diverticulitis. PELVIS:  Not included. PERITONEUM/RETROPERITONEUM:  No abdominal lymphadenopathy. No free intraperitoneal fluid. VESSELS:  Suspected typical hepatic arterial anatomy better seen CT. Limited evaluation of the portal and hepatic veins. Apparent patency of the inferior vena cava. SOFT TISSUES/BONES:  No suspicious marrow lesions. Multilevel degenerative disc disease. 1. Choledocholithiasis associated with severe extrahepatic biliary dilation and mild central intrahepatic biliary dilation. Evaluation for superimposed cholangitis is limited. 2. Cholelithiasis with no definite findings of cholecystitis. 3. Limited evaluation of the liver due to motion artifact. Signal abnormalities seen primarily in hepatic segment 7 correspond with the CT appearance and are suspicious for microabscesses. However, an infiltrative malignancy could appear similar. Consider evaluation with liver protocol abdomen CT, which would be less affected by motion. 4. Complicated fluid collections along the caudate lobe of the liver, between right adrenal gland right diaphragmatic deo, adjacent to the retrohepatic inferior vena cava, and likely within the christopher hepatis are suspicious for abscesses given the above findings. 5. Mild dilation of the downstream pancreatic duct potentially due to mass effect from stones in the distal common bile duct. Alternatively, this could be related to moderate parenchymal atrophy. 6. Small right pleural effusion.       Patient Instructions:      Medication List      START taking these medications    apixaban 5 MG Tabs tablet  Commonly known as: Eliquis  Take 1 tablet by mouth 2 times daily     folic acid 1 MG tablet  Commonly known as: FOLVITE  Take 1 tablet by mouth daily     multivitamin Tabs tablet  Take 1 tablet by mouth daily     piperacillin-tazobactam 3.375 (3-0.375) g injection  Commonly known as: Zosyn  Infuse 3,375 mg intravenously every 8 hours for 10 days     thiamine 100 MG tablet  Take 1 tablet by mouth daily           Where to Get Your Medications      These medications were sent to [address] [place] [address] [phone]  [facility], [address] [place] [address]    Phone: [phone]   · apixaban 5 MG Tabs tablet  · folic acid 1 MG tablet  · multivitamin Tabs tablet  · thiamine 100 MG tablet     You can get these medications from any pharmacy    Bring a paper prescription for each of these medications  · piperacillin-tazobactam 3.375 (3-0.375) g injection           Note that more than 30 minutes was spent in preparing discharge papers, discussing discharge with patient, medication review, etc.    Signed:  Electronically signed by Marisa Arrieta MD on 2/7/2021 at 3:27 PM

## 2021-02-05 NOTE — PROGRESS NOTES
Occupational Therapy  OT BEDSIDE TREATMENT NOTE      Date:2021  Patient Name: Maurice Kam  MRN: [id number]  : 1945  Room: [address] [place] [address]A     Referring Provider: KISHORE Keith - CNS     Evaluating OT: Aj Perkins OTR/L [id number]     AM-PAC Daily Activity Raw Score: 18/24     Recommended Adaptive Equipment: TBD     Royetta Lek. Pt presents to ED from home with abdominal cramping and diarrhea.      Pertinent Medical History: ETOH abuse   Precautions:  falls     Home Living: Pt is a questionable historian reports lives with wife in a 2 story home with 1st floor set up, pt reports he sleeps on the couch.  bath 1st floor.   Bathroom setup: tub/shower 2nd floor      Prior Level of Function: Per pt report Mod I with ADLs, family assists PRN with IADLs; completed functional mobility with no AD  Driving: Per pt yes     Pain Level: Abdomen; R LE- Mild     Cognition: A&O: 4/                Functional Assessment:    Initial Eval Status  Date: 21 Treatment session: 21 Short Term Goals      Feeding SBA       Grooming SBA  Standing sink level for hand hygiene   Independent   UB Dressing Min A  Donning/doffing hospital gown SBA to manage gown Independent   LB Dressing Mod A  Management of B socks   Mod I    Bathing Min A   Mod I   Toileting Min A  Use of grab bar for support in transfer and to maintain standing balance  Assist in posterior jorge care to ensure cleanliness Pt declined toileting at this time Mod I   Bed Mobility  Supine to sit: SBA  Supine<> sit: SBA     Functional Transfers STS: SBA  STS: Min Afrom EOB Mod I   Functional Mobility CGA with HHA  Hallway distance  Slow pace  Progressively forward flexed at trunk  Mild unsteadiness  Min A using ww household distance Mod I during ADLs   Balance Sitting: fair plus     Standing: fair/fair minus  Sitting: Independent  Standing: Min A     Activity Tolerance Fair minus Fair-  standing ghassan x6-7 min with fair plus balance during self care tasks                   B UE were WFL during tasks. Comments: Upon arrival pt was supine in bed. At end of session pt was transferred back to bed with daughter present, alarm on, all lines and tubes intact and call light within reach. Treatment: Pt c/o increasing R L pain. Per pt and daughter RN is aware. Instructed pt to use ww to decrease pressure on R LE during mobility,  Instruction for AD management provided to prevent falls. Pt R LE was elevated when supine in bed to decrease edema. Education: Benefits of AD, AD management and importance of mobility to increase tolerance to ADLs. · Pt has made good progress towards set goals. Plan of Care: 2-5 days/week for 1-2 weeks PRN   [x]????ADL retraining/adaptive techniques and AE recommendations to increase functional independence within precautions                    [x]? ? ??Energy conservation techniques to improve tolerance for ADL/IADLs  [x]???? Functional transfer/mobility training/DME recommendations for increased independence, safety and fall prevention         [x]????Patient/family education to increase safety and functional independence during daily routine          [x]? ? ??Environmental modifications for safe mobility and completion of ADLs                             []????Cognitive retraining to improve problem solving skills & safe participation in ADLs/IADLs     []?? ??Sensory re-education techniques to improve extremity awareness, maintain skin integrity and improve hand function                             []?? ??Visual/Perceptual retraining to improve body awareness and safety during transfers and ADLs  []????Splinting/positioning needs to maintain joint/skin integrity and contracture prevention  [x]?? ?? Therapeutic activity to improve functional performance during ADLs                                        [x]? ? ?? Therapeutic exercise to improve tolerance and functional strength for ADLs   [x]????Balance retraining/tolerance tasks for facilitation of postural control with dynamic challenges during ADLs  []????Neuromuscular re-education to facilitate righting/equilibrium reactions, midline orientation, scapular stability/mobility, normalize muscle tone and facilitate active functional movement                        []????Delirium prevention/treatment    [x]? ? ? Positioning to improve functional independence and decrease risk of skin breakdown  []????Other:     Treatment Charges: Mins Units   Ther Ex  [id number]     Manual Therapy [id number]     Thera Activities [id number]   ADL/Home Mgt [id number]     Neuro Re-ed [id number]     Group Therapy      Orthotic manage/training  [id number]     Non-Billable Time       Total Time: 1316 [address] MOREIRA/L [id number]

## 2021-02-06 ENCOUNTER — APPOINTMENT (OUTPATIENT)
Dept: ULTRASOUND IMAGING | Age: 76
DRG: 871 | End: 2021-02-06
Payer: MEDICARE

## 2021-02-06 ENCOUNTER — APPOINTMENT (OUTPATIENT)
Dept: GENERAL RADIOLOGY | Age: 76
DRG: 871 | End: 2021-02-06
Payer: MEDICARE

## 2021-02-06 LAB
ALBUMIN SERPL-MCNC: 2.5 G/DL (ref 3.5–5.2)
ALP BLD-CCNC: 81 U/L (ref 40–129)
ALT SERPL-CCNC: 8 U/L (ref 0–40)
AMMONIA: 29.5 UMOL/L (ref 16–60)
AMYLASE: 44 U/L (ref 20–100)
ANAEROBIC CULTURE: NORMAL
ANION GAP SERPL CALCULATED.3IONS-SCNC: 10 MMOL/L (ref 7–16)
APTT: 33.2 SEC (ref 24.5–35.1)
AST SERPL-CCNC: 12 U/L (ref 0–39)
BASOPHILS ABSOLUTE: 0.06 E9/L (ref 0–0.2)
BASOPHILS RELATIVE PERCENT: 0.5 % (ref 0–2)
BILIRUB SERPL-MCNC: 0.6 MG/DL (ref 0–1.2)
BODY FLUID CULTURE, STERILE: NORMAL
BUN BLDV-MCNC: 4 MG/DL (ref 8–23)
CALCIUM SERPL-MCNC: 8.3 MG/DL (ref 8.6–10.2)
CHLORIDE BLD-SCNC: 99 MMOL/L (ref 98–107)
CO2: 25 MMOL/L (ref 22–29)
CREAT SERPL-MCNC: 0.6 MG/DL (ref 0.7–1.2)
CULTURE, RESPIRATORY: ABNORMAL
EOSINOPHILS ABSOLUTE: 0.13 E9/L (ref 0.05–0.5)
EOSINOPHILS RELATIVE PERCENT: 1.1 % (ref 0–6)
GFR AFRICAN AMERICAN: >60
GFR NON-AFRICAN AMERICAN: >60 ML/MIN/1.73
GLUCOSE BLD-MCNC: 95 MG/DL (ref 74–99)
GRAM STAIN RESULT: NORMAL
HCT VFR BLD CALC: 32.4 % (ref 37–54)
HEMOGLOBIN: 10.2 G/DL (ref 12.5–16.5)
IMMATURE GRANULOCYTES #: 0.1 E9/L
IMMATURE GRANULOCYTES %: 0.8 % (ref 0–5)
LIPASE: 104 U/L (ref 13–60)
LYMPHOCYTES ABSOLUTE: 1.73 E9/L (ref 1.5–4)
LYMPHOCYTES RELATIVE PERCENT: 14.5 % (ref 20–42)
MAGNESIUM: 1.8 MG/DL (ref 1.6–2.6)
MCH RBC QN AUTO: 28.7 PG (ref 26–35)
MCHC RBC AUTO-ENTMCNC: 31.5 % (ref 32–34.5)
MCV RBC AUTO: 91.3 FL (ref 80–99.9)
MONOCYTES ABSOLUTE: 1.22 E9/L (ref 0.1–0.95)
MONOCYTES RELATIVE PERCENT: 10.2 % (ref 2–12)
NEUTROPHILS ABSOLUTE: 8.68 E9/L (ref 1.8–7.3)
NEUTROPHILS RELATIVE PERCENT: 72.9 % (ref 43–80)
ORGANISM: ABNORMAL
ORGANISM: ABNORMAL
PDW BLD-RTO: 14.2 FL (ref 11.5–15)
PHOSPHORUS: 3.7 MG/DL (ref 2.5–4.5)
PLATELET # BLD: 539 E9/L (ref 130–450)
PMV BLD AUTO: 9.9 FL (ref 7–12)
POTASSIUM SERPL-SCNC: 4.5 MMOL/L (ref 3.5–5)
RBC # BLD: 3.55 E12/L (ref 3.8–5.8)
SMEAR, RESPIRATORY: ABNORMAL
SODIUM BLD-SCNC: 134 MMOL/L (ref 132–146)
TOTAL PROTEIN: 6.4 G/DL (ref 6.4–8.3)
WBC # BLD: 11.9 E9/L (ref 4.5–11.5)

## 2021-02-06 PROCEDURE — 2060000000 HC ICU INTERMEDIATE R&B

## 2021-02-06 PROCEDURE — 2580000003 HC RX 258: Performed by: SPECIALIST

## 2021-02-06 PROCEDURE — 6360000002 HC RX W HCPCS: Performed by: SPECIALIST

## 2021-02-06 PROCEDURE — 82140 ASSAY OF AMMONIA: CPT

## 2021-02-06 PROCEDURE — 82150 ASSAY OF AMYLASE: CPT

## 2021-02-06 PROCEDURE — 85025 COMPLETE CBC W/AUTO DIFF WBC: CPT

## 2021-02-06 PROCEDURE — 6360000002 HC RX W HCPCS: Performed by: NURSE PRACTITIONER

## 2021-02-06 PROCEDURE — 2500000003 HC RX 250 WO HCPCS: Performed by: SPECIALIST

## 2021-02-06 PROCEDURE — 93970 EXTREMITY STUDY: CPT

## 2021-02-06 PROCEDURE — 85730 THROMBOPLASTIN TIME PARTIAL: CPT

## 2021-02-06 PROCEDURE — 6360000002 HC RX W HCPCS: Performed by: INTERNAL MEDICINE

## 2021-02-06 PROCEDURE — C1751 CATH, INF, PER/CENT/MIDLINE: HCPCS

## 2021-02-06 PROCEDURE — 76937 US GUIDE VASCULAR ACCESS: CPT

## 2021-02-06 PROCEDURE — 83690 ASSAY OF LIPASE: CPT

## 2021-02-06 PROCEDURE — 84100 ASSAY OF PHOSPHORUS: CPT

## 2021-02-06 PROCEDURE — 36415 COLL VENOUS BLD VENIPUNCTURE: CPT

## 2021-02-06 PROCEDURE — C9113 INJ PANTOPRAZOLE SODIUM, VIA: HCPCS | Performed by: NURSE PRACTITIONER

## 2021-02-06 PROCEDURE — 6370000000 HC RX 637 (ALT 250 FOR IP): Performed by: INTERNAL MEDICINE

## 2021-02-06 PROCEDURE — 2580000003 HC RX 258: Performed by: INTERNAL MEDICINE

## 2021-02-06 PROCEDURE — 99232 SBSQ HOSP IP/OBS MODERATE 35: CPT | Performed by: INTERNAL MEDICINE

## 2021-02-06 PROCEDURE — 80053 COMPREHEN METABOLIC PANEL: CPT

## 2021-02-06 PROCEDURE — 2580000003 HC RX 258: Performed by: NURSE PRACTITIONER

## 2021-02-06 PROCEDURE — 02HV33Z INSERTION OF INFUSION DEVICE INTO SUPERIOR VENA CAVA, PERCUTANEOUS APPROACH: ICD-10-PCS | Performed by: INTERNAL MEDICINE

## 2021-02-06 PROCEDURE — 94640 AIRWAY INHALATION TREATMENT: CPT

## 2021-02-06 PROCEDURE — 83735 ASSAY OF MAGNESIUM: CPT

## 2021-02-06 PROCEDURE — 36569 INSJ PICC 5 YR+ W/O IMAGING: CPT

## 2021-02-06 PROCEDURE — 71045 X-RAY EXAM CHEST 1 VIEW: CPT

## 2021-02-06 RX ADMIN — APIXABAN 5 MG: 5 TABLET, FILM COATED ORAL at 09:01

## 2021-02-06 RX ADMIN — PIPERACILLIN AND TAZOBACTAM 3375 MG: 3; .375 INJECTION, POWDER, FOR SOLUTION INTRAVENOUS at 06:00

## 2021-02-06 RX ADMIN — PIPERACILLIN AND TAZOBACTAM 3375 MG: 3; .375 INJECTION, POWDER, FOR SOLUTION INTRAVENOUS at 20:46

## 2021-02-06 RX ADMIN — Medication 10 ML: at 21:00

## 2021-02-06 RX ADMIN — LISINOPRIL 10 MG: 10 TABLET ORAL at 09:01

## 2021-02-06 RX ADMIN — HYDROCODONE BITARTRATE AND ACETAMINOPHEN 1 TABLET: 5; 325 TABLET ORAL at 06:28

## 2021-02-06 RX ADMIN — SODIUM CHLORIDE, PRESERVATIVE FREE 300 UNITS: 5 INJECTION INTRAVENOUS at 21:00

## 2021-02-06 RX ADMIN — METOPROLOL TARTRATE 25 MG: 25 TABLET, FILM COATED ORAL at 20:46

## 2021-02-06 RX ADMIN — ALBUTEROL SULFATE 2.5 MG: 2.5 SOLUTION RESPIRATORY (INHALATION) at 09:19

## 2021-02-06 RX ADMIN — PANTOPRAZOLE SODIUM 40 MG: 40 INJECTION, POWDER, FOR SOLUTION INTRAVENOUS at 09:01

## 2021-02-06 RX ADMIN — ALBUTEROL SULFATE 2.5 MG: 2.5 SOLUTION RESPIRATORY (INHALATION) at 20:09

## 2021-02-06 RX ADMIN — SODIUM CHLORIDE: 9 INJECTION, SOLUTION INTRAVENOUS at 08:59

## 2021-02-06 RX ADMIN — PANTOPRAZOLE SODIUM 40 MG: 40 INJECTION, POWDER, FOR SOLUTION INTRAVENOUS at 20:46

## 2021-02-06 RX ADMIN — APIXABAN 5 MG: 5 TABLET, FILM COATED ORAL at 20:46

## 2021-02-06 RX ADMIN — MULTIVITAMIN TABLET 1 TABLET: TABLET at 09:01

## 2021-02-06 RX ADMIN — SODIUM CHLORIDE, PRESERVATIVE FREE 10 ML: 5 INJECTION INTRAVENOUS at 21:00

## 2021-02-06 RX ADMIN — FOLIC ACID 1 MG: 1 TABLET ORAL at 09:01

## 2021-02-06 RX ADMIN — PIPERACILLIN AND TAZOBACTAM 3375 MG: 3; .375 INJECTION, POWDER, FOR SOLUTION INTRAVENOUS at 12:58

## 2021-02-06 RX ADMIN — METOPROLOL TARTRATE 25 MG: 25 TABLET, FILM COATED ORAL at 09:01

## 2021-02-06 RX ADMIN — LIDOCAINE HYDROCHLORIDE 1 ML: 10 INJECTION, SOLUTION EPIDURAL; INFILTRATION; INTRACAUDAL; PERINEURAL at 17:46

## 2021-02-06 RX ADMIN — SODIUM CHLORIDE, PRESERVATIVE FREE 10 ML: 5 INJECTION INTRAVENOUS at 09:00

## 2021-02-06 RX ADMIN — Medication 100 MG: at 09:01

## 2021-02-06 RX ADMIN — SODIUM CHLORIDE: 9 INJECTION, SOLUTION INTRAVENOUS at 06:55

## 2021-02-06 RX ADMIN — SODIUM CHLORIDE: 9 INJECTION, SOLUTION INTRAVENOUS at 18:35

## 2021-02-06 ASSESSMENT — PAIN SCALES - GENERAL: PAINLEVEL_OUTOF10: 7

## 2021-02-06 NOTE — PROCEDURES
PICC   Catheter insertion date 2/6/2021     Product Number:  CDC [id number] VPS   Lot No: [id number]   Gauge: 17   Lumen: 4fr single   R Basilic    Vein Diameter : 0.49cm   Upper arm circumference (10CM ABOVE AC): 28cm   Catheter Length : 42cm   Internal Length: 42cm   External Catheter Length: 0cm   Ultrasound Used:yes  CXR ordered  : ROSELYN Mohamud RN    CXR shows placement at [facility]. Floor nurse informed ok to use.

## 2021-02-06 NOTE — PROGRESS NOTES
[address] [place] [address] [facility]  [facility]  Progress Note  Face to face encounter   SUBJECTIVE:  Chief Complaint   Patient presents with    Diarrhea     Pt states having cramps after diarrhea x8 days. Denies fever. The patient says he has right foot pain. Denies trauma or history of gout. Denies any dyspnea or abdominal pain today  Tolerating antibiotics. Review of systems:  As stated above in the chief complaint, otherwise negative. Medications:  Scheduled Meds:   lidocaine PF  5 mL Intradermal Once    heparin flush  3 mL Intravenous 2 times per day    apixaban  5 mg Oral BID    piperacillin-tazobactam  3,375 mg Intravenous Q8H    albuterol  2.5 mg Nebulization Q4H    pantoprazole  40 mg Intravenous BID    And    sodium chloride (PF)  10 mL Intravenous BID    sodium chloride flush  10 mL Intravenous 2 times per day    folic acid  1 mg Oral Daily    thiamine  100 mg Oral Daily    multivitamin  1 tablet Oral Daily    lisinopril  10 mg Oral Daily    metoprolol tartrate  25 mg Oral BID     Continuous Infusions:   sodium chloride 12.5 mL/hr at 21 0859     PRN Meds:sodium chloride flush, heparin flush, sodium chloride flush, HYDROmorphone, perflutren lipid microspheres, HYDROcodone 5 mg - acetaminophen, hydrALAZINE, sodium chloride flush, promethazine **OR** ondansetron, polyethylene glycol, acetaminophen **OR** acetaminophen, LORazepam **OR** LORazepam **OR** LORazepam **OR** LORazepam **OR** LORazepam **OR** LORazepam **OR** LORazepam **OR** LORazepam    OBJECTIVE:  /63   Pulse 76   Temp 98.3 °F (36.8 °C) (Temporal)   Resp 18   Ht 5' 11\" (1.803 m)   Wt 166 lb (75.3 kg)   SpO2 90%   BMI 23.15 kg/m²   Temp  Av.8 °F (37.1 °C)  Min: 98.3 °F (36.8 °C)  Max: 99.4 °F (37.4 °C)  Constitutional: The patient is sitting up in bed. Visitor present. He is in no distress. Skin: Warm and dry. No rashes were noted. HEENT: Round and reactive pupils.   Moist mucous membranes. No ulcerations or thrush. Left ear is missing. Neck: Supple to movements. Chest: No respiratory distress. No crackles. Cardiovascular: Heart sounds rhythmic and regular. Abdomen: Slightly distended. Tender right upper quadrant. Extremities: Edema over the right foot and ankle noted. No erythema.   Lines: peripheral    Laboratory and Tests Review:  Lab Results   Component Value Date    WBC 11.9 (H) 02/06/2021    WBC 12.5 (H) 02/05/2021    WBC 13.8 (H) 02/04/2021    HGB 10.2 (L) 02/06/2021    HCT 32.4 (L) 02/06/2021    MCV 91.3 02/06/2021     (H) 02/06/2021     Lab Results   Component Value Date    NEUTROABS 8.68 (H) 02/06/2021    NEUTROABS 9.40 (H) 02/05/2021    NEUTROABS 11.04 (H) 02/04/2021     No results found for: [facility]  Lab Results   Component Value Date    ALT 8 02/06/2021    AST 12 02/06/2021    ALKPHOS 81 02/06/2021    BILITOT 0.6 02/06/2021     Lab Results   Component Value Date     02/06/2021    K 4.5 02/06/2021    K 3.2 01/28/2021    CL 99 02/06/2021    CO2 25 02/06/2021    BUN 4 02/06/2021    CREATININE 0.6 02/06/2021    CREATININE 0.7 02/05/2021    CREATININE 0.6 02/04/2021    GFRAA >60 02/06/2021    LABGLOM >60 02/06/2021    GLUCOSE 95 02/06/2021    PROT 6.4 02/06/2021    LABALBU 2.5 02/06/2021    CALCIUM 8.3 02/06/2021    BILITOT 0.6 02/06/2021    ALKPHOS 81 02/06/2021    AST 12 02/06/2021    ALT 8 02/06/2021     Lab Results   Component Value Date    CRP 12.2 (H) 01/31/2021    CRP 14.6 (H) 01/27/2021     Lab Results   Component Value Date    SEDRATE 62 (H) 01/31/2021    SEDRATE 60 (H) 01/27/2021     Radiology:    Microbiology:   C. difficile toxin assay: Not detected  Stool cultures: Negative  Giardia: None  Respiratory panel, including SARS-CoV-2: Not detected  Blood cultures 1/30/2021: Pending  Liver abscess 2/1/2021: Negative so far  Respiratory culture 2/2/2021: Pansensitive Pseudomonas aeruginosa, Candida albicans  Streptococcus pneumoniae/Legionella urine Ag: negative   pleural fluid 2/2/2021: Pending    No results for input(s): PROCAL in the last 72 hours. ASSESSMENT:  · Possible liver abscesses versus metastatic disease. Underwent liver biopsy 2/1/2021  · Choledocholithiasis with ascending cholangitis  · Status post ERCP with papillotomy, stone extraction and Wallstent placement 1/29/2021  · Leukocytosis associated to cholangitis and probable liver abscess  · Intermittent fever associated to the above, improving  · Alcohol withdrawal  · Dysphagia  · Right pleural effusion associated to the above. Underwent thoracentesis  · Right foot and leg edema. Possible DVT    PLAN:  · Consolidate antibiotics to Zosyn for another 10 days at least.  Reconciled  · Biopsy showed acute inflammation with necrosis and reactive change. Portal area showed moderate chronic inflammation. No granulomas. · PICC for IV antibiotics  · The patient can be discharged from ID standpoint. He is to follow-up with us in the office in a couple of weeks    Spoke with nursing    Informed Consent for PICC:  I explained the risks, benefits, alternative options, and potential complications associated with insertion of Peripherally Inserted Central Catheter (PICC) with the patient prior to the procedure.     Electronically signed by Rubio May MD on 2/6/2021 at 12:19 PM     Rubio May  12:19 PM  2/6/2021

## 2021-02-06 NOTE — PLAN OF CARE
Problem: Falls - Risk of:  Goal: Will remain free from falls  Description: Will remain free from falls  2/6/2021 1000 by Diane Carpenter RN  Outcome: Ongoing  2/6/2021 0220 by Debby Maher RN  Outcome: Met This Shift  Goal: Absence of physical injury  Description: Absence of physical injury  2/6/2021 1000 by Diane Carpenter RN  Outcome: Ongoing  2/6/2021 0220 by Debby Maher RN  Outcome: Met This Shift     Problem: Skin Integrity:  Goal: Will show no infection signs and symptoms  Description: Will show no infection signs and symptoms  2/6/2021 1000 by Diane Carpenter RN  Outcome: Ongoing  2/6/2021 0220 by Debby Maher RN  Outcome: Met This Shift  Goal: Absence of new skin breakdown  Description: Absence of new skin breakdown  2/6/2021 1000 by Diane Carpenter RN  Outcome: Ongoing  2/6/2021 0220 by Debby Maher RN  Outcome: Met This Shift

## 2021-02-06 NOTE — HOME CARE
ORDERED THERAPY AT TIME OF QUOTE: ceftriaxone 2gm iv q24h  COVERAGE: copay for drug; 80% for per akil  TOTAL PT RESPONSIBILITY: $87.48 per week    CALLED OUT TO PATIENTS SPOUSE AND SHE ASKED THAT WE SPEAK WITH DAUGHTER [address] [phone] TO MAKE SURE THEY ARE IN AGREEMENT WITH PRICING . .. LEFT VM AWAITING RETURN CALL.      Rosario Josue LPN   [facility] [address]

## 2021-02-06 NOTE — CARE COORDINATION
Social work / Discharge Planning:       RN updated social work that discharge plan has changed. Family does not want [facility] and prefer home with HC. Per RN, Dr. Sushant Jay is willing to follow for home care orders. Chart reviewed. [facility] had been following prior to plan for [facility]. Social work spoke to Kaity Velez from [facility]. Patient will need 1.) order for home care stating that Dr. Sushant Jay agrees to follow for home care. 2.) script for IV antibiotic needs to be faxed to [facility]#[phone].   3.) Patient still needs PICC line   4.) social work awaiting return call from [facility] to confirm that they are able to staff patient for discharge today. RN updated. Electronically signed by ANAYELI Lane on 2/6/2021 at 11:06 AM           Social work received call from [facility] that they cannot start services until tomorrow. RN updated.   Electronically signed by ANAYELI Lane on 2/6/2021 at 11:21 AM

## 2021-02-06 NOTE — PROGRESS NOTES
[facility] Progress Note    Admitting Date and Time: 1/27/2021  2:40 AM  Admit Dx: Diarrhea [R19.7]    Subjective:  Patient is being followed for Diarrhea [R19.7]   Pt feels ok, denied having abd pain. ROS: denies fever, chills, cp, sob, n/v, HA unless stated above.      lidocaine PF  5 mL Intradermal Once    heparin flush  3 mL Intravenous 2 times per day    apixaban  5 mg Oral BID    piperacillin-tazobactam  3,375 mg Intravenous Q8H    albuterol  2.5 mg Nebulization Q4H    pantoprazole  40 mg Intravenous BID    And    sodium chloride (PF)  10 mL Intravenous BID    sodium chloride flush  10 mL Intravenous 2 times per day    folic acid  1 mg Oral Daily    thiamine  100 mg Oral Daily    multivitamin  1 tablet Oral Daily    lisinopril  10 mg Oral Daily    metoprolol tartrate  25 mg Oral BID         sodium chloride flush, 10 mL, PRN      heparin flush, 3 mL, PRN      sodium chloride flush, 10 mL, PRN      HYDROmorphone, 0.5 mg, Q4H PRN      perflutren lipid microspheres, 1.5 mL, ONCE PRN      HYDROcodone 5 mg - acetaminophen, 1 tablet, Q6H PRN      hydrALAZINE, 5 mg, Q6H PRN      sodium chloride flush, 10 mL, PRN      promethazine, 12.5 mg, Q6H PRN    Or      ondansetron, 4 mg, Q6H PRN      polyethylene glycol, 17 g, Daily PRN      acetaminophen, 650 mg, Q6H PRN    Or      acetaminophen, 650 mg, Q6H PRN      LORazepam, 1 mg, Q1H PRN    Or      LORazepam, 1 mg, Q1H PRN    Or      LORazepam, 2 mg, Q1H PRN    Or      LORazepam, 2 mg, Q1H PRN    Or      LORazepam, 3 mg, Q1H PRN    Or      LORazepam, 3 mg, Q1H PRN    Or      LORazepam, 4 mg, Q1H PRN    Or      LORazepam, 4 mg, Q1H PRN         Objective:    /63   Pulse 76   Temp 98.3 °F (36.8 °C) (Temporal)   Resp 18   Ht 5' 11\" (1.803 m)   Wt 166 lb (75.3 kg)   SpO2 92%   BMI 23.15 kg/m²     General Appearance: alert and oriented to person, place and time  Skin: warm and dry  Head: normocephalic and above findings. 5. Mild dilation of the downstream pancreatic duct potentially due to mass   effect from stones in the distal common bile duct.  Alternatively, this could   be related to moderate parenchymal atrophy. 6. Small right pleural effusion. Assessment:    Active Problems:    Diarrhea    Essential hypertension    Atrial fibrillation (HCC)    Hypokalemia    Functional diarrhea    Gallstones    Choledocholithiasis    Chronic obstructive pulmonary disease (HCC)    Alcohol abuse    Atrial fibrillation, new onset (HCC)    Liver lesion    Hepatic abscess    Portal vein thrombosis  Resolved Problems:    * No resolved hospital problems. *      Plan:  1.  Abd pain, not sure about the etiology, with the presence of diarrhea, ruled  c-diff,   2.  Dilated biliary tree, no signs of obstruction, no elevated bilirubin, with the presence of liver lesions/cysts, need to rule out malignancy CT guided biopsy was done pending results, MRCP as above, s/p ERCP with papillotomy, stones extraction, and Wallstent placement. 1/29/2021 - Markedly dilated common bile duct with filling defect consistent with stones.  Papillotomy was done, and multiple three large stones as well as pus and gravel were extracted. Small area of stricture in the intrapapillary portion of the common bile duct.  A Wallstent 10 x 60 was placed without difficulty.  Excellent drainage was obtained.  consulted surgery, outpatient cholecystectomy. Cholecystectomy as outpatient in 2 weeks  3.  Post-ERCP pancreatitis, treated with fluids and pain management, improved, will need a repeat ERCP for stent removal as outpatient  3.  Afib? MAT, rate is controlled, patient hasn't been following with any doctor, and he is not aware of any medical problems, obtained an echo, start on metoprolol, on heparin IV for [facility], will switch to eliquis, need to hold eliquis 2 days before cholecystectomy   4.  Dysphgia, we obtained speech eval. Dysphagia 3 diet  Low fat diet as tolerated per SLP recommendations   5.  Alcohol abuse, about 5 beers a day, monitor for withdrawals. 6.  Liver abscesses with cholangitis and choledocholithiasis appreciate ID input, was on flagyl and IV ceftriaxone. Switched to zosyn for 10 more days. Path was negative for malignancy, showed inflammation and necrosis  7.  RLL pneumonia and effusion, s.p thoracentesis with pulm, cultures GNR oxi positive ? pseudo, continue zosyn   8.  ? IVC thormbus, was on heparin, now switching to eliquis. 5.  Dispo, family and patient refused SNF, discharge home when home care is arranged    NOTE: This report was transcribed using voice recognition software. Every effort was made to ensure accuracy; however, inadvertent computerized transcription errors may be present.   Electronically signed by Yaakov Hough MD on 2/6/2021 at 8:43 AM

## 2021-02-07 VITALS
TEMPERATURE: 98.1 F | HEIGHT: 71 IN | HEART RATE: 80 BPM | BODY MASS INDEX: 22.54 KG/M2 | RESPIRATION RATE: 18 BRPM | DIASTOLIC BLOOD PRESSURE: 62 MMHG | SYSTOLIC BLOOD PRESSURE: 107 MMHG | OXYGEN SATURATION: 94 % | WEIGHT: 161 LBS

## 2021-02-07 LAB
ALBUMIN SERPL-MCNC: 2.4 G/DL (ref 3.5–5.2)
ALP BLD-CCNC: 79 U/L (ref 40–129)
ALT SERPL-CCNC: 8 U/L (ref 0–40)
AMMONIA: 20.5 UMOL/L (ref 16–60)
AMYLASE: 41 U/L (ref 20–100)
ANION GAP SERPL CALCULATED.3IONS-SCNC: 7 MMOL/L (ref 7–16)
AST SERPL-CCNC: 14 U/L (ref 0–39)
BASOPHILS ABSOLUTE: 0.07 E9/L (ref 0–0.2)
BASOPHILS RELATIVE PERCENT: 0.6 % (ref 0–2)
BILIRUB SERPL-MCNC: 0.6 MG/DL (ref 0–1.2)
BUN BLDV-MCNC: 5 MG/DL (ref 8–23)
CALCIUM SERPL-MCNC: 8.3 MG/DL (ref 8.6–10.2)
CHLORIDE BLD-SCNC: 98 MMOL/L (ref 98–107)
CO2: 26 MMOL/L (ref 22–29)
CREAT SERPL-MCNC: 0.6 MG/DL (ref 0.7–1.2)
EOSINOPHILS ABSOLUTE: 0.1 E9/L (ref 0.05–0.5)
EOSINOPHILS RELATIVE PERCENT: 0.8 % (ref 0–6)
GFR AFRICAN AMERICAN: >60
GFR NON-AFRICAN AMERICAN: >60 ML/MIN/1.73
GLUCOSE BLD-MCNC: 130 MG/DL (ref 74–99)
HCT VFR BLD CALC: 30.1 % (ref 37–54)
HEMOGLOBIN: 10 G/DL (ref 12.5–16.5)
IMMATURE GRANULOCYTES #: 0.1 E9/L
IMMATURE GRANULOCYTES %: 0.8 % (ref 0–5)
LIPASE: 130 U/L (ref 13–60)
LYMPHOCYTES ABSOLUTE: 1.61 E9/L (ref 1.5–4)
LYMPHOCYTES RELATIVE PERCENT: 12.9 % (ref 20–42)
MAGNESIUM: 1.6 MG/DL (ref 1.6–2.6)
MCH RBC QN AUTO: 29.9 PG (ref 26–35)
MCHC RBC AUTO-ENTMCNC: 33.2 % (ref 32–34.5)
MCV RBC AUTO: 90.1 FL (ref 80–99.9)
MONOCYTES ABSOLUTE: 1.4 E9/L (ref 0.1–0.95)
MONOCYTES RELATIVE PERCENT: 11.2 % (ref 2–12)
NEUTROPHILS ABSOLUTE: 9.18 E9/L (ref 1.8–7.3)
NEUTROPHILS RELATIVE PERCENT: 73.7 % (ref 43–80)
PDW BLD-RTO: 14.6 FL (ref 11.5–15)
PHOSPHORUS: 3.3 MG/DL (ref 2.5–4.5)
PLATELET # BLD: 540 E9/L (ref 130–450)
PMV BLD AUTO: 9.7 FL (ref 7–12)
POTASSIUM SERPL-SCNC: 4.1 MMOL/L (ref 3.5–5)
RBC # BLD: 3.34 E12/L (ref 3.8–5.8)
SODIUM BLD-SCNC: 131 MMOL/L (ref 132–146)
TOTAL PROTEIN: 6.4 G/DL (ref 6.4–8.3)
WBC # BLD: 12.5 E9/L (ref 4.5–11.5)

## 2021-02-07 PROCEDURE — 2580000003 HC RX 258: Performed by: SPECIALIST

## 2021-02-07 PROCEDURE — 6360000002 HC RX W HCPCS: Performed by: INTERNAL MEDICINE

## 2021-02-07 PROCEDURE — 84100 ASSAY OF PHOSPHORUS: CPT

## 2021-02-07 PROCEDURE — 6370000000 HC RX 637 (ALT 250 FOR IP): Performed by: INTERNAL MEDICINE

## 2021-02-07 PROCEDURE — 94640 AIRWAY INHALATION TREATMENT: CPT

## 2021-02-07 PROCEDURE — C9113 INJ PANTOPRAZOLE SODIUM, VIA: HCPCS | Performed by: NURSE PRACTITIONER

## 2021-02-07 PROCEDURE — 82150 ASSAY OF AMYLASE: CPT

## 2021-02-07 PROCEDURE — 83735 ASSAY OF MAGNESIUM: CPT

## 2021-02-07 PROCEDURE — 2580000003 HC RX 258: Performed by: INTERNAL MEDICINE

## 2021-02-07 PROCEDURE — 36415 COLL VENOUS BLD VENIPUNCTURE: CPT

## 2021-02-07 PROCEDURE — 6360000002 HC RX W HCPCS: Performed by: NURSE PRACTITIONER

## 2021-02-07 PROCEDURE — 85025 COMPLETE CBC W/AUTO DIFF WBC: CPT

## 2021-02-07 PROCEDURE — 6360000002 HC RX W HCPCS: Performed by: SPECIALIST

## 2021-02-07 PROCEDURE — 97530 THERAPEUTIC ACTIVITIES: CPT

## 2021-02-07 PROCEDURE — 83690 ASSAY OF LIPASE: CPT

## 2021-02-07 PROCEDURE — 82140 ASSAY OF AMMONIA: CPT

## 2021-02-07 PROCEDURE — 80053 COMPREHEN METABOLIC PANEL: CPT

## 2021-02-07 PROCEDURE — 99239 HOSP IP/OBS DSCHRG MGMT >30: CPT | Performed by: INTERNAL MEDICINE

## 2021-02-07 PROCEDURE — 2580000003 HC RX 258: Performed by: NURSE PRACTITIONER

## 2021-02-07 RX ADMIN — APIXABAN 5 MG: 5 TABLET, FILM COATED ORAL at 08:54

## 2021-02-07 RX ADMIN — SODIUM CHLORIDE: 9 INJECTION, SOLUTION INTRAVENOUS at 08:58

## 2021-02-07 RX ADMIN — LISINOPRIL 10 MG: 10 TABLET ORAL at 08:55

## 2021-02-07 RX ADMIN — ALBUTEROL SULFATE 2.5 MG: 2.5 SOLUTION RESPIRATORY (INHALATION) at 08:47

## 2021-02-07 RX ADMIN — PANTOPRAZOLE SODIUM 40 MG: 40 INJECTION, POWDER, FOR SOLUTION INTRAVENOUS at 08:54

## 2021-02-07 RX ADMIN — MULTIVITAMIN TABLET 1 TABLET: TABLET at 08:55

## 2021-02-07 RX ADMIN — Medication 100 MG: at 08:55

## 2021-02-07 RX ADMIN — METOPROLOL TARTRATE 25 MG: 25 TABLET, FILM COATED ORAL at 08:55

## 2021-02-07 RX ADMIN — PIPERACILLIN AND TAZOBACTAM 3375 MG: 3; .375 INJECTION, POWDER, FOR SOLUTION INTRAVENOUS at 13:20

## 2021-02-07 RX ADMIN — SODIUM CHLORIDE, PRESERVATIVE FREE 300 UNITS: 5 INJECTION INTRAVENOUS at 08:55

## 2021-02-07 RX ADMIN — PIPERACILLIN AND TAZOBACTAM 3375 MG: 3; .375 INJECTION, POWDER, FOR SOLUTION INTRAVENOUS at 05:30

## 2021-02-07 RX ADMIN — Medication 10 ML: at 08:56

## 2021-02-07 RX ADMIN — SODIUM CHLORIDE, PRESERVATIVE FREE 10 ML: 5 INJECTION INTRAVENOUS at 08:56

## 2021-02-07 RX ADMIN — FOLIC ACID 1 MG: 1 TABLET ORAL at 08:55

## 2021-02-07 RX ADMIN — ALBUTEROL SULFATE 2.5 MG: 2.5 SOLUTION RESPIRATORY (INHALATION) at 12:24

## 2021-02-07 ASSESSMENT — PAIN SCALES - GENERAL: PAINLEVEL_OUTOF10: 0

## 2021-02-07 NOTE — PLAN OF CARE
Problem: Falls - Risk of:  Goal: Will remain free from falls  Description: Will remain free from falls  2/7/2021 1038 by Kp Nagel RN  Outcome: Ongoing  2/7/2021 0025 by Dom Acevedo RN  Outcome: Met This Shift  Goal: Absence of physical injury  Description: Absence of physical injury  2/7/2021 1038 by Kp Nagel RN  Outcome: Ongoing  2/7/2021 0025 by Dom Acevedo RN  Outcome: Met This Shift     Problem: Skin Integrity:  Goal: Will show no infection signs and symptoms  Description: Will show no infection signs and symptoms  2/7/2021 1038 by Kp Nagel RN  Outcome: Met This Shift  2/7/2021 0025 by Dom Acevedo RN  Outcome: Met This Shift  Goal: Absence of new skin breakdown  Description: Absence of new skin breakdown  Outcome: Met This Shift

## 2021-02-07 NOTE — PROGRESS NOTES
transfers, w/w usage and safety. Patient response to education:   Pt verbalized understanding Pt demonstrated skill Pt requires further education in this area   yes With repeated instruction yes     ASSESSMENT:   Comments:  Pt found and left in bed with call light in reach. Treatment: Functional mobility performed as documented above. No report of dizziness during functional mobility. Cueing required for hand placement during transfers. Pt ambulated to with very slow gait speed and step to pattern. Multiple cues required for w/w usage and safety. PT declined further distance ambulation at this time. Chair/bed alarm: bed alarm active    Time in 1019   Time out 1033    Total Treatment Time 14 minutes     CPT codes:   Therapeutic activities 50856 14 minutes   Therapeutic exercises 43266 0 minutes       Continue with physical therapy current plan of care.     Marc Herrera, [address]

## 2021-02-07 NOTE — PROGRESS NOTES
Home care order and script faxed to [facility]. We are to give 1330 dose of zosyn here and then [facility] will see patient this evening for IV antibiotics.

## 2021-02-07 NOTE — PROGRESS NOTES
[address] [place] [address] [facility]  [facility]  Progress Note  Face to face encounter   SUBJECTIVE:  Chief Complaint   Patient presents with    Diarrhea     Pt states having cramps after diarrhea x8 days. Denies fever. No new complaints. Tolerating antibiotics. Wants to go home. Review of systems:  As stated above in the chief complaint, otherwise negative. Medications:  Scheduled Meds:   heparin flush  3 mL Intravenous 2 times per day    apixaban  5 mg Oral BID    piperacillin-tazobactam  3,375 mg Intravenous Q8H    albuterol  2.5 mg Nebulization Q4H    pantoprazole  40 mg Intravenous BID    And    sodium chloride (PF)  10 mL Intravenous BID    sodium chloride flush  10 mL Intravenous 2 times per day    folic acid  1 mg Oral Daily    thiamine  100 mg Oral Daily    multivitamin  1 tablet Oral Daily    lisinopril  10 mg Oral Daily    metoprolol tartrate  25 mg Oral BID     Continuous Infusions:   sodium chloride 12.5 mL/hr at 21 0858     PRN Meds:sodium chloride flush, heparin flush, sodium chloride flush, HYDROmorphone, perflutren lipid microspheres, HYDROcodone 5 mg - acetaminophen, hydrALAZINE, sodium chloride flush, promethazine **OR** ondansetron, polyethylene glycol, acetaminophen **OR** acetaminophen, LORazepam **OR** LORazepam **OR** LORazepam **OR** LORazepam **OR** LORazepam **OR** LORazepam **OR** LORazepam **OR** LORazepam    OBJECTIVE:  BP (!) 140/70   Pulse 78   Temp 98 °F (36.7 °C) (Temporal)   Resp 18   Ht 5' 11\" (1.803 m)   Wt 161 lb (73 kg)   SpO2 93%   BMI 22.45 kg/m²   Temp  Av.7 °F (37.1 °C)  Min: 98 °F (36.7 °C)  Max: 99.4 °F (37.4 °C)  Constitutional: The patient is lying in bed. He is awake and in no distress. Skin: Warm and dry. No rashes were noted. HEENT: Round and reactive pupils. Moist mucous membranes. No ulcerations or thrush. Left ear is missing. Neck: Supple to movements. Chest: No respiratory distress.   No crackles. Cardiovascular: Heart sounds rhythmic and regular. Abdomen: Slightly distended. Nontender. Positive bowel sounds. Extremities: Edema over the right foot and ankle noted. No erythema. Lines: Right PICC 2/6/2021.     Laboratory and Tests Review:  Lab Results   Component Value Date    WBC 12.5 (H) 02/07/2021    WBC 11.9 (H) 02/06/2021    WBC 12.5 (H) 02/05/2021    HGB 10.0 (L) 02/07/2021    HCT 30.1 (L) 02/07/2021    MCV 90.1 02/07/2021     (H) 02/07/2021     Lab Results   Component Value Date    NEUTROABS 9.18 (H) 02/07/2021    NEUTROABS 8.68 (H) 02/06/2021    NEUTROABS 9.40 (H) 02/05/2021     No results found for: [facility]  Lab Results   Component Value Date    ALT 8 02/07/2021    AST 14 02/07/2021    ALKPHOS 79 02/07/2021    BILITOT 0.6 02/07/2021     Lab Results   Component Value Date     02/07/2021    K 4.1 02/07/2021    K 3.2 01/28/2021    CL 98 02/07/2021    CO2 26 02/07/2021    BUN 5 02/07/2021    CREATININE 0.6 02/07/2021    CREATININE 0.6 02/06/2021    CREATININE 0.7 02/05/2021    GFRAA >60 02/07/2021    LABGLOM >60 02/07/2021    GLUCOSE 130 02/07/2021    PROT 6.4 02/07/2021    LABALBU 2.4 02/07/2021    CALCIUM 8.3 02/07/2021    BILITOT 0.6 02/07/2021    ALKPHOS 79 02/07/2021    AST 14 02/07/2021    ALT 8 02/07/2021     Lab Results   Component Value Date    CRP 12.2 (H) 01/31/2021    CRP 14.6 (H) 01/27/2021     Lab Results   Component Value Date    SEDRATE 62 (H) 01/31/2021    SEDRATE 60 (H) 01/27/2021     Radiology:    Microbiology:   C. difficile toxin assay: Not detected  Stool cultures: Negative  Giardia: None  Respiratory panel, including SARS-CoV-2: Not detected  Blood cultures 1/30/2021: Pending  Liver abscess 2/1/2021: Negative so far  Respiratory culture 2/2/2021: Pansensitive Pseudomonas aeruginosa, Candida albicans  Streptococcus pneumoniae/Legionella urine Ag: negative   pleural fluid 2/2/2021: Pending    No results for input(s): PROCAL in the last 72

## 2021-02-08 ENCOUNTER — HOSPITAL ENCOUNTER (OUTPATIENT)
Age: 76
Discharge: HOME | End: 2021-02-10
Payer: MEDICARE

## 2021-02-08 LAB
HAV IGM SER IA-ACNC: NORMAL
HEPATITIS B CORE IGM ANTIBODY: NORMAL
HEPATITIS B SURFACE ANTIGEN INTERPRETATION: NORMAL
HEPATITIS C ANTIBODY INTERPRETATION: NORMAL

## 2021-02-08 PROCEDURE — 80053 COMPREHEN METABOLIC PANEL: CPT

## 2021-02-08 PROCEDURE — 86140 C-REACTIVE PROTEIN: CPT

## 2021-02-08 PROCEDURE — 83690 ASSAY OF LIPASE: CPT

## 2021-02-08 PROCEDURE — 82150 ASSAY OF AMYLASE: CPT

## 2021-02-08 PROCEDURE — 85651 RBC SED RATE NONAUTOMATED: CPT

## 2021-02-08 PROCEDURE — 85025 COMPLETE CBC W/AUTO DIFF WBC: CPT

## 2021-02-08 PROCEDURE — 36415 COLL VENOUS BLD VENIPUNCTURE: CPT

## 2021-02-08 NOTE — PROGRESS NOTES
CLINICAL PHARMACY NOTE: MEDS TO [address] Select Patient?: No  Total # of Prescriptions Filled: 1   The following medications were delivered to the patient:  · eliquis 5mg  · Folic acid 1mg  Total # of Interventions Completed: 5  Time Spent (min): 45    Additional Documentation:

## 2021-02-10 ENCOUNTER — OFFICE VISIT (OUTPATIENT)
Dept: PRIMARY CARE CLINIC | Age: 76
End: 2021-02-10
Payer: MEDICARE

## 2021-02-10 VITALS
WEIGHT: 157 LBS | SYSTOLIC BLOOD PRESSURE: 136 MMHG | TEMPERATURE: 98.1 F | OXYGEN SATURATION: 91 % | HEART RATE: 62 BPM | HEIGHT: 69 IN | RESPIRATION RATE: 20 BRPM | BODY MASS INDEX: 23.25 KG/M2 | DIASTOLIC BLOOD PRESSURE: 80 MMHG

## 2021-02-10 DIAGNOSIS — J18.9 PNEUMONIA OF RIGHT LUNG DUE TO INFECTIOUS ORGANISM, UNSPECIFIED PART OF LUNG: ICD-10-CM

## 2021-02-10 DIAGNOSIS — Z09 HOSPITAL DISCHARGE FOLLOW-UP: ICD-10-CM

## 2021-02-10 DIAGNOSIS — R05.9 COUGH: ICD-10-CM

## 2021-02-10 DIAGNOSIS — Z85.828 HISTORY OF SQUAMOUS CELL CARCINOMA OF SKIN: ICD-10-CM

## 2021-02-10 DIAGNOSIS — M15.9 GENERALIZED OSTEOARTHRITIS OF MULTIPLE SITES: Primary | ICD-10-CM

## 2021-02-10 DIAGNOSIS — K75.0 HEPATIC ABSCESS: ICD-10-CM

## 2021-02-10 PROCEDURE — 99214 OFFICE O/P EST MOD 30 MIN: CPT | Performed by: FAMILY MEDICINE

## 2021-02-10 PROCEDURE — 1111F DSCHRG MED/CURRENT MED MERGE: CPT | Performed by: FAMILY MEDICINE

## 2021-02-10 RX ORDER — GUAIFENESIN 600 MG/1
600 TABLET, EXTENDED RELEASE ORAL 2 TIMES DAILY
Qty: 30 TABLET | Refills: 0 | Status: SHIPPED | OUTPATIENT
Start: 2021-02-10 | End: 2021-02-25

## 2021-02-10 SDOH — ECONOMIC STABILITY: TRANSPORTATION INSECURITY
IN THE PAST 12 MONTHS, HAS LACK OF TRANSPORTATION KEPT YOU FROM MEETINGS, WORK, OR FROM GETTING THINGS NEEDED FOR DAILY LIVING?: NOT ASKED

## 2021-02-10 SDOH — ECONOMIC STABILITY: TRANSPORTATION INSECURITY
IN THE PAST 12 MONTHS, HAS THE LACK OF TRANSPORTATION KEPT YOU FROM MEDICAL APPOINTMENTS OR FROM GETTING MEDICATIONS?: NOT ASKED

## 2021-02-10 SDOH — ECONOMIC STABILITY: FOOD INSECURITY: WITHIN THE PAST 12 MONTHS, YOU WORRIED THAT YOUR FOOD WOULD RUN OUT BEFORE YOU GOT MONEY TO BUY MORE.: NEVER TRUE

## 2021-02-10 ASSESSMENT — PATIENT HEALTH QUESTIONNAIRE - PHQ9
SUM OF ALL RESPONSES TO PHQ QUESTIONS 1-9: 0
SUM OF ALL RESPONSES TO PHQ QUESTIONS 1-9: 0
2. FEELING DOWN, DEPRESSED OR HOPELESS: 0
SUM OF ALL RESPONSES TO PHQ QUESTIONS 1-9: 0
SUM OF ALL RESPONSES TO PHQ9 QUESTIONS 1 & 2: 0

## 2021-02-10 ASSESSMENT — ENCOUNTER SYMPTOMS
SHORTNESS OF BREATH: 0
COUGH: 1
ABDOMINAL PAIN: 0
DIARRHEA: 0

## 2021-02-10 NOTE — PROGRESS NOTES
2/10/21  Jimmy Vela : 1945 Sex: male  Age: 70 y.o. Assessment and Plan:  Christopher Cannon was seen today for follow-up from hospital and establish care. Diagnoses and all orders for this visit:    Generalized osteoarthritis of multiple sites    Cough  -     guaiFENesin (MUCINEX) 600 MG extended release tablet; Take 1 tablet by mouth 2 times daily for 15 days    History of squamous cell carcinoma of skin    Hospital discharge follow-up  -     [place] DISCHARGE MEDS RECONCILED W/ CURRENT OUTPATIENT MED LIST    Hepatic abscess    Pneumonia of right lung due to infectious organism, unspecified part of lung    Trial of Mucinex at their request.  Continue Zosyn. Follow-up with ID as scheduled. Continue Eliquis. Consider further follow-up with cardiology moving forward depending on how the patient does. Unclear etiology of the patient's transient pains, but I do think it is reasonable to suspect this may improve the more he recovers. We will continue to closely monitor. Follow-up with surgery and gastroenterology as scheduled as well. Total time reviewing patient's chart and medical history before and after visit, updating pt's problem list and direct time with patient was between 30-39 minutes. Return in 1 month (on 3/10/2021). Chief Complaint   Patient presents with    Follow-Up from [address] [place] [address]       HPI  Patient is here today for hospital discharge follow-up. He is with his daughter Mague Herrera. He was admitted after some severe abdominal pain took him to the emergency room. Patient was found to have a liver abscess as well as choledocholithiasis. He had stent placed in his biliary tree; planned removal is for early March. Patient was also found to be in atrial fibrillation and started on Eliquis. He also had pneumonia, with pleural effusion that was drained.   Patient is currently on Zosyn for his infectious issues, and was also started on supplementation for his history of alcohol abuse. Patient has upcoming follow-up with surgery to discuss cholecystectomy, infectious disease, and gastroenterology. Patient has been complaining about transient pain, currently in his hands bilaterally. He had some pain initially in his right lower extremity into the ankle, and more recently also in the left upper extremity in the shoulder. He seems to have some associated weakness from the pain at times, but nothing was ever made his daughter concerned about strokelike activity -he is still able to move the areas, they just hurt. Patient's daughter is wondering whether this is just something that will slowly improve the farther out from the hospitalization that he gets. Patient is currently living at his daughter and son-in-law's. Home nursing is involved in the patient's care, especially given his PICC line placement. He also has PT and OT coming. He reports a good appetite, and states that he is no longer drinking any beer; he recognizes that he has to give it up. Problem list reviewed and updated in full with patient today as necessary. A comprehensive ROS was negative, except as documented above. Review of Systems   Respiratory: Positive for cough. Negative for shortness of breath. Cardiovascular: Negative for chest pain. Gastrointestinal: Negative for abdominal pain and diarrhea. Musculoskeletal: Positive for arthralgias and myalgias.          Current Outpatient Medications:     guaiFENesin (MUCINEX) 600 MG extended release tablet, Take 1 tablet by mouth 2 times daily for 15 days, Disp: 30 tablet, Rfl: 0    piperacillin-tazobactam (ZOSYN) 3.375 (3-0.375) g injection, Infuse 3,375 mg intravenously every 8 hours for 10 days, Disp: [id number] mg, Rfl: 0    folic acid (FOLVITE) 1 MG tablet, Take 1 tablet by mouth daily, Disp: 30 tablet, Rfl: 3    Multiple Vitamin (MULTIVITAMIN) TABS tablet, Take 1 tablet by mouth daily, Disp: 30 tablet, Rfl: 0    thiamine 100 MG tablet, Take 1 tablet by mouth daily, Disp: 30 tablet, Rfl: 3    apixaban (ELIQUIS) 5 MG TABS tablet, Take 1 tablet by mouth 2 times daily, Disp: 180 tablet, Rfl: 1  No Known Allergies    Pt's past medical and surgical history were updated as necessary today   Pt's family and social history were updated as necessary today          Vitals:    02/10/21 1220   BP: 136/80   Site: Left Upper Arm   Position: Sitting   Cuff Size: Medium Adult   Pulse: 62   Resp: 20   Temp: 98.1 °F (36.7 °C)   TempSrc: Temporal   SpO2: 91%   Weight: 157 lb (71.2 kg)   Height: 5' 9\" (1.753 m)       Physical Exam  Constitutional:       Appearance: Normal appearance. HENT:      Head: Normocephalic and atraumatic. Ears:      Comments: Left earring missing    Eyes:      Conjunctiva/sclera: Conjunctivae normal.   Cardiovascular:      Rate and Rhythm: Normal rate and regular rhythm. Heart sounds: Normal heart sounds. Pulmonary:      Effort: Pulmonary effort is normal.      Comments: Moving air well throughout  Abdominal:      Palpations: Abdomen is soft. Tenderness: There is no abdominal tenderness. Musculoskeletal:      Comments: Pt is in wheelchair    Skin:     General: Skin is warm and dry. Neurological:      General: No focal deficit present. Mental Status: He is alert and oriented to person, place, and time.    Psychiatric:         Mood and Affect: Mood normal.         Behavior: Behavior normal.         Seen By:  Zaynab Reyes MD

## 2021-02-11 ENCOUNTER — HOSPITAL ENCOUNTER (OUTPATIENT)
Age: 76
Discharge: HOME | End: 2021-02-13
Payer: MEDICARE

## 2021-02-11 PROCEDURE — 80053 COMPREHEN METABOLIC PANEL: CPT

## 2021-02-11 PROCEDURE — 36415 COLL VENOUS BLD VENIPUNCTURE: CPT

## 2021-02-11 PROCEDURE — 85025 COMPLETE CBC W/AUTO DIFF WBC: CPT

## 2021-02-15 ENCOUNTER — HOSPITAL ENCOUNTER (OUTPATIENT)
Age: 76
Discharge: HOME | End: 2021-02-17
Payer: MEDICARE

## 2021-02-15 PROCEDURE — 36415 COLL VENOUS BLD VENIPUNCTURE: CPT

## 2021-02-15 PROCEDURE — 85025 COMPLETE CBC W/AUTO DIFF WBC: CPT

## 2021-02-15 PROCEDURE — 85651 RBC SED RATE NONAUTOMATED: CPT

## 2021-02-15 PROCEDURE — 80053 COMPREHEN METABOLIC PANEL: CPT

## 2021-02-15 PROCEDURE — 86140 C-REACTIVE PROTEIN: CPT

## 2021-02-17 ENCOUNTER — TELEPHONE (OUTPATIENT)
Dept: PRIMARY CARE CLINIC | Age: 76
End: 2021-02-17

## 2021-02-17 NOTE — TELEPHONE ENCOUNTER
Home health called in and stated theyre going to be discharging pt today from home care any questions they can be reached at [phone]

## 2021-02-18 ENCOUNTER — VIRTUAL VISIT (OUTPATIENT)
Dept: HEMATOLOGY | Age: 76
End: 2021-02-18
Payer: MEDICARE

## 2021-02-18 ENCOUNTER — TELEPHONE (OUTPATIENT)
Dept: HEMATOLOGY | Age: 76
End: 2021-02-18

## 2021-02-18 DIAGNOSIS — K83.09 CHOLANGITIS: Primary | ICD-10-CM

## 2021-02-18 PROCEDURE — 99212 OFFICE O/P EST SF 10 MIN: CPT | Performed by: TRANSPLANT SURGERY

## 2021-02-18 ASSESSMENT — ENCOUNTER SYMPTOMS
SHORTNESS OF BREATH: 0
VOMITING: 0
BACK PAIN: 0
BLOOD IN STOOL: 0
CONSTIPATION: 0
DIARRHEA: 0
NAUSEA: 0
EYE DISCHARGE: 0
ABDOMINAL PAIN: 0
PHOTOPHOBIA: 0
EYE PAIN: 0

## 2021-02-18 NOTE — TELEPHONE ENCOUNTER
I called [facility] and spoke to Jaf and no auth needed for cpt code 54155 with ref# [id number]. I scheduled patient for surgery on 2/25/21 at 8:30am at [facility]. I called patient and gave patient and his daughter the surgery information and she was instructed to hold her Eliquis starting on 1/22/21 until after surgery. I also instructed her to take her father for covid testing on 2/19/21 and gave her the directions for the [employer] [facility] testing site. I did tell her to expect a call from [facility] for further instructions. She verbalized  Understanding and confirmed this surgery date.     Electronically signed by Keisha Babin RN on 2/18/2021 at 4:05 PM

## 2021-02-18 NOTE — PROGRESS NOTES
Hepatobiliary and Pancreatic Surgery Office Note    This encounter was a telemedicine visit due to [address]. Assessment/Plan:   Liver abscess with cholangitis and choledocholithiasis s/p ERCP with stent  - Patient and family were made aware of the risks, benefits, alternatives, and complications of a  laparoscopic robotic cholecystectomy possible cholangiogram possible open and wish to proceed with surgery. - hold eliquis on Monday    10 Minutes of which greater than 50% was spent counseling or coordinating Mr. Agatha Kehr' care. Please send a copy of my note to Dr. Estela Choi    Chief Complaint    HPI: Daniel Okeefe is a 70 y.o. male who presented with a liver abscess and portal vein thrombosis, cholangitis with choledocholithiasis. He has done well with antibiotics and denies any abdominal pain nor diarrhea. He is eating well and has gotten his energy back. Past Surgical History:   Procedure Laterality Date    CT NEEDLE BIOPSY LIVER PERCUTANEOUS  2/1/2021    CT NEEDLE BIOPSY LIVER PERCUTANEOUS 2/1/2021 [facility] CT    ERCP N/A 1/29/2021    ERCP STONE REMOVAL performed by Olivia Santiago MD at [address] [place] [address] 2009    left sided ear surgery    PICC LINE INSERTION NURSE  2/6/2021            Prior to Admission medications    Medication Sig Start Date End Date Taking?  Authorizing Provider   guaiFENesin (MUCINEX) 600 MG extended release tablet Take 1 tablet by mouth 2 times daily for 15 days 2/10/21 2/25/21  Debby Chu MD   folic acid (FOLVITE) 1 MG tablet Take 1 tablet by mouth daily 2/6/21   Dariela Pablo MD   Multiple Vitamin (MULTIVITAMIN) TABS tablet Take 1 tablet by mouth daily 2/6/21 3/8/21  Dariela Pablo MD   thiamine 100 MG tablet Take 1 tablet by mouth daily 2/6/21   Dariela Pablo MD   apixaban Minta Lang) 5 MG TABS tablet Take 1 tablet by mouth 2 times daily 2/5/21   Dariela Pablo MD       No Known Allergies    Social History     Socioeconomic History    Marital status:      Spouse name: Not on file    Number of children: Not on file    Years of education: Not on file    Highest education level: Not on file   Occupational History    Not on file   Social Needs    Financial resource strain: Not hard at all    Food insecurity     Worry: Never true     Inability: Never true   [employer] needs     Medical: Not on file     Non-medical: Not on file   Tobacco Use    Smoking status: Former Smoker     Packs/day: 1.00     Years: 43.00     Pack years: 43.00     Start date: 1963     Quit date: 2006     Years since quittin.8    Smokeless tobacco: Never Used   Substance and Sexual Activity    Alcohol use: Yes     Alcohol/week: 30.0 standard drinks     Types: 30 Cans of beer per week    Drug use: No    Sexual activity: Not Currently     Partners: Female   Lifestyle    Physical activity     Days per week: Not on file     Minutes per session: Not on file    Stress: Not on file   Relationships    Social connections     Talks on phone: Not on file     Gets together: Not on file     Attends Muslim service: Not on file     Active member of club or organization: Not on file     Attends meetings of clubs or organizations: Not on file     Relationship status: Not on file    Intimate partner violence     Fear of current or ex partner: Not on file     Emotionally abused: Not on file     Physically abused: Not on file     Forced sexual activity: Not on file   Other Topics Concern    Not on file   Social History Narrative    Not on file     Family history: denies any cancer. Review of Systems   Constitutional: Negative for chills, diaphoresis and fever. HENT: Negative for congestion, ear discharge, ear pain, hearing loss, nosebleeds and tinnitus. Eyes: Negative for photophobia, pain and discharge. Respiratory: Negative for shortness of breath. Cardiovascular: Negative for palpitations and leg swelling.    Gastrointestinal: Negative for abdominal pain, blood in stool, constipation, diarrhea, nausea and vomiting. Endocrine: Negative for polydipsia. Genitourinary: Negative for frequency, hematuria and urgency. Musculoskeletal: Negative for back pain and neck pain. Skin: Negative for rash. Allergic/Immunologic: Negative for environmental allergies. Neurological: Negative for tremors and seizures. Psychiatric/Behavioral: Negative for hallucinations and suicidal ideas. The patient is not nervous/anxious. Objective:    Physical Exam  Constitutional:       Appearance: Normal appearance. He is normal weight. HENT:      Head: Normocephalic and atraumatic. Right Ear: External ear normal.      Left Ear: External ear normal.      Nose: Nose normal.   Eyes:      Conjunctiva/sclera: Conjunctivae normal.   Neck:      Musculoskeletal: Normal range of motion and neck supple. Pulmonary:      Effort: Pulmonary effort is normal.   Abdominal:      General: Abdomen is flat. Musculoskeletal: Normal range of motion. Neurological:      Mental Status: He is alert. Psychiatric:         Mood and Affect: Mood normal.         Behavior: Behavior normal.         Thought Content: Thought content normal.         Judgment: Judgment normal.         NOTE: This report, in part or full,may have been transcribed using voice recognition software. Every effort was made to ensure accuracy; however, inadvertent computerized transcription errors may be present. Please excuse any transcriptional grammatical or spelling errors that may have escaped my editorial review. TeleMedicine Patient Consent    This visit was performed as a virtual video visit using a synchronous, two-way, audio-video telehealth technology platform. Patient identification was verified at the start of the visit, including the patient's telephone number and physical location. I discussed with the patient the nature of our telehealth visits, that:     1.  Due to the nature of an audio- video modality, the only components of a physical exam that could be done are the elements supported by direct observation. 2. I would evaluate the patient and recommend diagnostics and treatments based on my assessment. 3. If it was felt that the patient should be evaluated in clinic or an emergency room setting, then they would be directed there. 4. Our sessions are not being recorded and that personal health information is protected. 5. Our team would provide follow up care in person if/when the patient needs it. Patient does agree to proceed with telemedicine consultation. Patient's location: home address in [facility]  Physician  Location: work other people involved in call:N/A      This visit was completed virtually using Doxy. me

## 2021-02-19 ENCOUNTER — HOSPITAL ENCOUNTER (OUTPATIENT)
Age: 76
Discharge: HOME | End: 2021-02-21
Payer: MEDICARE

## 2021-02-19 DIAGNOSIS — K83.09 CHOLANGITIS: ICD-10-CM

## 2021-02-19 PROCEDURE — U0003 INFECTIOUS AGENT DETECTION BY NUCLEIC ACID (DNA OR RNA); SEVERE ACUTE RESPIRATORY SYNDROME CORONAVIRUS 2 (SARS-COV-2) (CORONAVIRUS DISEASE [COVID-19]), AMPLIFIED PROBE TECHNIQUE, MAKING USE OF HIGH THROUGHPUT TECHNOLOGIES AS DESCRIBED BY CMS-2020-01-R: HCPCS

## 2021-02-19 NOTE — PROGRESS NOTES
[facility] PRE-ADMISSION TESTING INSTRUCTIONS    The Preadmission Testing patient is instructed accordingly using the following criteria (check applicable):    ARRIVAL INSTRUCTIONS:  [x] Parking the day of Surgery is located in the Main Entrance lot. Upon entering the door, make an immediate right to the surgery reception desk    [x] Bring photo ID and insurance card    [] Bring in a copy of Living will or Durable Power of  papers. [x] Please be sure to arrange transportation to and from the hospital    [x] Please arrange for someone to be with you the remainder of the day due to having anesthesia      GENERAL INSTRUCTIONS:    [x] Nothing by mouth after midnight, including gum, candy, mints or water    [x] You may brush your teeth, but do not swallow any water    [] Take medications as instructed with 1-2 oz of water    [x] Stop herbal supplements and vitamins 5 days prior to procedure    [x] Follow preop dosing of blood thinners per physician instructions    [] Do not take insulin or oral diabetic medications    [] If diabetic and have low blood sugar or feel symptomatic, take 1-2oz apple juice or glucose tablets    [] Bring inhalers day of surgery    [] Bring C-PAP/ Bi-Pap day of surgery    [] Bring urine specimen day of surgery    [x] Antibacterial Soap shower or bath AM of Surgery, no lotion, powders or creams to surgical site    [] Follow bowel prep as instructed per surgeon    [x] No tobacco products within 24 hours of surgery     [x] No alcohol or illegal drug use within 24 hours of surgery.     [x] Jewelry, body piercing's, eyeglasses, contact lenses and dentures are not permitted into surgery (bring cases)      [] Please do not wear any nail polish or make up on the day of surgery    [x] If not already done, you can expect a call from registration    [x] If surgeon requests a time change you will be notified the day prior to surgery    [] If you receive a survey after surgery we would greatly appreciate your comments    [] Parent/guardian of a minor must accompany their child and remain on the premises  the entire time they are under our care     [] Pediatric patients may bring favorite toy, blanket or comfort item with them    [] A caregiver or family member must remain with the patient during their stay if they are mentally handicapped, have dementia, disoriented or unable to use a call light or would be a safety concern if left unattended    [x] Please notify surgeon if you develop any illness between now and time of surgery (cold, cough, sore throat, fever, nausea, vomiting) or any signs of infections  including skin, wounds, and dental.    [] Other instructions    EDUCATIONAL MATERIALS PROVIDED:    [] PAT Preoperative Education Packet/Booklet     [] Medication List    [] Fluoroscopy Information Pamphlet    [] Transfusion bracelet applied with instructions    [] Joint replacement video reviewed    [] Shower with antibacterial soap and use CHG wipes provided the evening before surgery as instructed

## 2021-02-19 NOTE — PROGRESS NOTES
Have you been tested for COVID  Yes           Have you been told you were positive for COVID No  Have you had any known exposure to someone that is positive for COVID No  Do you have a cough                   HAS CHRONIC COUGH             Do you have shortness of breath No                 Do you have a sore throat            No                Are you having chills                    No                Are you having muscle aches. No                    Please come to the hospital wearing a mask and have your significant other wear a mask as well. Both of you should check your temperature before leaving to come here,  if it is 100 or higher please call [phone] for instruction.

## 2021-02-20 LAB — SARS-COV-2, PCR: NOT DETECTED

## 2021-02-24 ENCOUNTER — ANESTHESIA EVENT (OUTPATIENT)
Dept: OPERATING ROOM | Age: 76
End: 2021-02-24
Payer: MEDICARE

## 2021-02-24 NOTE — H&P
Hepatobiliary and Pancreatic Surgery Office Note     This encounter was a telemedicine visit due to [address].     Assessment/Plan:   Liver abscess with cholangitis and choledocholithiasis s/p ERCP with stent  - Patient and family were made aware of the risks, benefits, alternatives, and complications of a  laparoscopic robotic cholecystectomy possible cholangiogram possible open and wish to proceed with surgery. - hold eliquis on Monday     10 Minutes of which greater than 50% was spent counseling or coordinating Mr. Corey Hernandez' care.     Please send a copy of my note to Dr. Michel Olson     HPI: Casie Carrillo is a 70 y.o. male who presented with a liver abscess and portal vein thrombosis, cholangitis with choledocholithiasis. He has done well with antibiotics and denies any abdominal pain nor diarrhea. He is eating well and has gotten his energy back.         Past Surgical History         Past Surgical History:   Procedure Laterality Date    CT NEEDLE BIOPSY LIVER PERCUTANEOUS   2/1/2021     CT NEEDLE BIOPSY LIVER PERCUTANEOUS 2/1/2021 [facility] CT    ERCP N/A 1/29/2021     ERCP STONE REMOVAL performed by Sunday MD Walker at [address] [place] [address] Left 2009     left sided ear surgery    PICC LINE INSERTION NURSE   2/6/2021                  Home Medications           Prior to Admission medications    Medication Sig Start Date End Date Taking?  Authorizing Provider   guaiFENesin (MUCINEX) 600 MG extended release tablet Take 1 tablet by mouth 2 times daily for 15 days 2/10/21 2/25/21   Zach Rodriguez MD   folic acid (FOLVITE) 1 MG tablet Take 1 tablet by mouth daily 2/6/21     Carl Hemphill MD   Multiple Vitamin (MULTIVITAMIN) TABS tablet Take 1 tablet by mouth daily 2/6/21 3/8/21   Carl Hemphill MD   thiamine 100 MG tablet Take 1 tablet by mouth daily 2/6/21     Carl Hemphill MD   apixaban Everlina Martinez) 5 MG TABS tablet Take 1 tablet by mouth 2 times daily 2/5/21     Ally Caballero Danielle Peterson MD            No Known Allergies     Social History   Social History            Socioeconomic History    Marital status:        Spouse name: Not on file    Number of children: Not on file    Years of education: Not on file    Highest education level: Not on file   Occupational History    Not on file   Social Needs    Financial resource strain: Not hard at all   [employer] insecurity       Worry: Never true       Inability: Never true    Transportation needs       Medical: Not on file       Non-medical: Not on file   Tobacco Use    Smoking status: Former Smoker       Packs/day: 1.00       Years: 43.00       Pack years: 43.00       Start date: 1963       Quit date: 2006       Years since quittin.8    Smokeless tobacco: Never Used   Substance and Sexual Activity    Alcohol use: Yes       Alcohol/week: 30.0 standard drinks       Types: 30 Cans of beer per week    Drug use: No    Sexual activity: Not Currently       Partners: Female   Lifestyle    Physical activity       Days per week: Not on file       Minutes per session: Not on file    Stress: Not on file   Relationships    Social connections       Talks on phone: Not on file       Gets together: Not on file       Attends Mu-ism service: Not on file       Active member of club or organization: Not on file       Attends meetings of clubs or organizations: Not on file       Relationship status: Not on file    Intimate partner violence       Fear of current or ex partner: Not on file       Emotionally abused: Not on file       Physically abused: Not on file       Forced sexual activity: Not on file   Other Topics Concern    Not on file   Social History Narrative    Not on file         Family history: denies any cancer.     Review of Systems   Constitutional: Negative for chills, diaphoresis and fever. HENT: Negative for congestion, ear discharge, ear pain, hearing loss, nosebleeds and tinnitus.     Eyes: Negative for photophobia, pain and discharge. Respiratory: Negative for shortness of breath. Cardiovascular: Negative for palpitations and leg swelling. Gastrointestinal: Negative for abdominal pain, blood in stool, constipation, diarrhea, nausea and vomiting. Endocrine: Negative for polydipsia. Genitourinary: Negative for frequency, hematuria and urgency. Musculoskeletal: Negative for back pain and neck pain. Skin: Negative for rash. Allergic/Immunologic: Negative for environmental allergies. Neurological: Negative for tremors and seizures. Psychiatric/Behavioral: Negative for hallucinations and suicidal ideas. The patient is not nervous/anxious.                 Objective:     Physical Exam  Constitutional:       Appearance: Normal appearance. He is normal weight. HENT:      Head: Normocephalic and atraumatic. Right Ear: External ear normal.      Left Ear: External ear normal.      Nose: Nose normal.   Eyes:      Conjunctiva/sclera: Conjunctivae normal.   Neck:      Musculoskeletal: Normal range of motion and neck supple. Pulmonary:      Effort: Pulmonary effort is normal.   Abdominal:      General: Abdomen is flat. Musculoskeletal: Normal range of motion. Neurological:      Mental Status: He is alert. Psychiatric:         Mood and Affect: Mood normal.         Behavior: Behavior normal.         Thought Content: Thought content normal.         Judgment: Judgment normal.            NOTE: This report, in part or full,may have been transcribed using voice recognition software. Every effort was made to ensure accuracy; however, inadvertent computerized transcription errors may be present. Please excuse any transcriptional grammatical or spelling errors that may have escaped my editorial review.     TeleMedicine Patient Consent     This visit was performed as a virtual video visit using a synchronous, two-way, audio-video telehealth technology platform.  Patient identification was verified at the start of the visit, including the patient's telephone number and physical location. I discussed with the patient the nature of our telehealth visits, that:      1. Due to the nature of an audio- video modality, the only components of a physical exam that could be done are the elements supported by direct observation. 2. I would evaluate the patient and recommend diagnostics and treatments based on my assessment. 3. If it was felt that the patient should be evaluated in clinic or an emergency room setting, then they would be directed there. 4. Our sessions are not being recorded and that personal health information is protected. 5. Our team would provide follow up care in person if/when the patient needs it.         Patient does agree to proceed with telemedicine consultation.     Patient's location: home address in [place] [address]  Location: work other people involved in call:N/A        This visit was completed virtually using Doxy. me

## 2021-02-25 ENCOUNTER — APPOINTMENT (OUTPATIENT)
Dept: GENERAL RADIOLOGY | Age: 76
End: 2021-02-25
Attending: TRANSPLANT SURGERY
Payer: MEDICARE

## 2021-02-25 ENCOUNTER — ANESTHESIA (OUTPATIENT)
Dept: OPERATING ROOM | Age: 76
End: 2021-02-25
Payer: MEDICARE

## 2021-02-25 ENCOUNTER — HOSPITAL ENCOUNTER (OUTPATIENT)
Age: 76
Setting detail: OUTPATIENT SURGERY
Discharge: HOME | End: 2021-02-25
Attending: TRANSPLANT SURGERY | Admitting: TRANSPLANT SURGERY
Payer: MEDICARE

## 2021-02-25 VITALS — DIASTOLIC BLOOD PRESSURE: 77 MMHG | TEMPERATURE: 96.1 F | OXYGEN SATURATION: 99 % | SYSTOLIC BLOOD PRESSURE: 133 MMHG

## 2021-02-25 VITALS
TEMPERATURE: 97.5 F | HEIGHT: 69 IN | WEIGHT: 157 LBS | RESPIRATION RATE: 16 BRPM | DIASTOLIC BLOOD PRESSURE: 86 MMHG | HEART RATE: 90 BPM | OXYGEN SATURATION: 96 % | SYSTOLIC BLOOD PRESSURE: 168 MMHG | BODY MASS INDEX: 23.25 KG/M2

## 2021-02-25 DIAGNOSIS — K80.64 CHRONIC CHOLECYSTITIS DUE TO CHOLELITHIASIS WITH CHOLEDOCHOLITHIASIS: Primary | ICD-10-CM

## 2021-02-25 DIAGNOSIS — Z90.49 S/P LAPAROSCOPIC CHOLECYSTECTOMY: ICD-10-CM

## 2021-02-25 PROCEDURE — 6360000002 HC RX W HCPCS: Performed by: ANESTHESIOLOGY

## 2021-02-25 PROCEDURE — 47562 LAPAROSCOPIC CHOLECYSTECTOMY: CPT | Performed by: TRANSPLANT SURGERY

## 2021-02-25 PROCEDURE — 2500000003 HC RX 250 WO HCPCS

## 2021-02-25 PROCEDURE — 3700000001 HC ADD 15 MINUTES (ANESTHESIA): Performed by: TRANSPLANT SURGERY

## 2021-02-25 PROCEDURE — 3600000009 HC SURGERY ROBOT BASE: Performed by: TRANSPLANT SURGERY

## 2021-02-25 PROCEDURE — 7100000000 HC PACU RECOVERY - FIRST 15 MIN: Performed by: TRANSPLANT SURGERY

## 2021-02-25 PROCEDURE — 7100000010 HC PHASE II RECOVERY - FIRST 15 MIN: Performed by: TRANSPLANT SURGERY

## 2021-02-25 PROCEDURE — 2500000003 HC RX 250 WO HCPCS: Performed by: TRANSPLANT SURGERY

## 2021-02-25 PROCEDURE — 3600000019 HC SURGERY ROBOT ADDTL 15MIN: Performed by: TRANSPLANT SURGERY

## 2021-02-25 PROCEDURE — 6360000002 HC RX W HCPCS

## 2021-02-25 PROCEDURE — S2900 ROBOTIC SURGICAL SYSTEM: HCPCS | Performed by: TRANSPLANT SURGERY

## 2021-02-25 PROCEDURE — 6360000002 HC RX W HCPCS: Performed by: TRANSPLANT SURGERY

## 2021-02-25 PROCEDURE — 2580000003 HC RX 258: Performed by: TRANSPLANT SURGERY

## 2021-02-25 PROCEDURE — 2709999900 HC NON-CHARGEABLE SUPPLY: Performed by: TRANSPLANT SURGERY

## 2021-02-25 PROCEDURE — 7100000001 HC PACU RECOVERY - ADDTL 15 MIN: Performed by: TRANSPLANT SURGERY

## 2021-02-25 PROCEDURE — 2780000010 HC IMPLANT OTHER: Performed by: TRANSPLANT SURGERY

## 2021-02-25 PROCEDURE — 3700000000 HC ANESTHESIA ATTENDED CARE: Performed by: TRANSPLANT SURGERY

## 2021-02-25 PROCEDURE — 6370000000 HC RX 637 (ALT 250 FOR IP): Performed by: ANESTHESIOLOGY

## 2021-02-25 PROCEDURE — 2580000003 HC RX 258

## 2021-02-25 PROCEDURE — 7100000011 HC PHASE II RECOVERY - ADDTL 15 MIN: Performed by: TRANSPLANT SURGERY

## 2021-02-25 PROCEDURE — 88304 TISSUE EXAM BY PATHOLOGIST: CPT

## 2021-02-25 RX ORDER — ACETAMINOPHEN 500 MG
500 TABLET ORAL PRN
COMMUNITY
End: 2021-03-12

## 2021-02-25 RX ORDER — FENTANYL CITRATE 50 UG/ML
INJECTION, SOLUTION INTRAMUSCULAR; INTRAVENOUS PRN
Status: DISCONTINUED | OUTPATIENT
Start: 2021-02-25 | End: 2021-02-25 | Stop reason: SDUPTHER

## 2021-02-25 RX ORDER — SODIUM CHLORIDE 0.9 % (FLUSH) 0.9 %
10 SYRINGE (ML) INJECTION EVERY 12 HOURS SCHEDULED
Status: DISCONTINUED | OUTPATIENT
Start: 2021-02-25 | End: 2021-02-25 | Stop reason: HOSPADM

## 2021-02-25 RX ORDER — LABETALOL HYDROCHLORIDE 5 MG/ML
5 INJECTION, SOLUTION INTRAVENOUS ONCE
Status: COMPLETED | OUTPATIENT
Start: 2021-02-25 | End: 2021-02-25

## 2021-02-25 RX ORDER — GLYCOPYRROLATE 1 MG/5 ML
SYRINGE (ML) INTRAVENOUS PRN
Status: DISCONTINUED | OUTPATIENT
Start: 2021-02-25 | End: 2021-02-25 | Stop reason: SDUPTHER

## 2021-02-25 RX ORDER — INDOCYANINE GREEN AND WATER 25 MG
2.5 KIT INJECTION ONCE
Status: COMPLETED | OUTPATIENT
Start: 2021-02-25 | End: 2021-02-25

## 2021-02-25 RX ORDER — IBUPROFEN 800 MG/1
800 TABLET ORAL
Qty: 42 TABLET | Refills: 0 | Status: SHIPPED | OUTPATIENT
Start: 2021-02-25 | End: 2021-03-12

## 2021-02-25 RX ORDER — EPHEDRINE SULFATE/0.9% NACL/PF 50 MG/5 ML
SYRINGE (ML) INTRAVENOUS PRN
Status: DISCONTINUED | OUTPATIENT
Start: 2021-02-25 | End: 2021-02-25 | Stop reason: SDUPTHER

## 2021-02-25 RX ORDER — PROPOFOL 10 MG/ML
INJECTION, EMULSION INTRAVENOUS PRN
Status: DISCONTINUED | OUTPATIENT
Start: 2021-02-25 | End: 2021-02-25 | Stop reason: SDUPTHER

## 2021-02-25 RX ORDER — ONDANSETRON 2 MG/ML
INJECTION INTRAMUSCULAR; INTRAVENOUS PRN
Status: DISCONTINUED | OUTPATIENT
Start: 2021-02-25 | End: 2021-02-25 | Stop reason: SDUPTHER

## 2021-02-25 RX ORDER — HYDRALAZINE HYDROCHLORIDE 20 MG/ML
5 INJECTION INTRAMUSCULAR; INTRAVENOUS
Status: DISCONTINUED | OUTPATIENT
Start: 2021-02-25 | End: 2021-02-25 | Stop reason: HOSPADM

## 2021-02-25 RX ORDER — OXYCODONE HYDROCHLORIDE 5 MG/1
5 TABLET ORAL EVERY 6 HOURS PRN
Qty: 28 TABLET | Refills: 0 | Status: SHIPPED | OUTPATIENT
Start: 2021-02-25 | End: 2021-03-04

## 2021-02-25 RX ORDER — LABETALOL HYDROCHLORIDE 5 MG/ML
INJECTION, SOLUTION INTRAVENOUS
Status: COMPLETED
Start: 2021-02-25 | End: 2021-02-25

## 2021-02-25 RX ORDER — FENTANYL CITRATE 50 UG/ML
25 INJECTION, SOLUTION INTRAMUSCULAR; INTRAVENOUS EVERY 5 MIN PRN
Status: DISCONTINUED | OUTPATIENT
Start: 2021-02-25 | End: 2021-02-25 | Stop reason: HOSPADM

## 2021-02-25 RX ORDER — LIDOCAINE HYDROCHLORIDE 20 MG/ML
INJECTION, SOLUTION EPIDURAL; INFILTRATION; INTRACAUDAL; PERINEURAL PRN
Status: DISCONTINUED | OUTPATIENT
Start: 2021-02-25 | End: 2021-02-25 | Stop reason: SDUPTHER

## 2021-02-25 RX ORDER — SODIUM CHLORIDE 9 MG/ML
INJECTION, SOLUTION INTRAVENOUS CONTINUOUS PRN
Status: DISCONTINUED | OUTPATIENT
Start: 2021-02-25 | End: 2021-02-25 | Stop reason: SDUPTHER

## 2021-02-25 RX ORDER — ACETAMINOPHEN 500 MG
500 TABLET ORAL EVERY 6 HOURS
Qty: 56 TABLET | Refills: 0 | Status: SHIPPED | OUTPATIENT
Start: 2021-02-25 | End: 2021-03-31 | Stop reason: ALTCHOICE

## 2021-02-25 RX ORDER — NEOSTIGMINE METHYLSULFATE 1 MG/ML
INJECTION, SOLUTION INTRAVENOUS PRN
Status: DISCONTINUED | OUTPATIENT
Start: 2021-02-25 | End: 2021-02-25 | Stop reason: SDUPTHER

## 2021-02-25 RX ORDER — AMOXICILLIN 250 MG
2 CAPSULE ORAL DAILY
Qty: 28 TABLET | Refills: 0 | Status: SHIPPED | OUTPATIENT
Start: 2021-02-25 | End: 2021-03-11

## 2021-02-25 RX ORDER — ROCURONIUM BROMIDE 10 MG/ML
INJECTION, SOLUTION INTRAVENOUS PRN
Status: DISCONTINUED | OUTPATIENT
Start: 2021-02-25 | End: 2021-02-25 | Stop reason: SDUPTHER

## 2021-02-25 RX ORDER — SODIUM CHLORIDE 0.9 % (FLUSH) 0.9 %
10 SYRINGE (ML) INJECTION PRN
Status: DISCONTINUED | OUTPATIENT
Start: 2021-02-25 | End: 2021-02-25 | Stop reason: HOSPADM

## 2021-02-25 RX ORDER — HYDRALAZINE HYDROCHLORIDE 20 MG/ML
INJECTION INTRAMUSCULAR; INTRAVENOUS
Status: COMPLETED
Start: 2021-02-25 | End: 2021-02-25

## 2021-02-25 RX ORDER — OXYCODONE HYDROCHLORIDE 5 MG/1
5 TABLET ORAL
Status: COMPLETED | OUTPATIENT
Start: 2021-02-25 | End: 2021-02-25

## 2021-02-25 RX ORDER — BUPIVACAINE HYDROCHLORIDE 5 MG/ML
INJECTION, SOLUTION EPIDURAL; INTRACAUDAL PRN
Status: DISCONTINUED | OUTPATIENT
Start: 2021-02-25 | End: 2021-02-25 | Stop reason: ALTCHOICE

## 2021-02-25 RX ADMIN — PROPOFOL 50 MG: 10 INJECTION, EMULSION INTRAVENOUS at 09:26

## 2021-02-25 RX ADMIN — FENTANYL CITRATE 50 MCG: 50 INJECTION, SOLUTION INTRAMUSCULAR; INTRAVENOUS at 09:00

## 2021-02-25 RX ADMIN — HYDRALAZINE HYDROCHLORIDE 5 MG: 20 INJECTION INTRAMUSCULAR; INTRAVENOUS at 10:26

## 2021-02-25 RX ADMIN — Medication 10 MG: at 08:53

## 2021-02-25 RX ADMIN — HYDROMORPHONE HYDROCHLORIDE 0.25 MG: 1 INJECTION, SOLUTION INTRAMUSCULAR; INTRAVENOUS; SUBCUTANEOUS at 10:45

## 2021-02-25 RX ADMIN — FENTANYL CITRATE 50 MCG: 50 INJECTION, SOLUTION INTRAMUSCULAR; INTRAVENOUS at 08:39

## 2021-02-25 RX ADMIN — LIDOCAINE HYDROCHLORIDE 30 MG: 20 INJECTION, SOLUTION EPIDURAL; INFILTRATION; INTRACAUDAL; PERINEURAL at 08:39

## 2021-02-25 RX ADMIN — Medication 0.6 MG: at 09:29

## 2021-02-25 RX ADMIN — HYDROMORPHONE HYDROCHLORIDE 0.25 MG: 1 INJECTION, SOLUTION INTRAMUSCULAR; INTRAVENOUS; SUBCUTANEOUS at 10:01

## 2021-02-25 RX ADMIN — SODIUM CHLORIDE, PRESERVATIVE FREE 10 ML: 5 INJECTION INTRAVENOUS at 07:51

## 2021-02-25 RX ADMIN — HYDRALAZINE HYDROCHLORIDE 5 MG: 20 INJECTION INTRAMUSCULAR; INTRAVENOUS at 10:44

## 2021-02-25 RX ADMIN — LABETALOL HYDROCHLORIDE 5 MG: 5 INJECTION INTRAVENOUS at 09:46

## 2021-02-25 RX ADMIN — LABETALOL HYDROCHLORIDE 5 MG: 5 INJECTION, SOLUTION INTRAVENOUS at 09:46

## 2021-02-25 RX ADMIN — Medication 3 MG: at 09:29

## 2021-02-25 RX ADMIN — ONDANSETRON 4 MG: 2 INJECTION INTRAMUSCULAR; INTRAVENOUS at 09:11

## 2021-02-25 RX ADMIN — INDOCYANINE GREEN AND WATER 2.5 MG: KIT at 07:51

## 2021-02-25 RX ADMIN — Medication 10 MG: at 08:55

## 2021-02-25 RX ADMIN — PROPOFOL 150 MG: 10 INJECTION, EMULSION INTRAVENOUS at 08:39

## 2021-02-25 RX ADMIN — ROCURONIUM BROMIDE 35 MG: 10 INJECTION, SOLUTION INTRAVENOUS at 08:39

## 2021-02-25 RX ADMIN — SODIUM CHLORIDE: 9 INJECTION, SOLUTION INTRAVENOUS at 08:34

## 2021-02-25 RX ADMIN — Medication 2 G: at 08:34

## 2021-02-25 RX ADMIN — HYDROMORPHONE HYDROCHLORIDE 0.25 MG: 1 INJECTION, SOLUTION INTRAMUSCULAR; INTRAVENOUS; SUBCUTANEOUS at 10:20

## 2021-02-25 RX ADMIN — OXYCODONE HYDROCHLORIDE 5 MG: 5 TABLET ORAL at 13:06

## 2021-02-25 RX ADMIN — SODIUM CHLORIDE: 9 INJECTION, SOLUTION INTRAVENOUS at 09:31

## 2021-02-25 ASSESSMENT — PULMONARY FUNCTION TESTS
PIF_VALUE: 28
PIF_VALUE: 1
PIF_VALUE: 30
PIF_VALUE: 23
PIF_VALUE: 1
PIF_VALUE: 1
PIF_VALUE: 21
PIF_VALUE: 29
PIF_VALUE: 26
PIF_VALUE: 23
PIF_VALUE: 15
PIF_VALUE: 22
PIF_VALUE: 16
PIF_VALUE: 21
PIF_VALUE: 25
PIF_VALUE: 21
PIF_VALUE: 28
PIF_VALUE: 20
PIF_VALUE: 21
PIF_VALUE: 21
PIF_VALUE: 28
PIF_VALUE: 22
PIF_VALUE: 15
PIF_VALUE: 15
PIF_VALUE: 3
PIF_VALUE: 28
PIF_VALUE: 15
PIF_VALUE: 29
PIF_VALUE: 21
PIF_VALUE: 21
PIF_VALUE: 30
PIF_VALUE: 29
PIF_VALUE: 25

## 2021-02-25 ASSESSMENT — LIFESTYLE VARIABLES: SMOKING_STATUS: 1

## 2021-02-25 ASSESSMENT — PAIN SCALES - GENERAL
PAINLEVEL_OUTOF10: 8
PAINLEVEL_OUTOF10: 6
PAINLEVEL_OUTOF10: 8
PAINLEVEL_OUTOF10: 8

## 2021-02-25 ASSESSMENT — PAIN SCALES - WONG BAKER
WONGBAKER_NUMERICALRESPONSE: 0
WONGBAKER_NUMERICALRESPONSE: 0

## 2021-02-25 ASSESSMENT — PAIN DESCRIPTION - PAIN TYPE: TYPE: SURGICAL PAIN

## 2021-02-25 ASSESSMENT — PAIN - FUNCTIONAL ASSESSMENT: PAIN_FUNCTIONAL_ASSESSMENT: 0-10

## 2021-02-25 ASSESSMENT — PAIN DESCRIPTION - PROGRESSION: CLINICAL_PROGRESSION: GRADUALLY IMPROVING

## 2021-02-25 NOTE — PROGRESS NOTES
46- Dr. Wright Living notified of bp 190/91. Will come see patient. 1234-Dr. Wright Living here to see patient. Ok for pain med before discharge. See order.

## 2021-02-25 NOTE — ANESTHESIA PRE PROCEDURE
Department of Anesthesiology  Preprocedure Note       Name:  Marvin Romero   Age:  70 y.o.  :  1945                                          MRN:  [id number]         Date:  2021      Surgeon: Christen Ram):  Marija Dean MD    Procedure: Procedure(s):  LAPAROSCOPIC ROBOTIC ASSISTED CHOLECYSTECTOMY POSSIBLE OPEN POSSIBLE GRAM    Medications prior to admission:   Prior to Admission medications    Medication Sig Start Date End Date Taking? Authorizing Provider   acetaminophen (APAP EXTRA STRENGTH) 500 MG tablet Take 1 tablet by mouth every 6 hours for 14 days 2/25/21 3/11/21 Yes Rodolfo Browne III, MD   ibuprofen (ADVIL;MOTRIN) 800 MG tablet Take 1 tablet by mouth 3 times daily (with meals) for 14 days 2/25/21 3/11/21 Yes Rodolfo Browne III, MD   oxyCODONE (ROXICODONE) 5 MG immediate release tablet Take 1 tablet by mouth every 6 hours as needed for Pain for up to 7 days. Intended supply: 7 days.  Take lowest dose possible to manage pain 2/25/21 3/4/21 Yes Rodolfo Browne III, MD   senna-docusate (PERICOLACE) 8.6-50 MG per tablet Take 2 tablets by mouth daily for 14 days 2/25/21 3/11/21 Yes Rodolfo Browne III, MD   acetaminophen (TYLENOL) 500 MG tablet Take 500 mg by mouth as needed for Pain   Yes Historical Provider, MD   guaiFENesin (MUCINEX) 600 MG extended release tablet Take 1 tablet by mouth 2 times daily for 15 days 2/10/21 2/25/21 Yes Kailey Cantu MD   folic acid (FOLVITE) 1 MG tablet Take 1 tablet by mouth daily 21  Yes Marisa Arrieta MD   Multiple Vitamin (MULTIVITAMIN) TABS tablet Take 1 tablet by mouth daily 2/6/21 3/8/21 Yes Marisa Arrieta MD   thiamine 100 MG tablet Take 1 tablet by mouth daily 21  Yes Marisa Arrieta MD   apixaban Kathie Meo) 5 MG TABS tablet Take 1 tablet by mouth 2 times daily 21  Yes Marisa Arrieta MD       Current medications:    Current Facility-Administered Medications Medication Dose Route Frequency Provider Last Rate Last Admin    sodium chloride flush 0.9 % injection 10 mL  10 mL Intravenous 2 times per day Edy Garcia III, MD        sodium chloride flush 0.9 % injection 10 mL  10 mL Intravenous PRN Edy Garcia III, MD        ceFAZolin (ANCEF) 2000 mg in sterile water 20 mL IV syringe  2,000 mg Intravenous On Call to Sultana Cueto III, MD        indocyanine green (IC-GREEN) syringe 2.5 mg  2.5 mg Intravenous Once Edy Garcia III, MD        ceFAZolin 2000 mg in 20 mL SWFI IV Syringe IV syringe             ceFAZolin 2000 mg in 20 mL SWFI IV Syringe IV syringe             fentaNYL (SUBLIMAZE) injection 25 mcg  25 mcg Intravenous Q5 Min PRN Hattie Cordova MD        HYDROmorphone (DILAUDID) injection 0.25 mg  0.25 mg Intravenous Q5 Min PRN Hattie Cordova MD           Allergies:  No Known Allergies    Problem List:    Patient Active Problem List   Diagnosis Code    Essential hypertension I10    Atrial fibrillation (HCC) I48.91    Hypokalemia E87.6    Functional diarrhea K59.1    Choledocholithiasis K80.50    Chronic obstructive pulmonary disease (HCC) J44.9    Alcohol abuse F10.10    Hepatic abscess K75.0    Portal vein thrombosis I81    History of squamous cell carcinoma of skin Z85.828    Generalized osteoarthritis of multiple sites M15.9    Chronic cholecystitis due to cholelithiasis with choledocholithiasis K80.64       Past Medical History:  History reviewed. No pertinent past medical history.     Past Surgical History:        Procedure Laterality Date    CT NEEDLE BIOPSY LIVER PERCUTANEOUS  2/1/2021    CT NEEDLE BIOPSY LIVER PERCUTANEOUS 2/1/2021 [facility] CT    ERCP N/A 1/29/2021    ERCP STONE REMOVAL performed by Aristeo Polanco MD at [facility] Left 2009    left sided ear surgery    PICC LINE INSERTION NURSE  2/6/2021            Social History:    Social History Tobacco Use    Smoking status: Former Smoker     Packs/day: 1.00     Years: 43.00     Pack years: 43.00     Start date: 1963     Quit date: 2006     Years since quittin.8    Smokeless tobacco: Never Used   Substance Use Topics    Alcohol use: Yes     Alcohol/week: 30.0 standard drinks     Types: 30 Cans of beer per week     Comment: NONE SINCE 1/15/21                                Counseling given: Not Answered      Vital Signs (Current):   Vitals:    21 1228 21 0658 21 0729   BP:  (!) 180/87 (!) 191/93   Pulse:  70    Resp:  16    Temp:  97.3 °F (36.3 °C)    TempSrc:  Temporal    SpO2:  97%    Weight: 157 lb (71.2 kg) 157 lb (71.2 kg)    Height: 5' 9\" (1.753 m) 5' 9\" (1.753 m)                                               BP Readings from Last 3 Encounters:   21 (!) 191/93   02/10/21 136/80   21 107/62       NPO Status: Time of last liquid consumption: 0000                        Time of last solid consumption: 1900                        Date of last liquid consumption: 21                        Date of last solid food consumption: 21    BMI:   Wt Readings from Last 3 Encounters:   21 157 lb (71.2 kg)   02/10/21 157 lb (71.2 kg)   21 161 lb (73 kg)     Body mass index is 23.18 kg/m².     CBC:   Lab Results   Component Value Date    WBC 6.8 02/15/2021    RBC 4.07 02/15/2021    HGB 11.6 02/15/2021    HCT 37.8 02/15/2021    MCV 92.9 02/15/2021    RDW 14.0 02/15/2021     02/15/2021       CMP:   Lab Results   Component Value Date     02/15/2021    K 3.9 02/15/2021    K 3.2 2021     02/15/2021    CO2 29 02/15/2021    BUN 11 02/15/2021    CREATININE 0.6 02/15/2021    GFRAA >60 02/15/2021    LABGLOM >60 02/15/2021    GLUCOSE 94 02/15/2021    PROT 8.3 02/15/2021    CALCIUM 9.0 02/15/2021    BILITOT 0.4 02/15/2021    ALKPHOS 109 02/15/2021    AST 28 02/15/2021    ALT 22 02/15/2021 POC Tests: No results for input(s): POCGLU, POCNA, POCK, POCCL, POCBUN, POCHEMO, POCHCT in the last 72 hours. Coags:   Lab Results   Component Value Date    PROTIME 17.7 02/01/2021    INR 1.5 02/01/2021    APTT 33.2 02/06/2021       HCG (If Applicable): No results found for: PREGTESTUR, PREGSERUM, HCG, HCGQUANT     ABGs: No results found for: PHART, PO2ART, DUK9EWV, RNJ5MDX, BEART, J1KAWAXQ     Type & Screen (If Applicable):  No results found for: LABABO, LABRH    Drug/Infectious Status (If Applicable):  No results found for: HIV, HEPCAB    COVID-19 Screening (If Applicable):   Lab Results   Component Value Date    COVID19 Not Detected 02/19/2021         Anesthesia Evaluation  Patient summary reviewed no history of anesthetic complications:   Airway: Mallampati: III  TM distance: >3 FB   Neck ROM: full  Mouth opening: > = 3 FB Dental:      Comment: Very poor--decayed. Several teeth are missing. Many chipped teeth. Pulmonary: breath sounds clear to auscultation  (+) COPD:  current smoker (Quit cigarettes ~12 years ago.)                          ROS comment: Recent thoracentesis--hx of pleural effusion. Cardiovascular:    (+) hypertension:, dysrhythmias: atrial fibrillation,       ECG reviewed  Rhythm: regular  Rate: normal  Echocardiogram reviewed                  Neuro/Psych:   (+) psychiatric history:             ROS comment: ETOH abuse--12 pack of beer per day. He has had none since his recent hospitalization. GI/Hepatic/Renal:   (+) liver disease (Hx of liver abscess. Hx of CBD dilatation.):,           Endo/Other:    (+) malignancy/cancer (Skin--He had the right ear removed ~12 years ago. ). Abdominal:           Vascular:           ROS comment: Portal vein thrombosis. .                               Anesthesia Plan      general     ASA 3     (Pt agrees to GA--IV induction and ETTube.)  Induction: intravenous. Anesthetic plan and risks discussed with patient. Plan discussed with CRNA.     Attending anesthesiologist reviewed and agrees with Pre Eval content              Scot Solis MD   2/25/2021

## 2021-02-25 NOTE — PROGRESS NOTES
IV removed. Discharge instructions given to patient and daughter by Domi Bahena. Prescriptions already filled in hospital pharmacy.

## 2021-02-25 NOTE — INTERVAL H&P NOTE
Update History & Physical    The patient's History and Physical of February 24, 2021 was reviewed with the patient and I examined the patient. There was no change. The surgical site was confirmed by the patient and me. Plan: The risks, benefits, expected outcome, and alternative to the recommended procedure have been discussed with the patient. Patient understands and wants to proceed with the procedure.      Electronically signed by Thomas Fisher MD on 2/25/2021 at 6:54 AM

## 2021-02-26 NOTE — ANESTHESIA POSTPROCEDURE EVALUATION
Department of Anesthesiology  Postprocedure Note    Patient: Marvin Romero  MRN: [id number]  YOB: 1945  Date of evaluation: 2/26/2021  Time:  5:51 AM     Procedure Summary     Date: 02/25/21 Room / Location: [address]    Anesthesia Start: 2289 Anesthesia Stop: 0672    Procedure: CHOLECYSTECTOMY LAPAROSCOPIC ROBOTIC XI (N/A Abdomen) Diagnosis: (CHOLELITHIASIS CHOLANGITIS)    Surgeons: Marija Dean MD Responsible Provider: Aries Ovalles MD    Anesthesia Type: general ASA Status: 3          Anesthesia Type: general    Cassi Phase I: Cassi Score: 10    Cassi Phase II: Cassi Score: 10    Last vitals: Reviewed and per EMR flowsheets.        Anesthesia Post Evaluation    Patient location during evaluation: PACU  Level of consciousness: awake  Airway patency: patent  Nausea & Vomiting: no nausea and no vomiting  Complications: no  Cardiovascular status: hemodynamically stable  Respiratory status: acceptable

## 2021-03-10 ENCOUNTER — OFFICE VISIT (OUTPATIENT)
Dept: PRIMARY CARE CLINIC | Age: 76
End: 2021-03-10
Payer: MEDICARE

## 2021-03-10 VITALS
OXYGEN SATURATION: 97 % | HEART RATE: 63 BPM | DIASTOLIC BLOOD PRESSURE: 80 MMHG | SYSTOLIC BLOOD PRESSURE: 155 MMHG | TEMPERATURE: 96.5 F | WEIGHT: 148 LBS | BODY MASS INDEX: 21.86 KG/M2

## 2021-03-10 DIAGNOSIS — I10 ESSENTIAL HYPERTENSION: Primary | ICD-10-CM

## 2021-03-10 DIAGNOSIS — Z90.49 S/P CHOLECYSTECTOMY: ICD-10-CM

## 2021-03-10 DIAGNOSIS — I48.0 PAROXYSMAL ATRIAL FIBRILLATION (HCC): ICD-10-CM

## 2021-03-10 PROBLEM — K80.64 CHRONIC CHOLECYSTITIS DUE TO CHOLELITHIASIS WITH CHOLEDOCHOLITHIASIS: Status: RESOLVED | Noted: 2021-02-25 | Resolved: 2021-03-10

## 2021-03-10 PROCEDURE — 99214 OFFICE O/P EST MOD 30 MIN: CPT | Performed by: FAMILY MEDICINE

## 2021-03-10 RX ORDER — LOSARTAN POTASSIUM 25 MG/1
25 TABLET ORAL DAILY
Qty: 30 TABLET | Refills: 5 | Status: SHIPPED
Start: 2021-03-10 | End: 2021-04-07 | Stop reason: ALTCHOICE

## 2021-03-10 ASSESSMENT — ENCOUNTER SYMPTOMS
SHORTNESS OF BREATH: 0
COUGH: 1
GASTROINTESTINAL NEGATIVE: 1

## 2021-03-10 NOTE — PROGRESS NOTES
3/10/21  Celia Iglesias : 1945 Sex: male  Age: 70 y.o. Assessment and Plan:  Valerio Benavidez was seen today for 1 month follow-up. Diagnoses and all orders for this visit:    Essential hypertension  -     losartan (COZAAR) 25 MG tablet; Take 1 tablet by mouth daily    S/P cholecystectomy    Paroxysmal atrial fibrillation (HCC)    Overall doing well  Start losartan for BP control  Clarify re: metoprolol and ID f/u  Recheck BP 3-4 weeks    Return in about 3 weeks (around 3/31/2021). Chief Complaint   Patient presents with    1 Month Follow-Up       HPI   Here for one month f/u     Had gallbladder removed  - this all went well per patient   He was discharged same day   Has f/u with surgeon next week   Pt will be getting COVID test and then will be having his stent removed 3/22 as well  He is back on his eliquis now, no issues   His PICC line was taken out     Pt moved back home about a week ago   He states he is doing well overall  Does not have home nursing any longer  Pt states he remains EtOH free     Pain in his hands is \"good\" - this got better since his last visit with me   He is able to sqeeuze his fists now too   Still having some soreness right breast area, but this is slowly getting better     BP remains elevated today  He is amenable to starting medication for this   Echo done 2021 reviewed   Per discharge summery of previous hospitalization, pt was apparently to be metoprolol = this was noted after he left today   His pulse is WNL    Problem list reviewed and updated in full with patient today as necessary. A comprehensive ROS was negative, except as documented above. Review of Systems   Constitutional: Negative for chills and fever. Respiratory: Positive for cough. Negative for shortness of breath. Productive cough - phlegm is somewhat green   Cardiovascular: Negative for chest pain and palpitations. Gastrointestinal: Negative. Genitourinary: Negative. Psychiatric/Behavioral: Negative. Current Outpatient Medications:     losartan (COZAAR) 25 MG tablet, Take 1 tablet by mouth daily, Disp: 30 tablet, Rfl: 5    acetaminophen (APAP EXTRA STRENGTH) 500 MG tablet, Take 1 tablet by mouth every 6 hours for 14 days, Disp: 56 tablet, Rfl: 0    ibuprofen (ADVIL;MOTRIN) 800 MG tablet, Take 1 tablet by mouth 3 times daily (with meals) for 14 days, Disp: 42 tablet, Rfl: 0    senna-docusate (PERICOLACE) 8.6-50 MG per tablet, Take 2 tablets by mouth daily for 14 days, Disp: 28 tablet, Rfl: 0    acetaminophen (TYLENOL) 500 MG tablet, Take 500 mg by mouth as needed for Pain, Disp: , Rfl:     folic acid (FOLVITE) 1 MG tablet, Take 1 tablet by mouth daily, Disp: 30 tablet, Rfl: 3    thiamine 100 MG tablet, Take 1 tablet by mouth daily, Disp: 30 tablet, Rfl: 3    apixaban (ELIQUIS) 5 MG TABS tablet, Take 1 tablet by mouth 2 times daily, Disp: 180 tablet, Rfl: 1    Multiple Vitamin (MULTIVITAMIN) TABS tablet, Take 1 tablet by mouth daily, Disp: 30 tablet, Rfl: 0  No Known Allergies    Pt's past medical and surgical history were updated as necessary today   Pt's family and social history were updated as necessary today          Vitals:    03/10/21 0659 03/10/21 0702   BP: (!) 155/80 (!) 155/80   Pulse: 63    Temp: 96.5 °F (35.8 °C)    TempSrc: Temporal    SpO2: 97%    Weight: 148 lb (67.1 kg)        Physical Exam  Constitutional:       Appearance: Normal appearance. HENT:      Head: Normocephalic and atraumatic. Eyes:      Conjunctiva/sclera: Conjunctivae normal.   Cardiovascular:      Rate and Rhythm: Normal rate and regular rhythm. Heart sounds: Normal heart sounds. Comments: Frequent ectopy, but overall sinus rhythm   Pulmonary:      Effort: Pulmonary effort is normal.      Comments: Diminished, but moving air well   Abdominal:      Tenderness: There is no abdominal tenderness. Musculoskeletal: Normal range of motion.    Skin:     General: Skin is

## 2021-03-16 ENCOUNTER — OFFICE VISIT (OUTPATIENT)
Dept: SURGERY | Age: 76
End: 2021-03-16

## 2021-03-16 VITALS
DIASTOLIC BLOOD PRESSURE: 80 MMHG | BODY MASS INDEX: 21.7 KG/M2 | WEIGHT: 155 LBS | HEART RATE: 63 BPM | SYSTOLIC BLOOD PRESSURE: 155 MMHG | TEMPERATURE: 96.5 F | HEIGHT: 71 IN

## 2021-03-16 DIAGNOSIS — K75.0 HEPATIC ABSCESS: Primary | ICD-10-CM

## 2021-03-16 PROCEDURE — 99024 POSTOP FOLLOW-UP VISIT: CPT | Performed by: TRANSPLANT SURGERY

## 2021-03-16 NOTE — PROGRESS NOTES
Hepatobiliary and Pancreatic Surgery Progress Note    CC: follow up surgery    Subjective: Patient states that he is doing well. He is gaining weight since his gallbladder removal.  He denies any abdominal pain    OBJECTIVE      Physical    BP (!) 155/80   Pulse 63   Temp 96.5 °F (35.8 °C)   Ht 5' 11\" (1.803 m)   Wt 155 lb (70.3 kg)   BMI 21.62 kg/m²       General appearance: appears in no acute distress  Lungs:respiratory effort normal without accessory numbers  Heart: no pedal edema  Abdomen: soft, nondistended, nontympanic, no guarding, no peritoneal signs, normoactive bowel sounds  Extremities: ROM normal    ASSESSMENT: s/p robotic cholecystectomy with cholangitis and liver abscess and right portal vein thrombosis    PLAN:    - continue eliquis for total of 3 months  - stop eliquis 18th, resume eliquis 3/23  - ERCP with stent removal on 3/22  - continue losartan  - CT with IV contrast next month to assess portal vein  - follow up 4 weeks    Thank you for the consultation and allowing me to take part in Mr. Allie Head' care.       Edwige Saenz 3/16/2021 9:27 AM

## 2021-03-17 ENCOUNTER — HOSPITAL ENCOUNTER (OUTPATIENT)
Age: 76
Discharge: HOME | End: 2021-03-19
Payer: MEDICARE

## 2021-03-17 DIAGNOSIS — Z01.818 PREOP TESTING: ICD-10-CM

## 2021-03-17 PROCEDURE — U0003 INFECTIOUS AGENT DETECTION BY NUCLEIC ACID (DNA OR RNA); SEVERE ACUTE RESPIRATORY SYNDROME CORONAVIRUS 2 (SARS-COV-2) (CORONAVIRUS DISEASE [COVID-19]), AMPLIFIED PROBE TECHNIQUE, MAKING USE OF HIGH THROUGHPUT TECHNOLOGIES AS DESCRIBED BY CMS-2020-01-R: HCPCS

## 2021-03-18 LAB — SARS-COV-2, PCR: NOT DETECTED

## 2021-03-19 ENCOUNTER — PREP FOR PROCEDURE (OUTPATIENT)
Dept: GASTROENTEROLOGY | Age: 76
End: 2021-03-19

## 2021-03-19 RX ORDER — SODIUM CHLORIDE 9 MG/ML
INJECTION, SOLUTION INTRAVENOUS CONTINUOUS
Status: CANCELLED | OUTPATIENT
Start: 2021-03-19

## 2021-03-19 RX ORDER — SODIUM CHLORIDE 0.9 % (FLUSH) 0.9 %
10 SYRINGE (ML) INJECTION EVERY 12 HOURS SCHEDULED
Status: CANCELLED | OUTPATIENT
Start: 2021-03-19

## 2021-03-19 RX ORDER — SODIUM CHLORIDE 0.9 % (FLUSH) 0.9 %
10 SYRINGE (ML) INJECTION PRN
Status: CANCELLED | OUTPATIENT
Start: 2021-03-19

## 2021-03-22 ENCOUNTER — APPOINTMENT (OUTPATIENT)
Dept: GENERAL RADIOLOGY | Age: 76
End: 2021-03-22
Attending: INTERNAL MEDICINE
Payer: MEDICARE

## 2021-03-22 ENCOUNTER — ANESTHESIA EVENT (OUTPATIENT)
Dept: ENDOSCOPY | Age: 76
End: 2021-03-22
Payer: MEDICARE

## 2021-03-22 ENCOUNTER — ANESTHESIA (OUTPATIENT)
Dept: ENDOSCOPY | Age: 76
End: 2021-03-22
Payer: MEDICARE

## 2021-03-22 ENCOUNTER — HOSPITAL ENCOUNTER (OUTPATIENT)
Age: 76
Setting detail: OUTPATIENT SURGERY
Discharge: HOME | End: 2021-03-22
Attending: INTERNAL MEDICINE | Admitting: INTERNAL MEDICINE
Payer: MEDICARE

## 2021-03-22 VITALS
SYSTOLIC BLOOD PRESSURE: 182 MMHG | RESPIRATION RATE: 12 BRPM | OXYGEN SATURATION: 97 % | DIASTOLIC BLOOD PRESSURE: 93 MMHG

## 2021-03-22 VITALS
HEART RATE: 74 BPM | WEIGHT: 148 LBS | SYSTOLIC BLOOD PRESSURE: 167 MMHG | DIASTOLIC BLOOD PRESSURE: 88 MMHG | RESPIRATION RATE: 16 BRPM | OXYGEN SATURATION: 98 % | BODY MASS INDEX: 21.92 KG/M2 | HEIGHT: 69 IN

## 2021-03-22 DIAGNOSIS — Z01.818 PREOP TESTING: Primary | ICD-10-CM

## 2021-03-22 PROCEDURE — 74330 X-RAY BILE/PANC ENDOSCOPY: CPT

## 2021-03-22 PROCEDURE — 2720000010 HC SURG SUPPLY STERILE: Performed by: INTERNAL MEDICINE

## 2021-03-22 PROCEDURE — 7100000011 HC PHASE II RECOVERY - ADDTL 15 MIN: Performed by: INTERNAL MEDICINE

## 2021-03-22 PROCEDURE — 2709999900 HC NON-CHARGEABLE SUPPLY: Performed by: INTERNAL MEDICINE

## 2021-03-22 PROCEDURE — 7100000010 HC PHASE II RECOVERY - FIRST 15 MIN: Performed by: INTERNAL MEDICINE

## 2021-03-22 PROCEDURE — 6360000002 HC RX W HCPCS

## 2021-03-22 PROCEDURE — 3609015000 HC ERCP REMOVE FOREIGN BODY/STENT BILIARY/PANC DUCT: Performed by: INTERNAL MEDICINE

## 2021-03-22 PROCEDURE — 3700000000 HC ANESTHESIA ATTENDED CARE: Performed by: INTERNAL MEDICINE

## 2021-03-22 PROCEDURE — 6360000002 HC RX W HCPCS: Performed by: NURSE ANESTHETIST, CERTIFIED REGISTERED

## 2021-03-22 PROCEDURE — 6360000004 HC RX CONTRAST MEDICATION: Performed by: INTERNAL MEDICINE

## 2021-03-22 PROCEDURE — C1769 GUIDE WIRE: HCPCS | Performed by: INTERNAL MEDICINE

## 2021-03-22 PROCEDURE — 2500000003 HC RX 250 WO HCPCS: Performed by: NURSE ANESTHETIST, CERTIFIED REGISTERED

## 2021-03-22 PROCEDURE — 2580000003 HC RX 258: Performed by: INTERNAL MEDICINE

## 2021-03-22 PROCEDURE — 3700000001 HC ADD 15 MINUTES (ANESTHESIA): Performed by: INTERNAL MEDICINE

## 2021-03-22 RX ORDER — SODIUM CHLORIDE 9 MG/ML
INJECTION, SOLUTION INTRAVENOUS CONTINUOUS
Status: DISCONTINUED | OUTPATIENT
Start: 2021-03-22 | End: 2021-03-22 | Stop reason: HOSPADM

## 2021-03-22 RX ORDER — PROPOFOL 10 MG/ML
INJECTION, EMULSION INTRAVENOUS PRN
Status: DISCONTINUED | OUTPATIENT
Start: 2021-03-22 | End: 2021-03-22 | Stop reason: SDUPTHER

## 2021-03-22 RX ORDER — SODIUM CHLORIDE 0.9 % (FLUSH) 0.9 %
10 SYRINGE (ML) INJECTION EVERY 12 HOURS SCHEDULED
Status: DISCONTINUED | OUTPATIENT
Start: 2021-03-22 | End: 2021-03-22 | Stop reason: HOSPADM

## 2021-03-22 RX ORDER — SODIUM CHLORIDE 0.9 % (FLUSH) 0.9 %
10 SYRINGE (ML) INJECTION PRN
Status: DISCONTINUED | OUTPATIENT
Start: 2021-03-22 | End: 2021-03-22 | Stop reason: HOSPADM

## 2021-03-22 RX ADMIN — IOPAMIDOL 20 ML: 612 INJECTION, SOLUTION INTRAVENOUS at 15:59

## 2021-03-22 RX ADMIN — SODIUM CHLORIDE: 9 INJECTION, SOLUTION INTRAVENOUS at 15:10

## 2021-03-22 RX ADMIN — GLUCAGON HYDROCHLORIDE 0.5 MG: KIT at 15:48

## 2021-03-22 RX ADMIN — PROPOFOL 400 MG: 10 INJECTION, EMULSION INTRAVENOUS at 15:17

## 2021-03-22 ASSESSMENT — LIFESTYLE VARIABLES: SMOKING_STATUS: 0

## 2021-03-22 NOTE — ANESTHESIA PRE PROCEDURE
Department of Anesthesiology  Preprocedure Note       Name:  Jovana Sharma   Age:  70 y.o.  :  1945                                          MRN:  [id number]         Date:  3/22/2021      Surgeon: Monica Jang):  Carla Noguera MD    Procedure: Procedure(s):  ERCP WITH STENT REMOVAL    Medications prior to admission:   Prior to Admission medications    Medication Sig Start Date End Date Taking? Authorizing Provider   GUAIFENESIN 1200 PO Take by mouth daily    Historical Provider, MD   losartan (COZAAR) 25 MG tablet Take 1 tablet by mouth daily 3/10/21   Tacho Serrano MD   acetaminophen (APAP EXTRA STRENGTH) 500 MG tablet Take 1 tablet by mouth every 6 hours for 14 days 2/25/21 3/12/21  Diane Orr III, MD   folic acid (FOLVITE) 1 MG tablet Take 1 tablet by mouth daily 21   Rafiq Ascencio MD   Multiple Vitamin (MULTIVITAMIN) TABS tablet Take 1 tablet by mouth daily 2/6/21 3/12/21  Rafiq Ascencio MD   thiamine 100 MG tablet Take 1 tablet by mouth daily 21   Rafiq Ascencio MD   apixaban [facility]) 5 MG TABS tablet Take 1 tablet by mouth 2 times daily 21   Rafiq Ascencio MD       Current medications:    No current facility-administered medications for this visit. No current outpatient medications on file.      Facility-Administered Medications Ordered in Other Visits   Medication Dose Route Frequency Provider Last Rate Last Admin    0.9 % sodium chloride infusion   Intravenous Continuous Carla Noguera MD        sodium chloride flush 0.9 % injection 10 mL  10 mL Intravenous 2 times per day Carla Noguera MD        sodium chloride flush 0.9 % injection 10 mL  10 mL Intravenous PRN Carla Noguera MD           Allergies:  No Known Allergies    Problem List:    Patient Active Problem List   Diagnosis Code    Essential hypertension I10    Atrial fibrillation (HCC) I48.91    Hypokalemia E87.6    Functional diarrhea K59.1    Chronic obstructive pulmonary disease ([facility] [address] J44.9    Alcohol abuse F10.10    Hepatic abscess K75.0    Portal vein thrombosis I81    History of squamous cell carcinoma of skin Z85.828    Generalized osteoarthritis of multiple sites M15.9    History of cholecystectomy Z90.49       Past Medical History:        Diagnosis Date    A-fib ([address]     follows with PCP only    Hx of blood clots     Hypertension     Pneumonia     Squamous cell skin cancer     Stomach ulcer        Past Surgical History:        Procedure Laterality Date    CHOLECYSTECTOMY, LAPAROSCOPIC N/A 2021    CHOLECYSTECTOMY LAPAROSCOPIC ROBOTIC XI performed by Anish Gan MD at [facility] CT NEEDLE BIOPSY LIVER PERCUTANEOUS  2021    CT NEEDLE BIOPSY LIVER PERCUTANEOUS 2021 [facility] CT    ERCP N/A 2021    ERCP STONE REMOVAL performed by Ray Martínez MD at [address] [place] [address]    left sided ear surgery    PICC LINE INSERTION NURSE  2021            Social History:    Social History     Tobacco Use    Smoking status: Former Smoker     Packs/day: 1.00     Years: 43.00     Pack years: 43.00     Start date: 1963     Quit date: 2006     Years since quittin.8    Smokeless tobacco: Never Used   Substance Use Topics    Alcohol use: Yes     Alcohol/week: 30.0 standard drinks     Types: 30 Cans of beer per week     Comment: NONE SINCE 1/15/21                                Counseling given: Not Answered      Vital Signs (Current): There were no vitals filed for this visit.                                            BP Readings from Last 3 Encounters:   21 (!) 190/87   21 (!) 155/80   03/10/21 (!) 155/80       NPO Status:                                                                                 BMI:   Wt Readings from Last 3 Encounters:   21 148 lb (67.1 kg)   21 155 lb (70.3 kg)   03/10/21 148 lb (67.1 kg)     There is no height or weight on file to calculate BMI.    CBC:   Lab Results Component Value Date    WBC 6.8 02/15/2021    RBC 4.07 02/15/2021    HGB 11.6 02/15/2021    HCT 37.8 02/15/2021    MCV 92.9 02/15/2021    RDW 14.0 02/15/2021     02/15/2021       CMP:   Lab Results   Component Value Date     02/15/2021    K 3.9 02/15/2021    K 3.2 01/28/2021     02/15/2021    CO2 29 02/15/2021    BUN 11 02/15/2021    CREATININE 0.6 02/15/2021    GFRAA >60 02/15/2021    LABGLOM >60 02/15/2021    GLUCOSE 94 02/15/2021    PROT 8.3 02/15/2021    CALCIUM 9.0 02/15/2021    BILITOT 0.4 02/15/2021    ALKPHOS 109 02/15/2021    AST 28 02/15/2021    ALT 22 02/15/2021       POC Tests: No results for input(s): POCGLU, POCNA, POCK, POCCL, POCBUN, POCHEMO, POCHCT in the last 72 hours. Coags:   Lab Results   Component Value Date    PROTIME 17.7 02/01/2021    INR 1.5 02/01/2021    APTT 33.2 02/06/2021       HCG (If Applicable): No results found for: PREGTESTUR, PREGSERUM, HCG, HCGQUANT     ABGs: No results found for: PHART, PO2ART, IHN9SEG, SDD8HUC, BEART, L6QBAQOH     Type & Screen (If Applicable):  No results found for: LABABO, LABRH    Drug/Infectious Status (If Applicable):  No results found for: HIV, HEPCAB    COVID-19 Screening (If Applicable):   Lab Results   Component Value Date    COVID19 Not Detected 03/17/2021         Anesthesia Evaluation  Patient summary reviewed and Nursing notes reviewed no history of anesthetic complications:   Airway: Mallampati: III  TM distance: >3 FB   Neck ROM: full  Mouth opening: > = 3 FB Dental:      Comment: Missing most    Pulmonary:   (+) COPD:  decreased breath sounds,      (-) not a current smoker                          ROS comment: Chronic cough   Cardiovascular:  Exercise tolerance: poor (<4 METS),   (+) hypertension:, dysrhythmias: atrial fibrillation,         Rhythm: regular  Rate: normal           Beta Blocker:  Not on Beta Blocker         Neuro/Psych:   (+) psychiatric history:            GI/Hepatic/Renal:            ROS comment: gallstones. Endo/Other:    (+) blood dyscrasia: anticoagulation therapy:., malignancy/cancer. Abdominal:           Vascular: negative vascular ROS. Anesthesia Plan      MAC     ASA 3       Induction: intravenous. Anesthetic plan and risks discussed with patient and child/children.                       Deb Aguilera MD   3/22/2021

## 2021-03-23 ENCOUNTER — OFFICE VISIT (OUTPATIENT)
Dept: FAMILY MEDICINE CLINIC | Age: 76
End: 2021-03-23
Payer: MEDICARE

## 2021-03-23 VITALS
WEIGHT: 151 LBS | HEART RATE: 68 BPM | BODY MASS INDEX: 22.3 KG/M2 | OXYGEN SATURATION: 97 % | SYSTOLIC BLOOD PRESSURE: 160 MMHG | DIASTOLIC BLOOD PRESSURE: 85 MMHG | TEMPERATURE: 97.2 F

## 2021-03-23 DIAGNOSIS — H10.9 CONJUNCTIVITIS OF LEFT EYE, UNSPECIFIED CONJUNCTIVITIS TYPE: ICD-10-CM

## 2021-03-23 DIAGNOSIS — S05.02XA ABRASION OF LEFT CORNEA, INITIAL ENCOUNTER: Primary | ICD-10-CM

## 2021-03-23 PROCEDURE — 99213 OFFICE O/P EST LOW 20 MIN: CPT | Performed by: NURSE PRACTITIONER

## 2021-03-23 RX ORDER — OFLOXACIN 3 MG/ML
1 SOLUTION/ DROPS OPHTHALMIC 4 TIMES DAILY
Qty: 1 BOTTLE | Refills: 0 | Status: SHIPPED | OUTPATIENT
Start: 2021-03-23 | End: 2021-04-02

## 2021-03-23 RX ORDER — OFLOXACIN 3 MG/ML
1 SOLUTION/ DROPS OPHTHALMIC 4 TIMES DAILY
Qty: 1 BOTTLE | Refills: 0 | Status: SHIPPED
Start: 2021-03-23 | End: 2021-03-23

## 2021-03-23 NOTE — PROGRESS NOTES
Chief Complaint:   Eye Problem (left )      History of Present Illness   Source of history provided by:  patient. Kenyetta Garcia is a 70 y.o. old male presenting to express care with redness, irritation, and tearing to the left eye for the past 4 days. Pt reports unsure of a possibility of foreign body exposure. Has not had a recent URI within the past week. He reports blurred vision to his left eye. Denies any visual loss, HA, ear pain, fever, chills, N/V, neck pain, bleeding, or lethargy. Mechanism:     []  FB Exposure     []  Chemical Exposure     []  Direct Trauma     []  High Speed Machinery Use     [x]  Unknown     Circumstances:    []  Contact Lens Use     []  Recent URI Sx's     [x]  Spontaneous Onset     []  Close Contact w/similar Sx's     []  Work Related     History of:     []   Glaucoma     []   Recent Eye Surgery     Review of Systems    Unless otherwise stated in this report or unable to obtain because of the patient's clinical or mental status as evidenced by the medical record, this patients's positive and negative responses for Review of Systems, constitutional, psych, eyes, ENT, cardiovascular, respiratory, gastrointestinal, neurological, genitourinary, musculoskeletal, integument systems and systems related to the presenting problem are either stated in the preceding or were negative for the symptoms and/or complaints related to the medical problem. Past Medical History:  has a past medical history of A-fib ([address] Hx of blood clots, Hypertension, Pneumonia, Squamous cell skin cancer, and Stomach ulcer. 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 Social History:  reports that he quit smoking about 14 years ago.  He started smoking about 58 years ago. He has a 43.00 pack-year smoking history. He has never used smokeless tobacco. He reports current alcohol use of about 30.0 standard drinks of alcohol per week. He reports that he does not use drugs. Family History: family history is not on file. Allergies: Patient has no known allergies. Physical Exam   Vital Signs:  BP (!) 160/85   Pulse 68   Temp 97.2 °F (36.2 °C) (Temporal)   Wt 151 lb (68.5 kg)   SpO2 97%   BMI 22.30 kg/m²    Oxygen Saturation Interpretation: Normal.    Constitutional:  Alert, development consistent with age. Head:  NC/NT. Absence of portion of skull and left ear. Neck:  Normal ROM. Supple. No adenopathy. Airway patent. Eyes:         Pupils: PERRL bilaterally. Eyelids: No abrasions noted to the upper or lower lids. Positive for edema to the left upper and lower lids. Conjunctiva: Moderate erythema noted to the left conjunctiva. Sclera: Scleral injection noted to the left eye. Fluorescein staining negative for any FB, rust ring, or hyphema. Cornea: Small corneal abrasion noted at 6 o'clock, to left eye. EOM:  Intact bilaterally. Visual Acuity:  Grossly intact. Skin:  No rashes, erythema present, unless noted elsewhere. Chest:  Heart RRR, S1S2, no M/R/G. Lungs CTAB. Lymphatics: No lymphangitis or adenopathy noted. Neurological:  Oriented. Motor functions intact. Test Results Section   Labs:  (All laboratory and radiology results have been personally reviewed by myself)  No results found for this visit on 03/23/21. Assessment / Plan     Impression(s):  Daryle Skillern was seen today for eye problem.     Diagnoses and all orders for this visit:    Abrasion of left cornea, initial encounter  -     Discontinue: ofloxacin (OCUFLOX) 0.3 % solution; Place 1 drop into the left eye 4 times daily for 10 days  -     ofloxacin (OCUFLOX) 0.3 % solution; Place 1 drop into the left eye 4 times daily for 10 days    Conjunctivitis of left eye, unspecified conjunctivitis type  -     ofloxacin (OCUFLOX) 0.3 % solution; Place 1 drop into the left eye 4 times daily for 10 days        Eye examination with Fluorescein staining and Woods lamp reveals a corneal abrasion, to left eye, at 6 o'clock. Script written for ofloxacin ophthalmic drops, side effects and application directions discussed. F/u with ophthalmology in 48 hrs if not improved. ED sooner if symptoms worsen or change. ED immediately with the development of fever, visual changes, ocular pain/swelling, body aches, or shaking chills. Pt is in agreement with this care plan. All questions answered. Return if symptoms worsen or fail to improve.     Zaynab Delacruz, APRN - NP

## 2021-03-23 NOTE — OP NOTE
[address] [address]                                OPERATIVE REPORT    PATIENT NAME: Jory Rodriguez                    :        1945  MED REC NO:   [id number]                            ROOM:  ACCOUNT NO:   [de-identified]                           ADMIT DATE: 2021  PROVIDER:     Jaron Mendoza MD    DATE OF PROCEDURE:  2021    PROCEDURE PERFORMED:  ERCP with stent removal and stones extraction. PREOPERATIVE DIAGNOSIS:  The patient is status post ERCP with stent  placement, here for stent removal and reevaluation. POSTOPERATIVE DIAGNOSES:  Large tissue ingrowth on the stent. With  difficulty, the stent was freed and removed. Balloon sweeping resulted  into one large and one medium sized stones removal.  Excellent drainage  was obtained. ANESTHESIA:  LMAC. ESTIMATED BLOOD LOSS:  N/A    DESCRIPTION OF PROCEDURE:  With the patient in his left lateral  decubitus position, the Olympus side-viewing video duodenoscope was  introduced into the esophagus, advanced through the GE junction into the  gastric body, advanced through the pylorus into duodenal bulb, second  portion of the duodenum, where the stent was found with the distal  border barely visible from tissue ingrowth. Using two biopsy forceps,  one regular and one rat tooth, and gently, the tissue was freed from the  stent and using a rat tooth, the stent was dragged and disposed off. The papilla was then entered using a papillotome. Common bile duct  cannulated, appeared dilated. A guidewire was introduced into the  intrahepatic ductal system over which a 9 to 12 Russian balloon, then 12  to 15 Western Kathrine balloon were exchanged on the guidewire. Balloon sweeping  resulted in one large stone and then with subsequent sweeping, another  smaller stone were removed.   Multiple sweepings were taken with balloon  measuring 15 Western Kathrine and no other stones were found. Excellent drainage  obtained and the balloon was able to be extracted from the common bile  duct completely inflated signifying adequate papillotomy. The scope was  then retrieved, the area decompressed, and procedure was then  terminated. The patient tolerated the procedure well.         Clarissa Elder MD    D: 03/22/2021 16:14:06       T: 03/22/2021 16:18:45     [id number]  Job#: [id number]     Doc#: [id number]    CC:  Dr. Ashlyn Lopez MD

## 2021-03-23 NOTE — ANESTHESIA POSTPROCEDURE EVALUATION
Department of Anesthesiology  Postprocedure Note    Patient: Deep Noyola  MRN: [id number]  YOB: 1945  Date of evaluation: 3/23/2021  Time:  6:51 AM     Procedure Summary     Date: 03/22/21 Room / Location: [address]    Anesthesia Start: 8220 Anesthesia Stop: 4437    Procedure: ERCP WITH STENT REMOVAL (N/A ) Diagnosis: (CHOLEDOCHOLITHIASIS ASCENDING CHOLANGITIS)    Surgeons: Sulaiman Sweeney MD Responsible Provider: Robbi Holcomb MD    Anesthesia Type: MAC ASA Status: 3          Anesthesia Type: MAC    Cassi Phase I: Cassi Score: 10    Cassi Phase II: Cassi Score: 10    Last vitals: Reviewed and per EMR flowsheets.        Anesthesia Post Evaluation    Patient location during evaluation: PACU  Patient participation: complete - patient participated  Level of consciousness: awake and alert  Airway patency: patent  Nausea & Vomiting: no vomiting and no nausea  Complications: no  Cardiovascular status: blood pressure returned to baseline  Respiratory status: acceptable  Hydration status: euvolemic

## 2021-03-23 NOTE — H&P
Gastroenterology      Pre-operative History and Physical      HISTORY OF PRESENT ILLNESS:    Isabel Flores is a 70 y.o. male with significant past medical history of skin cancer admitted via ED for abdominal pain and diarhhea. Pt reports last week he was experiencing lower abdominal pain with cramping rated as 10/10. Reports decreased appetite without nausea, vomiting or weight loss. Last bowel movement this morning described as brown and loose. States loose stools started when he was given IV medication in the hospital. Normal bowel pattern is daily brown, and formed. Patient reported having frequent, productive moist cough for years, former smoker for 20 years. COVID done and negative. Denies fever, chills, SOB, or melena. Denies having EGD or colonoscopy in the past. Admission labs albumin 2.8, WBC 13.2, potassium 2.6, chloride 92, glucose 106, calcium 8.1. Ct Abdomen Pelvis W Iv Contrast- There are numerous liver lesions. Multiple small lesions are possibly cystic. Larger lesions in the left lobe are more nonspecific. The right lobe is also heterogeneous. MRI correlation is recommended for characterization of these abnormalities. Cholelithiasis. Biliary dilatation and probable multiple common bile duct stones. Soft tissue nodule adjacent to the diaphragmatic deo and right intrarenal gland measuring 2 cm. This this may be an adrenal adenoma or lymph node. Sigmoid diverticulosis. No acute diverticulitis. Small right pleural effusion with basilar atelectasis. Us Gallbladder Ruq- Cholecystolithiasis. Suggestion also of choledocholithiasis with common bile duct distention Solid-appearing mass adjacent to the left hepatic lobe, in the christopher hepatis region, may be artifact from the duodenum. Differential includes enlarged lymph node Simple appearing right renal cyst. Mri Abdomen W Wo Contrast Mrcp-1.  Choledocholithiasis associated with severe extrahepatic biliary dilation and mild central intrahepatic biliary dilation. Evaluation for superimposed cholangitis is limited. 2. Cholelithiasis with no definite findings of cholecystitis. 3. Limited evaluation of the liver due to motion artifact. Signal abnormalities seen primarily in hepatic segment 7 correspond with the CT appearance and are suspicious for microabscesses. However, an infiltrative malignancy could appear similar. Consider evaluation with liver protocol abdomen CT, which would be less affected by motion. 4. Complicated fluid collections along the caudate lobe of the liver, between right adrenal gland right diaphragmatic deo, adjacent to the retrohepatic inferior vena cava, and likely within the christopher hepatis are suspicious for abscesses given the above findings. 5. Mild dilation of the downstream pancreatic duct potentially due to mass effect from stones in the distal common bile duct. Alternatively, this could be related to moderate parenchymal atrophy. 6. Small right pleural effusion. Patient had ERCP with stent placement on 1/29/21 and is here for stent removal.    HISTORY:   Past Medical History:   Diagnosis Date    A-fib ([facility] [address]     follows with PCP only    Hx of blood clots     Hypertension     Pneumonia 2021    Squamous cell skin cancer     Stomach ulcer        PERTINENT FAMILY HISTORY:  History reviewed. No pertinent family history.     MEDICATIONS:    Current Outpatient Medications:     GUAIFENESIN 1200 PO, Take by mouth daily, Disp: , Rfl:     losartan (COZAAR) 25 MG tablet, Take 1 tablet by mouth daily (Patient not taking: Reported on 3/23/2021), Disp: 30 tablet, Rfl: 5    acetaminophen (APAP EXTRA STRENGTH) 500 MG tablet, Take 1 tablet by mouth every 6 hours for 14 days, Disp: 56 tablet, Rfl: 0    folic acid (FOLVITE) 1 MG tablet, Take 1 tablet by mouth daily (Patient not taking: Reported on 3/23/2021), Disp: 30 tablet, Rfl: 3    Multiple Vitamin (MULTIVITAMIN) TABS tablet, Take 1 tablet by mouth daily, Disp: 30 tablet, Rfl: 0   thiamine 100 MG tablet, Take 1 tablet by mouth daily (Patient not taking: Reported on 3/23/2021), Disp: 30 tablet, Rfl: 3    apixaban (ELIQUIS) 5 MG TABS tablet, Take 1 tablet by mouth 2 times daily (Patient not taking: Reported on 3/23/2021), Disp: 180 tablet, Rfl: 1    ofloxacin (OCUFLOX) 0.3 % solution, Place 1 drop into the left eye 4 times daily for 10 days, Disp: 1 Bottle, Rfl: 0    ALLERGIES:  Patient has no known allergies. PHYSICAL EXAM/GENERAL APPEARANCE:   CONSTITUTIONAL: awake, fatigued, ill appreaing, cooperative, no apparent distress, and appears stated age  EYES:  pupils equal, round and reactive to light, sclera anicteric and conjunctiva normal  ENT:  normocephalic, oral pharynx with moist mucous membranes  NECK:  supple   HEMATOLOGIC/LYMPHATICS:  no cervical lymphadenopathy and no supraclavicular lymphadenopathy  LUNGS:  No increased work of breathing, good air exchange. Scattered rhonchi throughout with diminished bases.  Moist, productive cough  CARDIOVASCULAR:  Normal apical impulse, regular rate and rhythm, no murmur noted; 2+ pulses; no edema  ABDOMEN:  normal bowel sounds, softly distended, tender upon palpatiion, no masses palpated, no hepatosplenomegally  MUSCULOSKELETAL:  full range of motion noted  motor strength is 5 out of 5 all extremities bilaterally  NEUROLOGIC:  Mental Status Exam: Level of Alertness:  awake  Orientation:  person, place, time  Motor Exam:  Motor exam is symmetrical 5 out of 5 all extremities bilaterally  SKIN:  Pale and dry with tenting tugor    OTHER SIGNIFICANT FINDINGS: None    IMPRESSION/INITIAL DIAGNOSIS:   Choledocholithiasis  Ascending cholangitis        Electronically signed by Adolph Rollins MD on 3/23/2021 at 1:20 PM

## 2021-03-24 ENCOUNTER — TELEPHONE (OUTPATIENT)
Dept: HEMATOLOGY | Age: 76
End: 2021-03-24

## 2021-03-24 NOTE — TELEPHONE ENCOUNTER
Pt is scheduled for his 4 week ct scan on 04/12/21 at [address] [place] [address]. Pt to arrive at 10:00 am and scna to start at 10:30 am. Pt to be NPO 4 hours prior. I did call and speak with the patient and gave him the above time, date and location which he confirmed.  The patient was also given his follow up appt with us for our [place] office while we were on the phone, which he also repeated to me and confirmed  Electronically signed by Vish Huff MA on 3/24/2021 at 9:57 AM

## 2021-03-26 NOTE — TELEPHONE ENCOUNTER
Informed patient to go to ER. He is agreeable.     Electronically signed by Rebecca Sinclair LPN on 6/51/3467 at 74:76 PM

## 2021-03-26 NOTE — TELEPHONE ENCOUNTER
Pt was dx with corneal abrasion  If he is not better at this point he needs to see optho urgently - can go to ER today if not able to get into outpatient office  Further ABx without a repeat eye exam would be inappropriate  Thanks

## 2021-03-31 ENCOUNTER — TELEPHONE (OUTPATIENT)
Dept: PRIMARY CARE CLINIC | Age: 76
End: 2021-03-31

## 2021-03-31 ENCOUNTER — OFFICE VISIT (OUTPATIENT)
Dept: PRIMARY CARE CLINIC | Age: 76
End: 2021-03-31
Payer: MEDICARE

## 2021-03-31 VITALS
HEART RATE: 89 BPM | DIASTOLIC BLOOD PRESSURE: 88 MMHG | TEMPERATURE: 97.6 F | SYSTOLIC BLOOD PRESSURE: 160 MMHG | OXYGEN SATURATION: 98 % | WEIGHT: 150 LBS | BODY MASS INDEX: 22.15 KG/M2

## 2021-03-31 DIAGNOSIS — I10 ESSENTIAL HYPERTENSION: Primary | ICD-10-CM

## 2021-03-31 DIAGNOSIS — S05.02XD ABRASION OF LEFT CORNEA, SUBSEQUENT ENCOUNTER: ICD-10-CM

## 2021-03-31 PROCEDURE — 99213 OFFICE O/P EST LOW 20 MIN: CPT | Performed by: FAMILY MEDICINE

## 2021-03-31 ASSESSMENT — ENCOUNTER SYMPTOMS
GASTROINTESTINAL NEGATIVE: 1
COUGH: 1
CHEST TIGHTNESS: 0
SHORTNESS OF BREATH: 0

## 2021-03-31 NOTE — TELEPHONE ENCOUNTER
Jessica Amezcua called in to tell us he stopped at the pharmacy and his med is supposed to be Losartan 25 mg. His phone was a little muffled when he was talking and I was having a hard time hearing/understanding what he was telling me. I told him that is what we had listed on his chart. He got a little irate that I didn't know what he was saying and hung up on me. But we do have the same medication listed on his chart as what the pharmacist told him.
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
## 2021-04-07 ENCOUNTER — OFFICE VISIT (OUTPATIENT)
Dept: PRIMARY CARE CLINIC | Age: 76
End: 2021-04-07
Payer: MEDICARE

## 2021-04-07 VITALS
BODY MASS INDEX: 23.11 KG/M2 | WEIGHT: 156 LBS | SYSTOLIC BLOOD PRESSURE: 188 MMHG | HEART RATE: 60 BPM | OXYGEN SATURATION: 97 % | TEMPERATURE: 97.5 F | HEIGHT: 69 IN | RESPIRATION RATE: 18 BRPM | DIASTOLIC BLOOD PRESSURE: 92 MMHG

## 2021-04-07 DIAGNOSIS — R05.8 PRODUCTIVE COUGH: ICD-10-CM

## 2021-04-07 DIAGNOSIS — I10 ESSENTIAL HYPERTENSION: Primary | ICD-10-CM

## 2021-04-07 PROCEDURE — 99214 OFFICE O/P EST MOD 30 MIN: CPT | Performed by: FAMILY MEDICINE

## 2021-04-07 RX ORDER — GUAIFENESIN 600 MG/1
600 TABLET, EXTENDED RELEASE ORAL 2 TIMES DAILY
Qty: 30 TABLET | Refills: 0 | Status: SHIPPED
Start: 2021-04-07 | End: 2021-04-20

## 2021-04-07 RX ORDER — LOSARTAN POTASSIUM 50 MG/1
50 TABLET ORAL DAILY
Qty: 30 TABLET | Refills: 5 | Status: SHIPPED
Start: 2021-04-07 | End: 2021-09-28 | Stop reason: SDUPTHER

## 2021-04-07 ASSESSMENT — ENCOUNTER SYMPTOMS
COUGH: 1
SHORTNESS OF BREATH: 0

## 2021-04-07 NOTE — PROGRESS NOTES
[address] [place] [address] [facility]  Progress Note  Face to face encounter   SUBJECTIVE:  Chief Complaint   Patient presents with    Diarrhea     Pt states having cramps after diarrhea x8 days. Denies fever. The patient has no new complaints today. He is still somewhat confused. He had a thoracentesis. No dyspnea. No pain. Review of systems:  As stated above in the chief complaint, otherwise negative. Medications:  Scheduled Meds:   pantoprazole  40 mg Intravenous BID    And    sodium chloride (PF)  10 mL Intravenous BID    cefTRIAXone (ROCEPHIN) IV  2,000 mg Intravenous Q24H    metroNIDAZOLE  500 mg Intravenous Q8H    sodium chloride flush  10 mL Intravenous 2 times per day    folic acid  1 mg Oral Daily    thiamine  100 mg Oral Daily    multivitamin  1 tablet Oral Daily    lisinopril  10 mg Oral Daily    metoprolol tartrate  25 mg Oral BID     Continuous Infusions:   [Held by provider] heparin (PORCINE) Infusion Stopped (21)     PRN Meds:sodium chloride flush, heparin (porcine), heparin (porcine), HYDROmorphone, perflutren lipid microspheres, HYDROcodone 5 mg - acetaminophen, hydrALAZINE, sodium chloride flush, promethazine **OR** ondansetron, polyethylene glycol, acetaminophen **OR** acetaminophen, LORazepam **OR** LORazepam **OR** LORazepam **OR** LORazepam **OR** LORazepam **OR** LORazepam **OR** LORazepam **OR** LORazepam    OBJECTIVE:  BP (!) 144/71   Pulse 67   Temp 98.2 °F (36.8 °C) (Oral)   Resp 19   Ht 5' 11\" (1.803 m)   Wt 163 lb 8 oz (74.2 kg)   SpO2 98%   BMI 22.80 kg/m²   Temp  Av.9 °F (36.6 °C)  Min: 97.1 °F (36.2 °C)  Max: 98.3 °F (36.8 °C)  Constitutional: The patient is sitting up in bed. Visitor present. He is in no distress. Skin: Warm and dry. No rashes were noted. HEENT: Round and reactive pupils. Moist mucous membranes. No ulcerations or thrush. Left ear is missing. Neck: Supple to movements.    Chest: No respiratory HR=77 bpm, CMSQ=894/101 mmhg, SpO2=98.0 %, Resp=12 B/min, EtCO2=36 mmHg, Pain=0, Alondra=10, [place]=2, Comment=NSR distress. No crackles. Cardiovascular: Heart sounds rhythmic and regular. Abdomen: Slightly distended. Positive bowel sounds to auscultation. Benign to palpation. Extremities: No edema. Lines: peripheral    Laboratory and Tests Review:  Lab Results   Component Value Date    WBC 13.2 (H) 02/03/2021    WBC 14.1 (H) 02/02/2021    WBC 15.1 (H) 02/01/2021    HGB 11.2 (L) 02/03/2021    HCT 34.6 (L) 02/03/2021    MCV 90.8 02/03/2021     (H) 02/03/2021     Lab Results   Component Value Date    NEUTROABS 9.45 (H) 02/03/2021    NEUTROABS 11.07 (H) 02/02/2021    NEUTROABS 12.63 (H) 02/01/2021     No results found for: [facility]  Lab Results   Component Value Date    ALT 12 02/03/2021    AST 16 02/03/2021    ALKPHOS 118 02/03/2021    BILITOT 0.4 02/03/2021     Lab Results   Component Value Date     02/03/2021    K 4.0 02/03/2021    K 3.2 01/28/2021     02/03/2021    CO2 27 02/03/2021    BUN 4 02/03/2021    CREATININE 0.6 02/03/2021    CREATININE 0.7 02/02/2021    CREATININE 0.6 02/01/2021    GFRAA >60 02/03/2021    LABGLOM >60 02/03/2021    GLUCOSE 94 02/03/2021    PROT 6.6 02/03/2021    LABALBU 2.5 02/03/2021    CALCIUM 8.3 02/03/2021    BILITOT 0.4 02/03/2021    ALKPHOS 118 02/03/2021    AST 16 02/03/2021    ALT 12 02/03/2021     Lab Results   Component Value Date    CRP 12.2 (H) 01/31/2021    CRP 14.6 (H) 01/27/2021     Lab Results   Component Value Date    SEDRATE 62 (H) 01/31/2021    SEDRATE 60 (H) 01/27/2021     Radiology:    Microbiology:   C. difficile toxin assay: Not detected  Stool cultures: Negative  Giardia: None  Respiratory panel, including SARS-CoV-2: Not detected  Blood cultures 1/30/2021: Pending  Liver abscess 2/1/2021: Negative so far  Respiratory culture 2/2/2021: Pending  Streptococcus pneumoniae/Legionella urine Ag: negative   pleural fluid 2/2/2021: Pending    No results for input(s): PROCAL in the last 72 hours. ASSESSMENT:  · Possible liver abscesses versus metastatic disease. Underwent liver biopsy 2/1/2021  · Choledocholithiasis with ascending cholangitis  · Status post ERCP with papillotomy, stone extraction and Wallstent placement 1/29/2021  · Leukocytosis associated to cholangitis and probable liver abscess  · Intermittent fever associated to the above. These seem to be trending downwards  · Alcohol withdrawal  · Dysphagia  · Right pleural effusion associated to the above.   Underwent thoracentesis    PLAN:  · Continue Metronidazole and Ceftriaxone  · Check liver biopsy and cultures -pending  · The patient will need outpatient cholecystectomy    Nora Dominguez  10:39 AM  2/3/2021

## 2021-04-07 NOTE — PROGRESS NOTES
21  Radha Anuj : 1945 Sex: male  Age: 70 y.o. Assessment and Plan:  Matheus Weldon was seen today for follow-up. Diagnoses and all orders for this visit:    Essential hypertension  -     losartan (COZAAR) 50 MG tablet; Take 1 tablet by mouth daily    Productive cough  -     XR CHEST (2 VW); Future  -     guaiFENesin (MUCINEX) 600 MG extended release tablet; Take 1 tablet by mouth 2 times daily for 15 days    Hypertension, uncontrolled. Increase losartan to 50 mg. Patient will be seen by his surgeon next week, so we will bring him back here in 2 weeks to reassess. We will plan to max out, and/or add additional medication, if his readings remain elevated. Productive cough; patient feels like there is a thick phlegm that he sometimes has trouble getting up. We will start him on Mucinex to see if we can alleviate some of this for him. We will also recheck a chest x-ray given the previous abnormalities noted during his inpatient stay. Return in about 2 weeks (around 2021). Chief Complaint   Patient presents with    Follow-up       HPI  Patient is here today for 1 week follow-up. He has with him his losartan medication. This is in the folic acid bottle, but has my writing on it as well as a new label from his pharmacist stating it is his losartan 25 mg like we thought it was last week. Patient states that he is taking the medication daily as prescribed.       HTN -blood pressure remains grossly uncontrolled  Denies any associated side effects with losartan  He is amenable to dose increase    Pt is also c/o cough  Started when he entered the hospital for PNA  Has not improved per patient  No associated SOB  Feels to patient like it's coming from his lungs - noted mostly at night when he's laying down  He will cough a bunch, gets up a lot of phlegm  Eventually it calms down   During the day as long as he's doing something he can easily get the phlegm up; it's not as bad during the day overall though   He has not been taking anything for this at home    Pt has eye doctor in [place] - has appt coming up but not sure when, sometime next week  His eye looks better today, but pt states sometimes it doesn't look good  States it feels good right now   His vision today is \"normal\" - denies any blurriness  Denies any irritation, redness or weeping today, but it was somewhat last night   States his daughter gave her some drops from her eye doctor that he used and that cleared       Problem list reviewed and updated in full with patient today as necessary. A comprehensive ROS was negative, except as documented above. Review of Systems   Eyes:        As per HPI   Respiratory: Positive for cough. Negative for shortness of breath. Cardiovascular: Negative for chest pain and palpitations. Current Outpatient Medications:     losartan (COZAAR) 50 MG tablet, Take 1 tablet by mouth daily, Disp: 30 tablet, Rfl: 5    guaiFENesin (MUCINEX) 600 MG extended release tablet, Take 1 tablet by mouth 2 times daily for 15 days, Disp: 30 tablet, Rfl: 0    folic acid (FOLVITE) 1 MG tablet, Take 1 tablet by mouth daily, Disp: 30 tablet, Rfl: 3    thiamine 100 MG tablet, Take 1 tablet by mouth daily, Disp: 30 tablet, Rfl: 3    apixaban (ELIQUIS) 5 MG TABS tablet, Take 1 tablet by mouth 2 times daily, Disp: 180 tablet, Rfl: 1    Multiple Vitamin (MULTIVITAMIN) TABS tablet, Take 1 tablet by mouth daily, Disp: 30 tablet, Rfl: 0  No Known Allergies    Pt's past medical and surgical history were updated as necessary today   Pt's family and social history were updated as necessary today        Vitals:    04/07/21 0816 04/07/21 0818   BP: (!) 188/92 (!) 188/92   Pulse: 60    Resp: 18    Temp: 97.5 °F (36.4 °C)    TempSrc: Temporal    SpO2: 97%    Weight: 156 lb (70.8 kg)    Height: 5' 9\" (1.753 m)        Physical Exam  Constitutional:       Appearance: Normal appearance.    HENT:      Head: Normocephalic and atraumatic. Eyes:      Conjunctiva/sclera: Conjunctivae normal.   Cardiovascular:      Rate and Rhythm: Normal rate. Heart sounds: Normal heart sounds. Comments: Underlying rhythm sinus with frequent ectopy   Pulmonary:      Effort: Pulmonary effort is normal.      Breath sounds: Normal breath sounds. Abdominal:      Palpations: Abdomen is soft. Tenderness: There is no abdominal tenderness. Musculoskeletal: Normal range of motion. Skin:     General: Skin is warm and dry. Neurological:      General: No focal deficit present. Mental Status: He is alert and oriented to person, place, and time.    Psychiatric:         Mood and Affect: Mood normal.         Behavior: Behavior normal.         Seen By:  Chavez Nance MD

## 2021-04-12 ENCOUNTER — HOSPITAL ENCOUNTER (OUTPATIENT)
Age: 76
Discharge: HOME | End: 2021-04-14
Payer: MEDICARE

## 2021-04-12 ENCOUNTER — HOSPITAL ENCOUNTER (OUTPATIENT)
Dept: GENERAL RADIOLOGY | Age: 76
Discharge: HOME | End: 2021-04-14
Payer: MEDICARE

## 2021-04-12 ENCOUNTER — TELEPHONE (OUTPATIENT)
Dept: HEMATOLOGY | Age: 76
End: 2021-04-12

## 2021-04-12 ENCOUNTER — HOSPITAL ENCOUNTER (OUTPATIENT)
Dept: CT IMAGING | Age: 76
Discharge: HOME | End: 2021-04-14
Payer: MEDICARE

## 2021-04-12 DIAGNOSIS — K75.0 HEPATIC ABSCESS: ICD-10-CM

## 2021-04-12 DIAGNOSIS — R05.8 PRODUCTIVE COUGH: ICD-10-CM

## 2021-04-12 PROCEDURE — 74160 CT ABDOMEN W/CONTRAST: CPT

## 2021-04-12 PROCEDURE — 6360000004 HC RX CONTRAST MEDICATION: Performed by: RADIOLOGY

## 2021-04-12 PROCEDURE — 71046 X-RAY EXAM CHEST 2 VIEWS: CPT

## 2021-04-12 RX ADMIN — IOPAMIDOL 75 ML: 755 INJECTION, SOLUTION INTRAVENOUS at 10:29

## 2021-04-12 NOTE — TELEPHONE ENCOUNTER
Called patient to remind him of his appt with dr Brittney Mendoza not able to leave message voicemail box not set up  Electronically signed by Isabel Sultana MA on 4/12/2021 at 8:33 AM

## 2021-04-13 ENCOUNTER — TELEPHONE (OUTPATIENT)
Dept: ADMINISTRATIVE | Age: 76
End: 2021-04-13

## 2021-04-13 ENCOUNTER — OFFICE VISIT (OUTPATIENT)
Dept: SURGERY | Age: 76
End: 2021-04-13

## 2021-04-13 VITALS
BODY MASS INDEX: 21.56 KG/M2 | HEART RATE: 60 BPM | OXYGEN SATURATION: 97 % | WEIGHT: 154 LBS | SYSTOLIC BLOOD PRESSURE: 188 MMHG | DIASTOLIC BLOOD PRESSURE: 92 MMHG | TEMPERATURE: 97.5 F | HEIGHT: 71 IN

## 2021-04-13 DIAGNOSIS — K75.0 HEPATIC ABSCESS: Primary | ICD-10-CM

## 2021-04-13 PROCEDURE — 99024 POSTOP FOLLOW-UP VISIT: CPT | Performed by: TRANSPLANT SURGERY

## 2021-04-20 ENCOUNTER — OFFICE VISIT (OUTPATIENT)
Dept: FAMILY MEDICINE CLINIC | Age: 76
End: 2021-04-20
Payer: MEDICARE

## 2021-04-20 VITALS
HEIGHT: 69 IN | BODY MASS INDEX: 22.81 KG/M2 | TEMPERATURE: 97.7 F | SYSTOLIC BLOOD PRESSURE: 140 MMHG | HEART RATE: 56 BPM | OXYGEN SATURATION: 98 % | DIASTOLIC BLOOD PRESSURE: 82 MMHG | WEIGHT: 154 LBS

## 2021-04-20 DIAGNOSIS — I81 PORTAL VEIN THROMBOSIS: ICD-10-CM

## 2021-04-20 DIAGNOSIS — J41.1 MUCOPURULENT CHRONIC BRONCHITIS (HCC): ICD-10-CM

## 2021-04-20 DIAGNOSIS — E87.6 HYPOKALEMIA: ICD-10-CM

## 2021-04-20 DIAGNOSIS — R79.89 OTHER SPECIFIED ABNORMAL FINDINGS OF BLOOD CHEMISTRY: ICD-10-CM

## 2021-04-20 DIAGNOSIS — K75.0 HEPATIC ABSCESS: ICD-10-CM

## 2021-04-20 DIAGNOSIS — C32.0 MALIGNANT NEOPLASM OF GLOTTIS (HCC): ICD-10-CM

## 2021-04-20 DIAGNOSIS — F10.10 ALCOHOL ABUSE: ICD-10-CM

## 2021-04-20 DIAGNOSIS — I10 ESSENTIAL HYPERTENSION: ICD-10-CM

## 2021-04-20 DIAGNOSIS — I48.0 PAROXYSMAL ATRIAL FIBRILLATION (HCC): Primary | ICD-10-CM

## 2021-04-20 DIAGNOSIS — K59.1 FUNCTIONAL DIARRHEA: ICD-10-CM

## 2021-04-20 DIAGNOSIS — I48.0 PAROXYSMAL ATRIAL FIBRILLATION (HCC): ICD-10-CM

## 2021-04-20 LAB
ALBUMIN SERPL-MCNC: 3.9 G/DL (ref 3.5–5.2)
ALP BLD-CCNC: 75 U/L (ref 40–129)
ALT SERPL-CCNC: 14 U/L (ref 0–40)
AMMONIA: 16 UMOL/L (ref 16–60)
AMPHETAMINE SCREEN, URINE: NOT DETECTED
ANION GAP SERPL CALCULATED.3IONS-SCNC: 14 MMOL/L (ref 7–16)
AST SERPL-CCNC: 23 U/L (ref 0–39)
BARBITURATE SCREEN URINE: NOT DETECTED
BASOPHILS ABSOLUTE: 0.09 E9/L (ref 0–0.2)
BASOPHILS RELATIVE PERCENT: 1.2 % (ref 0–2)
BENZODIAZEPINE SCREEN, URINE: NOT DETECTED
BILIRUB SERPL-MCNC: 0.5 MG/DL (ref 0–1.2)
BILIRUBIN DIRECT: <0.2 MG/DL (ref 0–0.3)
BILIRUBIN, INDIRECT: NORMAL MG/DL (ref 0–1)
BUN BLDV-MCNC: 16 MG/DL (ref 8–23)
C-REACTIVE PROTEIN: 0.3 MG/DL (ref 0–0.4)
CALCIUM SERPL-MCNC: 9.9 MG/DL (ref 8.6–10.2)
CANNABINOID SCREEN URINE: NOT DETECTED
CHLORIDE BLD-SCNC: 102 MMOL/L (ref 98–107)
CHOLESTEROL, TOTAL: 210 MG/DL (ref 0–199)
CO2: 23 MMOL/L (ref 22–29)
COCAINE METABOLITE SCREEN URINE: NOT DETECTED
CREAT SERPL-MCNC: 0.7 MG/DL (ref 0.7–1.2)
CREATININE URINE: 66 MG/DL (ref 40–278)
EOSINOPHILS ABSOLUTE: 0.31 E9/L (ref 0.05–0.5)
EOSINOPHILS RELATIVE PERCENT: 4.2 % (ref 0–6)
ETHANOL: <10 MG/DL (ref 0–0.08)
FENTANYL SCREEN, URINE: NOT DETECTED
FIBRINOGEN: 501 MG/DL (ref 225–540)
FOLATE: >20 NG/ML (ref 4.8–24.2)
GFR AFRICAN AMERICAN: >60
GFR NON-AFRICAN AMERICAN: >60 ML/MIN/1.73
GLUCOSE BLD-MCNC: 102 MG/DL (ref 74–99)
HCT VFR BLD CALC: 41.1 % (ref 37–54)
HDLC SERPL-MCNC: 70 MG/DL
HEMOGLOBIN: 13 G/DL (ref 12.5–16.5)
IMMATURE GRANULOCYTES #: 0.03 E9/L
IMMATURE GRANULOCYTES %: 0.4 % (ref 0–5)
LDL CHOLESTEROL CALCULATED: 127 MG/DL (ref 0–99)
LIPASE: 39 U/L (ref 13–60)
LYMPHOCYTES ABSOLUTE: 1.75 E9/L (ref 1.5–4)
LYMPHOCYTES RELATIVE PERCENT: 23.6 % (ref 20–42)
Lab: NORMAL
MCH RBC QN AUTO: 29.1 PG (ref 26–35)
MCHC RBC AUTO-ENTMCNC: 31.6 % (ref 32–34.5)
MCV RBC AUTO: 92.2 FL (ref 80–99.9)
METHADONE SCREEN, URINE: NOT DETECTED
MICROALBUMIN UR-MCNC: <12 MG/L
MICROALBUMIN/CREAT UR-RTO: ABNORMAL (ref 0–30)
MONOCYTES ABSOLUTE: 0.57 E9/L (ref 0.1–0.95)
MONOCYTES RELATIVE PERCENT: 7.7 % (ref 2–12)
NEUTROPHILS ABSOLUTE: 4.65 E9/L (ref 1.8–7.3)
NEUTROPHILS RELATIVE PERCENT: 62.9 % (ref 43–80)
OPIATE SCREEN URINE: NOT DETECTED
OXYCODONE URINE: NOT DETECTED
PDW BLD-RTO: 13.7 FL (ref 11.5–15)
PHENCYCLIDINE SCREEN URINE: NOT DETECTED
PLATELET # BLD: 362 E9/L (ref 130–450)
PMV BLD AUTO: 10.4 FL (ref 7–12)
POTASSIUM SERPL-SCNC: 4.4 MMOL/L (ref 3.5–5)
RBC # BLD: 4.46 E12/L (ref 3.8–5.8)
SODIUM BLD-SCNC: 139 MMOL/L (ref 132–146)
TOTAL PROTEIN: 8.2 G/DL (ref 6.4–8.3)
TRIGL SERPL-MCNC: 64 MG/DL (ref 0–149)
TSH SERPL DL<=0.05 MIU/L-ACNC: 2.18 UIU/ML (ref 0.27–4.2)
URIC ACID, SERUM: 5.5 MG/DL (ref 3.4–7)
VITAMIN B-12: 549 PG/ML (ref 211–946)
VLDLC SERPL CALC-MCNC: 13 MG/DL
WBC # BLD: 7.4 E9/L (ref 4.5–11.5)

## 2021-04-20 PROCEDURE — 99213 OFFICE O/P EST LOW 20 MIN: CPT | Performed by: FAMILY MEDICINE

## 2021-04-20 RX ORDER — TOBRAMYCIN AND DEXAMETHASONE 3; 1 MG/ML; MG/ML
SUSPENSION/ DROPS OPHTHALMIC
COMMUNITY
Start: 2021-04-14 | End: 2021-06-22

## 2021-04-20 ASSESSMENT — ENCOUNTER SYMPTOMS
VOMITING: 0
SHORTNESS OF BREATH: 0
CONSTIPATION: 0
BACK PAIN: 0
ABDOMINAL PAIN: 0
NAUSEA: 0
DIARRHEA: 0
SORE THROAT: 0
COUGH: 1
PHOTOPHOBIA: 0
BLOOD IN STOOL: 0

## 2021-04-20 NOTE — PROGRESS NOTES
Srinivasa Mendez (:  1945) is a 70 y.o. male,New patient, here for evaluation of the following chief complaint(s):  Hypertension      ASSESSMENT/PLAN:  1. Paroxysmal atrial fibrillation (HCC)  -     Hepatic Function Panel; Future  -     Basic Metabolic Panel; Future  -     Lipid Panel; Future  -     Microalbumin / Creatinine Urine Ratio; Future  -     Uric Acid; Future  -     CBC Auto Differential; Future  -     TSH; Future  -     C-REACTIVE PROTEIN; Future  -     FIBRINOGEN; Future  -     LIPASE; Future  -     AMMONIA; Future  -     VITAMIN B12 & FOLATE; Future  -     Urine Drug Screen; Future  -     ETHANOL; Future  2. Alcohol abuse  -     Hepatic Function Panel; Future  -     Basic Metabolic Panel; Future  -     Lipid Panel; Future  -     Microalbumin / Creatinine Urine Ratio; Future  -     Uric Acid; Future  -     CBC Auto Differential; Future  -     TSH; Future  -     C-REACTIVE PROTEIN; Future  -     FIBRINOGEN; Future  -     LIPASE; Future  -     AMMONIA; Future  -     VITAMIN B12 & FOLATE; Future  -     Urine Drug Screen; Future  -     ETHANOL; Future  3. Hepatic abscess  -     Hepatic Function Panel; Future  -     Basic Metabolic Panel; Future  -     Lipid Panel; Future  -     Microalbumin / Creatinine Urine Ratio; Future  -     Uric Acid; Future  -     CBC Auto Differential; Future  -     TSH; Future  -     C-REACTIVE PROTEIN; Future  -     FIBRINOGEN; Future  -     LIPASE; Future  -     AMMONIA; Future  -     VITAMIN B12 & FOLATE; Future  -     Urine Drug Screen; Future  -     ETHANOL; Future  4. Mucopurulent chronic bronchitis ([facility] [address]  -     Hepatic Function Panel; Future  -     Basic Metabolic Panel; Future  -     Lipid Panel; Future  -     Microalbumin / Creatinine Urine Ratio; Future  -     Uric Acid; Future  -     CBC Auto Differential; Future  -     TSH; Future  -     C-REACTIVE PROTEIN; Future  -     FIBRINOGEN; Future  -     LIPASE;  Future  -     AMMONIA; Future  -     VITAMIN B12 & FOLATE; Future  -     Urine Drug Screen; Future  -     ETHANOL; Future  5. Malignant neoplasm of glottis ([address]  -     Hepatic Function Panel; Future  -     Basic Metabolic Panel; Future  -     Lipid Panel; Future  -     Microalbumin / Creatinine Urine Ratio; Future  -     Uric Acid; Future  -     CBC Auto Differential; Future  -     TSH; Future  -     C-REACTIVE PROTEIN; Future  -     FIBRINOGEN; Future  -     LIPASE; Future  -     AMMONIA; Future  -     VITAMIN B12 & FOLATE; Future  -     Urine Drug Screen; Future  -     ETHANOL; Future  6. Portal vein thrombosis  -     Hepatic Function Panel; Future  -     Basic Metabolic Panel; Future  -     Lipid Panel; Future  -     Microalbumin / Creatinine Urine Ratio; Future  -     Uric Acid; Future  -     CBC Auto Differential; Future  -     TSH; Future  -     C-REACTIVE PROTEIN; Future  -     FIBRINOGEN; Future  -     LIPASE; Future  -     AMMONIA; Future  -     VITAMIN B12 & FOLATE; Future  -     Urine Drug Screen; Future  -     ETHANOL; Future  7. Functional diarrhea  -     Hepatic Function Panel; Future  -     Basic Metabolic Panel; Future  -     Lipid Panel; Future  -     Microalbumin / Creatinine Urine Ratio; Future  -     Uric Acid; Future  -     CBC Auto Differential; Future  -     TSH; Future  -     C-REACTIVE PROTEIN; Future  -     FIBRINOGEN; Future  -     LIPASE; Future  -     AMMONIA; Future  -     VITAMIN B12 & FOLATE; Future  -     Urine Drug Screen; Future  -     ETHANOL; Future  8. Hypokalemia  -     Hepatic Function Panel; Future  -     Basic Metabolic Panel; Future  -     Lipid Panel; Future  -     Microalbumin / Creatinine Urine Ratio; Future  -     Uric Acid; Future  -     CBC Auto Differential; Future  -     TSH; Future  -     C-REACTIVE PROTEIN; Future  -     FIBRINOGEN; Future  -     LIPASE; Future  -     AMMONIA; Future  -     VITAMIN B12 & FOLATE; Future  -     Urine Drug Screen; Future  -     ETHANOL; Future  9.  Essential hypertension  - Hepatic Function Panel; Future  -     Basic Metabolic Panel; Future  -     Lipid Panel; Future  -     Microalbumin / Creatinine Urine Ratio; Future  -     Uric Acid; Future  -     CBC Auto Differential; Future  -     TSH; Future  -     C-REACTIVE PROTEIN; Future  -     FIBRINOGEN; Future  -     LIPASE; Future  -     AMMONIA; Future  -     VITAMIN B12 & FOLATE; Future  -     Urine Drug Screen; Future  -     ETHANOL; Future  10. Other specified abnormal findings of blood chemistry   Patient is currently on Eliquis for portal vein thrombosis for roughly 3 months. Will be stopping at the end of this month. Continues to follow with surgery after cholecystectomy and recent cholangitis doing well with increased antihypertensive dose without side effect or adverse reaction. Blood work ordered today. See him back in 2 months or sooner for any acute issues. No follow-ups on file. SUBJECTIVE/OBJECTIVE:  HPI  Patient is here today for establishing visit. Switching providers within the practice secondary to distance from home. Patient was recently seen and discharged from the hospital on 2/7/2021 for A. fib, diarrhea, gallstones, choledocholithiasis, COPD, liver/hepatic abscess, portal vein thrombosis, and alcohol abuse. During the hospital visit patient had CT-guided biopsy, MRCP, and ERCP with papillotomy, stone extraction, and stent placement. Patient has since had the stent removed. Patient also had cholecystectomy since his discharge. Patient was found to have dysphagia. Liver abscess was negative for malignancy and he was placed on antibiotics. These were finished. Patient also had right lower lobe pneumonia with effusion status post thoracocentesis. Patient has not seen pulmonology since discharge. Echo shows ejection fraction 65%, borderline concentric LVH, no wall motion abnormality, sclerotic aortic valve, borderline dilated right ventricle.     Review of Systems   Constitutional: Negative for chills and fever. HENT: Negative for congestion, hearing loss, nosebleeds and sore throat. Eyes: Negative for photophobia. Respiratory: Positive for cough. Negative for shortness of breath. Cardiovascular: Negative for chest pain, palpitations and leg swelling. Gastrointestinal: Negative for abdominal pain, blood in stool, constipation, diarrhea, nausea and vomiting. Endocrine: Negative for polydipsia. Genitourinary: Negative for dysuria, frequency, hematuria and urgency. Musculoskeletal: Negative for back pain and myalgias. Skin: Negative. Neurological: Negative for dizziness, tremors, weakness and headaches. Hematological: Does not bruise/bleed easily. Psychiatric/Behavioral: Negative for hallucinations and suicidal ideas. All other systems reviewed and are negative.         Current Outpatient Medications:     losartan (COZAAR) 50 MG tablet, Take 1 tablet by mouth daily, Disp: 30 tablet, Rfl: 5    folic acid (FOLVITE) 1 MG tablet, Take 1 tablet by mouth daily, Disp: 30 tablet, Rfl: 3    Multiple Vitamin (MULTIVITAMIN) TABS tablet, Take 1 tablet by mouth daily, Disp: 30 tablet, Rfl: 0    apixaban (ELIQUIS) 5 MG TABS tablet, Take 1 tablet by mouth 2 times daily, Disp: 180 tablet, Rfl: 1    tobramycin-dexamethasone (TOBRADEX) 0.3-0.1 % ophthalmic suspension, , Disp: , Rfl:    Patient Active Problem List   Diagnosis    Essential hypertension    Atrial fibrillation (HCC)    Hypokalemia    Functional diarrhea    Chronic obstructive pulmonary disease (HCC)    Alcohol abuse    Hepatic abscess    Portal vein thrombosis    History of squamous cell carcinoma of skin    Generalized osteoarthritis of multiple sites    History of cholecystectomy    Malignant neoplasm of glottis (HCC)    Malignant neoplasm of skin of parts of face    Screening for malignant neoplasm of the rectum     Past Medical History:   Diagnosis Date    [facility]     follows with PCP only    Hx of blood clots     Hypertension     Pneumonia     Squamous cell skin cancer     Stomach ulcer      Past Surgical History:   Procedure Laterality Date    CHOLECYSTECTOMY, LAPAROSCOPIC N/A 2021    CHOLECYSTECTOMY LAPAROSCOPIC ROBOTIC XI performed by Jason Briseno MD at [facility] CT NEEDLE BIOPSY LIVER PERCUTANEOUS  2021    CT NEEDLE BIOPSY LIVER PERCUTANEOUS 2021 [facility] CT    ERCP N/A 2021    ERCP STONE REMOVAL performed by Tarah Virgen MD at [id number] [facility] ERCP N/A 3/22/2021    ERCP WITH STENT REMOVAL performed by Tarah Virgen MD at [address] [place] [address]    left sided ear surgery    PICC LINE INSERTION NURSE  2021          Social History     Socioeconomic History    Marital status:      Spouse name: Not on file    Number of children: Not on file    Years of education: Not on file    Highest education level: Not on file   Occupational History    Not on file   Social Needs    Financial resource strain: Not hard at all   [employer] insecurity     Worry: Never true     Inability: Never true   [email or web address] needs     Medical: Not on file     Non-medical: Not on file   Tobacco Use    Smoking status: Former Smoker     Packs/day: 1.00     Years: 43.00     Pack years: 43.00     Start date: 1963     Quit date: 2006     Years since quittin.9    Smokeless tobacco: Never Used   Substance and Sexual Activity    Alcohol use:  Yes     Alcohol/week: 30.0 standard drinks     Types: 30 Cans of beer per week     Comment: NONE SINCE 1/15/21    Drug use: No    Sexual activity: Not Currently     Partners: Female   Lifestyle    Physical activity     Days per week: Not on file     Minutes per session: Not on file    Stress: Not on file   Relationships    Social connections     Talks on phone: Not on file     Gets together: Not on file     Attends [employer] service: Not on file     Active member of club or organization: Not on Conjunctivae normal.      Pupils: Pupils are equal, round, and reactive to light. Neck:      Musculoskeletal: Neck supple. Thyroid: No thyromegaly. Cardiovascular:      Rate and Rhythm: Bradycardia present. Rhythm irregular. Heart sounds: Normal heart sounds. No murmur. Pulmonary:      Effort: Pulmonary effort is normal.      Breath sounds: Decreased air movement present. Decreased breath sounds and wheezing present. No rales. Abdominal:      General: Bowel sounds are normal. There is no distension. Palpations: Abdomen is soft. Tenderness: There is no abdominal tenderness. Musculoskeletal: Normal range of motion. Lymphadenopathy:      Cervical: No cervical adenopathy. Skin:     General: Skin is warm and dry. Findings: No erythema or rash. Neurological:      Mental Status: He is alert and oriented to person, place, and time. Cranial Nerves: No cranial nerve deficit. Psychiatric:         Mood and Affect: Affect is flat. Speech: Speech is delayed and tangential.         Judgment: Judgment normal.                 An electronic signature was used to authenticate this note.     --Denys Reich, DO

## 2021-04-27 DIAGNOSIS — Z12.12 SCREENING FOR MALIGNANT NEOPLASM OF THE RECTUM: Primary | ICD-10-CM

## 2021-04-27 LAB
CONTROL: DETECTED
HEMOCCULT STL QL: POSITIVE

## 2021-04-27 PROCEDURE — 82274 ASSAY TEST FOR BLOOD FECAL: CPT | Performed by: FAMILY MEDICINE

## 2021-04-30 ENCOUNTER — TELEPHONE (OUTPATIENT)
Dept: PRIMARY CARE CLINIC | Age: 76
End: 2021-04-30

## 2021-04-30 NOTE — TELEPHONE ENCOUNTER
Please let the patient know that if he does not want to go through the colonoscopy procedure we can do a virtual colonoscopy with a CAT scan to rule out colon cancer.

## 2021-04-30 NOTE — TELEPHONE ENCOUNTER
Patient returned calls regarding FIT and lab results. Advised patient of positive FIT results and he states he does not want to have a colonoscopy. Stressed the importance of the colonoscopy but he still refused. Advised elevated cholesterol.

## 2021-05-11 ENCOUNTER — TELEPHONE (OUTPATIENT)
Dept: FAMILY MEDICINE CLINIC | Age: 76
End: 2021-05-11

## 2021-05-11 DIAGNOSIS — R19.5 POSITIVE FIT (FECAL IMMUNOCHEMICAL TEST): Primary | ICD-10-CM

## 2021-05-11 NOTE — TELEPHONE ENCOUNTER
Heather Rosales called back regarding his positive Fit tests. He is willing to see a Gastro doctor for this and will see whoever you refer him to.

## 2021-06-22 ENCOUNTER — OFFICE VISIT (OUTPATIENT)
Dept: FAMILY MEDICINE CLINIC | Age: 76
End: 2021-06-22
Payer: MEDICARE

## 2021-06-22 VITALS
SYSTOLIC BLOOD PRESSURE: 138 MMHG | TEMPERATURE: 97.9 F | OXYGEN SATURATION: 95 % | WEIGHT: 155 LBS | HEART RATE: 60 BPM | DIASTOLIC BLOOD PRESSURE: 86 MMHG | HEIGHT: 69 IN | BODY MASS INDEX: 22.96 KG/M2

## 2021-06-22 DIAGNOSIS — Z00.00 ROUTINE GENERAL MEDICAL EXAMINATION AT A HEALTH CARE FACILITY: ICD-10-CM

## 2021-06-22 DIAGNOSIS — Z13.6 SCREENING FOR CARDIOVASCULAR CONDITION: ICD-10-CM

## 2021-06-22 PROCEDURE — G0402 INITIAL PREVENTIVE EXAM: HCPCS | Performed by: FAMILY MEDICINE

## 2021-06-22 PROCEDURE — G0446 INTENS BEHAVE THER CARDIO DX: HCPCS | Performed by: FAMILY MEDICINE

## 2021-06-22 ASSESSMENT — PATIENT HEALTH QUESTIONNAIRE - PHQ9
SUM OF ALL RESPONSES TO PHQ QUESTIONS 1-9: 0
SUM OF ALL RESPONSES TO PHQ QUESTIONS 1-9: 0
SUM OF ALL RESPONSES TO PHQ9 QUESTIONS 1 & 2: 0
SUM OF ALL RESPONSES TO PHQ QUESTIONS 1-9: 0
1. LITTLE INTEREST OR PLEASURE IN DOING THINGS: 0
2. FEELING DOWN, DEPRESSED OR HOPELESS: 0

## 2021-06-22 ASSESSMENT — LIFESTYLE VARIABLES
HOW OFTEN DO YOU HAVE A DRINK CONTAINING ALCOHOL: 0
AUDIT-C TOTAL SCORE: INCOMPLETE
AUDIT TOTAL SCORE: INCOMPLETE
HOW OFTEN DO YOU HAVE A DRINK CONTAINING ALCOHOL: NEVER

## 2021-06-22 NOTE — PATIENT INSTRUCTIONS
Personalized Preventive Plan for Tejal Esters - 6/22/2021  Medicare offers a range of preventive health benefits. Some of the tests and screenings are paid in full while other may be subject to a deductible, co-insurance, and/or copay. Some of these benefits include a comprehensive review of your medical history including lifestyle, illnesses that may run in your family, and various assessments and screenings as appropriate. After reviewing your medical record and screening and assessments performed today your provider may have ordered immunizations, labs, imaging, and/or referrals for you. A list of these orders (if applicable) as well as your Preventive Care list are included within your After Visit Summary for your review. Other Preventive Recommendations:    · A preventive eye exam performed by an eye specialist is recommended every 1-2 years to screen for glaucoma; cataracts, macular degeneration, and other eye disorders. · A preventive dental visit is recommended every 6 months. · Try to get at least 150 minutes of exercise per week or 10,000 steps per day on a pedometer . · Order or download the FREE \"Exercise & Physical Activity: Your Everyday Guide\" from [employer]. Call 9-843.453.2032 or search [employer] online. · You need 6853-5899 mg of calcium and 7597-0747 IU of vitamin D per day. It is possible to meet your calcium requirement with diet alone, but a vitamin D supplement is usually necessary to meet this goal.  · When exposed to the sun, use a sunscreen that protects against both UVA and UVB radiation with an SPF of 30 or greater. Reapply every 2 to 3 hours or after sweating, drying off with a towel, or swimming. · Always wear a seat belt when traveling in a car. Always wear a helmet when riding a bicycle or motorcycle.

## 2021-06-22 NOTE — PROGRESS NOTES
Medicare Annual Wellness Visit  Name: Jennifer Myers Date: 2021   MRN: [id number] Sex: Male   Age: 70 y.o. Ethnicity: Non-/Non    : 1945 Race: Garrett Peters is here for Medicare AWV    Screenings for behavioral, psychosocial and functional/safety risks, and cognitive dysfunction are all negative except as indicated below. These results, as well as other patient data from the [address] form, are documented in Flowsheets linked to this Encounter. No Known Allergies      Prior to Visit Medications    Medication Sig Taking? Authorizing Provider   losartan (COZAAR) 50 MG tablet Take 1 tablet by mouth daily Yes Sigrid Huerta MD         Past Medical History:   Diagnosis Date    [facility])     follows with PCP only    Hx of blood clots     Hypertension     Pneumonia     Squamous cell skin cancer     Stomach ulcer        Past Surgical History:   Procedure Laterality Date    CHOLECYSTECTOMY, LAPAROSCOPIC N/A 2021    CHOLECYSTECTOMY LAPAROSCOPIC ROBOTIC XI performed by Prateek Aguilar MD at [place] [address] CT NEEDLE BIOPSY LIVER PERCUTANEOUS  2021    CT NEEDLE BIOPSY LIVER PERCUTANEOUS 2021 [facility] CT    ERCP N/A 2021    ERCP STONE REMOVAL performed by Sancho Luther MD at [address] [place] [address] ERCP N/A 3/22/2021    ERCP WITH STENT REMOVAL performed by Sancho Luther MD at [address] [place] [address]    left sided ear surgery    PICC LINE INSERTION NURSE  2021            History reviewed. No pertinent family history.     CareTeam (Including outside providers/suppliers regularly involved in providing care):   Patient Care Team:  Emma Rodriguez DO as PCP - General (Family Medicine)  Emma Rodriguez DO as PCP - [facility] Empaneled Provider    Wt Readings from Last 3 Encounters:   21 155 lb (70.3 kg)   21 154 lb (69.9 kg)   21 154 lb (69.9 kg)     Vitals:    21 0949 21 0954 06/22/21 1000   BP: (!) 160/102 (!) 142/90 138/86   Pulse: 60     Temp: 97.9 °F (36.6 °C)     SpO2: 95%     Weight: 155 lb (70.3 kg)     Height: 5' 9\" (1.753 m)       Body mass index is 22.89 kg/m². Based upon direct observation of the patient, evaluation of cognition reveals recent and remote memory intact. Physical Exam  Vitals reviewed. HENT:      Head: Normocephalic and atraumatic. Right Ear: External ear normal.      Ears:      Comments: Left ear missing  Eyes:      General: No scleral icterus. Conjunctiva/sclera: Conjunctivae normal.      Pupils: Pupils are equal, round, and reactive to light. Neck:      Thyroid: No thyromegaly. Cardiovascular:      Rate and Rhythm: Bradycardia present. Rhythm irregular. Heart sounds: Normal heart sounds. No murmur heard. Pulmonary:      Effort: Pulmonary effort is normal.      Breath sounds: Decreased air movement present. Decreased breath sounds and wheezing present. No rales. Abdominal:      General: Bowel sounds are normal. There is no distension. Palpations: Abdomen is soft. Tenderness: There is no abdominal tenderness. Musculoskeletal:         General: Normal range of motion. Cervical back: Neck supple. Lymphadenopathy:      Cervical: No cervical adenopathy. Skin:     General: Skin is warm and dry. Findings: No erythema or rash. Neurological:      Mental Status: He is alert and oriented to person, place, and time. Cranial Nerves: No cranial nerve deficit. Psychiatric:         Mood and Affect: Affect is flat. Speech: Speech is delayed and tangential.         Judgment: Judgment normal.           Patient's complete Health Risk Assessment and screening values have been reviewed and are found in Flowsheets. The following problems were reviewed today and where indicated follow up appointments were made and/or referrals ordered.     Positive Risk Factor Screenings with Interventions: General Health and ACP:  General  In general, how would you say your health is?: Good  In the past 7 days, have you experienced any of the following?  New or Increased Pain, New or Increased Fatigue, Loneliness, Social Isolation, Stress or Anger?: None of These  Do you get the social and emotional support that you need?: Yes  Do you have a Living Will?: (!) No  Advance Directives     Power of [facility] Will ACP-Advance Directive ACP-Power of     Not on File Not on File Not on File Not on File      General Health Risk Interventions:  · No Living Will: Advance Care Planning addressed with patient today    Health Habits/Nutrition:  Health Habits/Nutrition  Do you exercise for at least 20 minutes 2-3 times per week?: (!) No  Have you lost any weight without trying in the past 3 months?: No  Do you eat only one meal per day?: (!) Yes  Have you seen the dentist within the past year?: (!) No  Body mass index: 22.89  Health Habits/Nutrition Interventions:  · Inadequate physical activity:  educational materials provided to promote increased physical activity  · Nutritional issues:  educational materials for healthy, well-balanced diet provided  · Dental exam overdue:  patient encouraged to make appointment with his/her dentist       Personalized Preventive Plan   Current Health Maintenance Status  Immunization History   Administered Date(s) Administered    Tdap (Boostrix, Adacel) 09/15/2008        Health Maintenance   Topic Date Due    COVID-19 Vaccine (1) Never done    Shingles Vaccine (1 of 2) Never done    Pneumococcal 65+ years Vaccine (1 of 1 - PPSV23) Never done    DTaP/Tdap/Td vaccine (2 - Td or Tdap) 09/15/2018    Annual Wellness Visit (AWV)  Never done    Flu vaccine (Season Ended) 09/01/2021    A1C test (Diabetic or Prediabetic)  01/27/2022    Potassium monitoring  04/20/2022    Creatinine monitoring  04/20/2022    Colon Cancer Screen FIT/FOBT  04/27/2022    Lipid screen  04/20/2026    AAA screen  Completed    Hepatitis C screen  Completed    Hepatitis A vaccine  Aged Out    Hepatitis B vaccine  Aged Out    Hib vaccine  Aged Out    Meningococcal (ACWY) vaccine  Aged Out     Recommendations for [email or web address] Due: see orders and patient instructions/AVS.  . Recommended screening schedule for the next 5-10 years is provided to the patient in written form: see Patient Donnie Meneses was seen today for medicare awv. Diagnoses and all orders for this visit:    Screening for cardiovascular condition  -     [place] Intens behave ther cardio dx, 15 minutes []    Routine general medical examination at a health care facility                   Cardiovascular Disease Risk Counseling: Assessed the patient's risk to develop cardiovascular disease and reviewed main risk factors. Reviewed steps to reduce disease risk including:   · Quitting tobacco use, reducing amount smoked, or not starting the habit  · Making healthy food choices  · Being physically active and gradualy increasing activity levels   · Reduce weight and determine a healthy BMI goal  · Monitor blood pressure and treat if higher than 140/90 mmHg  · Maintain blood total cholesterol levels under 5 mmol/l or 190 mg/dl  · Maintain LDL cholesterol levels under 3.0 mmol/l or 115 mg/dl   · Control blood glucose levels  · Consider taking aspirin (75 mg daily), once blood pressure is controlled   Provided a follow up plan.   Time spent (minutes): 10

## 2021-07-02 ENCOUNTER — TELEPHONE (OUTPATIENT)
Dept: FAMILY MEDICINE CLINIC | Age: 76
End: 2021-07-02
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
## 2021-07-13 ENCOUNTER — OFFICE VISIT (OUTPATIENT)
Dept: FAMILY MEDICINE CLINIC | Age: 76
End: 2021-07-13
Payer: MEDICARE

## 2021-07-13 VITALS
OXYGEN SATURATION: 92 % | SYSTOLIC BLOOD PRESSURE: 132 MMHG | WEIGHT: 155 LBS | BODY MASS INDEX: 22.89 KG/M2 | TEMPERATURE: 97.9 F | DIASTOLIC BLOOD PRESSURE: 64 MMHG | HEART RATE: 101 BPM

## 2021-07-13 DIAGNOSIS — R41.82 ALTERED MENTAL STATUS, UNSPECIFIED ALTERED MENTAL STATUS TYPE: Primary | ICD-10-CM

## 2021-07-13 PROCEDURE — 99213 OFFICE O/P EST LOW 20 MIN: CPT | Performed by: FAMILY MEDICINE

## 2021-07-13 PROCEDURE — 96372 THER/PROPH/DIAG INJ SC/IM: CPT | Performed by: FAMILY MEDICINE

## 2021-07-13 RX ORDER — LANOLIN ALCOHOL/MO/W.PET/CERES
400 CREAM (GRAM) TOPICAL DAILY
Qty: 30 TABLET | Refills: 3 | Status: SHIPPED
Start: 2021-07-13 | End: 2022-08-30

## 2021-07-13 RX ORDER — AVOBENZONE, HOMOSALATE, OCTISALATE, OCTOCRYLENE, OXYBENZONE 30; 130; 50; 70; 40 MG/ML; MG/ML; MG/ML; MG/ML; MG/ML
LOTION TOPICAL
Qty: 90 TABLET | Refills: 3 | Status: SHIPPED
Start: 2021-07-13 | End: 2022-08-30

## 2021-07-13 RX ORDER — CYANOCOBALAMIN 1000 UG/ML
1000 INJECTION INTRAMUSCULAR; SUBCUTANEOUS ONCE
Status: COMPLETED | OUTPATIENT
Start: 2021-07-13 | End: 2021-07-13

## 2021-07-13 RX ADMIN — CYANOCOBALAMIN 1000 MCG: 1000 INJECTION INTRAMUSCULAR; SUBCUTANEOUS at 15:44

## 2021-07-13 ASSESSMENT — ENCOUNTER SYMPTOMS
DIARRHEA: 0
BACK PAIN: 0
PHOTOPHOBIA: 0
NAUSEA: 0
ABDOMINAL PAIN: 0
VOMITING: 0
CONSTIPATION: 0
SORE THROAT: 0
COUGH: 1
BLOOD IN STOOL: 0
SHORTNESS OF BREATH: 0

## 2021-07-13 NOTE — PROGRESS NOTES
obstructive pulmonary disease (HCC)    Alcohol abuse    Hepatic abscess    Portal vein thrombosis    History of squamous cell carcinoma of skin    Generalized osteoarthritis of multiple sites    History of cholecystectomy    Malignant neoplasm of glottis (HCC)    Malignant neoplasm of skin of parts of face     Past Medical History:   Diagnosis Date    [facility]fib ([facility])     follows with PCP only    Hx of blood clots     Hypertension     Pneumonia 2021    Squamous cell skin cancer     Stomach ulcer      Past Surgical History:   Procedure Laterality Date    CHOLECYSTECTOMY, LAPAROSCOPIC N/A 2/25/2021    CHOLECYSTECTOMY LAPAROSCOPIC ROBOTIC XI performed by Suze Briones MD at [facility] CT NEEDLE BIOPSY LIVER PERCUTANEOUS  2/1/2021    CT NEEDLE BIOPSY LIVER PERCUTANEOUS 2/1/2021 [facility] CT    ERCP N/A 1/29/2021    ERCP STONE REMOVAL performed by Mikhail Morfin MD at [facility] ENDOSCOPY    ERCP N/A 3/22/2021    ERCP WITH STENT REMOVAL performed by Mikhail Morfin MD at [address] [place] [address]    left sided ear surgery    PICC LINE INSERTION NURSE  2/6/2021          Social History     Socioeconomic History    Marital status:      Spouse name: Not on file    Number of children: Not on file    Years of education: Not on file    Highest education level: Not on file   Occupational History    Not on file   Tobacco Use    Smoking status: Former Smoker     Packs/day: 1.00     Years: 43.00     Pack years: 43.00     Start date: 2/5/1963     Quit date: 5/5/2006     Years since quitting: 15.2    Smokeless tobacco: Never Used   Vaping Use    Vaping Use: Never used   Substance and Sexual Activity    Alcohol use:  Yes     Alcohol/week: 30.0 standard drinks     Types: 30 Cans of beer per week     Comment: NONE SINCE 1/15/21    Drug use: No    Sexual activity: Not Currently     Partners: Female   Other Topics Concern    Not on file   Social History Narrative    Not on file     Social Determinants of Health     Financial Resource Strain: Low Risk     Difficulty of Paying Living Expenses: Not hard at all   Food Insecurity: No Food Insecurity    Worried About Running Out of Food in the Last Year: Never true    Mike of Food in the Last Year: Never true   Transportation Needs:     Lack of Transportation (Medical):  Lack of Transportation (Non-Medical):    Physical Activity:     Days of Exercise per Week:     Minutes of Exercise per Session:    Stress:     Feeling of Stress :    Social Connections:     Frequency of Communication with Friends and Family:     Frequency of Social Gatherings with Friends and Family:     Attends [employer] Services:     Active Member of Clubs or Organizations:     Attends Club or Organization Meetings:     Marital Status:    Intimate Partner Violence:     Fear of Current or Ex-Partner:     Emotionally Abused:     Physically Abused:     Sexually Abused:      No family history on file. There are no preventive care reminders to display for this patient. There are no preventive care reminders to display for this patient. There are no preventive care reminders to display for this patient.    Health Maintenance Due   Topic    Shingles Vaccine (1 of 2)    Pneumococcal 65+ years Vaccine (1 of 1 - PPSV23)    DTaP/Tdap/Td vaccine (2 - Td or Tdap)      Health Maintenance   Topic Date Due    COVID-19 Vaccine (1) Never done    Shingles Vaccine (1 of 2) Never done    Pneumococcal 65+ years Vaccine (1 of 1 - PPSV23) Never done    DTaP/Tdap/Td vaccine (2 - Td or Tdap) 09/15/2018    Flu vaccine (1) 09/01/2021    A1C test (Diabetic or Prediabetic)  01/27/2022    Potassium monitoring  04/20/2022    Creatinine monitoring  04/20/2022    Colon Cancer Screen FIT/FOBT  04/27/2022    Annual Wellness Visit (AWV)  06/23/2022    Lipid screen  04/20/2026    AAA screen  Completed    Hepatitis C screen  Completed    Hepatitis A vaccine  Aged Out    Hepatitis B vaccine  Aged Out    Hib vaccine  Aged Out    Meningococcal (ACWY) vaccine  Aged Out      There are no preventive care reminders to display for this patient. There are no preventive care reminders to display for this patient. Physical Exam  Vitals reviewed. HENT:      Head: Normocephalic and atraumatic. Right Ear: External ear normal.      Ears:      Comments: Left ear missing  Eyes:      General: No scleral icterus. Conjunctiva/sclera: Conjunctivae normal.      Pupils: Pupils are equal, round, and reactive to light. Neck:      Thyroid: No thyromegaly. Cardiovascular:      Rate and Rhythm: Bradycardia present. Rhythm irregular. Heart sounds: Normal heart sounds. No murmur heard. Pulmonary:      Effort: Pulmonary effort is normal.      Breath sounds: Decreased air movement present. Decreased breath sounds and wheezing present. No rales. Abdominal:      General: Bowel sounds are normal. There is no distension. Palpations: Abdomen is soft. Tenderness: There is no abdominal tenderness. Musculoskeletal:         General: Normal range of motion. Cervical back: Neck supple. Lymphadenopathy:      Cervical: No cervical adenopathy. Skin:     General: Skin is warm and dry. Findings: No erythema or rash. Neurological:      Mental Status: He is alert and oriented to person, place, and time. Cranial Nerves: No cranial nerve deficit. Psychiatric:         Attention and Perception: Attention normal.         Mood and Affect: Affect is flat. Speech: Speech is delayed and tangential.         Judgment: Judgment normal.                 An electronic signature was used to authenticate this note.     --Ludin Reich, DO

## 2021-07-22 ENCOUNTER — CARE COORDINATION (OUTPATIENT)
Dept: CARE COORDINATION | Age: 76
End: 2021-07-22

## 2021-07-22 NOTE — LETTER
7/22/2021    [address]  [address] [address]      Dear [address],    My name is Sadaf Maciel and I am an [facility] who partners with Dr. Kenton Hurst to improve patients' health. I've been trying to reach you via phone to let you know that, as a member of your care team, I will work with other providers involved in your care, offer education for your specific health conditions, and connect you with more resources as needed. This program is designed to provide you with the opportunity to have a ([address] [facility]/[address]) care manager partner with you for the following situations:     1) if you come home from the hospital or emergency room   2) to help manage your disease   3) when you would like assistance coordinating services or appointments    This added support is provided at no additional cost to you. My primary focus is to help you achieve specific goals and improve your health. Please call me at [phone] to further discuss how I can support your health care needs.     Sincerely,    Radha Helm RN

## 2021-07-22 NOTE — CARE COORDINATION
-ACM attempted to reach patient to offer enrollment into Care Coordination program, however the phone recording said the VM box is not set up yet. Unable to leave a VM. -If no return call, will attempt outreach again.  -Will mail introduction letter to patient.

## 2021-08-09 ENCOUNTER — CARE COORDINATION (OUTPATIENT)
Dept: CARE COORDINATION | Age: 76
End: 2021-08-09

## 2021-09-28 ENCOUNTER — TELEPHONE (OUTPATIENT)
Dept: FAMILY MEDICINE CLINIC | Age: 76
End: 2021-09-28

## 2021-09-28 DIAGNOSIS — I10 ESSENTIAL HYPERTENSION: ICD-10-CM

## 2021-09-28 RX ORDER — LOSARTAN POTASSIUM 50 MG/1
50 TABLET ORAL DAILY
Qty: 30 TABLET | Refills: 2 | Status: SHIPPED
Start: 2021-09-28 | End: 2022-01-20

## 2021-09-28 NOTE — TELEPHONE ENCOUNTER
Name of Medication(s) Requested:  Losartan    Pharmacy Requested:   [facility]    Medication(s) pended? [x] Yes  [] No    Last Appointment:  7/13/2021    Future appts:  Future Appointments   Date Time Provider Sindy Grider   12/21/2021  9:00 AM J Luis Reich, DO [facility]          Does patient need call back?   [] Yes  [x] No

## 2021-09-28 NOTE — TELEPHONE ENCOUNTER
Spoke with [employer], she stated that the [facility] ([facility]) misunderstood her question. She was unsure if you could send a referral to a dentist to take care of this. However, patient is scheduled with a dentist and they will go from there.

## 2021-09-28 NOTE — TELEPHONE ENCOUNTER
----- Message from Donny Jamison sent at 9/28/2021  8:18 AM EDT -----  Subject: Appointment Request    Reason for Call: Urgent (Patient Request) No Script    QUESTIONS  Type of Appointment? Established Patient  Reason for appointment request? Available appointments did not meet   patient need  Additional Information for Provider? please call pt's daughter Juan Luis Murray   [phone] needs tooth pulled   ---------------------------------------------------------------------------  --------------  CALL BACK INFO  What is the best way for the office to contact you? OK to leave message on   voicemail  Preferred Call Back Phone Number? [phone]  ---------------------------------------------------------------------------  --------------  SCRIPT ANSWERS  Relationship to Patient? Other  Representative Name? alek  Additional information verified (besides Name and Date of Birth)? Address  (Is the patient requesting to see the provider for a procedure?)? No  (Is the patient requesting to see the provider urgently  today or   tomorrow. )? Yes  Have you been diagnosed with, awaiting test results for, or told that you   are suspected of having COVID-19 (Coronavirus)? (If patient has tested   negative or was tested as a requirement for work, school, or travel and   not based on symptoms, answer no)? No  Within the past two weeks have you developed any of the following symptoms   (answer no if symptoms have been present longer than 2 weeks or began   more than 2 weeks ago)? Fever or Chills, Cough, Shortness of breath or   difficulty breathing, Loss of taste or smell, Sore throat, Nasal   congestion, Sneezing or runny nose, Fatigue or generalized body aches   (answer no if pain is specific to a body part e.g. back pain), Diarrhea,   Headache? No  Have you had close contact with someone with COVID-19 in the last 14 days? No  (Service Expert  click yes below to proceed with Essential Testing As Usual   Scheduling)?  Yes

## 2021-10-06 ENCOUNTER — TELEPHONE (OUTPATIENT)
Dept: FAMILY MEDICINE CLINIC | Age: 76
End: 2021-10-06

## 2021-10-06 NOTE — TELEPHONE ENCOUNTER
Preston calling about some pain he is having in his arms and legs. This has come on kind of slowly over the last couple months. Now it is constant and over the counter pain meds are not helping. He is asking if you can give him something for this?

## 2021-11-02 ENCOUNTER — OFFICE VISIT (OUTPATIENT)
Dept: FAMILY MEDICINE CLINIC | Age: 76
End: 2021-11-02
Payer: MEDICARE

## 2021-11-02 VITALS
WEIGHT: 160 LBS | BODY MASS INDEX: 23.63 KG/M2 | TEMPERATURE: 97.9 F | DIASTOLIC BLOOD PRESSURE: 80 MMHG | OXYGEN SATURATION: 99 % | HEART RATE: 71 BPM | SYSTOLIC BLOOD PRESSURE: 138 MMHG

## 2021-11-02 DIAGNOSIS — R35.0 URINARY FREQUENCY: Primary | ICD-10-CM

## 2021-11-02 DIAGNOSIS — R35.0 URINARY FREQUENCY: ICD-10-CM

## 2021-11-02 LAB
BILIRUBIN, POC: NORMAL
BLOOD URINE, POC: NORMAL
CLARITY, POC: CLEAR
COLOR, POC: YELLOW
GLUCOSE URINE, POC: NORMAL
KETONES, POC: NORMAL
LEUKOCYTE EST, POC: NORMAL
NITRITE, POC: NORMAL
PH, POC: 7
PROTEIN, POC: NORMAL
SPECIFIC GRAVITY, POC: 1.02
UROBILINOGEN, POC: 0.2

## 2021-11-02 PROCEDURE — 99213 OFFICE O/P EST LOW 20 MIN: CPT | Performed by: FAMILY MEDICINE

## 2021-11-02 PROCEDURE — 81002 URINALYSIS NONAUTO W/O SCOPE: CPT | Performed by: FAMILY MEDICINE

## 2021-11-02 RX ORDER — OXYBUTYNIN CHLORIDE 5 MG/1
5 TABLET ORAL 2 TIMES DAILY
Qty: 60 TABLET | Refills: 3 | Status: SHIPPED
Start: 2021-11-02 | End: 2022-02-17

## 2021-11-02 RX ORDER — TAMSULOSIN HYDROCHLORIDE 0.4 MG/1
0.4 CAPSULE ORAL DAILY
Qty: 30 CAPSULE | Refills: 5 | Status: SHIPPED
Start: 2021-11-02 | End: 2022-04-08

## 2021-11-02 ASSESSMENT — ENCOUNTER SYMPTOMS
COUGH: 1
BACK PAIN: 0
NAUSEA: 0
CONSTIPATION: 0
PHOTOPHOBIA: 0
ABDOMINAL PAIN: 0
DIARRHEA: 0
SHORTNESS OF BREATH: 0
SORE THROAT: 0
VOMITING: 0
BLOOD IN STOOL: 0

## 2021-11-02 NOTE — PROGRESS NOTES
Susan Klein (:  1945) is a 70 y.o. male,Established patient, here for evaluation of the following chief complaint(s):  Urinary Frequency (at night )         ASSESSMENT/PLAN:  1. Urinary frequency  -     POCT Urinalysis no Micro  -     Culture, Urine; Future  -     tamsulosin (FLOMAX) 0.4 MG capsule; Take 1 capsule by mouth daily, Disp-30 capsule, R-5Normal  -     oxybutynin (DITROPAN) 5 MG tablet; Take 1 tablet by mouth 2 times daily, Disp-60 tablet, R-3Normal  At this time we will treat symptomatically. Patient we will keep the December appointment or call sooner if no improvement. No follow-ups on file. Subjective   SUBJECTIVE/OBJECTIVE:  HPI   Patient presents today for evaluation of worsening urinary frequency particularly at night for the last several months. Patient also states urinary incontinence. Denies any dysuria or hematuria. Review of Systems   Constitutional: Negative for chills and fever. HENT: Negative for congestion, hearing loss, nosebleeds and sore throat. Eyes: Negative for photophobia. Respiratory: Positive for cough. Negative for shortness of breath. Cardiovascular: Negative for chest pain, palpitations and leg swelling. Gastrointestinal: Negative for abdominal pain, blood in stool, constipation, diarrhea, nausea and vomiting. Endocrine: Negative for polydipsia. Genitourinary: Positive for frequency and urgency. Negative for dysuria and hematuria. Musculoskeletal: Negative for back pain and myalgias. Skin: Negative. Neurological: Negative for dizziness, tremors, weakness and headaches. Hematological: Does not bruise/bleed easily. Psychiatric/Behavioral: Positive for confusion, decreased concentration and dysphoric mood. Negative for hallucinations and suicidal ideas. All other systems reviewed and are negative.          Current Outpatient Medications:     tamsulosin (FLOMAX) 0.4 MG capsule, Take 1 capsule by mouth daily, Disp: 30 capsule, Rfl: 5    oxybutynin (DITROPAN) 5 MG tablet, Take 1 tablet by mouth 2 times daily, Disp: 60 tablet, Rfl: 3    losartan (COZAAR) 50 MG tablet, Take 1 tablet by mouth daily, Disp: 30 tablet, Rfl: 2    B Complex-Biotin-FA (B COMPLEX 100 TR) TBCR, 1 tablet daily, Disp: 90 tablet, Rfl: 3    folic acid (FOLATE) 319 MCG tablet, Take 1 tablet by mouth daily, Disp: 30 tablet, Rfl: 3   Patient Active Problem List   Diagnosis    Essential hypertension    Atrial fibrillation (HCC)    Hypokalemia    Functional diarrhea    Chronic obstructive pulmonary disease (HCC)    Alcohol abuse    Hepatic abscess    Portal vein thrombosis    History of squamous cell carcinoma of skin    Generalized osteoarthritis of multiple sites    History of cholecystectomy    Malignant neoplasm of glottis (HCC)    Malignant neoplasm of skin of parts of face     Past Medical History:   Diagnosis Date    A-fib ([facility] [address]     follows with PCP only    Hx of blood clots     Hypertension     Pneumonia 2021    Squamous cell skin cancer     Stomach ulcer      Past Surgical History:   Procedure Laterality Date    CHOLECYSTECTOMY, LAPAROSCOPIC N/A 2/25/2021    CHOLECYSTECTOMY LAPAROSCOPIC ROBOTIC XI performed by Inocente Segura MD at [place] [address] CT NEEDLE BIOPSY LIVER PERCUTANEOUS  2/1/2021    CT NEEDLE BIOPSY LIVER PERCUTANEOUS 2/1/2021 [facility] CT    ERCP N/A 1/29/2021    ERCP STONE REMOVAL performed by Karli Nance MD at [facility] ENDOSCOPY    ERCP N/A 3/22/2021    ERCP WITH STENT REMOVAL performed by Karli Nance MD at [address] [place] [address] 2009    left sided ear surgery    PICC LINE INSERTION NURSE  2/6/2021          Social History     Socioeconomic History    Marital status:      Spouse name: Not on file    Number of children: Not on file    Years of education: Not on file    Highest education level: Not on file   Occupational History    Not on file   Tobacco Use    Smoking status: Former Smoker     Packs/day: 1.00     Years: 43.00     Pack years: 43.00     Start date: 2/5/1963     Quit date: 5/5/2006     Years since quitting: 15.5    Smokeless tobacco: Never Used   Vaping Use    Vaping Use: Never used   Substance and Sexual Activity    Alcohol use: Yes     Alcohol/week: 30.0 standard drinks     Types: 30 Cans of beer per week     Comment: NONE SINCE 1/15/21    Drug use: No    Sexual activity: Not Currently     Partners: Female   Other Topics Concern    Not on file   Social History Narrative    Not on file     Social Determinants of Health     Financial Resource Strain: Low Risk     Difficulty of Paying Living Expenses: Not hard at all   Food Insecurity: No Food Insecurity    Worried About [address] [employer] in the Last Year: Never true    [address] [email or web address] in the Last Year: Never true   Transportation Needs:     Lack of Transportation (Medical):  Lack of Transportation (Non-Medical):    Physical Activity:     Days of Exercise per Week:     Minutes of Exercise per Session:    Stress:     Feeling of Stress :    Social Connections:     Frequency of Communication with Friends and Family:     Frequency of Social Gatherings with Friends and Family:     Attends Hinduism Services:     Active Member of Clubs or Organizations:     Attends Club or Organization Meetings:     Marital Status:    Intimate Partner Violence:     Fear of Current or Ex-Partner:     Emotionally Abused:     Physically Abused:     Sexually Abused:      No family history on file. There are no preventive care reminders to display for this patient. There are no preventive care reminders to display for this patient. There are no preventive care reminders to display for this patient.    Health Maintenance Due   Topic    Shingles Vaccine (1 of 2)    Pneumococcal 65+ years Vaccine (1 of 1 - PPSV23)    DTaP/Tdap/Td vaccine (2 - Td or Tdap)      Health Maintenance   Topic Date Due    Shingles Vaccine (1 of 2) Never done    Pneumococcal 65+ years Vaccine (1 of 1 - PPSV23) Never done    DTaP/Tdap/Td vaccine (2 - Td or Tdap) 09/15/2018    Flu vaccine (1) Never done    A1C test (Diabetic or Prediabetic)  01/27/2022    Potassium monitoring  04/20/2022    Creatinine monitoring  04/20/2022    Colon Cancer Screen FIT/FOBT  04/27/2022    Annual Wellness Visit (AWV)  06/23/2022    Lipid screen  04/20/2026    COVID-19 Vaccine  Completed    AAA screen  Completed    Hepatitis C screen  Completed    Hepatitis A vaccine  Aged Out    Hepatitis B vaccine  Aged Out    Hib vaccine  Aged Out    Meningococcal (ACWY) vaccine  Aged Out      There are no preventive care reminders to display for this patient. There are no preventive care reminders to display for this patient. /80   Pulse 71   Temp 97.9 °F (36.6 °C)   Wt 160 lb (72.6 kg)   SpO2 99%   BMI 23.63 kg/m²     Objective   Physical Exam  Vitals reviewed. HENT:      Head: Normocephalic and atraumatic. Right Ear: External ear normal.      Ears:      Comments: Left ear missing  Eyes:      General: No scleral icterus. Conjunctiva/sclera: Conjunctivae normal.      Pupils: Pupils are equal, round, and reactive to light. Neck:      Thyroid: No thyromegaly. Cardiovascular:      Rate and Rhythm: Bradycardia present. Rhythm irregular. Heart sounds: Normal heart sounds. No murmur heard. Pulmonary:      Effort: Pulmonary effort is normal.      Breath sounds: Decreased air movement present. Decreased breath sounds and wheezing present. No rales. Abdominal:      General: Bowel sounds are normal. There is no distension. Palpations: Abdomen is soft. Tenderness: There is no abdominal tenderness. Musculoskeletal:         General: Normal range of motion. Cervical back: Neck supple. Lymphadenopathy:      Cervical: No cervical adenopathy. Skin:     General: Skin is warm and dry.       Findings: No erythema or rash.   Neurological:      Mental Status: He is alert and oriented to person, place, and time. Cranial Nerves: No cranial nerve deficit. Psychiatric:         Attention and Perception: Attention normal.         Mood and Affect: Affect is flat. Speech: Speech is delayed and tangential.         Judgment: Judgment normal.                  An electronic signature was used to authenticate this note.     --Sherwin Reich, DO

## 2021-11-04 LAB — URINE CULTURE, ROUTINE: NORMAL

## 2022-01-20 DIAGNOSIS — I10 ESSENTIAL HYPERTENSION: ICD-10-CM

## 2022-01-20 RX ORDER — LOSARTAN POTASSIUM 50 MG/1
TABLET ORAL
Qty: 30 TABLET | Refills: 2 | Status: SHIPPED
Start: 2022-01-20 | End: 2022-04-08

## 2022-02-17 DIAGNOSIS — R35.0 URINARY FREQUENCY: ICD-10-CM

## 2022-02-17 RX ORDER — OXYBUTYNIN CHLORIDE 5 MG/1
TABLET ORAL
Qty: 60 TABLET | Refills: 0 | Status: SHIPPED
Start: 2022-02-17 | End: 2022-03-21

## 2022-02-22 ENCOUNTER — CARE COORDINATION (OUTPATIENT)
Dept: CARE COORDINATION | Age: 77
End: 2022-02-22

## 2022-02-22 NOTE — LETTER
2/22/2022    Eric Melendez  [address] [facility] [address] [place] [address]    Dear Eric Melendez    My name is Shirley Arthur and I am an [address] [place] [address] who partners with Dr. Tayla Bonilla to improve patients' health. I've been trying to reach you via phone to let you know that, as a member of your care team, I will work with other providers involved in your care, offer education for your specific health conditions, and connect you with more resources as needed. This program is designed to provide you with the opportunity to have a ([facility]/[address]) care manager partner with you for the following situations:     1) if you come home from the hospital or emergency room   2) to help manage your disease   3) when you would like assistance coordinating services or appointments    This added support is provided at no additional cost to you. My primary focus is to help you achieve specific goals and improve your health. Please call me at [phone] to further discuss how I can support your health care needs.     Sincerely,    Ember Pierre RN

## 2022-02-22 NOTE — CARE COORDINATION
-ACM attempted to reach patient to offer enrollment into Care Coordination program, however the phone answered but there was no sound. Asked for patient by name several times with no response and no voice message recording occurred. Unable to leave a VM. ACM called 3 times with the same response.   -If no return call, will attempt outreach again.  -Will mail introduction letter to patient.

## 2022-02-24 ENCOUNTER — CARE COORDINATION (OUTPATIENT)
Dept: CARE COORDINATION | Age: 77
End: 2022-02-24

## 2022-02-24 NOTE — CARE COORDINATION
-[facility]'s second attempt to reach patient  to offer enrollment into Care Coordination program, however the recording said the VM box is not sent up yet. Unable to leave a VM. -If no return call, will attempt outreach again.

## 2022-03-03 ENCOUNTER — CARE COORDINATION (OUTPATIENT)
Dept: CARE COORDINATION | Age: 77
End: 2022-03-03

## 2022-03-03 NOTE — CARE COORDINATION
-[facility]'s third attempt to reach patient  to offer enrollment into Care Coordination program, however the recording said the VM box has not been set up yet. Unable to leave a VM. -Introductory letter mailed 2-. -[facility] will remove name from care team if no return call by end of day. Dennis Ayala

## 2022-03-19 DIAGNOSIS — R35.0 URINARY FREQUENCY: ICD-10-CM

## 2022-03-21 RX ORDER — OXYBUTYNIN CHLORIDE 5 MG/1
TABLET ORAL
Qty: 60 TABLET | Refills: 0 | Status: SHIPPED
Start: 2022-03-21 | End: 2022-04-26

## 2022-04-04 ENCOUNTER — OFFICE VISIT (OUTPATIENT)
Dept: PRIMARY CARE CLINIC | Age: 77
End: 2022-04-04
Payer: MEDICARE

## 2022-04-04 VITALS
HEIGHT: 69 IN | HEART RATE: 77 BPM | OXYGEN SATURATION: 98 % | SYSTOLIC BLOOD PRESSURE: 142 MMHG | DIASTOLIC BLOOD PRESSURE: 78 MMHG | TEMPERATURE: 97.7 F | BODY MASS INDEX: 24.29 KG/M2 | WEIGHT: 164 LBS

## 2022-04-04 DIAGNOSIS — I10 ESSENTIAL HYPERTENSION: Primary | ICD-10-CM

## 2022-04-04 DIAGNOSIS — I10 ESSENTIAL HYPERTENSION: ICD-10-CM

## 2022-04-04 DIAGNOSIS — Z12.12 SCREENING FOR MALIGNANT NEOPLASM OF THE RECTUM: ICD-10-CM

## 2022-04-04 DIAGNOSIS — J41.1 MUCOPURULENT CHRONIC BRONCHITIS (HCC): ICD-10-CM

## 2022-04-04 DIAGNOSIS — I48.0 PAROXYSMAL ATRIAL FIBRILLATION (HCC): ICD-10-CM

## 2022-04-04 DIAGNOSIS — M15.9 GENERALIZED OSTEOARTHRITIS OF MULTIPLE SITES: ICD-10-CM

## 2022-04-04 DIAGNOSIS — Z12.5 ENCOUNTER FOR SCREENING FOR MALIGNANT NEOPLASM OF PROSTATE: ICD-10-CM

## 2022-04-04 DIAGNOSIS — R73.03 PREDIABETES: ICD-10-CM

## 2022-04-04 DIAGNOSIS — C32.0 MALIGNANT NEOPLASM OF GLOTTIS (HCC): ICD-10-CM

## 2022-04-04 DIAGNOSIS — I81 PORTAL VEIN THROMBOSIS: ICD-10-CM

## 2022-04-04 DIAGNOSIS — F10.10 ALCOHOL ABUSE: ICD-10-CM

## 2022-04-04 DIAGNOSIS — R41.82 ALTERED MENTAL STATUS, UNSPECIFIED ALTERED MENTAL STATUS TYPE: ICD-10-CM

## 2022-04-04 LAB
ALBUMIN SERPL-MCNC: 4.9 G/DL (ref 3.5–5.2)
ALP BLD-CCNC: 86 U/L (ref 40–129)
ALT SERPL-CCNC: 15 U/L (ref 0–40)
ANION GAP SERPL CALCULATED.3IONS-SCNC: 17 MMOL/L (ref 7–16)
AST SERPL-CCNC: 25 U/L (ref 0–39)
BASOPHILS ABSOLUTE: 0.07 E9/L (ref 0–0.2)
BASOPHILS RELATIVE PERCENT: 0.9 % (ref 0–2)
BILIRUB SERPL-MCNC: 0.5 MG/DL (ref 0–1.2)
BILIRUBIN DIRECT: <0.2 MG/DL (ref 0–0.3)
BILIRUBIN, INDIRECT: ABNORMAL MG/DL (ref 0–1)
BUN BLDV-MCNC: 18 MG/DL (ref 6–23)
CALCIUM SERPL-MCNC: 10.2 MG/DL (ref 8.6–10.2)
CHLORIDE BLD-SCNC: 102 MMOL/L (ref 98–107)
CHOLESTEROL, TOTAL: 226 MG/DL (ref 0–199)
CO2: 22 MMOL/L (ref 22–29)
CREAT SERPL-MCNC: 0.7 MG/DL (ref 0.7–1.2)
CREATININE URINE: 56 MG/DL (ref 40–278)
D DIMER: 3928 NG/ML DDU
EOSINOPHILS ABSOLUTE: 0.29 E9/L (ref 0.05–0.5)
EOSINOPHILS RELATIVE PERCENT: 3.9 % (ref 0–6)
GFR AFRICAN AMERICAN: >60
GFR NON-AFRICAN AMERICAN: >60 ML/MIN/1.73
GLUCOSE BLD-MCNC: 98 MG/DL (ref 74–99)
HBA1C MFR BLD: 5.5 % (ref 4–5.6)
HCT VFR BLD CALC: 45.9 % (ref 37–54)
HDLC SERPL-MCNC: 63 MG/DL
HEMOGLOBIN: 15.1 G/DL (ref 12.5–16.5)
IMMATURE GRANULOCYTES #: 0.02 E9/L
IMMATURE GRANULOCYTES %: 0.3 % (ref 0–5)
LDL CHOLESTEROL CALCULATED: 139 MG/DL (ref 0–99)
LYMPHOCYTES ABSOLUTE: 1.84 E9/L (ref 1.5–4)
LYMPHOCYTES RELATIVE PERCENT: 24.9 % (ref 20–42)
MCH RBC QN AUTO: 29.8 PG (ref 26–35)
MCHC RBC AUTO-ENTMCNC: 32.9 % (ref 32–34.5)
MCV RBC AUTO: 90.7 FL (ref 80–99.9)
MICROALBUMIN UR-MCNC: <12 MG/L
MICROALBUMIN/CREAT UR-RTO: ABNORMAL (ref 0–30)
MONOCYTES ABSOLUTE: 0.6 E9/L (ref 0.1–0.95)
MONOCYTES RELATIVE PERCENT: 8.1 % (ref 2–12)
NEUTROPHILS ABSOLUTE: 4.57 E9/L (ref 1.8–7.3)
NEUTROPHILS RELATIVE PERCENT: 61.9 % (ref 43–80)
PDW BLD-RTO: 14.3 FL (ref 11.5–15)
PLATELET # BLD: 304 E9/L (ref 130–450)
PMV BLD AUTO: 11.1 FL (ref 7–12)
POTASSIUM SERPL-SCNC: 4.1 MMOL/L (ref 3.5–5)
PROSTATE SPECIFIC ANTIGEN: 2.06 NG/ML (ref 0–4)
RBC # BLD: 5.06 E12/L (ref 3.8–5.8)
SODIUM BLD-SCNC: 141 MMOL/L (ref 132–146)
T4 FREE: 1.25 NG/DL (ref 0.93–1.7)
TOTAL PROTEIN: 9.6 G/DL (ref 6.4–8.3)
TRIGL SERPL-MCNC: 120 MG/DL (ref 0–149)
TSH SERPL DL<=0.05 MIU/L-ACNC: 2.33 UIU/ML (ref 0.27–4.2)
URIC ACID, SERUM: 4.9 MG/DL (ref 3.4–7)
VLDLC SERPL CALC-MCNC: 24 MG/DL
WBC # BLD: 7.4 E9/L (ref 4.5–11.5)

## 2022-04-04 PROCEDURE — 99213 OFFICE O/P EST LOW 20 MIN: CPT | Performed by: FAMILY MEDICINE

## 2022-04-04 ASSESSMENT — ENCOUNTER SYMPTOMS
BACK PAIN: 0
DIARRHEA: 0
COUGH: 1
NAUSEA: 0
CONSTIPATION: 0
PHOTOPHOBIA: 0
BLOOD IN STOOL: 0
SHORTNESS OF BREATH: 0
SORE THROAT: 0
VOMITING: 0
ABDOMINAL PAIN: 0
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
## 2022-04-05 LAB
FOLATE: >20 NG/ML (ref 4.8–24.2)
VITAMIN B-12: 437 PG/ML (ref 211–946)

## 2022-04-06 DIAGNOSIS — R79.89 ELEVATED D-DIMER: Primary | ICD-10-CM

## 2022-04-06 DIAGNOSIS — I10 ESSENTIAL (PRIMARY) HYPERTENSION: ICD-10-CM

## 2022-04-08 DIAGNOSIS — I10 ESSENTIAL HYPERTENSION: ICD-10-CM

## 2022-04-08 DIAGNOSIS — R35.0 URINARY FREQUENCY: ICD-10-CM

## 2022-04-08 RX ORDER — LOSARTAN POTASSIUM 50 MG/1
TABLET ORAL
Qty: 30 TABLET | Refills: 2 | Status: SHIPPED
Start: 2022-04-08 | End: 2022-08-05

## 2022-04-08 RX ORDER — TAMSULOSIN HYDROCHLORIDE 0.4 MG/1
CAPSULE ORAL
Qty: 30 CAPSULE | Refills: 5 | Status: SHIPPED
Start: 2022-04-08 | End: 2022-08-30

## 2022-04-26 DIAGNOSIS — R35.0 URINARY FREQUENCY: ICD-10-CM

## 2022-04-26 RX ORDER — OXYBUTYNIN CHLORIDE 5 MG/1
TABLET ORAL
Qty: 60 TABLET | Refills: 0 | Status: SHIPPED
Start: 2022-04-26 | End: 2022-05-16

## 2022-05-04 ENCOUNTER — TELEPHONE (OUTPATIENT)
Dept: FAMILY MEDICINE CLINIC | Age: 77
End: 2022-05-04

## 2022-05-04 NOTE — TELEPHONE ENCOUNTER
Lucy from [facility] called. She said an elevated D-Dimer does not fit the reasoning for a CTA Abdominal aorta w bilateral run off w contrast. She aid its more for vascular. Please clarify.

## 2022-05-05 ENCOUNTER — HOSPITAL ENCOUNTER (OUTPATIENT)
Dept: CT IMAGING | Age: 77
Discharge: HOME | End: 2022-05-07
Payer: MEDICARE

## 2022-05-05 DIAGNOSIS — I10 ESSENTIAL (PRIMARY) HYPERTENSION: ICD-10-CM

## 2022-05-05 DIAGNOSIS — R41.82 ALTERED MENTAL STATUS, UNSPECIFIED ALTERED MENTAL STATUS TYPE: ICD-10-CM

## 2022-05-05 DIAGNOSIS — R79.89 ELEVATED D-DIMER: ICD-10-CM

## 2022-05-05 PROCEDURE — 6360000004 HC RX CONTRAST MEDICATION: Performed by: RADIOLOGY

## 2022-05-05 PROCEDURE — 70450 CT HEAD/BRAIN W/O DYE: CPT

## 2022-05-05 PROCEDURE — 75635 CT ANGIO ABDOMINAL ARTERIES: CPT

## 2022-05-05 PROCEDURE — 71275 CT ANGIOGRAPHY CHEST: CPT

## 2022-05-05 RX ADMIN — IOPAMIDOL 150 ML: 755 INJECTION, SOLUTION INTRAVENOUS at 15:03

## 2022-05-16 DIAGNOSIS — R35.0 URINARY FREQUENCY: ICD-10-CM

## 2022-05-16 RX ORDER — OXYBUTYNIN CHLORIDE 5 MG/1
TABLET ORAL
Qty: 60 TABLET | Refills: 0 | Status: SHIPPED
Start: 2022-05-16 | End: 2022-08-30

## 2022-06-03 ENCOUNTER — APPOINTMENT (OUTPATIENT)
Dept: CT IMAGING | Age: 77
DRG: 956 | End: 2022-06-03
Payer: MEDICARE

## 2022-06-03 ENCOUNTER — APPOINTMENT (OUTPATIENT)
Dept: GENERAL RADIOLOGY | Age: 77
DRG: 956 | End: 2022-06-03
Payer: MEDICARE

## 2022-06-03 ENCOUNTER — SURGICAL CONSULT (OUTPATIENT)
Dept: ORTHOPEDIC SURGERY | Age: 77
End: 2022-06-03

## 2022-06-03 ENCOUNTER — HOSPITAL ENCOUNTER (INPATIENT)
Age: 77
LOS: 4 days | Discharge: HOME HEALTH | DRG: 956 | End: 2022-06-07
Attending: EMERGENCY MEDICINE | Admitting: SURGERY
Payer: MEDICARE
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
## 2022-06-03 DIAGNOSIS — S72.142A CLOSED INTERTROCHANTERIC FRACTURE OF HIP, LEFT, INITIAL ENCOUNTER (HCC): ICD-10-CM

## 2022-06-03 PROBLEM — T14.90XA TRAUMA: Status: ACTIVE | Noted: 2022-06-03

## 2022-06-03 LAB
ABO/RH: NORMAL
ABO/RH: NORMAL
ACETAMINOPHEN LEVEL: <5 MCG/ML (ref 10–30)
ALBUMIN SERPL-MCNC: 4 G/DL (ref 3.5–5.2)
ALP BLD-CCNC: 64 U/L (ref 40–129)
ALT SERPL-CCNC: 5 U/L (ref 0–40)
ANGLE (CLOT STRENGTH): 79 DEGREE (ref 59–74)
ANION GAP SERPL CALCULATED.3IONS-SCNC: 12 MMOL/L (ref 7–16)
ANTIBODY SCREEN: NORMAL
APTT: 26.2 SEC (ref 24.5–35.1)
AST SERPL-CCNC: 18 U/L (ref 0–39)
B.E.: -3.1 MMOL/L (ref -3–3)
BILIRUB SERPL-MCNC: 1.1 MG/DL (ref 0–1.2)
BUN BLDV-MCNC: 13 MG/DL (ref 6–23)
CALCIUM SERPL-MCNC: 8.9 MG/DL (ref 8.6–10.2)
CHLORIDE BLD-SCNC: 103 MMOL/L (ref 98–107)
CO2: 21 MMOL/L (ref 22–29)
COHB: 0 % (ref 0–1.5)
CREAT SERPL-MCNC: 0.7 MG/DL (ref 0.7–1.2)
CRITICAL: ABNORMAL
DATE ANALYZED: ABNORMAL
DATE OF COLLECTION: ABNORMAL
EPL-TEG: 1.3 % (ref 0–15)
ETHANOL: <10 MG/DL (ref 0–0.08)
FIO2: 100 %
G-TEG: 16.5 K D/SC (ref 4.5–11)
GFR AFRICAN AMERICAN: >60
GFR NON-AFRICAN AMERICAN: >60 ML/MIN/1.73
GLUCOSE BLD-MCNC: 129 MG/DL (ref 74–99)
HCO3: 20.9 MMOL/L (ref 22–26)
HCT VFR BLD CALC: 35.6 % (ref 37–54)
HEMOGLOBIN: 11.9 G/DL (ref 12.5–16.5)
HHB: 0.4 % (ref 0–5)
INR BLD: 1.3
K (CLOTTING TIME): 0.9 MIN (ref 1–3)
LAB: ABNORMAL
LACTIC ACID: 1.8 MMOL/L (ref 0.5–2.2)
LY30 (FIBRINOLYSIS): 1.3 % (ref 0–8)
Lab: ABNORMAL
MA (MAX AMPLITUDE): 76.7 MM (ref 50–70)
MCH RBC QN AUTO: 29.8 PG (ref 26–35)
MCHC RBC AUTO-ENTMCNC: 33.4 % (ref 32–34.5)
MCV RBC AUTO: 89.2 FL (ref 80–99.9)
METHB: 0.4 % (ref 0–1.5)
MODE: ABNORMAL
O2 CONTENT: 18.7 ML/DL
O2 SATURATION: 99.6 % (ref 92–98.5)
O2HB: 99.2 % (ref 94–97)
OPERATOR ID: 2021
PATIENT TEMP: 37 C
PCO2: 34.4 MMHG (ref 35–45)
PDW BLD-RTO: 12.7 FL (ref 11.5–15)
PFO2: 3.5 MMHG/%
PH BLOOD GAS: 7.4 (ref 7.35–7.45)
PLATELET # BLD: 232 E9/L (ref 130–450)
PMV BLD AUTO: 10.6 FL (ref 7–12)
PO2: 349.8 MMHG (ref 75–100)
POTASSIUM SERPL-SCNC: 3.78 MMOL/L (ref 3.5–5)
POTASSIUM SERPL-SCNC: 4.1 MMOL/L (ref 3.5–5)
PROTHROMBIN TIME: 14.1 SEC (ref 9.3–12.4)
R (REACTION TIME): 3.2 MIN (ref 5–10)
RBC # BLD: 3.99 E12/L (ref 3.8–5.8)
SALICYLATE, SERUM: <0.3 MG/DL (ref 0–30)
SODIUM BLD-SCNC: 136 MMOL/L (ref 132–146)
SOURCE, BLOOD GAS: ABNORMAL
THB: 12.8 G/DL (ref 11.5–16.5)
TIME ANALYZED: 1525
TOTAL PROTEIN: 7.4 G/DL (ref 6.4–8.3)
TRICYCLIC ANTIDEPRESSANTS SCREEN SERUM: NEGATIVE NG/ML
WBC # BLD: 9.6 E9/L (ref 4.5–11.5)

## 2022-06-03 PROCEDURE — 1200000000 HC SEMI PRIVATE

## 2022-06-03 PROCEDURE — 6360000004 HC RX CONTRAST MEDICATION: Performed by: RADIOLOGY

## 2022-06-03 PROCEDURE — 80179 DRUG ASSAY SALICYLATE: CPT

## 2022-06-03 PROCEDURE — 80053 COMPREHEN METABOLIC PANEL: CPT

## 2022-06-03 PROCEDURE — 85027 COMPLETE CBC AUTOMATED: CPT

## 2022-06-03 PROCEDURE — 99223 1ST HOSP IP/OBS HIGH 75: CPT | Performed by: SURGERY

## 2022-06-03 PROCEDURE — 2580000003 HC RX 258: Performed by: STUDENT IN AN ORGANIZED HEALTH CARE EDUCATION/TRAINING PROGRAM

## 2022-06-03 PROCEDURE — 6360000002 HC RX W HCPCS: Performed by: STUDENT IN AN ORGANIZED HEALTH CARE EDUCATION/TRAINING PROGRAM

## 2022-06-03 PROCEDURE — 6370000000 HC RX 637 (ALT 250 FOR IP): Performed by: STUDENT IN AN ORGANIZED HEALTH CARE EDUCATION/TRAINING PROGRAM

## 2022-06-03 PROCEDURE — 82077 ASSAY SPEC XCP UR&BREATH IA: CPT

## 2022-06-03 PROCEDURE — 82805 BLOOD GASES W/O2 SATURATION: CPT

## 2022-06-03 PROCEDURE — 85576 BLOOD PLATELET AGGREGATION: CPT

## 2022-06-03 PROCEDURE — 80307 DRUG TEST PRSMV CHEM ANLYZR: CPT

## 2022-06-03 PROCEDURE — 85610 PROTHROMBIN TIME: CPT

## 2022-06-03 PROCEDURE — 70450 CT HEAD/BRAIN W/O DYE: CPT

## 2022-06-03 PROCEDURE — 83605 ASSAY OF LACTIC ACID: CPT

## 2022-06-03 PROCEDURE — 99285 EMERGENCY DEPT VISIT HI MDM: CPT

## 2022-06-03 PROCEDURE — 85730 THROMBOPLASTIN TIME PARTIAL: CPT

## 2022-06-03 PROCEDURE — 86901 BLOOD TYPING SEROLOGIC RH(D): CPT

## 2022-06-03 PROCEDURE — 90714 TD VACC NO PRESV 7 YRS+ IM: CPT | Performed by: STUDENT IN AN ORGANIZED HEALTH CARE EDUCATION/TRAINING PROGRAM

## 2022-06-03 PROCEDURE — 86900 BLOOD TYPING SEROLOGIC ABO: CPT

## 2022-06-03 PROCEDURE — 6810039000 HC L1 TRAUMA ALERT

## 2022-06-03 PROCEDURE — 71260 CT THORAX DX C+: CPT

## 2022-06-03 PROCEDURE — 85384 FIBRINOGEN ACTIVITY: CPT

## 2022-06-03 PROCEDURE — 71045 X-RAY EXAM CHEST 1 VIEW: CPT

## 2022-06-03 PROCEDURE — 74177 CT ABD & PELVIS W/CONTRAST: CPT

## 2022-06-03 PROCEDURE — 85347 COAGULATION TIME ACTIVATED: CPT

## 2022-06-03 PROCEDURE — 6360000002 HC RX W HCPCS: Performed by: EMERGENCY MEDICINE

## 2022-06-03 PROCEDURE — 72170 X-RAY EXAM OF PELVIS: CPT

## 2022-06-03 PROCEDURE — 86850 RBC ANTIBODY SCREEN: CPT

## 2022-06-03 PROCEDURE — 84132 ASSAY OF SERUM POTASSIUM: CPT

## 2022-06-03 PROCEDURE — 72125 CT NECK SPINE W/O DYE: CPT

## 2022-06-03 PROCEDURE — 90471 IMMUNIZATION ADMIN: CPT | Performed by: STUDENT IN AN ORGANIZED HEALTH CARE EDUCATION/TRAINING PROGRAM

## 2022-06-03 PROCEDURE — 36415 COLL VENOUS BLD VENIPUNCTURE: CPT

## 2022-06-03 PROCEDURE — 80143 DRUG ASSAY ACETAMINOPHEN: CPT

## 2022-06-03 RX ORDER — OXYCODONE HYDROCHLORIDE 5 MG/1
5 TABLET ORAL EVERY 4 HOURS PRN
Status: DISCONTINUED | OUTPATIENT
Start: 2022-06-03 | End: 2022-06-04

## 2022-06-03 RX ORDER — SODIUM CHLORIDE 0.9 % (FLUSH) 0.9 %
10 SYRINGE (ML) INJECTION EVERY 12 HOURS SCHEDULED
Status: DISCONTINUED | OUTPATIENT
Start: 2022-06-03 | End: 2022-06-07 | Stop reason: HOSPADM

## 2022-06-03 RX ORDER — SODIUM CHLORIDE, SODIUM LACTATE, POTASSIUM CHLORIDE, CALCIUM CHLORIDE 600; 310; 30; 20 MG/100ML; MG/100ML; MG/100ML; MG/100ML
INJECTION, SOLUTION INTRAVENOUS CONTINUOUS
Status: DISCONTINUED | OUTPATIENT
Start: 2022-06-03 | End: 2022-06-05

## 2022-06-03 RX ORDER — SENNA AND DOCUSATE SODIUM 50; 8.6 MG/1; MG/1
1 TABLET, FILM COATED ORAL 2 TIMES DAILY
Status: DISCONTINUED | OUTPATIENT
Start: 2022-06-03 | End: 2022-06-07 | Stop reason: HOSPADM

## 2022-06-03 RX ORDER — POLYETHYLENE GLYCOL 3350 17 G/17G
17 POWDER, FOR SOLUTION ORAL DAILY
Status: DISCONTINUED | OUTPATIENT
Start: 2022-06-03 | End: 2022-06-07 | Stop reason: HOSPADM

## 2022-06-03 RX ORDER — METHOCARBAMOL 500 MG/1
1000 TABLET, FILM COATED ORAL 4 TIMES DAILY
Status: DISCONTINUED | OUTPATIENT
Start: 2022-06-03 | End: 2022-06-07 | Stop reason: HOSPADM

## 2022-06-03 RX ORDER — ACETAMINOPHEN 325 MG/1
650 TABLET ORAL EVERY 4 HOURS PRN
Status: DISCONTINUED | OUTPATIENT
Start: 2022-06-03 | End: 2022-06-04

## 2022-06-03 RX ORDER — FENTANYL CITRATE 50 UG/ML
INJECTION, SOLUTION INTRAMUSCULAR; INTRAVENOUS DAILY PRN
Status: COMPLETED | OUTPATIENT
Start: 2022-06-03 | End: 2022-06-03

## 2022-06-03 RX ORDER — SODIUM CHLORIDE 0.9 % (FLUSH) 0.9 %
10 SYRINGE (ML) INJECTION PRN
Status: DISCONTINUED | OUTPATIENT
Start: 2022-06-03 | End: 2022-06-07 | Stop reason: HOSPADM

## 2022-06-03 RX ORDER — FENTANYL CITRATE 50 UG/ML
INJECTION, SOLUTION INTRAMUSCULAR; INTRAVENOUS
Status: DISPENSED
Start: 2022-06-03 | End: 2022-06-04

## 2022-06-03 RX ORDER — SODIUM CHLORIDE 9 MG/ML
INJECTION, SOLUTION INTRAVENOUS PRN
Status: DISCONTINUED | OUTPATIENT
Start: 2022-06-03 | End: 2022-06-07 | Stop reason: HOSPADM

## 2022-06-03 RX ORDER — ONDANSETRON 2 MG/ML
4 INJECTION INTRAMUSCULAR; INTRAVENOUS EVERY 6 HOURS PRN
Status: DISCONTINUED | OUTPATIENT
Start: 2022-06-03 | End: 2022-06-07 | Stop reason: HOSPADM

## 2022-06-03 RX ORDER — OXYCODONE HYDROCHLORIDE 10 MG/1
10 TABLET ORAL EVERY 4 HOURS PRN
Status: DISCONTINUED | OUTPATIENT
Start: 2022-06-03 | End: 2022-06-04

## 2022-06-03 RX ORDER — ONDANSETRON 2 MG/ML
INJECTION INTRAMUSCULAR; INTRAVENOUS DAILY PRN
Status: COMPLETED | OUTPATIENT
Start: 2022-06-03 | End: 2022-06-03

## 2022-06-03 RX ORDER — ONDANSETRON 4 MG/1
4 TABLET, ORALLY DISINTEGRATING ORAL EVERY 8 HOURS PRN
Status: DISCONTINUED | OUTPATIENT
Start: 2022-06-03 | End: 2022-06-07 | Stop reason: HOSPADM

## 2022-06-03 RX ORDER — TETANUS AND DIPHTHERIA TOXOIDS ADSORBED 2; 2 [LF]/.5ML; [LF]/.5ML
0.5 INJECTION INTRAMUSCULAR ONCE
Status: COMPLETED | OUTPATIENT
Start: 2022-06-03 | End: 2022-06-03

## 2022-06-03 RX ADMIN — METHOCARBAMOL 1000 MG: 500 TABLET ORAL at 21:07

## 2022-06-03 RX ADMIN — OXYCODONE HYDROCHLORIDE 10 MG: 10 TABLET ORAL at 21:07

## 2022-06-03 RX ADMIN — SODIUM CHLORIDE, PRESERVATIVE FREE 10 ML: 5 INJECTION INTRAVENOUS at 21:08

## 2022-06-03 RX ADMIN — SODIUM CHLORIDE, PRESERVATIVE FREE 10 ML: 5 INJECTION INTRAVENOUS at 23:40

## 2022-06-03 RX ADMIN — HYDROMORPHONE HYDROCHLORIDE 0.25 MG: 1 INJECTION, SOLUTION INTRAMUSCULAR; INTRAVENOUS; SUBCUTANEOUS at 20:18

## 2022-06-03 RX ADMIN — ONDANSETRON HYDROCHLORIDE 4 MG: 2 INJECTION, SOLUTION INTRAMUSCULAR; INTRAVENOUS at 15:32

## 2022-06-03 RX ADMIN — HYDROMORPHONE HYDROCHLORIDE 0.25 MG: 1 INJECTION, SOLUTION INTRAMUSCULAR; INTRAVENOUS; SUBCUTANEOUS at 23:40

## 2022-06-03 RX ADMIN — FENTANYL CITRATE 50 MCG: 50 INJECTION, SOLUTION INTRAMUSCULAR; INTRAVENOUS at 15:27

## 2022-06-03 RX ADMIN — SODIUM CHLORIDE, POTASSIUM CHLORIDE, SODIUM LACTATE AND CALCIUM CHLORIDE: 600; 310; 30; 20 INJECTION, SOLUTION INTRAVENOUS at 19:01

## 2022-06-03 RX ADMIN — TETANUS AND DIPHTHERIA TOXOIDS ADSORBED 0.5 ML: 2; 2 INJECTION INTRAMUSCULAR at 15:34

## 2022-06-03 RX ADMIN — POLYETHYLENE GLYCOL 3350 17 G: 17 POWDER, FOR SOLUTION ORAL at 21:07

## 2022-06-03 RX ADMIN — SENNOSIDES AND DOCUSATE SODIUM 1 TABLET: 50; 8.6 TABLET ORAL at 21:07

## 2022-06-03 RX ADMIN — IOPAMIDOL 90 ML: 755 INJECTION, SOLUTION INTRAVENOUS at 15:53

## 2022-06-03 RX ADMIN — HYDROMORPHONE HYDROCHLORIDE 0.25 MG: 1 INJECTION, SOLUTION INTRAMUSCULAR; INTRAVENOUS; SUBCUTANEOUS at 18:44

## 2022-06-03 ASSESSMENT — PAIN DESCRIPTION - ONSET
ONSET: ON-GOING

## 2022-06-03 ASSESSMENT — PAIN SCALES - GENERAL
PAINLEVEL_OUTOF10: 10
PAINLEVEL_OUTOF10: 8
PAINLEVEL_OUTOF10: 9
PAINLEVEL_OUTOF10: 9
PAINLEVEL_OUTOF10: 10
PAINLEVEL_OUTOF10: 9

## 2022-06-03 ASSESSMENT — PAIN - FUNCTIONAL ASSESSMENT
PAIN_FUNCTIONAL_ASSESSMENT: PREVENTS OR INTERFERES SOME ACTIVE ACTIVITIES AND ADLS

## 2022-06-03 ASSESSMENT — PAIN DESCRIPTION - DESCRIPTORS
DESCRIPTORS: ACHING;SHARP
DESCRIPTORS: ACHING;SHARP
DESCRIPTORS: ACHING;DISCOMFORT;SHARP

## 2022-06-03 ASSESSMENT — PAIN DESCRIPTION - LOCATION
LOCATION: HIP
LOCATION: HIP
LOCATION: HIP;LEG

## 2022-06-03 ASSESSMENT — PAIN DESCRIPTION - ORIENTATION
ORIENTATION: LEFT

## 2022-06-03 ASSESSMENT — PAIN DESCRIPTION - PAIN TYPE
TYPE: ACUTE PAIN

## 2022-06-03 ASSESSMENT — PAIN DESCRIPTION - FREQUENCY
FREQUENCY: CONTINUOUS

## 2022-06-03 NOTE — ED PROVIDER NOTES
Patient presents after a fall. He fell from approximately 10 feet. He is complaining of left hip pain at this time. EMS reports that patient's left lower extremity was externally rotated and shortened. They did give the patient 100 mcg of fentanyl and 1 mg of Dilaudid. The history is provided by the EMS personnel and the patient. The history is limited by the condition of the patient. No  was used. Review of Systems   Unable to perform ROS: Mental status change (Patient is agitated)        Physical Exam  Vitals and nursing note reviewed. Constitutional:       General: He is in acute distress. Appearance: He is well-developed. HENT:      Head: Normocephalic. Eyes:      Conjunctiva/sclera: Conjunctivae normal.      Pupils: Pupils are equal, round, and reactive to light. Neck:      Comments: C-collar  Cardiovascular:      Rate and Rhythm: Normal rate and regular rhythm. Heart sounds: Normal heart sounds. No murmur heard. Pulmonary:      Effort: Pulmonary effort is normal. No respiratory distress. Breath sounds: Normal breath sounds. No wheezing or rales. Abdominal:      General: Bowel sounds are normal.      Palpations: Abdomen is soft. Tenderness: There is no abdominal tenderness. There is no guarding or rebound. Musculoskeletal:         General: Tenderness, deformity (Left lower extremity externally rotated and shortened) and signs of injury present. Cervical back: Neck supple. Skin:     General: Skin is warm and dry. Neurological:      Mental Status: He is alert and oriented to person, place, and time. Cranial Nerves: No cranial nerve deficit. Coordination: Coordination normal.          Procedures     MDM  Number of Diagnoses or Management Options  Diagnosis management comments: Patient presents after a 10 foot fall. Patient is agitated. Trauma alert was called. ABCs are intact.  Patient did have left lower extremity deformity. Orthopedics was consulted. Trauma team will determine disposition. Amount and/or Complexity of Data Reviewed  Clinical lab tests: reviewed                    --------------------------------------------- PAST HISTORY ---------------------------------------------  Past Medical History:  has no past medical history on file. Past Surgical History:  has no past surgical history on file. Social History:      Family History: family history is not on file. The patients home medications have been reviewed. Allergies: Patient has no allergy information on record.    -------------------------------------------------- RESULTS -------------------------------------------------  Labs:  Results for orders placed or performed during the hospital encounter of 06/03/22   Blood Gas, Arterial   Result Value Ref Range    Date Analyzed 20220603     Time Analyzed 1525     Source: Blood Arterial     pH, Blood Gas 7.402 7.350 - 7.450    PCO2 34.4 (L) 35.0 - 45.0 mmHg    PO2 349.8 (H) 75.0 - 100.0 mmHg    HCO3 20.9 (L) 22.0 - 26.0 mmol/L    B.E. -3.1 (L) -3.0 - 3.0 mmol/L    O2 Sat 99.6 (H) 92.0 - 98.5 %    PO2/FIO2 3.50 mmHg/%    O2Hb 99.2 (H) 94.0 - 97.0 %    COHb 0.0 0.0 - 1.5 %    MetHb 0.4 0.0 - 1.5 %    O2 Content 18.7 mL/dL    HHb 0.4 0.0 - 5.0 %    tHb (est) 12.8 11.5 - 16.5 g/dL    Potassium 3.78 3.50 - 5.00 mmol/L    Mode NRB 15L     FIO2 100.0 %    Date Of Collection      Time Collected      Pt Temp 37.0 C     ID 2021     Lab 30093     Critical(s) Notified .  No Critical Values        Radiology:  CT HEAD WO CONTRAST    (Results Pending)   CT CERVICAL SPINE WO CONTRAST    (Results Pending)   CT CHEST W CONTRAST    (Results Pending)   CT ABDOMEN PELVIS W IV CONTRAST Additional Contrast? None    (Results Pending)   XR CHEST 1 VIEW    (Results Pending)   XR PELVIS (1-2 VIEWS)    (Results Pending)       ------------------------- NURSING NOTES AND VITALS REVIEWED ---------------------------  Date / Time Roomed:  6/3/2022  3:05 PM  ED Bed Assignment:  14B/14B-14    The nursing notes within the ED encounter and vital signs as below have been reviewed. BP (!) 201/94   Pulse 72   Temp 97.6 °F (36.4 °C) (Axillary)   Resp 23   Ht 5' 10\" (1.778 m)   Wt 170 lb (77.1 kg)   SpO2 100%   BMI 24.39 kg/m²   Oxygen Saturation Interpretation: Normal      ------------------------------------------ PROGRESS NOTES ------------------------------------------      New Prescriptions    No medications on file       Diagnosis:  1. Fall, initial encounter        Disposition:  Patient's disposition:  To be determined by trauma team  Patient's condition is to be determined    Patient was seen and evaluated by both myself and Ochoa Bryan MD.           Chacha Walter, DO  Resident  06/03/22 0959

## 2022-06-03 NOTE — ED NOTES
Patient arrived via ambulance. Placed on NRB upon arrival, c-collar placed.       Maria Fernanda Duke RN  06/03/22 8041

## 2022-06-03 NOTE — H&P
Department of Orthopedic Surgery  Consult Note    Reason for Consult: Left hip pain    HISTORY OF PRESENT ILLNESS:       Patient is a 78 y.o. male who presents with left hip pain. Patient is a poor historian with no family present today. According to his chart notes, he fell earlier today approximately 8 to 10 feet from his porch roof landing on his left hip area. He noticed immediate pain and inability to ambulate with the left leg. He states that prior to this injury he was a community ambulator without assistive devices. He denies pain or symptoms in bilateral arms, his right leg or any other pain other than the hip in his left leg. Denies numbness/tingling/paresthesias. Denies any other orthopedic complaints at this time. Past Medical History:    No past medical history on file. Past Surgical History:    No past surgical history on file. Current Medications:   No current facility-administered medications for this visit. Allergies:  Patient has no allergy information on record. Social History:   TOBACCO:   has no history on file for tobacco use. ETOH:   has no history on file for alcohol use. DRUGS:   has no history on file for drug use. ACTIVITIES OF DAILY LIVING:    OCCUPATION:    Family History:   No family history on file.     REVIEW OF SYSTEMS:  CONSTITUTIONAL:  negative for  fevers, chills  EYES:  negative for blurred vision, visual disturbance  HEENT:  negative for  hearing loss, voice change  RESPIRATORY:  negative for  dyspnea, wheezing  CARDIOVASCULAR:  negative for  chest pain, palpitations  GASTROINTESTINAL:  negative for nausea, vomiting  GENITOURINARY:  negative for frequency, urinary incontinence  HEMATOLOGIC/LYMPHATIC:  negative for bleeding and petechiae  MUSCULOSKELETAL:  positive for  pain  NEUROLOGICAL:  negative for headaches, dizziness  BEHAVIOR/PSYCH:  negative for increased agitation and anxiety    PHYSICAL EXAM:    VITALS:  There were no vitals taken for this visit.  CONSTITUTIONAL:  awake, alert, cooperative, no apparent distress, and appears stated age  MUSCULOSKELETAL:  Left Lower Extremity  Skin clean dry and intact, without signs of infection  Mild edema noted to the hip area  Compartments supple throughout thigh and leg  Calf supple and nontender  He wiggles his toes minimally but does not follow other commands regarding motion of the ankle with dorsi/plantar flexion  States sensation intact to touch in sural/deep peroneal/superficial peroneal/saphenous/posterior tibial nerve distributions to foot/ankle. Palpable dorsalis pedis and posterior tibialis pulses, cap refill brisk in toes, foot warm/perfused. Moderate left lower extremity shortening and external rotation noted  Positive logroll, positive heel strike    Secondary Exam:   · bilateralUE: No obvious signs of trauma. -TTP to fingers, hand, wrist, forearm, elbow, humerus, shoulder or clavicle. -- Patient able to flex/extend fingers, wrist, elbow and shoulder with active and passive ROM without pain, +2/4 Radial pulse, cap refill <3sec, +AIN/PIN/Radial/Ulnar/Median N, distal sensation grossly intact to C4-T1 dermatomes, compartments soft and compressible. · rightLE: No obvious signs of trauma. -TTP to foot, ankle, leg, knee, thigh, hip.-- Patient able to flex/extend toes, ankle, knee and hip with active and passive ROM without pain,+2/4 DP & PT pulses, cap refill <3sec, +5/5 PF/DF/EHL, distal sensation grossly intact to L4-S1 dermatomes, compartments soft and compressible.     · Pelvis: -TTP, -Log roll, -Heel strike     DATA:    CBC:   Lab Results   Component Value Date    WBC 9.6 06/03/2022    RBC 3.99 06/03/2022    HGB 11.9 06/03/2022    HCT 35.6 06/03/2022    MCV 89.2 06/03/2022    MCH 29.8 06/03/2022    MCHC 33.4 06/03/2022    RDW 12.7 06/03/2022     06/03/2022    MPV 10.6 06/03/2022     PT/INR:    Lab Results   Component Value Date    PROTIME 14.1 06/03/2022    INR 1.3 06/03/2022 Radiology Review:  Pelvis and left hip:  FINDINGS:   Single AP view of the pelvis demonstrates a nondisplaced fracture through the   inferior left pubic ramus.  There is a comminuted fractures through the   intertrochanteric region of the proximal left femur.  There is mild   foreshortening at the fracture site and lateral deviation of the main   fracture component.  The femoral head remains within the acetabulum.  There   are mild osteoarthritic changes of the right hip.  The SI joints appear   intact.           Impression   Mild-to-moderate displacement comminuted left intertrochanteric proximal left   femur fracture.         IMPRESSION:  · closed left intertrochanteric hip fracture  · Closed nondisplaced left inferior pubic ramus fracture    PLAN:  · Patient reviewed and discussed with Dr. Stefanie Nichols  · Recommend for patient to get admitted through medicine. Plan for surgical fixation of the left hip fracture tomorrow if cleared. Procedure was reviewed in detail with the patient today including the benefits, risks, complications and postop course and all questions were answered to his satisfaction. Patient is in agreement with proceeding with surgery.   · Patient will be put in Rivera's traction

## 2022-06-03 NOTE — ED NOTES
Patient log rolled, c-spine neutrality maintained. No stepoffs, deformities,or tenderness noted.      Srini Padilla, ANA LUISA  06/03/22 3424

## 2022-06-03 NOTE — H&P
TRAUMA HISTORY & PHYSICAL  Surgical Resident/Advance Practice Nurse  6/3/2022  3:13 PM    PRIMARY SURVEY    CHIEF COMPLAINT:  Trauma alert. Injury occurred just prior to arrival. Patient was fall from 8-10 feet from his porch roof, LLE shortened and outwardly rotated. Patient in pain and non-cooperative. Missing left ear at baseline. AIRWAY:   Airway Normal  EMS ETT Absent  Noisy respirations Absent  Retractions: Absent  Vomiting/bleeding: Absent    BREATHING:    Midaxillary breath sound left:  Normal  Midaxillary breath sound right:  Normal    Cough sound intensity:  good   FiO2:  15 L non-re breather mask    SMI Not obtained mL. CIRCULATION:   Femoral pulse intensity: Strong  Palpebral conjunctiva: Pink     There were no vitals filed for this visit. There were no vitals filed for this visit.      FAST EXAM: Deferred    Central Nervous System    GCS Initial 15 minutes   Eye  Motor  Verbal 4 - Opens eyes on own  6 - Follows simple motor commands  4 - Seems confused, disoriented 4 - Opens eyes on own  6 - Follows simple motor commands  4 - Seems confused, disoriented     Neuromuscular blockade: No  Pupil size:  Left 3 mm    Right 3 mm  Pupil reaction: Yes    Wiggles fingers: Left Yes Right Yes  Wiggles toes: Left Yes   Right Yes    Hand grasp:   Left  Present      Right  Present  Plantar flexion: Left  Present      Right   Present    Loss of consciousness:  No    History Obtained From:  Patient & EMS  Private Medical Doctor: Unreliable due to pain    Pre-exisiting Medical History:  unknown    Conditions: Unreliable due to pain    Medications: Unreliable due to pain     Allergies: Unreliable    Social History:   Tobacco use:  Unreliable  Alcohol use:  history unreliable  Illicit drug use:  unreliable    Past Surgical History:  unreliable    Anticoagulant use: None  Antiplatelet use:   None    NSAID use in last 72 hours: unknown  Taken PCN in past:  unknown  Last food/drink: Unknown  Last tetanus: Unknown    Family History:   Unable to obtain secondary to patient condition    Complaints:   Head:  None  Neck:   None  Chest:   None  Back:   None  Abdomen:   None  Extremities:   Moderate  Comments: Left hip pain    Review of systems:  All negative unless otherwise noted. SECONDARY SURVEY  Head/scalp: Atraumatic    Face: Atraumatic    Eyes/ears/nose: Missing left ear. Pharynx/mouth: Atraumatic    Neck: Atraumatic     Cervical spine tenderness:   Cervical collar in place at time of arrival  Pain:  none  ROM:  Not indicated     Chest wall:  Atraumatic    Heart:  Regular rate & rhythm    Abdomen: Atraumatic. Soft ND  Tenderness:  none    Pelvis: Atraumatic  Tenderness: none    Thoracolumbar spine: Atraumatic  Tenderness:  none    Genitourinary:  Atraumatic. No blood or urine noted    Rectum: Atraumatic. No blood noted. Perineum: Atraumatic. No blood or urine noted. Extremities:   Sensory normal  Motor normal    Distal Pulses  Left arm normal  Right arm normal  Left leg normal  Right leg normal    Capillary refill  Left arm normal  Right arm normal  Left leg normal  Right leg normal    Procedures in ED:  Fracture reduction/splinting    In the event of Emergency Blood Transfusion:  Due to the critical condition of this patient, I request the immediate release of blood products for emergency transfusion secondary to shock. I understand the increased risks incurred by the lack of complete transfusion testing.       Radiology: Chest Xray, Pelvic Xray, Ct head, Ct cervical spine, CT chest, CT abdomen    Consultations:  Orthopedic surgery    Admission/Diagnosis: Mechanical fall    Plan of Treatment:  - f/u labs  - f/u scans  - UDS  - tertiary  - ortho consult, recommendations appreciated     Plan discussed with Dr. Uday Dawson at 6/3/2022 on 3:13 PM    Electronically signed by Antonio Mejia MD on 6/3/2022 at 3:13 PM

## 2022-06-04 ENCOUNTER — APPOINTMENT (OUTPATIENT)
Dept: GENERAL RADIOLOGY | Age: 77
DRG: 956 | End: 2022-06-04
Payer: MEDICARE

## 2022-06-04 ENCOUNTER — ANESTHESIA (OUTPATIENT)
Dept: OPERATING ROOM | Age: 77
DRG: 956 | End: 2022-06-04
Payer: MEDICARE

## 2022-06-04 ENCOUNTER — ANESTHESIA EVENT (OUTPATIENT)
Dept: OPERATING ROOM | Age: 77
DRG: 956 | End: 2022-06-04
Payer: MEDICARE
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
## 2022-06-04 PROBLEM — S72.002A CLOSED FRACTURE OF NECK OF LEFT FEMUR (HCC): Status: ACTIVE | Noted: 2022-06-04

## 2022-06-04 LAB
ANION GAP SERPL CALCULATED.3IONS-SCNC: 14 MMOL/L (ref 7–16)
BASOPHILS ABSOLUTE: 0.03 E9/L (ref 0–0.2)
BASOPHILS RELATIVE PERCENT: 0.3 % (ref 0–2)
BUN BLDV-MCNC: 9 MG/DL (ref 6–23)
CALCIUM SERPL-MCNC: 8.6 MG/DL (ref 8.6–10.2)
CHLORIDE BLD-SCNC: 100 MMOL/L (ref 98–107)
CO2: 23 MMOL/L (ref 22–29)
CREAT SERPL-MCNC: 0.7 MG/DL (ref 0.7–1.2)
EOSINOPHILS ABSOLUTE: 0.03 E9/L (ref 0.05–0.5)
EOSINOPHILS RELATIVE PERCENT: 0.3 % (ref 0–6)
GFR AFRICAN AMERICAN: >60
GFR NON-AFRICAN AMERICAN: >60 ML/MIN/1.73
GLUCOSE BLD-MCNC: 119 MG/DL (ref 74–99)
HCT VFR BLD CALC: 33.1 % (ref 37–54)
HEMOGLOBIN: 10.9 G/DL (ref 12.5–16.5)
IMMATURE GRANULOCYTES #: 0.05 E9/L
IMMATURE GRANULOCYTES %: 0.5 % (ref 0–5)
LYMPHOCYTES ABSOLUTE: 1.02 E9/L (ref 1.5–4)
LYMPHOCYTES RELATIVE PERCENT: 10.7 % (ref 20–42)
MCH RBC QN AUTO: 29.9 PG (ref 26–35)
MCHC RBC AUTO-ENTMCNC: 32.9 % (ref 32–34.5)
MCV RBC AUTO: 90.9 FL (ref 80–99.9)
MONOCYTES ABSOLUTE: 1.02 E9/L (ref 0.1–0.95)
MONOCYTES RELATIVE PERCENT: 10.7 % (ref 2–12)
NEUTROPHILS ABSOLUTE: 7.41 E9/L (ref 1.8–7.3)
NEUTROPHILS RELATIVE PERCENT: 77.5 % (ref 43–80)
PDW BLD-RTO: 12.4 FL (ref 11.5–15)
PLATELET # BLD: 243 E9/L (ref 130–450)
PMV BLD AUTO: 11 FL (ref 7–12)
POTASSIUM REFLEX MAGNESIUM: 3.9 MMOL/L (ref 3.5–5)
RBC # BLD: 3.64 E12/L (ref 3.8–5.8)
SODIUM BLD-SCNC: 137 MMOL/L (ref 132–146)
WBC # BLD: 9.6 E9/L (ref 4.5–11.5)

## 2022-06-04 PROCEDURE — 0QS704Z REPOSITION LEFT UPPER FEMUR WITH INTERNAL FIXATION DEVICE, OPEN APPROACH: ICD-10-PCS | Performed by: ORTHOPAEDIC SURGERY

## 2022-06-04 PROCEDURE — 6370000000 HC RX 637 (ALT 250 FOR IP): Performed by: STUDENT IN AN ORGANIZED HEALTH CARE EDUCATION/TRAINING PROGRAM

## 2022-06-04 PROCEDURE — 2720000010 HC SURG SUPPLY STERILE: Performed by: ORTHOPAEDIC SURGERY

## 2022-06-04 PROCEDURE — 6360000002 HC RX W HCPCS: Performed by: PHYSICIAN ASSISTANT

## 2022-06-04 PROCEDURE — 3700000001 HC ADD 15 MINUTES (ANESTHESIA): Performed by: ORTHOPAEDIC SURGERY

## 2022-06-04 PROCEDURE — 27245 TREAT THIGH FRACTURE: CPT | Performed by: ORTHOPAEDIC SURGERY

## 2022-06-04 PROCEDURE — 3700000000 HC ANESTHESIA ATTENDED CARE: Performed by: ORTHOPAEDIC SURGERY

## 2022-06-04 PROCEDURE — 1200000000 HC SEMI PRIVATE

## 2022-06-04 PROCEDURE — 6370000000 HC RX 637 (ALT 250 FOR IP): Performed by: SURGERY

## 2022-06-04 PROCEDURE — 7100000000 HC PACU RECOVERY - FIRST 15 MIN: Performed by: ORTHOPAEDIC SURGERY

## 2022-06-04 PROCEDURE — 80048 BASIC METABOLIC PNL TOTAL CA: CPT

## 2022-06-04 PROCEDURE — 2580000003 HC RX 258: Performed by: PHYSICIAN ASSISTANT

## 2022-06-04 PROCEDURE — 99221 1ST HOSP IP/OBS SF/LOW 40: CPT | Performed by: ORTHOPAEDIC SURGERY

## 2022-06-04 PROCEDURE — C1713 ANCHOR/SCREW BN/BN,TIS/BN: HCPCS | Performed by: ORTHOPAEDIC SURGERY

## 2022-06-04 PROCEDURE — 7100000001 HC PACU RECOVERY - ADDTL 15 MIN: Performed by: ORTHOPAEDIC SURGERY

## 2022-06-04 PROCEDURE — 2500000003 HC RX 250 WO HCPCS: Performed by: ANESTHESIOLOGY

## 2022-06-04 PROCEDURE — 6360000002 HC RX W HCPCS: Performed by: NURSE ANESTHETIST, CERTIFIED REGISTERED

## 2022-06-04 PROCEDURE — 6370000000 HC RX 637 (ALT 250 FOR IP): Performed by: ORTHOPAEDIC SURGERY

## 2022-06-04 PROCEDURE — 2580000003 HC RX 258: Performed by: ORTHOPAEDIC SURGERY

## 2022-06-04 PROCEDURE — 99233 SBSQ HOSP IP/OBS HIGH 50: CPT | Performed by: SURGERY

## 2022-06-04 PROCEDURE — 2580000003 HC RX 258: Performed by: ANESTHESIOLOGY

## 2022-06-04 PROCEDURE — 85025 COMPLETE CBC W/AUTO DIFF WBC: CPT

## 2022-06-04 PROCEDURE — C1769 GUIDE WIRE: HCPCS | Performed by: ORTHOPAEDIC SURGERY

## 2022-06-04 PROCEDURE — 2500000003 HC RX 250 WO HCPCS: Performed by: NURSE ANESTHETIST, CERTIFIED REGISTERED

## 2022-06-04 PROCEDURE — 2580000003 HC RX 258: Performed by: NURSE ANESTHETIST, CERTIFIED REGISTERED

## 2022-06-04 PROCEDURE — 73502 X-RAY EXAM HIP UNI 2-3 VIEWS: CPT

## 2022-06-04 PROCEDURE — 3600000005 HC SURGERY LEVEL 5 BASE: Performed by: ORTHOPAEDIC SURGERY

## 2022-06-04 PROCEDURE — 3600000015 HC SURGERY LEVEL 5 ADDTL 15MIN: Performed by: ORTHOPAEDIC SURGERY

## 2022-06-04 PROCEDURE — 2580000003 HC RX 258: Performed by: STUDENT IN AN ORGANIZED HEALTH CARE EDUCATION/TRAINING PROGRAM

## 2022-06-04 PROCEDURE — 3209999900 FLUORO FOR SURGICAL PROCEDURES

## 2022-06-04 PROCEDURE — 2709999900 HC NON-CHARGEABLE SUPPLY: Performed by: ORTHOPAEDIC SURGERY

## 2022-06-04 PROCEDURE — 36415 COLL VENOUS BLD VENIPUNCTURE: CPT

## 2022-06-04 DEVICE — SCREW TFNA FEN 100MM: Type: IMPLANTABLE DEVICE | Site: FEMUR | Status: FUNCTIONAL

## 2022-06-04 DEVICE — SCREW BNE L48MM DIA5MM LAT FEM LT GRN TI ST LOK FULL THRD: Type: IMPLANTABLE DEVICE | Site: FEMUR | Status: FUNCTIONAL

## 2022-06-04 DEVICE — IMPLANTABLE DEVICE: Type: IMPLANTABLE DEVICE | Site: FEMUR | Status: FUNCTIONAL

## 2022-06-04 RX ORDER — ENOXAPARIN SODIUM 100 MG/ML
30 INJECTION SUBCUTANEOUS 2 TIMES DAILY
Status: DISCONTINUED | OUTPATIENT
Start: 2022-06-05 | End: 2022-06-07 | Stop reason: HOSPADM

## 2022-06-04 RX ORDER — GLYCOPYRROLATE 1 MG/5 ML
SYRINGE (ML) INTRAVENOUS PRN
Status: DISCONTINUED | OUTPATIENT
Start: 2022-06-04 | End: 2022-06-04 | Stop reason: SDUPTHER

## 2022-06-04 RX ORDER — SODIUM CHLORIDE 0.9 % (FLUSH) 0.9 %
5-40 SYRINGE (ML) INJECTION PRN
Status: DISCONTINUED | OUTPATIENT
Start: 2022-06-04 | End: 2022-06-04 | Stop reason: HOSPADM

## 2022-06-04 RX ORDER — NEOSTIGMINE METHYLSULFATE 1 MG/ML
INJECTION, SOLUTION INTRAVENOUS PRN
Status: DISCONTINUED | OUTPATIENT
Start: 2022-06-04 | End: 2022-06-04 | Stop reason: SDUPTHER

## 2022-06-04 RX ORDER — ONDANSETRON 2 MG/ML
4 INJECTION INTRAMUSCULAR; INTRAVENOUS
Status: DISCONTINUED | OUTPATIENT
Start: 2022-06-04 | End: 2022-06-04 | Stop reason: HOSPADM

## 2022-06-04 RX ORDER — SODIUM CHLORIDE, SODIUM LACTATE, POTASSIUM CHLORIDE, CALCIUM CHLORIDE 600; 310; 30; 20 MG/100ML; MG/100ML; MG/100ML; MG/100ML
INJECTION, SOLUTION INTRAVENOUS CONTINUOUS PRN
Status: DISCONTINUED | OUTPATIENT
Start: 2022-06-04 | End: 2022-06-04 | Stop reason: SDUPTHER

## 2022-06-04 RX ORDER — EPHEDRINE SULFATE/0.9% NACL/PF 50 MG/5 ML
SYRINGE (ML) INTRAVENOUS PRN
Status: DISCONTINUED | OUTPATIENT
Start: 2022-06-04 | End: 2022-06-04 | Stop reason: SDUPTHER

## 2022-06-04 RX ORDER — ACETAMINOPHEN 325 MG/1
650 TABLET ORAL EVERY 4 HOURS
Status: DISCONTINUED | OUTPATIENT
Start: 2022-06-04 | End: 2022-06-07 | Stop reason: HOSPADM

## 2022-06-04 RX ORDER — OXYCODONE HYDROCHLORIDE 5 MG/1
2.5 TABLET ORAL EVERY 4 HOURS PRN
Status: DISCONTINUED | OUTPATIENT
Start: 2022-06-04 | End: 2022-06-07 | Stop reason: HOSPADM

## 2022-06-04 RX ORDER — SODIUM CHLORIDE 0.9 % (FLUSH) 0.9 %
5-40 SYRINGE (ML) INJECTION EVERY 12 HOURS SCHEDULED
Status: DISCONTINUED | OUTPATIENT
Start: 2022-06-04 | End: 2022-06-04 | Stop reason: HOSPADM

## 2022-06-04 RX ORDER — OXYCODONE HYDROCHLORIDE 5 MG/1
5 TABLET ORAL EVERY 4 HOURS PRN
Status: DISCONTINUED | OUTPATIENT
Start: 2022-06-04 | End: 2022-06-07

## 2022-06-04 RX ORDER — PROPOFOL 10 MG/ML
INJECTION, EMULSION INTRAVENOUS PRN
Status: DISCONTINUED | OUTPATIENT
Start: 2022-06-04 | End: 2022-06-04 | Stop reason: SDUPTHER

## 2022-06-04 RX ORDER — DEXAMETHASONE SODIUM PHOSPHATE 10 MG/ML
INJECTION INTRAMUSCULAR; INTRAVENOUS PRN
Status: DISCONTINUED | OUTPATIENT
Start: 2022-06-04 | End: 2022-06-04 | Stop reason: SDUPTHER

## 2022-06-04 RX ORDER — PHENYLEPHRINE HCL IN 0.9% NACL 1 MG/10 ML
SYRINGE (ML) INTRAVENOUS PRN
Status: DISCONTINUED | OUTPATIENT
Start: 2022-06-04 | End: 2022-06-04 | Stop reason: SDUPTHER

## 2022-06-04 RX ORDER — LIDOCAINE HYDROCHLORIDE 20 MG/ML
INJECTION, SOLUTION INTRAVENOUS PRN
Status: DISCONTINUED | OUTPATIENT
Start: 2022-06-04 | End: 2022-06-04 | Stop reason: SDUPTHER

## 2022-06-04 RX ORDER — GAUZE BANDAGE 2" X 2"
100 BANDAGE TOPICAL DAILY
Status: DISCONTINUED | OUTPATIENT
Start: 2022-06-04 | End: 2022-06-07 | Stop reason: HOSPADM

## 2022-06-04 RX ORDER — FOLIC ACID 1 MG/1
1 TABLET ORAL DAILY
Status: DISCONTINUED | OUTPATIENT
Start: 2022-06-04 | End: 2022-06-07 | Stop reason: HOSPADM

## 2022-06-04 RX ORDER — OXYCODONE HYDROCHLORIDE AND ACETAMINOPHEN 5; 325 MG/1; MG/1
1 TABLET ORAL EVERY 6 HOURS PRN
Qty: 28 TABLET | Refills: 0 | Status: SHIPPED | OUTPATIENT
Start: 2022-06-04 | End: 2022-06-24 | Stop reason: SDUPTHER

## 2022-06-04 RX ORDER — FENTANYL CITRATE 50 UG/ML
INJECTION, SOLUTION INTRAMUSCULAR; INTRAVENOUS PRN
Status: DISCONTINUED | OUTPATIENT
Start: 2022-06-04 | End: 2022-06-04 | Stop reason: SDUPTHER

## 2022-06-04 RX ORDER — M-VIT,TX,IRON,MINS/CALC/FOLIC 27MG-0.4MG
1 TABLET ORAL DAILY
Status: DISCONTINUED | OUTPATIENT
Start: 2022-06-04 | End: 2022-06-07 | Stop reason: HOSPADM

## 2022-06-04 RX ORDER — SODIUM CHLORIDE 9 MG/ML
INJECTION, SOLUTION INTRAVENOUS PRN
Status: DISCONTINUED | OUTPATIENT
Start: 2022-06-04 | End: 2022-06-04 | Stop reason: HOSPADM

## 2022-06-04 RX ORDER — LOSARTAN POTASSIUM 50 MG/1
50 TABLET ORAL DAILY
Status: DISCONTINUED | OUTPATIENT
Start: 2022-06-04 | End: 2022-06-07 | Stop reason: HOSPADM

## 2022-06-04 RX ORDER — ROCURONIUM BROMIDE 10 MG/ML
INJECTION, SOLUTION INTRAVENOUS PRN
Status: DISCONTINUED | OUTPATIENT
Start: 2022-06-04 | End: 2022-06-04 | Stop reason: SDUPTHER

## 2022-06-04 RX ORDER — ONDANSETRON 2 MG/ML
INJECTION INTRAMUSCULAR; INTRAVENOUS PRN
Status: DISCONTINUED | OUTPATIENT
Start: 2022-06-04 | End: 2022-06-04 | Stop reason: SDUPTHER

## 2022-06-04 RX ADMIN — LOSARTAN POTASSIUM 50 MG: 50 TABLET, FILM COATED ORAL at 10:16

## 2022-06-04 RX ADMIN — FENTANYL CITRATE 50 MCG: 50 INJECTION, SOLUTION INTRAMUSCULAR; INTRAVENOUS at 16:16

## 2022-06-04 RX ADMIN — ACETAMINOPHEN 650 MG: 325 TABLET ORAL at 10:16

## 2022-06-04 RX ADMIN — Medication 100 MCG: at 15:29

## 2022-06-04 RX ADMIN — ACETAMINOPHEN 650 MG: 325 TABLET ORAL at 20:48

## 2022-06-04 RX ADMIN — Medication 0.6 MG: at 16:34

## 2022-06-04 RX ADMIN — METHOCARBAMOL 1000 MG: 500 TABLET ORAL at 20:48

## 2022-06-04 RX ADMIN — Medication 0.2 MG: at 15:46

## 2022-06-04 RX ADMIN — Medication 200 MCG: at 15:37

## 2022-06-04 RX ADMIN — Medication 100 MCG: at 16:25

## 2022-06-04 RX ADMIN — Medication 5 MG: at 15:58

## 2022-06-04 RX ADMIN — ONDANSETRON 4 MG: 2 INJECTION INTRAMUSCULAR; INTRAVENOUS at 16:29

## 2022-06-04 RX ADMIN — ROCURONIUM BROMIDE 50 MG: 10 SOLUTION INTRAVENOUS at 15:29

## 2022-06-04 RX ADMIN — SODIUM CHLORIDE 0.4 MCG/KG/HR: 9 INJECTION, SOLUTION INTRAVENOUS at 15:46

## 2022-06-04 RX ADMIN — SENNOSIDES AND DOCUSATE SODIUM 1 TABLET: 50; 8.6 TABLET ORAL at 20:48

## 2022-06-04 RX ADMIN — Medication 100 MCG: at 15:59

## 2022-06-04 RX ADMIN — CEFAZOLIN 2000 MG: 2 INJECTION, POWDER, FOR SOLUTION INTRAMUSCULAR; INTRAVENOUS at 15:40

## 2022-06-04 RX ADMIN — SODIUM CHLORIDE, POTASSIUM CHLORIDE, SODIUM LACTATE AND CALCIUM CHLORIDE: 600; 310; 30; 20 INJECTION, SOLUTION INTRAVENOUS at 15:25

## 2022-06-04 RX ADMIN — Medication 100 MCG: at 15:31

## 2022-06-04 RX ADMIN — OXYCODONE HYDROCHLORIDE 10 MG: 10 TABLET ORAL at 02:36

## 2022-06-04 RX ADMIN — FENTANYL CITRATE 100 MCG: 50 INJECTION, SOLUTION INTRAMUSCULAR; INTRAVENOUS at 15:29

## 2022-06-04 RX ADMIN — SODIUM CHLORIDE, POTASSIUM CHLORIDE, SODIUM LACTATE AND CALCIUM CHLORIDE: 600; 310; 30; 20 INJECTION, SOLUTION INTRAVENOUS at 10:15

## 2022-06-04 RX ADMIN — DEXAMETHASONE SODIUM PHOSPHATE 10 MG: 10 INJECTION INTRAMUSCULAR; INTRAVENOUS at 15:55

## 2022-06-04 RX ADMIN — Medication 3 MG: at 16:34

## 2022-06-04 RX ADMIN — SODIUM CHLORIDE, POTASSIUM CHLORIDE, SODIUM LACTATE AND CALCIUM CHLORIDE: 600; 310; 30; 20 INJECTION, SOLUTION INTRAVENOUS at 16:39

## 2022-06-04 RX ADMIN — METHOCARBAMOL 1000 MG: 500 TABLET ORAL at 10:16

## 2022-06-04 RX ADMIN — LIDOCAINE HYDROCHLORIDE 80 MG: 20 INJECTION, SOLUTION INTRAVENOUS at 15:29

## 2022-06-04 RX ADMIN — PROPOFOL 150 MG: 10 INJECTION, EMULSION INTRAVENOUS at 15:29

## 2022-06-04 RX ADMIN — SODIUM CHLORIDE, POTASSIUM CHLORIDE, SODIUM LACTATE AND CALCIUM CHLORIDE: 600; 310; 30; 20 INJECTION, SOLUTION INTRAVENOUS at 18:00

## 2022-06-04 RX ADMIN — SODIUM CHLORIDE, PRESERVATIVE FREE 10 ML: 5 INJECTION INTRAVENOUS at 20:49

## 2022-06-04 ASSESSMENT — ENCOUNTER SYMPTOMS
RESPIRATORY NEGATIVE: 1
DIARRHEA: 0
COUGH: 0
GASTROINTESTINAL NEGATIVE: 1
VOMITING: 0
CONSTIPATION: 0
ALLERGIC/IMMUNOLOGIC NEGATIVE: 1
ABDOMINAL PAIN: 0
NAUSEA: 0
EYES NEGATIVE: 1
BACK PAIN: 0
ANAL BLEEDING: 0
SHORTNESS OF BREATH: 0
BLOOD IN STOOL: 0
ABDOMINAL DISTENTION: 0

## 2022-06-04 ASSESSMENT — PAIN DESCRIPTION - ORIENTATION: ORIENTATION: LEFT

## 2022-06-04 ASSESSMENT — PAIN DESCRIPTION - LOCATION
LOCATION: BACK
LOCATION: HIP

## 2022-06-04 ASSESSMENT — PAIN DESCRIPTION - ONSET: ONSET: AWAKENED FROM SLEEP

## 2022-06-04 ASSESSMENT — PAIN SCALES - GENERAL
PAINLEVEL_OUTOF10: 8
PAINLEVEL_OUTOF10: 3

## 2022-06-04 ASSESSMENT — PAIN DESCRIPTION - PAIN TYPE: TYPE: ACUTE PAIN

## 2022-06-04 ASSESSMENT — PAIN DESCRIPTION - DESCRIPTORS
DESCRIPTORS: ACHING;DISCOMFORT;SHARP
DESCRIPTORS: ACHING;DISCOMFORT;SORE

## 2022-06-04 ASSESSMENT — PAIN DESCRIPTION - FREQUENCY: FREQUENCY: CONTINUOUS

## 2022-06-04 ASSESSMENT — PAIN - FUNCTIONAL ASSESSMENT: PAIN_FUNCTIONAL_ASSESSMENT: PREVENTS OR INTERFERES SOME ACTIVE ACTIVITIES AND ADLS

## 2022-06-04 NOTE — PROGRESS NOTES
OT consult received and appreciated. Chart reviewed. Will hold evaluation due to pt scheduled for OR today. Will evaluate at a later time. Thank you.  Tarun Basurto, OTR/L # [id number]

## 2022-06-04 NOTE — PLAN OF CARE
Problem: Discharge Planning  Goal: Discharge to home or other facility with appropriate resources  6/4/2022 0900 by Moise Manjarrez RN  Outcome: Progressing  6/4/2022 0858 by Moise Manjarrez RN  Outcome: Progressing  Flowsheets (Taken 6/4/2022 0856)  Discharge to home or other facility with appropriate resources: Identify barriers to discharge with patient and caregiver  6/3/2022 2358 by Rafael Dugan RN  Outcome: Progressing  Flowsheets (Taken 6/3/2022 2053)  Discharge to home or other facility with appropriate resources: Identify barriers to discharge with patient and caregiver     Problem: Pain  Goal: Verbalizes/displays adequate comfort level or baseline comfort level  6/4/2022 0900 by Moise Manjarrez RN  Outcome: Progressing  6/4/2022 0858 by Moise Manjarrez RN  Outcome: Progressing  Flowsheets (Taken 6/4/2022 0729)  Verbalizes/displays adequate comfort level or baseline comfort level: Encourage patient to monitor pain and request assistance  6/3/2022 2358 by Rafael Dugan RN  Outcome: Progressing     Problem: Safety - Adult  Goal: Free from fall injury  6/4/2022 0900 by Moise Manjarrez RN  Outcome: Progressing  6/4/2022 0858 by Moise Manjarrez RN  Outcome: Progressing  6/3/2022 2358 by Rafael Dugan RN  Outcome: Progressing  Flowsheets (Taken 6/3/2022 2112)  Free From Fall Injury: Instruct family/caregiver on patient safety

## 2022-06-04 NOTE — PROGRESS NOTES
[facility]  PROGRESS NOTE  ATTENDING NOTE        TRAUMA  MECHANISM:  Fall 8-10 feet    Chief Complaint   Patient presents with    Fall     8/10FT       HPI   Trauma alert. Injury occurred just prior to arrival. Patient was fall from 8-10 feet from his porch roof, LLE shortened and outwardly rotated. Patient in pain and non-cooperative. Missing left ear at baseline. Patient Active Problem List   Diagnosis    Trauma    Closed fracture of neck of left femur ([address]    Fall       SUBJECTIVE/OVERNIGHT EVENTS:  Admitted, constantly states he \"is no good\" referring to LLE. He is at baseline mental status    Review of Systems   Constitutional: Positive for activity change. Negative for appetite change and unexpected weight change. HENT: Negative. Eyes: Negative. Respiratory: Negative. Negative for cough and shortness of breath. Cardiovascular: Negative. Negative for chest pain and leg swelling. Gastrointestinal: Negative. Negative for abdominal distention, abdominal pain, anal bleeding, blood in stool, constipation, diarrhea, nausea and vomiting. Endocrine: Negative. Genitourinary: Negative. Musculoskeletal: Positive for arthralgias, gait problem and myalgias. Negative for back pain and joint swelling. Skin: Negative. Allergic/Immunologic: Negative. Neurological: Negative for dizziness, weakness and headaches. Hematological: Negative. Psychiatric/Behavioral: Positive for confusion and decreased concentration. Negative for sleep disturbance. BP (!) 181/82   Pulse 85   Temp 97.6 °F (36.4 °C) (Oral)   Resp 16   Ht 5' 11\" (1.803 m)   Wt 147 lb 4.3 oz (66.8 kg)   SpO2 95%   BMI 20.54 kg/m²   Physical Exam  HENT:      Head: Normocephalic and atraumatic. Ears:      Comments: No left ear d/t prior surgery     Nose: Nose normal.      Mouth/Throat:      Mouth: Mucous membranes are moist.      Pharynx: Oropharynx is clear.    Eyes:      Extraocular Movements: Extraocular movements intact. Pupils: Pupils are equal, round, and reactive to light. Cardiovascular:      Rate and Rhythm: Normal rate and regular rhythm. Pulses: Normal pulses. Heart sounds: Normal heart sounds. Pulmonary:      Effort: Pulmonary effort is normal.      Breath sounds: Normal breath sounds. Abdominal:      General: There is no distension. Palpations: Abdomen is soft. Tenderness: There is no abdominal tenderness. Musculoskeletal:         General: Tenderness and signs of injury present. Cervical back: Normal range of motion and neck supple. Comments: LLE shortened and externally rotated  Contusion to left posterior left  +2 right DP  +1 left DP   Skin:     General: Skin is warm and dry. Neurological:      General: No focal deficit present. Mental Status: He is alert. Mental status is at baseline. Psychiatric:         Mood and Affect: Mood normal.         Behavior: Behavior normal.         Thought Content: Thought content normal.         Judgment: Judgment normal.         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 Left femoral neck fracture--ortho following, OR today  3. Atrial fibrillation--rate controlled  4. HTN--losartan  5. Acute blood loss anemia--monitor    INCIDENTAL FINDINGS:  none    AMPAC:  TBD/24    DVT/GI ppx:  Lovenox/N/A    EKG reviewed--Afib, rate controlled    Patient is cleared for OR from a trauma/medical standpoint. Patient is moderate risk with no identifiable risk factor modifications prior to OR.       Amina Jackson MD, MSc, FACS  6/4/2022  9:39 AM

## 2022-06-04 NOTE — CONSULTS
Department of Orthopedic Surgery  Consult Note     Reason for Consult: Left hip pain     HISTORY OF PRESENT ILLNESS:                   Patient is a 78 y.o. male who presents with left hip pain. Patient is a poor historian with no family present today. According to his chart notes, he fell earlier today approximately 8 to 10 feet from his porch roof landing on his left hip area. He noticed immediate pain and inability to ambulate with the left leg. He states that prior to this injury he was a community ambulator without assistive devices. He denies pain or symptoms in bilateral arms, his right leg or any other pain other than the hip in his left leg. Denies numbness/tingling/paresthesias. Denies any other orthopedic complaints at this time.       Past Medical History:    Past Medical History    History reviewed. No pertinent past medical history. Past Surgical History:    Past Surgical History   No past surgical history on file.      Current Medications:     Current Hospital Medications   Current Facility-Administered Medications: ceFAZolin (ANCEF) 2,000 mg in sterile water 20 mL IV syringe, 2,000 mg, IntraVENous, On Call to OR  lactated ringers infusion, , IntraVENous, Continuous  sodium chloride flush 0.9 % injection 10 mL, 10 mL, IntraVENous, 2 times per day  sodium chloride flush 0.9 % injection 10 mL, 10 mL, IntraVENous, PRN  0.9 % sodium chloride infusion, , IntraVENous, PRN  HYDROmorphone (DILAUDID) injection 0.25 mg, 0.25 mg, IntraVENous, Q3H PRN  methocarbamol (ROBAXIN) tablet 1,000 mg, 1,000 mg, Oral, 4x Daily  ondansetron (ZOFRAN-ODT) disintegrating tablet 4 mg, 4 mg, Oral, Q8H PRN **OR** ondansetron (ZOFRAN) injection 4 mg, 4 mg, IntraVENous, Q6H PRN  polyethylene glycol (GLYCOLAX) packet 17 g, 17 g, Oral, Daily  acetaminophen (TYLENOL) tablet 650 mg, 650 mg, Oral, Q4H PRN  oxyCODONE (ROXICODONE) immediate release tablet 5 mg, 5 mg, Oral, Q4H PRN **OR** oxyCODONE HCl (OXY-IR) immediate release tablet 10 mg, 10 mg, Oral, Q4H PRN  sennosides-docusate sodium (SENOKOT-S) 8.6-50 MG tablet 1 tablet, 1 tablet, Oral, BID     Allergies:  Patient has no known allergies.     Social History:   TOBACCO:   has no history on file for tobacco use. ETOH:   has no history on file for alcohol use. DRUGS:   has no history on file for drug use. ACTIVITIES OF DAILY LIVING:    OCCUPATION:    Family History:   Family History   No family history on file.        REVIEW OF SYSTEMS:  CONSTITUTIONAL:  negative for  fevers, chills  EYES:  negative for blurred vision, visual disturbance  HEENT:  negative for  hearing loss, voice change  RESPIRATORY:  negative for  dyspnea, wheezing  CARDIOVASCULAR:  negative for  chest pain, palpitations  GASTROINTESTINAL:  negative for nausea, vomiting  GENITOURINARY:  negative for frequency, urinary incontinence  HEMATOLOGIC/LYMPHATIC:  negative for bleeding and petechiae  MUSCULOSKELETAL:  positive for  pain  NEUROLOGICAL:  negative for headaches, dizziness  BEHAVIOR/PSYCH:  negative for increased agitation and anxiety     PHYSICAL EXAM:    VITALS:  BP (!) 155/69   Pulse 97   Temp 98.6 °F (37 °C) (Oral)   Resp 15   Ht 5' 11\" (1.803 m)   Wt 147 lb 4.3 oz (66.8 kg)   SpO2 97%   BMI 20.54 kg/m²   CONSTITUTIONAL:  awake, alert, cooperative, no apparent distress, and appears stated age  MUSCULOSKELETAL:  · Left Lower Extremity  · Skin clean dry and intact, without signs of infection  · Mild edema noted to the hip area  · Compartments supple throughout thigh and leg  · Calf supple and nontender  · He wiggles his toes minimally but does not follow other commands regarding motion of the ankle with dorsi/plantar flexion  · States sensation intact to touch in sural/deep peroneal/superficial peroneal/saphenous/posterior tibial nerve distributions to foot/ankle. · Palpable dorsalis pedis and posterior tibialis pulses, cap refill brisk in toes, foot warm/perfused.   · Moderate left lower extremity shortening and external rotation noted  · Positive logroll, positive heel strike     Secondary Exam:   · bilateralUE: No obvious signs of trauma. -TTP to fingers, hand, wrist, forearm, elbow, humerus, shoulder or clavicle. -- Patient able to flex/extend fingers, wrist, elbow and shoulder with active and passive ROM without pain, +2/4 Radial pulse, cap refill <3sec, +AIN/PIN/Radial/Ulnar/Median N, distal sensation grossly intact to C4-T1 dermatomes, compartments soft and compressible.     · rightLE: No obvious signs of trauma.    -TTP to foot, ankle, leg, knee, thigh, hip.-- Patient able to flex/extend toes, ankle, knee and hip with active and passive ROM without pain,+2/4 DP & PT pulses, cap refill <3sec, +5/5 PF/DF/EHL, distal sensation grossly intact to L4-S1 dermatomes, compartments soft and compressible.     · Pelvis: -TTP, -Log roll, -Heel strike      DATA:    CBC:         Lab Results   Component Value Date     WBC 9.6 06/04/2022     RBC 3.64 06/04/2022     HGB 10.9 06/04/2022     HCT 33.1 06/04/2022     MCV 90.9 06/04/2022     MCH 29.9 06/04/2022     MCHC 32.9 06/04/2022     RDW 12.4 06/04/2022      06/04/2022     MPV 11.0 06/04/2022      PT/INR:          Lab Results   Component Value Date     PROTIME 14.1 06/03/2022     INR 1.3 06/03/2022         Radiology Review:  Pelvis and left hip:  FINDINGS:   Single AP view of the pelvis demonstrates a nondisplaced fracture through the   inferior left pubic ramus.  There is a comminuted fractures through the   intertrochanteric region of the proximal left femur.  There is mild   foreshortening at the fracture site and lateral deviation of the main   fracture component.  The femoral head remains within the acetabulum.  There   are mild osteoarthritic changes of the right hip.  The SI joints appear   intact.           Impression   Mild-to-moderate displacement comminuted left intertrochanteric proximal left   femur fracture.          IMPRESSION:  · closed left intertrochanteric hip fracture  · Closed nondisplaced left inferior pubic ramus fracture     PLAN:  · Patient reviewed and discussed with Dr. Ju Estes  · Recommend for patient to get admitted through medicine. Plan for surgical fixation of the left hip fracture tomorrow if cleared. Procedure was reviewed in detail with the patient today including the benefits, risks, complications and postop course and all questions were answered to his satisfaction. Patient is in agreement with proceeding with surgery. · Patient will be put in Rivera's traction    I have seen and evaluated the patient and agree with the above assessment and plan on today's visit. I have performed the key components of the history and physical examination with significant findings of Left closed displaced intertrochanteric hip fracture with ipsilateral ramus fracture. Complexity of injury explained. Plan for ORIF of hip fracture. I concur with the findings and plan as documented. Jefry Myers MD  6/4/2022     I explained the risks, benefits, alternatives and complications of surgery with the patient including but not limited to the risks of death, possible damage to nerves, vessels, or tendons, possible infection, possible nonunion, possible malunion, leg length discrepancy and malrotation,  Deep Venous Thrombosis (DVT), Pulmonary Emboli (PE), post-operative DVT prophylaxis, possible hardware failure, possible need for hardware removal, stiffness, as well as the possible need further surgery and unanticipated complications. The patient voiced understanding and all questions were answered. The patient elected to proceed with surgical intervention.      Jefry Myers MD  6/4/2022

## 2022-06-04 NOTE — H&P
Department of Orthopedic Surgery  Consult Note    Reason for Consult: Left hip pain    HISTORY OF PRESENT ILLNESS:       Patient is a 78 y.o. male who presents with left hip pain. Patient is a poor historian with no family present today. According to his chart notes, he fell earlier today approximately 8 to 10 feet from his porch roof landing on his left hip area. He noticed immediate pain and inability to ambulate with the left leg. He states that prior to this injury he was a community ambulator without assistive devices. He denies pain or symptoms in bilateral arms, his right leg or any other pain other than the hip in his left leg. Denies numbness/tingling/paresthesias. Denies any other orthopedic complaints at this time. Past Medical History:    History reviewed. No pertinent past medical history. Past Surgical History:    No past surgical history on file.   Current Medications:   Current Facility-Administered Medications: ceFAZolin (ANCEF) 2,000 mg in sterile water 20 mL IV syringe, 2,000 mg, IntraVENous, On Call to OR  lactated ringers infusion, , IntraVENous, Continuous  sodium chloride flush 0.9 % injection 10 mL, 10 mL, IntraVENous, 2 times per day  sodium chloride flush 0.9 % injection 10 mL, 10 mL, IntraVENous, PRN  0.9 % sodium chloride infusion, , IntraVENous, PRN  HYDROmorphone (DILAUDID) injection 0.25 mg, 0.25 mg, IntraVENous, Q3H PRN  methocarbamol (ROBAXIN) tablet 1,000 mg, 1,000 mg, Oral, 4x Daily  ondansetron (ZOFRAN-ODT) disintegrating tablet 4 mg, 4 mg, Oral, Q8H PRN **OR** ondansetron (ZOFRAN) injection 4 mg, 4 mg, IntraVENous, Q6H PRN  polyethylene glycol (GLYCOLAX) packet 17 g, 17 g, Oral, Daily  acetaminophen (TYLENOL) tablet 650 mg, 650 mg, Oral, Q4H PRN  oxyCODONE (ROXICODONE) immediate release tablet 5 mg, 5 mg, Oral, Q4H PRN **OR** oxyCODONE HCl (OXY-IR) immediate release tablet 10 mg, 10 mg, Oral, Q4H PRN  sennosides-docusate sodium (SENOKOT-S) 8.6-50 MG tablet 1 tablet, 1 tablet, Oral, BID  Allergies:  Patient has no known allergies. Social History:   TOBACCO:   has no history on file for tobacco use. ETOH:   has no history on file for alcohol use. DRUGS:   has no history on file for drug use. ACTIVITIES OF DAILY LIVING:    OCCUPATION:    Family History:   No family history on file. REVIEW OF SYSTEMS:  CONSTITUTIONAL:  negative for  fevers, chills  EYES:  negative for blurred vision, visual disturbance  HEENT:  negative for  hearing loss, voice change  RESPIRATORY:  negative for  dyspnea, wheezing  CARDIOVASCULAR:  negative for  chest pain, palpitations  GASTROINTESTINAL:  negative for nausea, vomiting  GENITOURINARY:  negative for frequency, urinary incontinence  HEMATOLOGIC/LYMPHATIC:  negative for bleeding and petechiae  MUSCULOSKELETAL:  positive for  pain  NEUROLOGICAL:  negative for headaches, dizziness  BEHAVIOR/PSYCH:  negative for increased agitation and anxiety    PHYSICAL EXAM:    VITALS:  BP (!) 155/69   Pulse 97   Temp 98.6 °F (37 °C) (Oral)   Resp 15   Ht 5' 11\" (1.803 m)   Wt 147 lb 4.3 oz (66.8 kg)   SpO2 97%   BMI 20.54 kg/m²   CONSTITUTIONAL:  awake, alert, cooperative, no apparent distress, and appears stated age  MUSCULOSKELETAL:  Left Lower Extremity  Skin clean dry and intact, without signs of infection  Mild edema noted to the hip area  Compartments supple throughout thigh and leg  Calf supple and nontender  He wiggles his toes minimally but does not follow other commands regarding motion of the ankle with dorsi/plantar flexion  States sensation intact to touch in sural/deep peroneal/superficial peroneal/saphenous/posterior tibial nerve distributions to foot/ankle. Palpable dorsalis pedis and posterior tibialis pulses, cap refill brisk in toes, foot warm/perfused. Moderate left lower extremity shortening and external rotation noted  Positive logroll, positive heel strike    Secondary Exam:   · bilateralUE: No obvious signs of trauma.   -TTP to fingers, hand, wrist, forearm, elbow, humerus, shoulder or clavicle. -- Patient able to flex/extend fingers, wrist, elbow and shoulder with active and passive ROM without pain, +2/4 Radial pulse, cap refill <3sec, +AIN/PIN/Radial/Ulnar/Median N, distal sensation grossly intact to C4-T1 dermatomes, compartments soft and compressible. · rightLE: No obvious signs of trauma. -TTP to foot, ankle, leg, knee, thigh, hip.-- Patient able to flex/extend toes, ankle, knee and hip with active and passive ROM without pain,+2/4 DP & PT pulses, cap refill <3sec, +5/5 PF/DF/EHL, distal sensation grossly intact to L4-S1 dermatomes, compartments soft and compressible.     · Pelvis: -TTP, -Log roll, -Heel strike     DATA:    CBC:   Lab Results   Component Value Date    WBC 9.6 06/04/2022    RBC 3.64 06/04/2022    HGB 10.9 06/04/2022    HCT 33.1 06/04/2022    MCV 90.9 06/04/2022    MCH 29.9 06/04/2022    MCHC 32.9 06/04/2022    RDW 12.4 06/04/2022     06/04/2022    MPV 11.0 06/04/2022     PT/INR:    Lab Results   Component Value Date    PROTIME 14.1 06/03/2022    INR 1.3 06/03/2022       Radiology Review:  Pelvis and left hip:  FINDINGS:   Single AP view of the pelvis demonstrates a nondisplaced fracture through the   inferior left pubic ramus.  There is a comminuted fractures through the   intertrochanteric region of the proximal left femur.  There is mild   foreshortening at the fracture site and lateral deviation of the main   fracture component.  The femoral head remains within the acetabulum.  There   are mild osteoarthritic changes of the right hip.  The SI joints appear   intact.           Impression   Mild-to-moderate displacement comminuted left intertrochanteric proximal left   femur fracture.         IMPRESSION:  · closed left intertrochanteric hip fracture  · Closed nondisplaced left inferior pubic ramus fracture    PLAN:  · Patient reviewed and discussed with Dr. Renetta Houston  · Recommend for patient to get admitted through medicine. Plan for surgical fixation of the left hip fracture tomorrow if cleared. Procedure was reviewed in detail with the patient today including the benefits, risks, complications and postop course and all questions were answered to his satisfaction. Patient is in agreement with proceeding with surgery.   · Patient will be put in Rivera's traction

## 2022-06-04 NOTE — FLOWSHEET NOTE
PT noted to frequent cough. Asked [facility] what diet patient should have. Pt able to drink water, but some cough noted after. [facility] messaged.

## 2022-06-04 NOTE — PROGRESS NOTES
Physical Therapy    Date: 2022       Patient Name: Julisa Bernardo  : 1945      MRN: [id number]    PT order received. Chart has been reviewed. PT evaluation will be on hold due to planned OR 22. Will continue to follow and complete evaluation at later time.      Shobha Hoover, PT

## 2022-06-04 NOTE — ANESTHESIA POSTPROCEDURE EVALUATION
Department of Anesthesiology  Postprocedure Note    Patient: Daily Batista  MRN: [id number]  YOB: 1945  Date of evaluation: 6/4/2022  Time:  6:25 PM     Procedure Summary     Date: 06/04/22 Room / Location: [facility] OR 08 / [facility]    Anesthesia Start: 3012 Anesthesia Stop: 0699    Procedure: LEFT INTERTROCHANTERIC HIP FRACTURE OPEN REDUCTION INTERNAL FIXATION (Left Hip) Diagnosis:       Closed displaced intertrochanteric fracture of left femur, initial encounter ([facility] [address]      (LEFT INTERTROCHANTERIC HIP FRACTURE)    Surgeons: Ashley Hernandez MD Responsible Provider: Don Corona MD    Anesthesia Type: general ASA Status: 3          Anesthesia Type: No value filed. Cassi Phase I: Cassi Score: 10    Cassi Phase II:      Last vitals: Reviewed and per EMR flowsheets.        Anesthesia Post Evaluation    Patient location during evaluation: PACU  Patient participation: complete - patient participated  Level of consciousness: awake  Pain score: 2  Airway patency: patent  Nausea & Vomiting: no nausea  Complications: no  Cardiovascular status: hemodynamically stable  Respiratory status: acceptable  Hydration status: stable  Multimodal analgesia pain management approach

## 2022-06-04 NOTE — PROGRESS NOTES
Trauma Tertiary Survey    Admit Date: 6/3/2022  Hospital day 1    CC:  Mechanical fall,  L femoral neck fracture    Alcohol pre-screening:  Men: How many times in the past year have you had 5 or more drinks in a day?  states none in past year    How much do you drink on a daily basis? States none, states no alcohol use for many months    Scheduled Meds:   ceFAZolin  2,000 mg IntraVENous On Call to OR    sodium chloride flush  10 mL IntraVENous 2 times per day    methocarbamol  1,000 mg Oral 4x Daily    polyethylene glycol  17 g Oral Daily    sennosides-docusate sodium  1 tablet Oral BID     Continuous Infusions:   lactated ringers 75 mL/hr at 06/03/22 1901    sodium chloride       PRN Meds:sodium chloride flush, sodium chloride, HYDROmorphone, ondansetron **OR** ondansetron, acetaminophen, oxyCODONE **OR** oxyCODONE    Subjective:     C/o pain in L hip. Objective:     Patient Vitals for the past 8 hrs:   BP Temp Temp src Pulse Resp   06/04/22 0729 (!) 181/82 97.6 °F (36.4 °C) Oral 85 16   06/04/22 0325 -- -- -- -- 15   06/04/22 0023 -- -- -- -- 16       I/O last 3 completed shifts:  In: -   Out: 50 [Urine:50]  No intake/output data recorded. History reviewed. No pertinent past medical history. @homemeds@    Radiology:  CT HEAD WO CONTRAST   Final Result   No acute intracranial abnormality or hemorrhage. Cortical atrophy and periventricular leukomalacia. Sinus disease. CT CERVICAL SPINE WO CONTRAST   Final Result   No acute abnormality of the cervical spine. Multilevel degenerative changes. CT CHEST W CONTRAST   Final Result   1. Small areas of ground-glass opacification along with centrilobular   nodules in the posterior right upper lobe and superior segment of the right   lower lobe. Milder findings are noted involving the lateral right upper   lobe. Rule out aspiration pneumonia.       2.  Ill-defined, ground-glass opacity symmetrically and dependently within   the lower lobes. Some of this again could represent aspiration pneumonia,   however, this could also represent atelectasis. A combination of both can   also be present. 3.  Small amount of decreased attenuation within the proximal right mainstem   bronchus. Question mucous or aspirated material.      4.  Mild centrilobular emphysema again suggested, when compared to the   previous study performed 05/05/2022.      5.  Small amount of fluid in the mid and distal esophagus, probably secondary   to reflux. 6.  Small hiatal hernia again noted. CT ABDOMEN PELVIS W IV CONTRAST Additional Contrast? None   Final Result   1. Intertrochanteric fracture of the left femur. 2. There is biliary ductal dilatation and pneumobilia status post   cholecystectomy. 3. Small to moderate-sized hiatal hernia. 4. There are vascular calcifications about the bilateral kidneys as well as   probable bilateral nonobstructive nephrolithiasis. 5. Colonic diverticulosis without evidence of diverticulitis. XR CHEST 1 VIEW   Final Result   Prominent pulmonary vasculature suggesting fluid overload. No focal airspace   opacity or pleural effusion. XR PELVIS (1-2 VIEWS)   Final Result   Mild-to-moderate displacement comminuted left intertrochanteric proximal left   femur fracture.              PHYSICAL EXAM:     Central Nervous System  Loss of consciousness:  No    GCS:    Eye:  4 - Opens eyes on own  Motor:  6 - Follows simple motor commands  Verbal:  4 - Seems confused, disoriented    Neuromuscular blockade: No  Pupil size:  Left 3 mm    Right 3 mm  Pupil reaction: Yes    Wiggles fingers: Left Yes Right Yes  Wiggles toes: Left Yes   Right Yes    Hand grasp:   Left  Present      Right  Present  Plantar flexion: Left  Weak limited by pain in L hip     Right   Present    PHYSICAL EXAM  General: No apparent distress, mild discomfort, slightly confused  HEENT: Trachea midline, no masses, Pupils equal round   Chest: Respiratory effort was normal with no retractions or use of accessory muscles. Cardiovascular: Extremities warm, well perfused,   Abdomen:  Soft and non distended. No tenderness, guarding, rebound, or rigidity   Extremities: Moves all 4 extremeties, No pedal edema. L hip in external rotation, +swelling    Spine:   Spine Tenderness ROM   Cervical 0/10 Normal   Thoracic 0 /10 Normal   Lumbar 0 /10 Normal     Musculoskeletal:    Joint Tenderness Swelling ROM   Right shoulder Absent absent normal   Left shoulder absent absent normal   Right elbow absent absent normal   Left elbow absent absent normal   Right wrist absent absent normal   Left wrist absent absent normal   Right hand grasp Absent absent normal   Left hand grasp absent absent normal   Right hip absent absent normal   Left hip present present Limited by pain   Right knee Absent absent normal   Left knee absent absent normal   Right ankle absent absent normal   Left ankle absent absent normal   Right foot Absent absent normal   Left foot absent absent normal       CONSULTS: Orthopedic surgery    PROCEDURES: none at this time    INJURIES:      Principal Problem:    Trauma  Resolved Problems:    * No resolved hospital problems. *        Assessment/Plan:       · Neuro:  GCS 14, hx off alcohol abuse but denies current use. Monitor for withdrawal symptoms. Thiamine, folate  · Acute pain syndrome - cecy tylenol, prn aisha, robaxin, prn dilaudid  · CV: HR near normal limits, no acute issues   · Pulm: tolerating room air. Encourage pulmonary toilet. Pep/Flutter. SMI  · GI: NPO, possible OR today with ortho  · Renal: no acute issues. Monitor lytes, UOP. Replace prn. IVFs  · ID: afebrile, no acute issues. Monitor for SIRS    · Endocrine: no acute issues,  Monitor glucose  · MSK: L femoral neck fracture. Ortho following. Tentative OR today. Pita Camejo PT/OT when able  · Heme: no acute issues. Monitor H/H.       Bowel regime: colace, MOM   Pain control/Sedation: cecy tylenol, prn aisha/dilaudid, robaxin  DVT prophylaxis: SCDs, lovenox postop    GI: diet   Glucose protocol: n/a  Mouth/Eye care: per patient, .    Radha: none     Code status:    Full Code    Patient/Family update:  As available     Disposition:  Continue current care      Electronically signed by Damaso Martinez DO on 6/4/22 at 8:21 AM EDT

## 2022-06-04 NOTE — OP NOTE
Operative Note      Patient: Tory Rosas  YOB: 1945  MRN: [id number]    Date of Procedure: 6/4/2022    Pre-Op Diagnosis: LEFT INTERTROCHANTERIC HIP FRACTURE    Post-Op Diagnosis: Same       Procedure(s):  LEFT INTERTROCHANTERIC HIP FRACTURE OPEN REDUCTION INTERNAL FIXATION    Surgeon(s):  Selene Hernandez MD    Assistant:   Resident: Claudean Bill, DO    Anesthesia: General    Estimated Blood Loss (mL): less than 50     Complications: None    Specimens:   * No specimens in log *    Implants:  Implant Name Type Inv. Item Serial No.  Lot No. LRB No. Used Action   NAIL IM L380MM [id number] 130DEG LNG R FEM ADV - [id number]  NAIL IM L380MM [id number] 130DEG LNG R FEM ADV  DEPUY [employer] [facility]-WD [id number] Left 1 Implanted   SCREW TFNA FEN 100MM - [id number] Leg/Ankle/Foot/Toe SCREW TFNA FEN 100MM  SYNTHES-PMM [id number] Left 1 Implanted   SCREW BNE L48MM DIA5MM LAT FEM LT GRN TI ST SHEREE FULL THRD - [id number]  SCREW BNE L48MM DIA5MM LAT FEM LT GRN TI ST SHEREE FULL THRD  DEPUY [employer]-WD [id number] Left 1 Implanted         Drains:   Urinary Catheter 2 Way (Active)       Findings: see details    Detailed Description of Procedure:   DATE OF PROCEDURE: 6/4/2022       PREOPERATIVE DIAGNOSIS:  Left intertrochanteric hip fracture.     POSTOPERATIVE DIAGNOSIS:  Left intertrochanteric hip fracture.     PROCEDURE PERFORMED:  Left intertrochanteric hip fracture open reduction  and internal fixation using a Synthes trochanteric femoral nail 14 mm in  Diameter, 380mm in legnth,  100 mm lag screw with a 130-degree angled implant.     ANESTHESIA:  General.     ASSISTANT:  Dr Michelle Wright, Orthopedic Surgery resident.     COMPLICATIONS:  None.     DISPOSITION:  The patient was stable throughout the procedure.     OPERATIVE INDICATIONS:  The patient sustained a hip fracture and was admitted to the Medical Service and medically cleared for surgical intervention.   The risks,  benefits, alternatives, and complications were fully outlined and explained  to the patient as well as the family. These included, but not limited to the risks  of infection; damage to nerves, vessels, or tendons; malunion; nonunion;  symptomatic hardware; hardware failure; risk of deep venous thrombosis,  pulmonary emboli, as well as the increased risk of perioperative morbidity  and mortality. They understood and wished to proceed.     SURGERY IN DETAIL:  The patient was identified in the holding area. The  left hip was identified as the surgical site. The patient was then seen by  Anesthesia, taken to the operating room, placed supine on the table, and  underwent general anesthesia per the Anesthesia Department. The patient  was transferred to a well-padded skeletal traction table, and both legs  were placed into well-padded traction boots. The nonoperative leg was extended and  the operative leg was placed into traction under fluoroscopic imaging. AP and  lateral views confirmed hip reduction after maneuver was undertaken. The hip was then prepared and draped in standard sterile fashion. An  incision over the tip of the greater trochanter was then created followed  by blunt dissection and identification of the tip of the greater  trochanter. A guide pin was placed under fluoroscopic imaging followed by  proximal reaming. A long guide aurea was placed under fluoroscopic imaging. The length of the nail was estimated to be 380mm. Sequential reaming was then performed to 15mm. A 14mm diameter nail by 380 mm was then selected. The nail was then passed with the guide jig in  place. AP and lateral views confirmed good reduction and placement of the  implant. The centering guide was then used for placement of the lag screw  through a nick incision and placement of the guide pin into the  [id number] aspect of the femoral head, confirmed in both AP and  lateral planes.   Screw length was estimated to be 100mm , this was drilled, and  the screw inserted with excellent purchase achieved. The proximal  locking system was then engaged and backed off followed by compression of  the implant and followed by final locking of the lag screw. Imaging confirmed good reduction and fixaiton. The guide jig was then fully removed. AP and  lateral views confirmed excellent reduction of the fracture as well as  screw placement. A distal cross-lock was placed using perfect [place] technique with fluoroscopy. A small nick incision was made followed by blunt dissection. The oblong screw hole was drilled, measured and the appropriate length screw inserted. Excellent capture was achieved and length and screw position was confirmed by imaging. The wounds were copiously irrigated out. Deep tissues  were closed with 0 Vicryl followed by 2-0 Vicryl, skin staples, and sterile  Dressing.  The patient tolerated the procedure and was taken to the recovery room in stable condition.           Electronically signed by Nicolasa Everett MD on 6/4/2022 at 4:35 PM

## 2022-06-04 NOTE — PLAN OF CARE
Problem: Discharge Planning  Goal: Discharge to home or other facility with appropriate resources  6/4/2022 0858 by Vicki Reece RN  Outcome: Progressing  Flowsheets (Taken 6/4/2022 0856)  Discharge to home or other facility with appropriate resources: Identify barriers to discharge with patient and caregiver  6/3/2022 2358 by Abner Hendrickson RN  Outcome: Progressing  Flowsheets (Taken 6/3/2022 2053)  Discharge to home or other facility with appropriate resources: Identify barriers to discharge with patient and caregiver     Problem: Pain  Goal: Verbalizes/displays adequate comfort level or baseline comfort level  6/4/2022 0858 by Vicki Reece RN  Outcome: Progressing  Flowsheets (Taken 6/4/2022 0729)  Verbalizes/displays adequate comfort level or baseline comfort level: Encourage patient to monitor pain and request assistance  6/3/2022 2358 by Abnre Hendrickson RN  Outcome: Progressing     Problem: Safety - Adult  Goal: Free from fall injury  6/4/2022 0858 by Vicki Reece RN  Outcome: Progressing  6/3/2022 2358 by Abner Hendrickson RN  Outcome: Progressing  Flowsheets (Taken 6/3/2022 2112)  Free From Fall Injury: Instruct family/caregiver on patient safety

## 2022-06-04 NOTE — ANESTHESIA PRE PROCEDURE
Department of Anesthesiology  Preprocedure Note       Name:  Guillermo Mosqueda   Age:  78 y.o.  :  1945                                          MRN:  [id number]         Date:  2022      Surgeon: Leann Paul):  Claudio Villa MD    Procedure: Procedure(s):  LEFT INTERTROCHANTERIC HIP FRACTURE OPEN REDUCTION INTERNAL FIXATION    Medications prior to admission:   Prior to Admission medications    Not on File       Current medications:    Current Facility-Administered Medications   Medication Dose Route Frequency Provider Last Rate Last Admin    acetaminophen (TYLENOL) tablet 650 mg  650 mg Oral Q4H Leora E Kaercher, DO        oxyCODONE (ROXICODONE) immediate release tablet 2.5 mg  2.5 mg Oral Q4H PRN Sigrid Perez,         Or    oxyCODONE (ROXICODONE) immediate release tablet 5 mg  5 mg Oral Q4H PRN Leora E Kaercher, DO        thiamine mononitrate tablet 100 mg  100 mg Oral Daily Leora E Kaercher, DO        folic acid (FOLVITE) tablet 1 mg  1 mg Oral Daily Leora E Kaercher, DO        therapeutic multivitamin-minerals 1 tablet  1 tablet Oral Daily Leora E Kaercher, DO        [START ON 2022] enoxaparin Sodium (LOVENOX) injection 30 mg  30 mg SubCUTAneous BID Leora E Kaercher, DO        losartan (COZAAR) tablet 50 mg  50 mg Oral Daily John Sandoval MD        ceFAZolin (ANCEF) 2,000 mg in sterile water 20 mL IV syringe  2,000 mg IntraVENous On Call to [address], [place]        lactated ringers infusion   IntraVENous Continuous Kami Rivera MD 75 mL/hr at 22 1901 New Bag at 22 1901    sodium chloride flush 0.9 % injection 10 mL  10 mL IntraVENous 2 times per day Kami Rivera MD   10 mL at 22    sodium chloride flush 0.9 % injection 10 mL  10 mL IntraVENous PRN Kami Rivera MD   10 mL at 22 2340    0.9 % sodium chloride infusion   IntraVENous PRN Kami Rivera MD        HYDROmorphone (DILAUDID) injection 0.25 mg  0.25 mg IntraVENous Q3H PRN Pravin Dickens MD   0.25 mg at 06/03/22 2340    methocarbamol (ROBAXIN) tablet 1,000 mg  1,000 mg Oral 4x Daily Pravin Dickens MD   1,000 mg at 06/03/22 2107    ondansetron (ZOFRAN-ODT) disintegrating tablet 4 mg  4 mg Oral Q8H PRN Pravin Dickens MD        Or    ondansetron [facility]) injection 4 mg  4 mg IntraVENous Q6H PRN Pravin Dickens MD        polyethylene glycol [facility]) packet 17 g  17 g Oral Daily Pravin Dickens MD   17 g at 06/03/22 2107    sennosides-docusate sodium (SENOKOT-S) 8.6-50 MG tablet 1 tablet  1 tablet Oral BID Pravin Dickens MD   1 tablet at 06/03/22 2107       Allergies:  No Known Allergies    Problem List:    Patient Active Problem List   Diagnosis Code    Trauma T14.90XA    Closed fracture of neck of left femur ([facility] [address] S72.002A    Fall W19. Chad Gray       Past Medical History:  History reviewed. No pertinent past medical history. Past Surgical History:  No past surgical history on file.     Social History:    Social History     Tobacco Use    Smoking status: Not on file    Smokeless tobacco: Not on file   Substance Use Topics    Alcohol use: Not on file                                Counseling given: Not Answered      Vital Signs (Current):   Vitals:    06/03/22 2338 06/04/22 0023 06/04/22 0325 06/04/22 0729   BP: (!) 155/69   (!) 181/82   Pulse: 97   85   Resp: 17 16 15 16   Temp: 98.6 °F (37 °C)   97.6 °F (36.4 °C)   TempSrc: Oral   Oral   SpO2: 97%   95%   Weight: 147 lb 4.3 oz (66.8 kg)      Height: 5' 11\" (1.803 m)                                                 BP Readings from Last 3 Encounters:   06/04/22 (!) 181/82       NPO Status: Time of last liquid consumption: 0000                        Time of last solid consumption: 0000                        Date of last liquid consumption: 06/04/22                        Date of last solid food consumption: 06/04/22    BMI:   Wt Readings from Last 3 Encounters:   06/03/22 147 lb 4.3 oz (66.8 kg)     Body mass index is 20.54 kg/m². CBC:   Lab Results   Component Value Date    WBC 9.6 06/04/2022    RBC 3.64 06/04/2022    HGB 10.9 06/04/2022    HCT 33.1 06/04/2022    MCV 90.9 06/04/2022    RDW 12.4 06/04/2022     06/04/2022       CMP:   Lab Results   Component Value Date     06/04/2022    K 3.9 06/04/2022     06/04/2022    CO2 23 06/04/2022    BUN 9 06/04/2022    CREATININE 0.7 06/04/2022    GFRAA >60 06/04/2022    LABGLOM >60 06/04/2022    GLUCOSE 119 06/04/2022    PROT 7.4 06/03/2022    CALCIUM 8.6 06/04/2022    BILITOT 1.1 06/03/2022    ALKPHOS 64 06/03/2022    AST 18 06/03/2022    ALT 5 06/03/2022       POC Tests: No results for input(s): POCGLU, POCNA, POCK, POCCL, POCBUN, POCHEMO, POCHCT in the last 72 hours. Coags:   Lab Results   Component Value Date    PROTIME 14.1 06/03/2022    INR 1.3 06/03/2022    APTT 26.2 06/03/2022       HCG (If Applicable): No results found for: PREGTESTUR, PREGSERUM, HCG, HCGQUANT     ABGs: No results found for: PHART, PO2ART, PWW2IFJ, JIO1ZUD, BEART, Q3PUWYWM     Type & Screen (If Applicable):  No results found for: LABABO, LABRH    Drug/Infectious Status (If Applicable):  No results found for: HIV, HEPCAB    COVID-19 Screening (If Applicable): No results found for: COVID19        Anesthesia Evaluation    Airway: Mallampati: II  TM distance: >3 FB     Mouth opening: > = 3 FB   Dental:          Pulmonary:                              Cardiovascular:    (+) hypertension:, dysrhythmias: atrial fibrillation,         Rhythm: irregular                   ROS comment: Afib rate control per today EKG. Unknown previous cardiology care. No CP or SOB or leg swelling. Cleared for surgery per trauma team( Dr. Zeeshan Sails). Neuro/Psych:   (+) dementia             ROS comment: Alcohol induced encephalopathy. GI/Hepatic/Renal:             Endo/Other:    (+) blood dyscrasia: anticoagulation therapy:., .                 Abdominal:             Vascular:           Other Findings: Anesthesia Plan      general     ASA 3       Induction: intravenous. MIPS: Postoperative opioids intended and Prophylactic antiemetics administered. Anesthetic plan and risks discussed with patient. Plan discussed with CRNA.                     Jimbo Dunlap MD   6/4/2022

## 2022-06-04 NOTE — DISCHARGE SUMMARY
Physician Discharge Summary     Patient ID:  Leonardo Dorado  [id number]  46 y.o.  1945    Admit date: 6/3/2022    Discharge date and time: 6/7/2022  3:41 PM     Admitting Physician: Mike Parekh MD     Admission Diagnoses: Trauma Chiquita Maurer  Fall, initial encounter [W19. XXXA]    Discharge Diagnoses: Principal Problem:    Trauma  Active Problems:    Closed fracture of neck of left femur (HCC)    Fall    Closed intertrochanteric fracture of hip, left, initial encounter ([facility] [address]    Acute blood loss anemia    Alcoholic encephalopathy (HCC)  Resolved Problems:    * No resolved hospital problems. *      Admission Condition: poor    Discharged Condition: stable    Indication for Admission: L femoral neck fx    Hospital Course/Procedures/Operation/treatments:   6/3:  [place] [facility] [place] a mechanical fall from 8-10ft. Sustained L femoral neck fracture. Confused. Ortho consulted  6/4: No new findings on Tertiary exam. OR w ortho. GCS 14 for confusion. 6/5: Pain controlled, Some concerns for aspiration. NPO will obtain speech eval some concerns for aspiration. 6/6: heartburn overnight, nausea with water. SLP swallow eval passed for soft diet with thin liquids. [facility] 12/24 6/7: Shelia Cuevas for DC home. Consults:   IP CONSULT TO ORTHOPEDIC SURGERY  IP CONSULT TO ANESTHESIOLOGY  IP CONSULT TO HOME CARE NEEDS    Significant Diagnostic Studies:   XR PELVIS (1-2 VIEWS)    Result Date: 6/3/2022  EXAMINATION: ONE XRAY VIEW OF THE PELVIS 6/3/2022 3:33 pm COMPARISON: None. HISTORY: ORDERING SYSTEM PROVIDED HISTORY: FALL/TRAUMA TECHNOLOGIST PROVIDED HISTORY: Reason for exam:->FALL/TRAUMA What reading provider will be dictating this exam?->CRC FINDINGS: Single AP view of the pelvis demonstrates a nondisplaced fracture through the inferior left pubic ramus. There is a comminuted fractures through the intertrochanteric region of the proximal left femur.   There is mild foreshortening at the fracture site and lateral deviation of the main fracture component. The femoral head remains within the acetabulum. There are mild osteoarthritic changes of the right hip. The SI joints appear intact. Mild-to-moderate displacement comminuted left intertrochanteric proximal left femur fracture. CT HEAD WO CONTRAST    Result Date: 6/3/2022  EXAMINATION: CT OF THE HEAD WITHOUT CONTRAST  6/3/2022 3:31 pm TECHNIQUE: CT of the head was performed without the administration of intravenous contrast. Automated exposure control, iterative reconstruction, and/or weight based adjustment of the mA/kV was utilized to reduce the radiation dose to as low as reasonably achievable. COMPARISON: None. HISTORY: ORDERING SYSTEM PROVIDED HISTORY: trauma TECHNOLOGIST PROVIDED HISTORY: Has a \"code stroke\" or \"stroke alert\" been called? ->No Reason for exam:->trauma Decision Support Exception - unselect if not a suspected or confirmed emergency medical condition->Emergency Medical Condition (MA) What reading provider will be dictating this exam?->CRC FINDINGS: BRAIN/VENTRICLES: There are age related cortical atrophy and periventricular white matter ischemic changes. There is no acute intracranial hemorrhage, mass effect or midline shift. No abnormal extra-axial fluid collection. The gray-white differentiation is maintained without evidence of an acute infarct. There is no evidence of hydrocephalus. ORBITS: The visualized portion of the orbits demonstrate no acute abnormality. SINUSES: There is mucosal thickening in bilateral maxillary ethmoid and frontal sinuses. SOFT TISSUES/SKULL:  No acute abnormality of the visualized skull or soft tissues. Postsurgical defect in the left mastoid region. No acute intracranial abnormality or hemorrhage. Cortical atrophy and periventricular leukomalacia. Sinus disease.      CT CHEST W CONTRAST    Result Date: 6/3/2022  EXAMINATION: CT OF THE CHEST WITH CONTRAST 6/3/2022 3:31 pm TECHNIQUE: CT of the chest was performed with the administration of intravenous contrast. Multiplanar reformatted images are provided for review. Automated exposure control, iterative reconstruction, and/or weight based adjustment of the mA/kV was utilized to reduce the radiation dose to as low as reasonably achievable. COMPARISON: The previous CTA of the chest performed 05/05/2022. Correlation is made with chest radiograph performed the same day as this study. HISTORY: ORDERING SYSTEM PROVIDED HISTORY: trauma TECHNOLOGIST PROVIDED HISTORY: Reason for exam:->trauma Decision Support Exception - unselect if not a suspected or confirmed emergency medical condition->Emergency Medical Condition (MA) What reading provider will be dictating this exam?->CRC FINDINGS: Mediastinum: No axillary, hilar, or mediastinal lymphadenopathy is identified. The thoracic aorta is again atherosclerotic. The remainder of the visualized pulmonary vasculature is unremarkable. The heart is upper limits of normal in size. Mild coronary artery calcifications are again appreciated. The thyroid gland and trachea are unremarkable. A small amount of fluid is seen within the mid and distal esophagus, probably secondary to reflux. Lungs/pleura: There are small areas of ground-glass opacification along with the presence of centrilobular nodules in the posterior right upper lobe and superior segment of the right lower lobe. Milder findings are noted within the lateral right upper lobe. Rule out aspiration pneumonia. There are ill-defined, ground-glass opacities symmetrically and dependently within the lower lobes. Again, some of this could represent aspiration pneumonia, however, this could also represent atelectasis. A combination of both can also be present. A small amount of decreased attenuation is noted within the proximal right mainstem bronchus.   This could represent mucus or aspirated material.  Pulmonary parenchymal scar formation is again seen in the right upper lobe. Mild centrilobular emphysema is again suggested. No active pleural disease is seen. No other pulmonary parenchymal nodule or mass is identified. Upper Abdomen: Please refer to the transcription for the patient's CT scan of the abdomen performed the same day. Soft Tissues/Bones: A small hiatal hernia is again seen. Osteo-degenerative changes are again noted involving the visualized thoracolumbar spine sternum, and right shoulder. 1.  Small areas of ground-glass opacification along with centrilobular nodules in the posterior right upper lobe and superior segment of the right lower lobe. Milder findings are noted involving the lateral right upper lobe. Rule out aspiration pneumonia. 2.  Ill-defined, ground-glass opacity symmetrically and dependently within the lower lobes. Some of this again could represent aspiration pneumonia, however, this could also represent atelectasis. A combination of both can also be present. 3.  Small amount of decreased attenuation within the proximal right mainstem bronchus. Question mucous or aspirated material. 4.  Mild centrilobular emphysema again suggested, when compared to the previous study performed 05/05/2022. 5.  Small amount of fluid in the mid and distal esophagus, probably secondary to reflux. 6.  Small hiatal hernia again noted. CT CERVICAL SPINE WO CONTRAST    Result Date: 6/3/2022  EXAMINATION: CT OF THE CERVICAL SPINE WITHOUT CONTRAST 6/3/2022 3:31 pm TECHNIQUE: CT of the cervical spine was performed without the administration of intravenous contrast. Multiplanar reformatted images are provided for review. Automated exposure control, iterative reconstruction, and/or weight based adjustment of the mA/kV was utilized to reduce the radiation dose to as low as reasonably achievable. COMPARISON: None.  HISTORY: ORDERING SYSTEM PROVIDED HISTORY: trauma TECHNOLOGIST PROVIDED HISTORY: Reason for exam:->trauma Decision Support Exception - unselect if not a suspected or confirmed emergency medical condition->Emergency Medical Condition (MA) What reading provider will be dictating this exam?->CRC FINDINGS: BONES/ALIGNMENT: The bones are demineralized. There is no acute fracture or traumatic malalignment. Partially visualized postsurgical resection of the left mastoid and neck region. There is minimal degenerative retrolisthesis of C4 on C5. DEGENERATIVE CHANGES: There are multilevel degenerative changes. SOFT TISSUES: There is no prevertebral soft tissue swelling. No acute abnormality of the cervical spine. Multilevel degenerative changes. CT ABDOMEN PELVIS W IV CONTRAST Additional Contrast? None    Result Date: 6/3/2022  EXAMINATION: CT OF THE ABDOMEN AND PELVIS WITH CONTRAST 6/3/2022 3:31 pm TECHNIQUE: CT of the abdomen and pelvis was performed with the administration of intravenous contrast. Multiplanar reformatted images are provided for review. Automated exposure control, iterative reconstruction, and/or weight based adjustment of the mA/kV was utilized to reduce the radiation dose to as low as reasonably achievable. COMPARISON: None. HISTORY: ORDERING SYSTEM PROVIDED HISTORY: trauma TECHNOLOGIST PROVIDED HISTORY: Additional Contrast?->None Reason for exam:->trauma What reading provider will be dictating this exam?->CRC FINDINGS: The patient is status post cholecystectomy with intrahepatic and extrahepatic biliary ductal dilatation as well as pneumobilia. There is no evidence of acute abnormality about the liver. The spleen, pancreas, and adrenal glands are unremarkable. There are vascular calcifications about the bilateral kidneys. There are also possible nonobstructive caliceal stones. There are 2 right renal cysts. There is no evidence of bowel obstruction, pneumoperitoneum, or ascites. There is colonic diverticulosis without evidence of diverticulitis. The appendix is unremarkable in appearance.   There is arteriosclerosis without abdominal aortic aneurysm. There is small to moderate sized hiatal hernia. There is intertrochanteric fracture of the left femur. The lesser trochanter represents a displaced free fracture fragment. There are degenerative changes within the spine. 1. Intertrochanteric fracture of the left femur. 2. There is biliary ductal dilatation and pneumobilia status post cholecystectomy. 3. Small to moderate-sized hiatal hernia. 4. There are vascular calcifications about the bilateral kidneys as well as probable bilateral nonobstructive nephrolithiasis. 5. Colonic diverticulosis without evidence of diverticulitis. XR CHEST 1 VIEW    Result Date: 6/3/2022  EXAMINATION: ONE XRAY VIEW OF THE CHEST 6/3/2022 3:34 pm COMPARISON: None. HISTORY: ORDERING SYSTEM PROVIDED HISTORY: FALL/TRAUMA TECHNOLOGIST PROVIDED HISTORY: Reason for exam:->FALL/TRAUMA What reading provider will be dictating this exam?->CRC FINDINGS: No airspace opacity or pleural effusion. Prominence of pulmonary vasculature might suggest fluid overload. The heart is normal size. No pneumothorax. No free air beneath the hemidiaphragms. Prominent pulmonary vasculature suggesting fluid overload. No focal airspace opacity or pleural effusion. Discharge Exam:  GCS 14, pleasantly confused  No apparent distress  Lungs clear  Breathing comfortably  Abdomen soft nontender nondistended  Left hip incisions dressed, compartments soft    Disposition: home    In process/preliminary results:  Outstanding Order Results       No orders found from 5/5/2022 to 6/4/2022.             Patient Instructions:   Discharge Medication List as of 6/7/2022 11:56 AM             Details   polyethylene glycol (GLYCOLAX) 17 g packet Take 17 g by mouth daily, Disp-527 g, R-1Normal      sennosides-docusate sodium (SENOKOT-S) 8.6-50 MG tablet Take 1 tablet by mouth 2 times daily, Disp-60 tablet, R-0Normal      methocarbamol (ROBAXIN) 500 MG tablet Take 2 tablets by mouth 4 times daily for 10 days, Disp-80 tablet, R-0Normal      oxyCODONE-acetaminophen (PERCOCET) 5-325 MG per tablet Take 1 tablet by mouth every 6 hours as needed for Pain for up to 7 days. Intended supply: 7 days. Take lowest dose possible to manage pain, Disp-28 tablet, R-0Print      aspirin 325 mg EC tablet Take 1 tablet by mouth 2 times daily for 28 days, Disp-56 tablet, R-0Print             Orthopedics  Weight bearing Status - Weight bearing as tolerated - Left leg  Pain medication Per Prescription  Ice and Elevate effected Extremity  Continue DVT Prophylaxis  Wound care - ok to remove dressing on post operative day 7 cover with a clean dry dressing as needed for drainage  Follow Up in Office in 2 weeks with Dr. Stefanie Nichols, call to schedule an appointment        [address]    Call [phone], option 2, for any questions/concerns. Please follow the instructions checked below:  Please follow-up with your primary care provider. ACTIVITY INSTRUCTIONS  Increase activity as tolerated  No heavy lifting or strenuous activity  Take your incentive spirometer home and use 4-6 times/day   [x]  No driving until cleared by Orthopedic surgery. WOUND/DRESSING INSTRUCTIONS:  You may shower. No sitting in bath tub, hot tub or swimming until cleared by physician. Ice to areas of pain for first 24 hours. Heat to areas of pain after that. Wash areas of lacerations/abrasions with soap & water. Rinse well. Pat dry with clean towel. Apply thin layer of Bacitracin, Neosporin, or triple antibiotic cream to affected area 2-3 times per day. Keep wounds clean and dry. []  Sutures/Staples are to be removed in  week(s). MEDICATION INSTRUCTIONS  Take medication as prescribed. When taking pain medications, you may experience dizziness or drowsiness. Do not drink alcohol or drive when taking these medications. You may experience constipation while taking pain medication.   You may take over the counter stool softeners such as docusate (Colace), sennosides S (Senokot-S), or Miralax. [x]  You may take acetaminophen (Tylenol) products. Do NOT take more than 4000mg of Tylenol in 24h. []  Do not take any other acetaminophen (Tylenol) products if you are taking Percocet or Norco, as these contain Tylenol. --Do NOT take more than 4000mg of Tylenol in 24h. OPIOID MEDICATION INSTRUCTIONS  Read the medication guide that is included with your prescription. Take your medication exactly as prescribed. Store medication away from children and in a safe place. Do NOT share your medication with others. Do NOT take medication unless it is prescribed for you. Do NOT drink alcohol while taking opioids (I.e., Norco, Percocet, Oxycodone, etc). Discuss with the Trauma Clinic staff if the dose of medication you are taking does not control your pain and any side effects that you may be having. CALL 911 OR YOUR LOCAL EMERGENCY SERVICE:  --If you take too much medication  --If you have trouble breathing or shortness of breath  --A child has taken this medication. WORK:  You may not return to work until you receive follow-up with the Trauma Clinic or clearance by all consultants. Call the trauma clinic for any of the following or for questions/concerns;  --fever over 101F  --redness, swelling, hardness or warmth at the wound site(s). --Unrelieved nausea/vomiting  --Foul smelling or cloudy drainage at the wound site(s)  --Unrelieved pain or increase in pain  --Increase in shortness of breath    Follow-up:  Trauma Clinic: [phone] option [address] [place] [place]    SPECIAL CONSIDERATIONS FOR OUR PATIENTS OVER THE AGE OF 65Y    Getting around your home safely can be a challenge if you have injuries or health problems that make it easy for you to fall.  Loose rugs and furniture in walkways are among the dangers for many older people who have problems walking or who have poor eyesight. People who have conditions such as arthritis, osteoporosis, or dementia also must be careful not to fall. You can make your home safer with a few simple measures. Follow-up care is a key part of your treatment and safety. Be sure to make and go to all appointments, and call your doctor or nurse call line if you are having problems. It's also a good idea to know your test results and keep a list of the medicines you take. How can you care for yourself at home? Taking care of yourself  You may get dizzy if you do not drink enough water. To prevent dehydration, drink plenty of fluids, enough so that your urine is light yellow or clear like water. Choose water and other caffeine-free clear liquids. If you have kidney, heart, or liver disease and have to limit fluids, talk with your doctor before you increase the amount of fluids you drink. Exercise regularly to improve your strength, muscle tone, and balance. Walk if you can. Swimming may be a good choice if you cannot walk easily. Have your vision and hearing checked each year or any time you notice a change. If you have trouble seeing and hearing, you might not be able to avoid objects and could lose your balance. Know the side effects of the medicines you take. Ask your doctor or pharmacist whether the medicines you take can affect your balance. Sleeping pills or sedatives can affect your balance. Limit the amount of alcohol you drink. Alcohol can impair your balance and other senses. Ask your doctor whether calluses or corns on your feet need to be removed. If you wear loose-fitting shoes because of calluses or corns, you can lose your balance and fall. Talk to your doctor if you have numbness in your feet. Preventing falls at home  Remove raised doorway thresholds, throw rugs, and clutter. Repair loose carpet or raised areas in the floor.   Move furniture and electrical cords to keep them out of walking paths.  Use non-skid floor wax, and wipe up spills right away, especially on ceramic tile floors. If you use a walker or cane, put rubber tips on it. If you use crutches, clean the bottoms of them regularly with an abrasive pad, such as steel wool. Keep your house well lit, especially stairways, porches, and outside walkways. Use night-lights in areas such as hallways and washrooms. Add extra light switches or use remote switches (such as switches that go on or off when you clap your hands) to make it easier to turn lights on if you have to get up during the night. Install sturdy handrails on stairways. Move items in your cabinets so that the things you use a lot are on the lower shelves (about waist level). Keep a cordless phone and a flashlight with new batteries by your bed. If possible, put a phone in each of the main rooms of your house, or carry a cell phone in case you fall and cannot reach a phone. Or, you can wear a device around your neck or wrist. You push a button that sends a signal for help. Wear low-heeled shoes that fit well and give your feet good support. Use footwear with non-skid soles. Check the heels and soles of your shoes for wear. Repair or replace worn heels or soles. Do not wear socks without shoes on wood floors. Walk on the grass when the sidewalks are slippery. If you live in an area that gets snow and ice in the winter, sprinkle salt on slippery steps and sidewalks. Preventing falls in the bath  Install grab bars and non-skid mats inside and outside your shower or tub and near the toilet and sinks. Use shower chairs and bath benches. Use a hand-held shower head that will allow you to sit while showering. Get into a tub or shower by putting the weaker leg in first. Get out of a tub or shower with your strong side first.  Repair loose toilet seats and consider installing a raised toilet seat to make getting on and off the toilet easier.   Keep your washroom door unlocked while you are in the shower.     Follow-up:  Trauma Clinic: [phone] option 2  [place], [address] [address]     Follow up:   [facility] [place]  [address] [phone]    In 2 weeks  follow up    [address]  [address] [place] [phone]  Call  As needed    [address]  [address] [place] [address]         Signed:  Lady Iza MD  6/7/2022  7:32 PM

## 2022-06-05 LAB
ANION GAP SERPL CALCULATED.3IONS-SCNC: 14 MMOL/L (ref 7–16)
BASOPHILS ABSOLUTE: 0.01 E9/L (ref 0–0.2)
BASOPHILS RELATIVE PERCENT: 0.1 % (ref 0–2)
BUN BLDV-MCNC: 10 MG/DL (ref 6–23)
CALCIUM SERPL-MCNC: 8.6 MG/DL (ref 8.6–10.2)
CHLORIDE BLD-SCNC: 103 MMOL/L (ref 98–107)
CO2: 20 MMOL/L (ref 22–29)
CREAT SERPL-MCNC: 0.6 MG/DL (ref 0.7–1.2)
EOSINOPHILS ABSOLUTE: 0 E9/L (ref 0.05–0.5)
EOSINOPHILS RELATIVE PERCENT: 0 % (ref 0–6)
GFR AFRICAN AMERICAN: >60
GFR NON-AFRICAN AMERICAN: >60 ML/MIN/1.73
GLUCOSE BLD-MCNC: 147 MG/DL (ref 74–99)
HCT VFR BLD CALC: 30.4 % (ref 37–54)
HEMOGLOBIN: 9.8 G/DL (ref 12.5–16.5)
IMMATURE GRANULOCYTES #: 0.04 E9/L
IMMATURE GRANULOCYTES %: 0.4 % (ref 0–5)
LYMPHOCYTES ABSOLUTE: 0.68 E9/L (ref 1.5–4)
LYMPHOCYTES RELATIVE PERCENT: 7.6 % (ref 20–42)
MCH RBC QN AUTO: 29.6 PG (ref 26–35)
MCHC RBC AUTO-ENTMCNC: 32.2 % (ref 32–34.5)
MCV RBC AUTO: 91.8 FL (ref 80–99.9)
MONOCYTES ABSOLUTE: 0.71 E9/L (ref 0.1–0.95)
MONOCYTES RELATIVE PERCENT: 7.9 % (ref 2–12)
NEUTROPHILS ABSOLUTE: 7.55 E9/L (ref 1.8–7.3)
NEUTROPHILS RELATIVE PERCENT: 84 % (ref 43–80)
PDW BLD-RTO: 12.5 FL (ref 11.5–15)
PLATELET # BLD: 220 E9/L (ref 130–450)
PMV BLD AUTO: 10.5 FL (ref 7–12)
POTASSIUM REFLEX MAGNESIUM: 4.1 MMOL/L (ref 3.5–5)
RBC # BLD: 3.31 E12/L (ref 3.8–5.8)
SODIUM BLD-SCNC: 137 MMOL/L (ref 132–146)
WBC # BLD: 9 E9/L (ref 4.5–11.5)

## 2022-06-05 PROCEDURE — 6370000000 HC RX 637 (ALT 250 FOR IP): Performed by: ORTHOPAEDIC SURGERY

## 2022-06-05 PROCEDURE — 6370000000 HC RX 637 (ALT 250 FOR IP): Performed by: SURGERY

## 2022-06-05 PROCEDURE — 6360000002 HC RX W HCPCS: Performed by: ORTHOPAEDIC SURGERY

## 2022-06-05 PROCEDURE — 94664 DEMO&/EVAL PT USE INHALER: CPT

## 2022-06-05 PROCEDURE — 80048 BASIC METABOLIC PNL TOTAL CA: CPT

## 2022-06-05 PROCEDURE — 97535 SELF CARE MNGMENT TRAINING: CPT

## 2022-06-05 PROCEDURE — 36415 COLL VENOUS BLD VENIPUNCTURE: CPT

## 2022-06-05 PROCEDURE — 2580000003 HC RX 258: Performed by: ORTHOPAEDIC SURGERY

## 2022-06-05 PROCEDURE — 92526 ORAL FUNCTION THERAPY: CPT

## 2022-06-05 PROCEDURE — 97165 OT EVAL LOW COMPLEX 30 MIN: CPT

## 2022-06-05 PROCEDURE — 97161 PT EVAL LOW COMPLEX 20 MIN: CPT

## 2022-06-05 PROCEDURE — 1200000000 HC SEMI PRIVATE

## 2022-06-05 PROCEDURE — 97530 THERAPEUTIC ACTIVITIES: CPT

## 2022-06-05 PROCEDURE — 92610 EVALUATE SWALLOWING FUNCTION: CPT

## 2022-06-05 PROCEDURE — 85025 COMPLETE CBC W/AUTO DIFF WBC: CPT

## 2022-06-05 PROCEDURE — 6370000000 HC RX 637 (ALT 250 FOR IP): Performed by: STUDENT IN AN ORGANIZED HEALTH CARE EDUCATION/TRAINING PROGRAM

## 2022-06-05 PROCEDURE — 99233 SBSQ HOSP IP/OBS HIGH 50: CPT | Performed by: SURGERY

## 2022-06-05 PROCEDURE — 94640 AIRWAY INHALATION TREATMENT: CPT

## 2022-06-05 RX ORDER — HYDRALAZINE HYDROCHLORIDE 20 MG/ML
10 INJECTION INTRAMUSCULAR; INTRAVENOUS EVERY 4 HOURS PRN
Status: DISCONTINUED | OUTPATIENT
Start: 2022-06-05 | End: 2022-06-07 | Stop reason: HOSPADM

## 2022-06-05 RX ORDER — GUAIFENESIN 400 MG/1
400 TABLET ORAL 3 TIMES DAILY
Status: DISCONTINUED | OUTPATIENT
Start: 2022-06-05 | End: 2022-06-07 | Stop reason: HOSPADM

## 2022-06-05 RX ORDER — LABETALOL HYDROCHLORIDE 5 MG/ML
10 INJECTION, SOLUTION INTRAVENOUS EVERY 4 HOURS PRN
Status: DISCONTINUED | OUTPATIENT
Start: 2022-06-05 | End: 2022-06-07 | Stop reason: HOSPADM

## 2022-06-05 RX ORDER — IPRATROPIUM BROMIDE AND ALBUTEROL SULFATE 2.5; .5 MG/3ML; MG/3ML
1 SOLUTION RESPIRATORY (INHALATION)
Status: DISCONTINUED | OUTPATIENT
Start: 2022-06-05 | End: 2022-06-07 | Stop reason: HOSPADM

## 2022-06-05 RX ADMIN — ACETAMINOPHEN 650 MG: 325 TABLET ORAL at 21:02

## 2022-06-05 RX ADMIN — ACETAMINOPHEN 650 MG: 325 TABLET ORAL at 12:13

## 2022-06-05 RX ADMIN — GUAIFENESIN 400 MG: 400 TABLET ORAL at 21:02

## 2022-06-05 RX ADMIN — SODIUM CHLORIDE, PRESERVATIVE FREE 10 ML: 5 INJECTION INTRAVENOUS at 21:03

## 2022-06-05 RX ADMIN — CEFAZOLIN 2000 MG: 2 INJECTION, POWDER, FOR SOLUTION INTRAMUSCULAR; INTRAVENOUS at 00:20

## 2022-06-05 RX ADMIN — SENNOSIDES AND DOCUSATE SODIUM 1 TABLET: 50; 8.6 TABLET ORAL at 10:22

## 2022-06-05 RX ADMIN — Medication 1 TABLET: at 10:22

## 2022-06-05 RX ADMIN — GUAIFENESIN 400 MG: 400 TABLET ORAL at 12:37

## 2022-06-05 RX ADMIN — SODIUM CHLORIDE, POTASSIUM CHLORIDE, SODIUM LACTATE AND CALCIUM CHLORIDE: 600; 310; 30; 20 INJECTION, SOLUTION INTRAVENOUS at 03:56

## 2022-06-05 RX ADMIN — IPRATROPIUM BROMIDE AND ALBUTEROL SULFATE 1 AMPULE: .5; 2.5 SOLUTION RESPIRATORY (INHALATION) at 21:04

## 2022-06-05 RX ADMIN — ENOXAPARIN SODIUM 30 MG: 100 INJECTION SUBCUTANEOUS at 10:21

## 2022-06-05 RX ADMIN — IPRATROPIUM BROMIDE AND ALBUTEROL SULFATE 1 AMPULE: .5; 2.5 SOLUTION RESPIRATORY (INHALATION) at 16:05

## 2022-06-05 RX ADMIN — ACETAMINOPHEN 650 MG: 325 TABLET ORAL at 18:25

## 2022-06-05 RX ADMIN — METHOCARBAMOL 1000 MG: 500 TABLET ORAL at 18:25

## 2022-06-05 RX ADMIN — ENOXAPARIN SODIUM 30 MG: 100 INJECTION SUBCUTANEOUS at 21:03

## 2022-06-05 RX ADMIN — CEFAZOLIN 2000 MG: 2 INJECTION, POWDER, FOR SOLUTION INTRAMUSCULAR; INTRAVENOUS at 09:49

## 2022-06-05 RX ADMIN — METHOCARBAMOL 1000 MG: 500 TABLET ORAL at 12:14

## 2022-06-05 RX ADMIN — SENNOSIDES AND DOCUSATE SODIUM 1 TABLET: 50; 8.6 TABLET ORAL at 21:03

## 2022-06-05 RX ADMIN — IPRATROPIUM BROMIDE AND ALBUTEROL SULFATE 1 AMPULE: .5; 2.5 SOLUTION RESPIRATORY (INHALATION) at 07:46

## 2022-06-05 RX ADMIN — LOSARTAN POTASSIUM 50 MG: 50 TABLET, FILM COATED ORAL at 10:22

## 2022-06-05 RX ADMIN — METHOCARBAMOL 1000 MG: 500 TABLET ORAL at 21:03

## 2022-06-05 RX ADMIN — FOLIC ACID 1 MG: 1 TABLET ORAL at 10:22

## 2022-06-05 RX ADMIN — Medication 100 MG: at 10:22

## 2022-06-05 ASSESSMENT — ENCOUNTER SYMPTOMS
COUGH: 0
VOMITING: 0
ANAL BLEEDING: 0
ALLERGIC/IMMUNOLOGIC NEGATIVE: 1
CONSTIPATION: 0
SHORTNESS OF BREATH: 0
BLOOD IN STOOL: 0
NAUSEA: 0
ABDOMINAL PAIN: 0
DIARRHEA: 0
RESPIRATORY NEGATIVE: 1
EYES NEGATIVE: 1
GASTROINTESTINAL NEGATIVE: 1
BACK PAIN: 0
ABDOMINAL DISTENTION: 0

## 2022-06-05 ASSESSMENT — PAIN DESCRIPTION - ORIENTATION: ORIENTATION: RIGHT

## 2022-06-05 ASSESSMENT — PAIN SCALES - GENERAL
PAINLEVEL_OUTOF10: 3
PAINLEVEL_OUTOF10: 4

## 2022-06-05 ASSESSMENT — PAIN DESCRIPTION - LOCATION: LOCATION: HIP

## 2022-06-05 ASSESSMENT — PAIN DESCRIPTION - ONSET: ONSET: ON-GOING

## 2022-06-05 ASSESSMENT — PAIN DESCRIPTION - FREQUENCY: FREQUENCY: CONTINUOUS

## 2022-06-05 ASSESSMENT — PAIN DESCRIPTION - DESCRIPTORS: DESCRIPTORS: ACHING;DISCOMFORT;DULL

## 2022-06-05 ASSESSMENT — PAIN DESCRIPTION - PAIN TYPE: TYPE: ACUTE PAIN;SURGICAL PAIN

## 2022-06-05 NOTE — PLAN OF CARE
Problem: Discharge Planning  Goal: Discharge to home or other facility with appropriate resources  Outcome: Progressing  Flowsheets (Taken 6/5/2022 0836)  Discharge to home or other facility with appropriate resources: Identify barriers to discharge with patient and caregiver     Problem: Pain  Goal: Verbalizes/displays adequate comfort level or baseline comfort level  Outcome: Progressing  Flowsheets (Taken 6/5/2022 0830)  Verbalizes/displays adequate comfort level or baseline comfort level: Encourage patient to monitor pain and request assistance     Problem: Safety - Adult  Goal: Free from fall injury  Outcome: Progressing     Problem: Skin/Tissue Integrity  Goal: Absence of new skin breakdown  Outcome: Progressing

## 2022-06-05 NOTE — PROGRESS NOTES
Department of Orthopedic Surgery  Resident Progress Note    Patient seen and examined. Pain controlled. No new complaints. Discussed his surgery and post operative plan.  All questions answered    VITALS:  /75   Pulse 56   Temp 98 °F (36.7 °C) (Temporal)   Resp 14   Ht 5' 11\" (1.803 m)   Wt 147 lb 4.3 oz (66.8 kg)   SpO2 96%   BMI 20.54 kg/m²     General: Awake, alert cooperative    MUSCULOSKELETAL:   left lower extremity:  · Dressing C/D/I  · Compartments soft and compressible  · +PF/DF/EHL  · +2/4 DP & PT pulses, Brisk Cap refill, Toes warm and perfused  · Distal sensation grossly intact to Peroneals, Sural, Saphenous, and tibial nrs    CBC:   Lab Results   Component Value Date    WBC 9.6 06/04/2022    HGB 10.9 06/04/2022    HCT 33.1 06/04/2022     06/04/2022     PT/INR:    Lab Results   Component Value Date    PROTIME 14.1 06/03/2022    INR 1.3 06/03/2022           ASSESSMENT  · S/P left hip cephalomedullary nail-5/4/2023 2    PLAN      · Continue physical therapy and protocol: WBAT - LLE  · 24 hour abx coverage  · Deep venous thrombosis prophylaxis -Per primary team transition to aspirin as outpatient from orthopedic standpoint, early mobilization  · PT/OT  · Pain Control: Multimodal  · Monitor H&H 10.9  · D/C Plan: Pending  · Discussed with attending

## 2022-06-05 NOTE — PROGRESS NOTES
SPEECH/LANGUAGE PATHOLOGY  CLINICAL ASSESSMENT OF SWALLOWING FUNCTION   and PLAN OF CARE    PATIENT NAME:  Rebel Lawton  (male)     MRN:  [id number]    :  1945  (78 y.o.)  STATUS:  Inpatient: Room 5403/5403-A    TODAY'S DATE:  2022  REFERRING PROVIDER:   Dr. Guillory Repress: YURIDIA swallowing-dysphagia evaluation and treatment Date of order:  22  REASON FOR REFERRAL: assess swallow fx   EVALUATING THERAPIST: YURIDIA White                 RESULTS:    DYSPHAGIA DIAGNOSIS:   Clinical indicators of mild  oral phase dysphagia     Patient presented with a baseline wet cough which did not worsen with PO intake. No change in vocal quality post PO intake. If silent aspiration is suspected, rec a MBSS.       DIET RECOMMENDATIONS:  Soft and bite size consistency solids (IDDSI level 6) with  thin liquids (IDDSI level 0)     FEEDING RECOMMENDATIONS:     Assistance level:  No assistance needed      Compensatory strategies recommended: Small bites/sips and Alternate solids and liquids      Discussed recommendations with nursing and/or faxed report to referring provider: Yes    SPEECH THERAPY  PLAN OF CARE   The dysphagia POC is established based on physician order, dysphagia diagnosis and results of clinical assessment     Dysphagia therapy is not recommended     Conditions Requiring Skilled Therapeutic Intervention for dysphagia:    Not applicable    Specific dysphagia interventions to include:     not applicable    Specific instructions for next treatment:  not applicable   Patient Treatment Goals:    Short Term Goals:  Not applicable no therapy warranted     Long Term Goals:   Not applicable no therapy warranted      Patient/family Goal:    not applicable    Plan of care discussed with Patient   The Patient understand(s) the diagnosis, prognosis and plan of care     Rehabilitation Potential/Prognosis: good                    ADMITTING DIAGNOSIS: Trauma Betsey Jubilee  Fall, initial standardized testing/informal observation of tasks, assessment of data and education on plan of care and goals. [x]The admitting diagnosis and active problem list, have been reviewed prior to initiation of this evaluation. ACTIVE PROBLEM LIST:   Patient Active Problem List   Diagnosis    Trauma    Closed fracture of neck of left femur ([facility] [address]    Fall    Closed intertrochanteric fracture of hip, left, initial encounter [facility])         CPT code:  27382  bedside swallow eval  48920  dysphagia tx Charley Spurling M. A.  [id number] [facility] [id number]  Speech Language Pathologist

## 2022-06-05 NOTE — PROGRESS NOTES
[address] [address] [address] [facility]                                                   Patient Name: Bijal Tarango     MRN: [id number]     : 1945     Room: [address] [place] [address]       Evaluating OT: Otto MOORE, OTR/L, [id number]      Referring Provider: Diya López DO    Specific Provider Orders/Date: OT eval and treat (6/3/22)    Diagnosis: Trauma Jose Relic  Fall, initial encounter [facility]. XXXA]    Surgeries/Procedures: : LEFT INTERTROCHANTERIC HIP FRACTURE OPEN REDUCTION INTERNAL FIXATION       Pt admitted s/p fall from porch with L hip fx      Pertinent Medical History:      has no past medical history on file.          Precautions:  Fall Risk, WBAT LLE     Assessment of current deficits    [x] Functional mobility  [x]ADLs  [x] Strength               [x]Cognition    [x] Functional transfers   [x] IADLs         [x] Safety Awareness   [x]Endurance    [] Fine Coordination              [x] Balance      [] Vision/perception   [x]Sensation     []Gross Motor Coordination  [] ROM  [] Delirium                   [] Motor Control     OT PLAN OF CARE   OT POC based on physician orders, patient diagnosis and results of clinical assessment    Frequency/Duration 2-5 days/wk for 2 weeks PRN   Specific OT Treatment Interventions to include:   * Instruction/training on adapted ADL techniques and AE recommendations to increase functional independence within precautions       * Training on energy conservation strategies, correct breathing pattern and techniques to improve independence/tolerance for self-care routine  * Functional transfer/mobility training/DME recommendations for increased independence, safety, and fall prevention  * Patient/Family education to increase follow through with safety techniques and functional independence  * Recommendation of environmental modifications for increased safety with functional transfers/mobility and ADLs  * Cognitive retraining/development of therapeutic activities to improve problem solving, judgement, memory, and attention for increased safety/participation in ADL/IADL tasks  * Sensory re-education to improve body/limb awareness, maintain/improve skin integrity, and improve hand/UE motor function  * Visual-perceptual training to improve environmental scanning, visual attention/focus, and oculomotor skills for increased safety/independence with functional transfers/mobility and ADLs  * Splinting/positioning for increased function, prevention of contractures, and improve skin integrity  * Therapeutic exercise to improve motor endurance, ROM, and functional strength for ADLs/functional transfers  * Therapeutic activities to facilitate/challenge dynamic balance, stand tolerance for increased safety and independence with ADLs  * Therapeutic activities to facilitate gross/fine motor skills for increased independence with ADLs  * Neuro-muscular re-education: facilitation of righting/equilibrium reactions, midline orientation, scapular stability/mobility, normalization of muscle tone, and facilitation of volitional active controled movement  * Positioning to improve skin integrity, interaction with environment and functional independence  * Manual techniques for edema management    Recommended Adaptive Equipment/DME: LB AE, BSC, Shower chair, TBD     Home Living: Pt lives with wife/son in 2 story house with 4 large steps to enter and bed and bath on main floor. Bathroom setup: tub/shower, standard commode   Equipment owned: grab bar in shower. Prior Level of Function: Ind with ADLs , Ind with IADLs; Used no AD for functional mobility. Pt reports son can assist at home. Driving: No  Occupation: Enjoys puzzles    Pain Level: 8/10; hip pain while coughing/moving  Cognition: A&O: 4/4; Follows 2 step directions; requires intermittent redirection.    Memory: G-   Sequencing: G-   Problem solving: G-   Judgement/safety: G-    Vitals Assessed:  SpO2: 98%, 56HR before activty  SpO2: 100%, 79 HR following activity      BP: NT     Functional Assessment:  AM-PAC Daily Activity Raw Score: 12/24   Initial Eval Status  Date: 6/5/22 Treatment Status  Date: STGs = LTGs  Time frame: 10-14 days   Feeding Sup; set-up  Set-up   Grooming Stand by Assist   While seated at EOB    Minimal Assist   To stand at sink   UB Dressing Moderate Assist   While seated at EOB to don gown around backside    Stand by Assist    LB Dressing Dependent   Pt educated on LB AE  Minimal Assist   With use of LB AE     Bathing Maximal Assist  Pt educated on DME/LB AE  Minimal Assist   With use of LB AE     Toileting Dependent     Minimal Assist    Bed Mobility  Supine to sit: Moderate Assist x2   Sit to supine: Moderate Assist x2  Verbal cues for use of bed rails and techniques. Increased time and effort required. Supine to sit: Minimal Assist   Sit to supine: Minimal Assist    Functional Transfers Moderate Assist > Minimal Assist with ww  With repeated trials    Stand by Assist    Functional Mobility Moderate Assist with ww  For short distance in room  3-4 steps   Stand by Assist    Balance Sitting:     Static: SBA    Dynamic: Min A  Standing: Mod A    during functional activity  Sitting:     Static: Ind    Dynamic: SBA  Standing: SBA   Activity Tolerance Fair with light activity  WFL   Visual/  Perceptual Glasses: No        Safety G-  G     Hand Dominance R   AROM (PROM) Strength Additional Info:    RUE  WFL Grossly 4+/5   Good  and wfl FMC/dexterity noted during ADL tasks       LUE WFL Grossly 4+/5 Good  and wfl FMC/dexterity noted during ADL tasks       Hearing: WFL   Sensation:  No c/o numbness or tingling  Tone: WFL  Edema: Unremarkable    Comments: Patient cleared by nursing. Upon arrival patient supine in bed, educated on the role of OT, and agreeable to OT session.  No family present for session today. OT facilitated ADLs, bed mobility, functional transfers with focus on safety, body mechanics, and precautions with good understanding. At end of session, patient supine in bed, properly positioned, oriented to call light, with call light to R side and phone within reach, all lines and tubes intact. Nursing notified. Overall patient demonstrated good reception to education, decreased independence and safety during completion of ADL/functional transfer/mobility tasks. Pt would benefit from continued skilled OT to increase safety and independence with completion of ADL/IADL tasks for functional independence and quality of life. Treatment: OT treatment provided this date includes:    Instruction/training on safety and adapted techniques for completion of ADLs:  to increase independence in self-care.   Instruction/training on safe functional mobility/transfer techniques:  with focus on safety, body mechanics, and precautions    Proper Positioning/Alignment: for optimal healing, skin integrity, to prevent breakdown, decrease edema, and reduce risk of contracture.  Therapeutic activity:  to challenge dynamic sitting/standing balance and endurance to promote safety during ADL tasks and functional transfers and mobility. Rehab Potential: Good for established goals     Patient / Family Goal: To return home [facility]      Patient and/or family were instructed on functional diagnosis, prognosis/goals and OT plan of care. Demonstrated good understanding.      Eval Complexity: Low    Time In: 7:55a  Time Out: 8:28a  Total Treatment Time: 18 min    Min Units   OT Eval Low 97165 x  1    OT Eval Medium 90429      OT Eval High 32913      OT Re-Eval [id number]       Therapeutic Ex 89707       Therapeutic Activities 87245       ADL/Self Care 03056 18 1    Orthotic Management 21491       Manual 24477     Neuro Re-Ed 01972       Non-Billable Time          Evaluation Time additionally includes thorough review of current medical information, gathering information on past medical history/social history and prior level of function, interpretation of standardized testing/informal observation of tasks, assessment of data and development of plan of care and goals.             Holley Young, OTD,  OTR/L; [id number]

## 2022-06-05 NOTE — PROGRESS NOTES
Physical Therapy  Physical Therapy Initial Evaluation    Name: Nafisa Alcocer  : 1945  MRN: [id number]      Date of Service: 2022    Evaluating PT:  Haile Power, PT [id number]      Room #:  9221/0864-A  Diagnosis:  Trauma Jacqui Raddle  Fall, initial encounter [W19. XXXA]  PMHx/PSHx:     has no past medical history on file. has no past surgical history on file. · Procedure/Surgery:  22 S/P left hip cephalomedullary nail-  Precautions:  Falls,  WBAT (weight bearing as tolerated) LLE, bed alarm for safety. Equipment Needs: Wheeled Walker,    SUBJECTIVE:    Patient lives with wife and son  in a two story home resides first  with 4 steps to enter with 1Rail  Bed is on 1 floor and bath is on 1 floor. Patient ambulated independently  PTA. Equipment owned: Emperatriz Escobedo      OBJECTIVE:   Initial Evaluation  Date: 22 Treatment Short Term/ Long Term   Goals   AM-PAC 6 Clicks 60/63     Was pt agreeable to Eval/treatment? yes     Does pt have pain? Yes LLE with all activity. Bed Mobility  Rolling: NT   Supine to sit:   Mod    Sit to supine: Min   Scooting: Mod    Rolling: Ind  Supine to sit: Ind  Sit to supine: Ind  Scooting: Ind   Transfers Sit to stand: Mod x 2 to fww  Stand to sit: Mod x 2  Stand pivot: Mod x 2 with fww  Sit to stand: Mod Ind  to fww  Stand to sit: Mod Ind    Stand pivot: Mod Ind  with fww   Ambulation    side steps to [facility] and fwd 3 feet and backwards to bed. Decreased tolerance to WB LLE. 25 feet with Mod Ind  fww   Stair negotiation: ascended and descended  NT  4 steps with Min 1 rail    ROM BUE:  Defer to OT eval  BLE:  Decreased LLE. wfl     Strength BUE: Defer to OT eval  RLE:  Grossly 4+/5  LLE:  Grossly  3+/5  4+/5   Balance Sitting EOB:  SBA for safety flexed posture. Dynamic Standing: Mod A x 2 with fww for support. Sitting EOB:  ind  Dynamic Standing:   Mod Ind     Patient is Alert & Oriented x person, place, time and situation and follows directions Sensation:  Pt denies numbness and tingling to extremities  Edema:  LLE  Therapeutic Exercises:  Functional activity as stated above. Patient education  Pt educated regarding role of PT evaluation and weight bearing precautions for LLE    Patient response to education:   Pt verbalized understanding Pt demonstrated skill Pt requires further education in this area   yes yes Reminders     ASSESSMENT:    Conditions Requiring Skilled Therapeutic Intervention:    [x]Decreased strength     [x]Decreased ROM  [x]Decreased functional mobility  [x]Decreased balance   [x]Decreased endurance   [x]Decreased posture  []Decreased sensation  []Decreased coordination   []Decreased vision  [x]Decreased safety awareness   []Increased pain       Comments:    RN cleared patient for participation in therapy session. Patient was seen this date for PT evaluation. . Patient was agreeable to intervention. Results of the functional assessment are noted above. Upon entering the room patient was found supine in bed. Increased assistance required to transition to to EOB increased discomfort noted. . Sat EOB x 10 minutes to increase dynamic sitting balance and activity tolerance. STS completed to fww with cues for sequencing. Mobility completed with fww for support. Only able to take side steps and fwd retro gait. At end of session, patient in bed with  call light and phone within reach, all lines and tubes intact, nursing notified. This patient can benefit from the continuation of skilled PT  to maximize functional level and return to PLOF.    Treatment:  Patient practiced and was instructed in the following treatment:    · Bed mobility training - pt given verbal and tactile cues to facilitate proper sequencing and safety during rolling and supine>sit as well as provided with physical assistance to complete task    · Assistive device training - pt educated on using [address] during gait, [address] approximation/negotiation, and hand placement during sit<>stand to [employer]  · STS and transfer training - educated on hand/foot placement, safety, and sequencing during STS and pivot transfers using assistive device  · Gait training - verbal cues for [employer] approximation/negotiation, upright posture, and safety during 90 and 180 degree turns during gait   · Education in WBAT LLE,     Pt's/ family goals      1. Home with family when able. Prognosis is good  for reaching above PT goals. Patient and or family understand(s) diagnosis, prognosis, and plan of care.  yes,     PHYSICAL THERAPY PLAN OF CARE:    PT POC is established based on physician order and patient diagnosis     Referring provider/PT Order:  PT Eval and Treat   06/03/22 1715  PT evaluation and treat         Davian Valles DO     Diagnosis:  Trauma Harvard Jelly  Fall, initial encounter [W19. XXXA]  Specific instructions for next treatment:  Transfer to bedside chair, Increase ambulation distance and BLE therapeutic exercise  Current Treatment Recommendations:     [x] Strengthening to improve independence with functional mobility   [x] ROM to improve independence with functional mobility   [x] Balance Training to improve static/dynamic balance and to reduce fall risk  [x] Endurance Training to improve activity tolerance during functional mobility   [x] Transfer Training to improve safety and independence with all functional transfers   [x] Gait Training to improve gait mechanics, endurance and asses need for appropriate assistive device  [x] Stair Training in preparation for safe discharge home and/or into the community   [] Positioning to prevent skin breakdown and contractures  [x] Safety and Education Training   [] Patient/Caregiver Education   [] HEP  [] Other     PT long term treatment goals are located in above grid    Frequency of treatments: 2-5x/week x 1-2 weeks.     Time in  0800  Time out  0830    Total Treatment Time  30 minutes     Evaluation Time includes thorough review of current medical information, gathering information on past medical history/social history and prior level of function, completion of standardized testing/informal observation of tasks, assessment of data and education on plan of care and goals.     CPT codes:  [x] Low Complexity PT evaluation 16405  [] Moderate Complexity PT evaluation 96227  [] High Complexity PT evaluation 22257  [] PT Re-evaluation 70556  [] Gait training 83005 - minutes  [] Manual therapy 20826 - minutes  [x] Therapeutic activities 97121 15 minutes  [] Therapeutic exercises 80655 - minutes  [] Neuromuscular reeducation 72460 - minutes     Vince Cooleys, [address] [facility]

## 2022-06-05 NOTE — PLAN OF CARE
Problem: Discharge Planning  Goal: Discharge to home or other facility with appropriate resources  6/5/2022 1649 by Ann Cummins RN  Outcome: Progressing  6/5/2022 0843 by Ann Cummins RN  Outcome: Progressing  Flowsheets (Taken 6/5/2022 1417)  Discharge to home or other facility with appropriate resources: Identify barriers to discharge with patient and caregiver     Problem: Pain  Goal: Verbalizes/displays adequate comfort level or baseline comfort level  6/5/2022 1649 by Ann Cummins RN  Outcome: Progressing  Flowsheets (Taken 6/5/2022 1545)  Verbalizes/displays adequate comfort level or baseline comfort level: Encourage patient to monitor pain and request assistance  6/5/2022 0843 by Ann Cummins RN  Outcome: Progressing  Flowsheets (Taken 6/5/2022 0830)  Verbalizes/displays adequate comfort level or baseline comfort level: Encourage patient to monitor pain and request assistance     Problem: Safety - Adult  Goal: Free from fall injury  6/5/2022 1649 by Ann Cummins RN  Outcome: Progressing  6/5/2022 0843 by Ann Cummins RN  Outcome: Progressing     Problem: Skin/Tissue Integrity  Goal: Absence of new skin breakdown  6/5/2022 1649 by Ann Cummins RN  Outcome: Progressing  6/5/2022 0843 by Ann Cummins RN  Outcome: Progressing

## 2022-06-06 LAB
ANION GAP SERPL CALCULATED.3IONS-SCNC: 11 MMOL/L (ref 7–16)
BASOPHILS ABSOLUTE: 0.01 E9/L (ref 0–0.2)
BASOPHILS RELATIVE PERCENT: 0.1 % (ref 0–2)
BUN BLDV-MCNC: 13 MG/DL (ref 6–23)
CALCIUM SERPL-MCNC: 8.5 MG/DL (ref 8.6–10.2)
CHLORIDE BLD-SCNC: 104 MMOL/L (ref 98–107)
CO2: 24 MMOL/L (ref 22–29)
CREAT SERPL-MCNC: 0.6 MG/DL (ref 0.7–1.2)
EOSINOPHILS ABSOLUTE: 0.01 E9/L (ref 0.05–0.5)
EOSINOPHILS RELATIVE PERCENT: 0.1 % (ref 0–6)
GFR AFRICAN AMERICAN: >60
GFR NON-AFRICAN AMERICAN: >60 ML/MIN/1.73
GLUCOSE BLD-MCNC: 111 MG/DL (ref 74–99)
HCT VFR BLD CALC: 26.3 % (ref 37–54)
HEMOGLOBIN: 8.6 G/DL (ref 12.5–16.5)
IMMATURE GRANULOCYTES #: 0.05 E9/L
IMMATURE GRANULOCYTES %: 0.4 % (ref 0–5)
LYMPHOCYTES ABSOLUTE: 1.58 E9/L (ref 1.5–4)
LYMPHOCYTES RELATIVE PERCENT: 14.2 % (ref 20–42)
MCH RBC QN AUTO: 29.3 PG (ref 26–35)
MCHC RBC AUTO-ENTMCNC: 32.7 % (ref 32–34.5)
MCV RBC AUTO: 89.5 FL (ref 80–99.9)
MONOCYTES ABSOLUTE: 1.11 E9/L (ref 0.1–0.95)
MONOCYTES RELATIVE PERCENT: 9.9 % (ref 2–12)
NEUTROPHILS ABSOLUTE: 8.4 E9/L (ref 1.8–7.3)
NEUTROPHILS RELATIVE PERCENT: 75.3 % (ref 43–80)
PDW BLD-RTO: 12.9 FL (ref 11.5–15)
PLATELET # BLD: 250 E9/L (ref 130–450)
PMV BLD AUTO: 10.8 FL (ref 7–12)
POTASSIUM REFLEX MAGNESIUM: 3.7 MMOL/L (ref 3.5–5)
RBC # BLD: 2.94 E12/L (ref 3.8–5.8)
SODIUM BLD-SCNC: 139 MMOL/L (ref 132–146)
WBC # BLD: 11.2 E9/L (ref 4.5–11.5)

## 2022-06-06 PROCEDURE — 94640 AIRWAY INHALATION TREATMENT: CPT

## 2022-06-06 PROCEDURE — 6370000000 HC RX 637 (ALT 250 FOR IP): Performed by: ORTHOPAEDIC SURGERY

## 2022-06-06 PROCEDURE — 6370000000 HC RX 637 (ALT 250 FOR IP): Performed by: STUDENT IN AN ORGANIZED HEALTH CARE EDUCATION/TRAINING PROGRAM

## 2022-06-06 PROCEDURE — 6360000002 HC RX W HCPCS: Performed by: ORTHOPAEDIC SURGERY

## 2022-06-06 PROCEDURE — 97530 THERAPEUTIC ACTIVITIES: CPT

## 2022-06-06 PROCEDURE — 85025 COMPLETE CBC W/AUTO DIFF WBC: CPT

## 2022-06-06 PROCEDURE — 2580000003 HC RX 258: Performed by: ORTHOPAEDIC SURGERY

## 2022-06-06 PROCEDURE — 36415 COLL VENOUS BLD VENIPUNCTURE: CPT

## 2022-06-06 PROCEDURE — 97535 SELF CARE MNGMENT TRAINING: CPT

## 2022-06-06 PROCEDURE — 2500000003 HC RX 250 WO HCPCS: Performed by: STUDENT IN AN ORGANIZED HEALTH CARE EDUCATION/TRAINING PROGRAM

## 2022-06-06 PROCEDURE — 6370000000 HC RX 637 (ALT 250 FOR IP): Performed by: SURGERY

## 2022-06-06 PROCEDURE — 99232 SBSQ HOSP IP/OBS MODERATE 35: CPT | Performed by: SURGERY

## 2022-06-06 PROCEDURE — 80048 BASIC METABOLIC PNL TOTAL CA: CPT

## 2022-06-06 PROCEDURE — 1200000000 HC SEMI PRIVATE

## 2022-06-06 PROCEDURE — 6370000000 HC RX 637 (ALT 250 FOR IP): Performed by: NURSE PRACTITIONER

## 2022-06-06 RX ORDER — CALCIUM CARBONATE 200(500)MG
500 TABLET,CHEWABLE ORAL 3 TIMES DAILY PRN
Status: DISCONTINUED | OUTPATIENT
Start: 2022-06-06 | End: 2022-06-07 | Stop reason: HOSPADM

## 2022-06-06 RX ADMIN — IPRATROPIUM BROMIDE AND ALBUTEROL SULFATE 1 AMPULE: .5; 2.5 SOLUTION RESPIRATORY (INHALATION) at 17:26

## 2022-06-06 RX ADMIN — ACETAMINOPHEN 650 MG: 325 TABLET ORAL at 08:33

## 2022-06-06 RX ADMIN — ACETAMINOPHEN 650 MG: 325 TABLET ORAL at 20:29

## 2022-06-06 RX ADMIN — IPRATROPIUM BROMIDE AND ALBUTEROL SULFATE 1 AMPULE: .5; 2.5 SOLUTION RESPIRATORY (INHALATION) at 13:54

## 2022-06-06 RX ADMIN — ACETAMINOPHEN 650 MG: 325 TABLET ORAL at 13:01

## 2022-06-06 RX ADMIN — SODIUM CHLORIDE, PRESERVATIVE FREE 10 ML: 5 INJECTION INTRAVENOUS at 20:30

## 2022-06-06 RX ADMIN — ENOXAPARIN SODIUM 30 MG: 100 INJECTION SUBCUTANEOUS at 20:29

## 2022-06-06 RX ADMIN — LABETALOL HYDROCHLORIDE 10 MG: 5 INJECTION INTRAVENOUS at 00:56

## 2022-06-06 RX ADMIN — SENNOSIDES AND DOCUSATE SODIUM 1 TABLET: 50; 8.6 TABLET ORAL at 08:34

## 2022-06-06 RX ADMIN — ENOXAPARIN SODIUM 30 MG: 100 INJECTION SUBCUTANEOUS at 08:34

## 2022-06-06 RX ADMIN — CALCIUM CARBONATE 500 MG: 500 TABLET, CHEWABLE ORAL at 08:37

## 2022-06-06 RX ADMIN — Medication 1 TABLET: at 08:34

## 2022-06-06 RX ADMIN — SODIUM CHLORIDE, PRESERVATIVE FREE 10 ML: 5 INJECTION INTRAVENOUS at 08:36

## 2022-06-06 RX ADMIN — FOLIC ACID 1 MG: 1 TABLET ORAL at 08:34

## 2022-06-06 RX ADMIN — Medication 100 MG: at 08:34

## 2022-06-06 RX ADMIN — METHOCARBAMOL 1000 MG: 500 TABLET ORAL at 13:00

## 2022-06-06 RX ADMIN — POTASSIUM BICARBONATE 20 MEQ: 782 TABLET, EFFERVESCENT ORAL at 15:59

## 2022-06-06 RX ADMIN — METHOCARBAMOL 1000 MG: 500 TABLET ORAL at 15:59

## 2022-06-06 RX ADMIN — IPRATROPIUM BROMIDE AND ALBUTEROL SULFATE 1 AMPULE: .5; 2.5 SOLUTION RESPIRATORY (INHALATION) at 09:36

## 2022-06-06 RX ADMIN — GUAIFENESIN 400 MG: 400 TABLET ORAL at 15:59

## 2022-06-06 RX ADMIN — GUAIFENESIN 400 MG: 400 TABLET ORAL at 20:29

## 2022-06-06 RX ADMIN — METHOCARBAMOL 1000 MG: 500 TABLET ORAL at 08:34

## 2022-06-06 RX ADMIN — GUAIFENESIN 400 MG: 400 TABLET ORAL at 08:33

## 2022-06-06 RX ADMIN — LOSARTAN POTASSIUM 50 MG: 50 TABLET, FILM COATED ORAL at 08:34

## 2022-06-06 RX ADMIN — ACETAMINOPHEN 650 MG: 325 TABLET ORAL at 15:57

## 2022-06-06 RX ADMIN — METHOCARBAMOL 1000 MG: 500 TABLET ORAL at 20:29

## 2022-06-06 ASSESSMENT — PAIN SCALES - GENERAL
PAINLEVEL_OUTOF10: 0
PAINLEVEL_OUTOF10: 4
PAINLEVEL_OUTOF10: 4
PAINLEVEL_OUTOF10: 0
PAINLEVEL_OUTOF10: 4

## 2022-06-06 ASSESSMENT — PAIN DESCRIPTION - FREQUENCY: FREQUENCY: INTERMITTENT

## 2022-06-06 ASSESSMENT — PAIN DESCRIPTION - ORIENTATION: ORIENTATION: RIGHT

## 2022-06-06 ASSESSMENT — PAIN DESCRIPTION - ONSET: ONSET: GRADUAL

## 2022-06-06 ASSESSMENT — PAIN DESCRIPTION - DESCRIPTORS: DESCRIPTORS: DISCOMFORT;DULL;SORE

## 2022-06-06 ASSESSMENT — PAIN DESCRIPTION - LOCATION: LOCATION: HIP

## 2022-06-06 ASSESSMENT — PAIN DESCRIPTION - PAIN TYPE: TYPE: ACUTE PAIN;SURGICAL PAIN

## 2022-06-06 NOTE — PROGRESS NOTES
Department of Orthopedic Surgery  Resident Progress Note    POD 2. Patient seen and examined. Pain controlled. No new complaints. Admits to urinating on his dressings and removing them himself. Discussed the importance of keeping dressings clean, dry, and in tact    VITALS:  BP (!) 166/81   Pulse 76   Temp 98.4 °F (36.9 °C) (Oral)   Resp 16   Ht 5' 11\" (1.803 m)   Wt 147 lb 4.3 oz (66.8 kg)   SpO2 100%   BMI 20.54 kg/m²     General: Awake, alert cooperative    MUSCULOSKELETAL:   left lower extremity:  · Proximal dressing C/D/I, 2 distal dressings nowhere to be found  · Exposed incisions clean. No drainage appreciated. Staples in place  · Compartments soft and compressible  · +PF/DF/EHL  · +2/4 DP & PT pulses, Brisk Cap refill, Toes warm and perfused  · Distal sensation grossly intact to Peroneals, Sural, Saphenous, and tibial nrs    CBC:   Lab Results   Component Value Date    WBC 9.0 06/05/2022    HGB 9.8 06/05/2022    HCT 30.4 06/05/2022     06/05/2022     PT/INR:    Lab Results   Component Value Date    PROTIME 14.1 06/03/2022    INR 1.3 06/03/2022           ASSESSMENT  · S/P left hip cephalomedullary nail 5/4/2023 2    PLAN      · Continue physical therapy and protocol: WBAT - LLE  · 24 hour abx coverage completed  · Deep venous thrombosis prophylaxis -Per primary team transition to aspirin as outpatient from orthopedic standpoint, early mobilization  · PT/OT  · Pain Control: Multimodal  · Monitor H&H 9.8  · Ok to discharge from orthopedic standpoint. Orthopedics will follow the patient peripherally for the remainder of their inpatient stay. Please call with questions or concerns.   · Nursing notified to reapply (2) 6-inch Aquacel dressings  · Discussed with attending

## 2022-06-06 NOTE — PLAN OF CARE
Problem: Discharge Planning  Goal: Discharge to home or other facility with appropriate resources  Outcome: Progressing     Problem: Pain  Goal: Verbalizes/displays adequate comfort level or baseline comfort level  Outcome: Progressing     Problem: Safety - Adult  Goal: Free from fall injury  Outcome: Progressing     Problem: Skin/Tissue Integrity  Goal: Absence of new skin breakdown  Description: 1. Monitor for areas of redness and/or skin breakdown  2. Assess vascular access sites hourly  3. Every 4-6 hours minimum:  Change oxygen saturation probe site  4. Every 4-6 hours:  If on nasal continuous positive airway pressure, respiratory therapy assess nares and determine need for appliance change or resting period.   Outcome: Progressing     Problem: ABCDS Injury Assessment  Goal: Absence of physical injury  Outcome: Progressing

## 2022-06-06 NOTE — PROGRESS NOTES
OCCUPATIONAL THERAPY TREATMENT NOTE    [facility] TREATMENT NOTE      Date:2022  Patient Name: Vignesh Null  MRN: [id number]  : 1945  Room: [address] [place] [address]-A     Per OT Eval:   Evaluating OT: Ronda Holm OTD, OTR/L, [id number]       Referring Provider: Genia Delgado DO    Specific Provider Orders/Date: OT eval and treat (6/3/22)    Diagnosis: Trauma Barbara Magic  Fall, initial encounter Elis. XXXA]    Surgeries/Procedures: : LEFT INTERTROCHANTERIC HIP FRACTURE OPEN REDUCTION INTERNAL FIXATION        Pt admitted s/p fall from porch with L hip fx       Pertinent Medical History:      has no past medical history on file.           Precautions:  Fall Risk, WBAT LLE, +bed alarm      Assessment of current deficits    [x]? Functional mobility            [x]?ADLs           [x]? Strength                   [x]? Cognition    [x]? Functional transfers          [x]? IADLs         [x]? Safety Awareness   [x]? Endurance    []? Fine Coordination              [x]? Balance      []? Vision/perception    [x]? Sensation      []? Gross Motor Coordination  []? ROM           []?  Delirium                   []? Motor Control      OT PLAN OF CARE   OT POC based on physician orders, patient diagnosis and results of clinical assessment     Frequency/Duration 2-5 days/wk for 2 weeks PRN   Specific OT Treatment Interventions to include:   * Instruction/training on adapted ADL techniques and AE recommendations to increase functional independence within precautions       * Training on energy conservation strategies, correct breathing pattern and techniques to improve independence/tolerance for self-care routine  * Functional transfer/mobility training/DME recommendations for increased independence, safety, and fall prevention  * Patient/Family education to increase follow through with safety techniques and functional independence  * Recommendation of environmental modifications for increased safety with functional transfers/mobility and ADLs  * Cognitive retraining/development of therapeutic activities to improve problem solving, judgement, memory, and attention for increased safety/participation in ADL/IADL tasks  * Sensory re-education to improve body/limb awareness, maintain/improve skin integrity, and improve hand/UE motor function  * Visual-perceptual training to improve environmental scanning, visual attention/focus, and oculomotor skills for increased safety/independence with functional transfers/mobility and ADLs  * Splinting/positioning for increased function, prevention of contractures, and improve skin integrity  * Therapeutic exercise to improve motor endurance, ROM, and functional strength for ADLs/functional transfers  * Therapeutic activities to facilitate/challenge dynamic balance, stand tolerance for increased safety and independence with ADLs  * Therapeutic activities to facilitate gross/fine motor skills for increased independence with ADLs  * Neuro-muscular re-education: facilitation of righting/equilibrium reactions, midline orientation, scapular stability/mobility, normalization of muscle tone, and facilitation of volitional active controled movement  * Positioning to improve skin integrity, interaction with environment and functional independence  * Manual techniques for edema management     Recommended Adaptive Equipment/DME: LB AE, BSC, Shower chair, TBD - provided reacher & sock aid (6/6/22).    Home Living: Pt lives with wife/son in 2 story house with 4 large steps to enter and bed and bath on main floor. Bathroom setup: tub/shower, standard commode   Equipment owned: grab bar in shower.     Prior Level of Function: Ind with ADLs , Ind with IADLs; Used no AD for functional mobility. Pt reports son can assist at home.   Driving: No  Occupation: Enjoys puzzles     Pain Level: Pt did not rate on scale however c/o LLE pain; therapist provided repositioning techniques  Cognition: A&O: 4/4; Follows 2 step directions; requires intermittent redirection. Memory:  G-              Sequencing: G-              Problem solving: G-              Judgement/safety: G-                Functional Assessment:  AM-PAC Daily Activity Raw Score: 13/24    Initial Eval Status  Date: 6/5/22 Treatment Status  Date: 6/6/22 STGs = LTGs  Time frame: 10-14 days   Feeding Sup; set-up  Per previous session sup/set up Set-up   Grooming Stand by Assist   While seated at EOB     Per previous session SBA Minimal Assist   To stand at sink   UB Dressing Moderate Assist   While seated at EOB to don gown around backside     Mod A  Pt required assist to manage gown & tie strings upon arrival. Stand by Assist    LB Dressing Dependent   Pt educated on LB AE   Mod A  With use of AE (I.e. reacher & sockaid) to don/doff socks with mod verbal cuing for sequencing. Minimal Assist   With use of LB AE      Bathing Maximal Assist  Pt educated on DME/LB AE Per previous session Max A  Minimal Assist   With use of LB AE      Toileting Dependent      Per previous session Dep Minimal Assist    Bed Mobility  Supine to sit: Moderate Assist x2   Sit to supine: Moderate Assist x2  Verbal cues for use of bed rails and techniques. Increased time and effort required.     Supine to sit: Moderate Assist   Sit to supine: NT  Assist for LLE management & trunk control. Supine to sit: Minimal Assist   Sit to supine: Minimal Assist    Functional Transfers Moderate Assist > Minimal Assist with ww  With repeated trials    Sit to stand: Minimal Assist  Stand to sit: Minimal Assist  Stand pivot: Minimal Assist   Use of ww to maintain dynamic standing balance requiring assist/cuing for proper hand placement. Stand by Assist    Functional Mobility Moderate Assist with ww  For short distance in room  3-4 steps   Minimal Assist  Use of ww shorter than household distance with assist/cuing for ww management & sequencing.  Stand by Assist    Balance Sitting: Static: SBA    Dynamic: Min A  Standing: Mod A    during functional activity Sitting:     Static: Supervision    Dynamic: SBA  Standing: Min A Sitting:     Static: Ind    Dynamic: SBA  Standing: SBA   Activity Tolerance Fair with light activity  Fair WFL   Visual/  Perceptual Glasses: No          Safety G-              G- G      Hand Dominance R    AROM (PROM) Strength Additional Info:    RUE  WFL Grossly 4+/5    Good  and wfl FMC/dexterity noted during ADL tasks         LUE WFL Grossly 4+/5 Good  and wfl FMC/dexterity noted during ADL tasks         Hearing: WFL   Sensation:  No c/o numbness or tingling  Tone: WFL  Edema: Unremarkable      Comments: Upon arrival pt supine in bed & agreeable to OT session with daughter in room. Pt required assist for clothing/gown management upon arrival at bed-level. Pt required assist for LLE management & trunk control with directional cuing to complete supine to sit transfer. Pt completed functional transfers/mobility with use of ww to maintain dynamic standing balance - pt required moderate verbal/tactile cues & assist for ww management/safety, proper body mechanics, & implementation of fall prevention strategies. Pt & family educated on AE (I.e. reacher & sockaid) to don/doff socks - pt required assist & verbal cues to complete LB dressing tasks with use of AE & activity modifications/adaptations. Pt required increased time & moderate verbal cues for sequencing of LB dress techniques throughout session. At end of session pt seated in bedside chair with call light within reach. · Pt has made fair progress towards set goals.    · Continue with current plan of care      Treatment Time In:10:26a            Treatment Time Out: 11:05a                Treatment Charges: Mins Units   Ther Ex  58562     Manual Therapy 47070     Thera Activities 54566 15 1   ADL/Home Mgt 32479 64 2   Neuro Re-ed 87650     Group Therapy      Orthotic manage/training  04479     Non-Billable Time Total Timed Treatment 45 3         Mikie Jackson OTR/L; [id number]

## 2022-06-06 NOTE — CARE COORDINATION
Met with the pt at the bedside to discuss transition of care. The pt lives with his wife and son and will return home when medically stable. His PCP is Dr Anjana Burr. . the pt has had [facility] in the past and would like to use their services again. Referral made. Will follow. Electronically signed by Rommel Guerrero RN on 6/6/2022 at 3:45 PM  The Plan for Transition of Care is related to the following treatment goals: to return to of    The Patient  was provided with a choice of provider and agrees   with the discharge plan. [x] Yes [] No    Freedom of choice list was provided with basic dialogue that supports the patient's individualized plan of care/goals, treatment preferences and shares the quality data associated with the providers.  [x] Yes [] No

## 2022-06-06 NOTE — PROGRESS NOTES
Physical Therapy  Physical Therapy Treatment Note    Name: Francisco Caruso  : 1945  MRN: [id number]      Date of Service: 2022    Evaluating PT:  Bernerd Prader, PT [id number]      Room #:  3099/0142-A  Diagnosis:  Trauma Alonso Zamorano  Fall, initial encounter [W19. XXXA]  PMHx/PSHx:     has no past medical history on file. has a past surgical history that includes Hip fracture surgery (Left, 2022). · Procedure/Surgery:  22 S/P left hip cephalomedullary nail-  Precautions:  Falls,  WBAT (weight bearing as tolerated) LLE, bed alarm for safety. Equipment Needs: Wheeled Walker,    SUBJECTIVE:    Patient lives with wife and son  in a two story home resides first  with 4 steps to enter with 1Rail  Bed is on 1 floor and bath is on 1 floor. Patient ambulated independently  PTA. Equipment owned: [employer],      OBJECTIVE:   Initial Evaluation  Date: 22 Treatment  22 Short Term/ Long Term   Goals   AM-PAC 6 Clicks 10/46 88/13    Was pt agreeable to Eval/treatment? yes yes    Does pt have pain? Yes LLE with all activity. Yes LLE    Bed Mobility  Rolling: NT   Supine to sit:   Mod    Sit to supine: Min   Scooting: Mod   Rolling: NT   Supine to sit:   Mod primarily for management of LLE  Sit to supine: NT   Scooting: Min   Rolling: Ind  Supine to sit: Ind  Sit to supine: Ind  Scooting: Ind   Transfers Sit to stand: Mod x 2 to fww  Stand to sit: Mod x 2  Stand pivot: Mod x 2 with fww Sit to stand: Min to fww  Stand to sit: Min  Stand pivot: Min with fww Sit to stand: Mod Ind  to fww  Stand to sit: Mod Ind    Stand pivot: Mod Ind  with fww   Ambulation    side steps to [facility] and fwd 3 feet and backwards to bed. Decreased tolerance to WB LLE. Min A required with fww for gait 20 feet. Able to complete turns. Cues for walker sequencing. 25 feet with Mod Ind  fww   Stair negotiation: ascended and descended  NT NT Discussed sequencing for step[s.   4 steps with Min 1 rail    ROM BUE:  Defer to OT eval  BLE:  Decreased LLE. wfl Decreased LLE due to discomfort. Strength BUE: Defer to OT eval  RLE:  Grossly 4+/5  LLE:  Grossly  3+/5  4+/5   Balance Sitting EOB:  SBA for safety flexed posture. Dynamic Standing: Mod A x 2 with fww for support. Sitting EOB:  ind  Dynamic Standing: Mod Ind     Patient is Alert & Oriented x person, place, time and situation and follows directions   Sensation:  Pt denies numbness and tingling to extremities  Edema:  LLE  Therapeutic Exercises:  Functional activity as stated above. Patient education  Pt educated regarding role of PT evaluation and weight bearing precautions for LLE    Patient response to education:   Pt verbalized understanding Pt demonstrated skill Pt requires further education in this area   yes yes Reminders     ASSESSMENT:    Conditions Requiring Skilled Therapeutic Intervention:    [x]Decreased strength     [x]Decreased ROM  [x]Decreased functional mobility  [x]Decreased balance   [x]Decreased endurance   [x]Decreased posture  []Decreased sensation  []Decreased coordination   []Decreased vision  [x]Decreased safety awareness   []Increased pain       Comments:    Daughter present for entire session. Daughter states her brother will be available to assist once home. RN cleared patient for participation in therapy session. Patient was seen this date for PT treatment. Patient was agreeable to intervention. Results of the functional assessment are noted above. Upon entering the room patient was found supine in bed. Increased assistance required to transition to to EOB increased discomfort noted. . Sat EOB x 10 minutes to increase dynamic sitting balance and activity tolerance. Transfer completed to bedside chair initially. STS completed to fww with cues for sequencing. Mobility completed with fww for support. Only able to take side steps and fwd retro gait.  At end of session, patient in chair with  call light and phone within reach, all lines and tubes intact, nursing notified. Discussed car transfer sequencing. This patient can benefit from the continuation of skilled PT  to maximize functional level and return to PLOF. Treatment:  Patient practiced and was instructed in the following treatment:    · Bed mobility training - pt given verbal and tactile cues to facilitate proper sequencing and safety during rolling and supine>sit as well as provided with physical assistance to complete task    · Assistive device training - pt educated on using [address] during gait, [address] approximation/negotiation, and hand placement during sit<>stand to [address]  · STS and transfer training - educated on hand/foot placement, safety, and sequencing during STS and pivot transfers using assistive device  · Gait training - verbal cues for [address] approximation/negotiation, upright posture, and safety during 90 and 180 degree turns during gait   · Education in WBAT LLE,     Pt's/ family goals      1. Home with family when able. Prognosis is good  for reaching above PT goals. Patient and or family understand(s) diagnosis, prognosis, and plan of care.  yes,     PHYSICAL THERAPY PLAN OF CARE:    PT POC is established based on physician order and patient diagnosis     Referring provider/PT Order:  PT Eval and Treat   06/03/22 1715  PT evaluation and treat         Diya López DO     Diagnosis:  Trauma Jose Relic  Fall, initial encounter [W19. XXXA]  Specific instructions for next treatment:  Transfer to bedside chair, Increase ambulation distance and BLE therapeutic exercise  Current Treatment Recommendations:     [x] Strengthening to improve independence with functional mobility   [x] ROM to improve independence with functional mobility   [x] Balance Training to improve static/dynamic balance and to reduce fall risk  [x] Endurance Training to improve activity tolerance during functional mobility   [x] Transfer Training to improve safety and independence with all functional transfers   [x] Gait Training to improve gait mechanics, endurance and asses need for appropriate assistive device  [x] Stair Training in preparation for safe discharge home and/or into the community   [] Positioning to prevent skin breakdown and contractures  [x] Safety and Education Training   [] Patient/Caregiver Education   [] HEP  [] Other     PT long term treatment goals are located in above grid    Frequency of treatments: 2-5x/week x 1-2 weeks. Time in  1025  Time out  1050    Total Treatment Time  25 minutes     Evaluation Time includes thorough review of current medical information, gathering information on past medical history/social history and prior level of function, completion of standardized testing/informal observation of tasks, assessment of data and education on plan of care and goals.     CPT codes:  [] Low Complexity PT evaluation 21127  [] Moderate Complexity PT evaluation 00874  [] High Complexity PT evaluation 50917  [] PT Re-evaluation 34413  [] Gait training 06528 - minutes  [] Manual therapy 91138 - minutes  [x] Therapeutic activities 31399 25 minutes  [] Therapeutic exercises 52801 - minutes  [] Neuromuscular reeducation 30841 - minutes     Haile Power, [address] [facility] [place]

## 2022-06-06 NOTE — PROGRESS NOTES
[facility]  ATTENDING PHYSICIAN PROGRESS NOTE     I have examined the patient and  reviewed the record. I have reviewed all relevant labs and imaging data. The following summarizes my clinical findings and independent assessment. CC: Fall    S. Patient denies much pain. He is pleasantly confused. O.  GCS 14, pleasantly confused  No apparent distress  Lungs clear  Breathing comfortably  Abdomen soft nontender nondistended  Left hip incisions dressed, compartments soft    ASSESSMENT:  Principal Problem:    Trauma  Active Problems:    Closed fracture of neck of left femur (HCC)    Fall    Closed intertrochanteric fracture of hip, left, initial encounter ([facility] [address]    Acute blood loss anemia    Alcoholic encephalopathy (HCC)  Resolved Problems:    * No resolved hospital problems. *       PLAN:  Left femoral neck fractures-status post ORIF-weight-bear as tolerated per Ortho  Alcohol encephalopathy-continue monitor neuro status  A. fib-continue rate control  Hypertension-continue losartan  General diet  Acute blood loss anemia from trauma-continue monitor CBC  DVT Proph: SCDS/Lovenox  PT OT  Refer to discharge summary as part of the discharge planning. Swapna Marx MD, FACS  6/6/2022  12:43 PM    NOTE: This report was transcribed using voice recognition software. Every effort was made to ensure accuracy; however, inadvertent computerized transcription errors may be present.

## 2022-06-07 VITALS
HEIGHT: 71 IN | OXYGEN SATURATION: 97 % | WEIGHT: 147.27 LBS | DIASTOLIC BLOOD PRESSURE: 97 MMHG | SYSTOLIC BLOOD PRESSURE: 163 MMHG | TEMPERATURE: 99.1 F | BODY MASS INDEX: 20.62 KG/M2 | HEART RATE: 85 BPM | RESPIRATION RATE: 16 BRPM

## 2022-06-07 LAB
ANION GAP SERPL CALCULATED.3IONS-SCNC: 13 MMOL/L (ref 7–16)
BASOPHILS ABSOLUTE: 0.06 E9/L (ref 0–0.2)
BASOPHILS RELATIVE PERCENT: 0.8 % (ref 0–2)
BUN BLDV-MCNC: 10 MG/DL (ref 6–23)
CALCIUM SERPL-MCNC: 9 MG/DL (ref 8.6–10.2)
CHLORIDE BLD-SCNC: 103 MMOL/L (ref 98–107)
CO2: 25 MMOL/L (ref 22–29)
CREAT SERPL-MCNC: 0.7 MG/DL (ref 0.7–1.2)
EOSINOPHILS ABSOLUTE: 0.12 E9/L (ref 0.05–0.5)
EOSINOPHILS RELATIVE PERCENT: 1.5 % (ref 0–6)
GFR AFRICAN AMERICAN: >60
GFR NON-AFRICAN AMERICAN: >60 ML/MIN/1.73
GLUCOSE BLD-MCNC: 94 MG/DL (ref 74–99)
HCT VFR BLD CALC: 28.4 % (ref 37–54)
HEMOGLOBIN: 8.9 G/DL (ref 12.5–16.5)
IMMATURE GRANULOCYTES #: 0.05 E9/L
IMMATURE GRANULOCYTES %: 0.6 % (ref 0–5)
LYMPHOCYTES ABSOLUTE: 1.25 E9/L (ref 1.5–4)
LYMPHOCYTES RELATIVE PERCENT: 15.8 % (ref 20–42)
MCH RBC QN AUTO: 29 PG (ref 26–35)
MCHC RBC AUTO-ENTMCNC: 31.3 % (ref 32–34.5)
MCV RBC AUTO: 92.5 FL (ref 80–99.9)
MONOCYTES ABSOLUTE: 0.77 E9/L (ref 0.1–0.95)
MONOCYTES RELATIVE PERCENT: 9.8 % (ref 2–12)
NEUTROPHILS ABSOLUTE: 5.64 E9/L (ref 1.8–7.3)
NEUTROPHILS RELATIVE PERCENT: 71.5 % (ref 43–80)
PDW BLD-RTO: 13 FL (ref 11.5–15)
PLATELET # BLD: 281 E9/L (ref 130–450)
PMV BLD AUTO: 10.7 FL (ref 7–12)
POTASSIUM REFLEX MAGNESIUM: 3.8 MMOL/L (ref 3.5–5)
RBC # BLD: 3.07 E12/L (ref 3.8–5.8)
SODIUM BLD-SCNC: 141 MMOL/L (ref 132–146)
WBC # BLD: 7.9 E9/L (ref 4.5–11.5)

## 2022-06-07 PROCEDURE — 6370000000 HC RX 637 (ALT 250 FOR IP): Performed by: ORTHOPAEDIC SURGERY

## 2022-06-07 PROCEDURE — 80048 BASIC METABOLIC PNL TOTAL CA: CPT

## 2022-06-07 PROCEDURE — 85025 COMPLETE CBC W/AUTO DIFF WBC: CPT

## 2022-06-07 PROCEDURE — 2580000003 HC RX 258: Performed by: ORTHOPAEDIC SURGERY

## 2022-06-07 PROCEDURE — 6370000000 HC RX 637 (ALT 250 FOR IP): Performed by: STUDENT IN AN ORGANIZED HEALTH CARE EDUCATION/TRAINING PROGRAM

## 2022-06-07 PROCEDURE — 99238 HOSP IP/OBS DSCHRG MGMT 30/<: CPT | Performed by: SURGERY

## 2022-06-07 PROCEDURE — 36415 COLL VENOUS BLD VENIPUNCTURE: CPT

## 2022-06-07 PROCEDURE — 6370000000 HC RX 637 (ALT 250 FOR IP): Performed by: SURGERY

## 2022-06-07 PROCEDURE — 94640 AIRWAY INHALATION TREATMENT: CPT

## 2022-06-07 PROCEDURE — 6360000002 HC RX W HCPCS: Performed by: ORTHOPAEDIC SURGERY

## 2022-06-07 PROCEDURE — 97530 THERAPEUTIC ACTIVITIES: CPT

## 2022-06-07 RX ORDER — POLYETHYLENE GLYCOL 3350 17 G/17G
17 POWDER, FOR SOLUTION ORAL DAILY
Qty: 527 G | Refills: 1 | Status: SHIPPED | OUTPATIENT
Start: 2022-06-07 | End: 2022-07-07

## 2022-06-07 RX ORDER — METHOCARBAMOL 500 MG/1
1000 TABLET, FILM COATED ORAL 4 TIMES DAILY
Qty: 80 TABLET | Refills: 0 | Status: SHIPPED | OUTPATIENT
Start: 2022-06-07 | End: 2022-06-08 | Stop reason: SDUPTHER

## 2022-06-07 RX ORDER — SENNA AND DOCUSATE SODIUM 50; 8.6 MG/1; MG/1
1 TABLET, FILM COATED ORAL 2 TIMES DAILY
Qty: 60 TABLET | Refills: 0 | Status: SHIPPED | OUTPATIENT
Start: 2022-06-07 | End: 2022-08-30 | Stop reason: ALTCHOICE

## 2022-06-07 RX ADMIN — ACETAMINOPHEN 650 MG: 325 TABLET ORAL at 08:11

## 2022-06-07 RX ADMIN — Medication 1 TABLET: at 08:11

## 2022-06-07 RX ADMIN — GUAIFENESIN 400 MG: 400 TABLET ORAL at 08:10

## 2022-06-07 RX ADMIN — FOLIC ACID 1 MG: 1 TABLET ORAL at 08:10

## 2022-06-07 RX ADMIN — IPRATROPIUM BROMIDE AND ALBUTEROL SULFATE 1 AMPULE: .5; 2.5 SOLUTION RESPIRATORY (INHALATION) at 12:10

## 2022-06-07 RX ADMIN — ENOXAPARIN SODIUM 30 MG: 100 INJECTION SUBCUTANEOUS at 08:11

## 2022-06-07 RX ADMIN — SODIUM CHLORIDE, PRESERVATIVE FREE 10 ML: 5 INJECTION INTRAVENOUS at 08:15

## 2022-06-07 RX ADMIN — Medication 100 MG: at 08:11

## 2022-06-07 RX ADMIN — METHOCARBAMOL 1000 MG: 500 TABLET ORAL at 13:41

## 2022-06-07 RX ADMIN — METHOCARBAMOL 1000 MG: 500 TABLET ORAL at 08:11

## 2022-06-07 RX ADMIN — ACETAMINOPHEN 650 MG: 325 TABLET ORAL at 13:41

## 2022-06-07 RX ADMIN — IPRATROPIUM BROMIDE AND ALBUTEROL SULFATE 1 AMPULE: .5; 2.5 SOLUTION RESPIRATORY (INHALATION) at 08:40

## 2022-06-07 RX ADMIN — SENNOSIDES AND DOCUSATE SODIUM 1 TABLET: 50; 8.6 TABLET ORAL at 08:11

## 2022-06-07 RX ADMIN — LOSARTAN POTASSIUM 50 MG: 50 TABLET, FILM COATED ORAL at 08:10

## 2022-06-07 NOTE — DISCHARGE INSTRUCTIONS
Orthopedics  Weight bearing Status - Weight bearing as tolerated - Left leg  Pain medication Per Prescription  Ice and Elevate effected Extremity  Continue DVT Prophylaxis  Wound care - ok to remove dressing on post operative day 7 cover with a clean dry dressing as needed for drainage  Follow Up in Office in 2 weeks with Dr. Rafiq Hyde, call to schedule an appointment        [address]    Call [phone], option 2, for any questions/concerns. Please follow the instructions checked below:  Please follow-up with your primary care provider. ACTIVITY INSTRUCTIONS  Increase activity as tolerated  No heavy lifting or strenuous activity  Take your incentive spirometer home and use 4-6 times/day   [x]  No driving until cleared by Orthopedic surgery. WOUND/DRESSING INSTRUCTIONS:  You may shower. No sitting in bath tub, hot tub or swimming until cleared by physician. Ice to areas of pain for first 24 hours. Heat to areas of pain after that. Wash areas of lacerations/abrasions with soap & water. Rinse well. Pat dry with clean towel. Apply thin layer of Bacitracin, Neosporin, or triple antibiotic cream to affected area 2-3 times per day. Keep wounds clean and dry. []  Sutures/Staples are to be removed in *** {DAY/WK/MON/YRS:20513}. MEDICATION INSTRUCTIONS  Take medication as prescribed. When taking pain medications, you may experience dizziness or drowsiness. Do not drink alcohol or drive when taking these medications. You may experience constipation while taking pain medication. You may take over the counter stool softeners such as docusate (Colace), sennosides S (Senokot-S), or Miralax. [x]  You may take acetaminophen (Tylenol) products. Do NOT take more than 4000mg of Tylenol in 24h. []  Do not take any other acetaminophen (Tylenol) products if you are taking Percocet or Norco, as these contain Tylenol. --Do NOT take more than 4000mg of Tylenol in 24h.     OPIOID MEDICATION INSTRUCTIONS  Read the medication guide that is included with your prescription. Take your medication exactly as prescribed. Store medication away from children and in a safe place. Do NOT share your medication with others. Do NOT take medication unless it is prescribed for you. Do NOT drink alcohol while taking opioids (I.e., Norco, Percocet, Oxycodone, etc). Discuss with the Trauma Clinic staff if the dose of medication you are taking does not control your pain and any side effects that you may be having. CALL 911 OR YOUR LOCAL EMERGENCY SERVICE:  --If you take too much medication  --If you have trouble breathing or shortness of breath  --A child has taken this medication. WORK:  You may not return to work until you receive follow-up with the Trauma Clinic or clearance by all consultants. Call the trauma clinic for any of the following or for questions/concerns;  --fever over 101F  --redness, swelling, hardness or warmth at the wound site(s). --Unrelieved nausea/vomiting  --Foul smelling or cloudy drainage at the wound site(s)  --Unrelieved pain or increase in pain  --Increase in shortness of breath    Follow-up:  Trauma Clinic: [phone] option [address]    SPECIAL CONSIDERATIONS FOR OUR PATIENTS OVER THE AGE OF 65Y    Getting around your home safely can be a challenge if you have injuries or health problems that make it easy for you to fall. Loose rugs and furniture in walkways are among the dangers for many older people who have problems walking or who have poor eyesight. People who have conditions such as arthritis, osteoporosis, or dementia also must be careful not to fall. You can make your home safer with a few simple measures. Follow-up care is a key part of your treatment and safety.  Be sure to make and go to all appointments, and call your doctor or nurse call line if you are having problems. It's also a good idea to know your test results and keep a list of the medicines you take. How can you care for yourself at home? Taking care of yourself   You may get dizzy if you do not drink enough water. To prevent dehydration, drink plenty of fluids, enough so that your urine is light yellow or clear like water. Choose water and other caffeine-free clear liquids. If you have kidney, heart, or liver disease and have to limit fluids, talk with your doctor before you increase the amount of fluids you drink.  Exercise regularly to improve your strength, muscle tone, and balance. Walk if you can. Swimming may be a good choice if you cannot walk easily.  Have your vision and hearing checked each year or any time you notice a change. If you have trouble seeing and hearing, you might not be able to avoid objects and could lose your balance.  Know the side effects of the medicines you take. Ask your doctor or pharmacist whether the medicines you take can affect your balance. Sleeping pills or sedatives can affect your balance.  Limit the amount of alcohol you drink. Alcohol can impair your balance and other senses.  Ask your doctor whether calluses or corns on your feet need to be removed. If you wear loose-fitting shoes because of calluses or corns, you can lose your balance and fall.  Talk to your doctor if you have numbness in your feet. Preventing falls at home   Remove raised doorway thresholds, throw rugs, and clutter. Repair loose carpet or raised areas in the floor. [facility] furniture and electrical cords to keep them out of walking paths.  Use non-skid floor wax, and wipe up spills right away, especially on ceramic tile floors.  If you use a walker or cane, put rubber tips on it. If you use crutches, clean the bottoms of them regularly with an abrasive pad, such as steel wool.  Keep your house well lit, especially [place] Diver, and outside walkways.  Use night-lights in areas such as hallways and washrooms. Add extra light switches or use remote switches (such as switches that go on or off when you clap your hands) to make it easier to turn lights on if you have to get up during the night.  Install sturdy handrails on stairways.  Move items in your cabinets so that the things you use a lot are on the lower shelves (about waist level).  Keep a cordless phone and a flashlight with new batteries by your bed. If possible, put a phone in each of the main rooms of your house, or carry a cell phone in case you fall and cannot reach a phone. Or, you can wear a device around your neck or wrist. You push a button that sends a signal for help.  Wear low-heeled shoes that fit well and give your feet good support. Use footwear with non-skid soles. Check the heels and soles of your shoes for wear. Repair or replace worn heels or soles.  Do not wear socks without shoes on wood floors.  Walk on the grass when the sidewalks are slippery. If you live in an area that gets snow and ice in the winter, sprinkle salt on slippery steps and sidewalks. Preventing falls in the bath   Install grab bars and non-skid mats inside and outside your shower or tub and near the toilet and sinks.  Use shower chairs and bath benches.  Use a hand-held shower head that will allow you to sit while showering.  Get into a tub or shower by putting the weaker leg in first. Get out of a tub or shower with your strong side first.   Repair loose toilet seats and consider installing a raised toilet seat to make getting on and off the toilet easier.  Keep your washroom door unlocked while you are in the shower.     Follow-up:  Trauma Clinic: [phone] option [address]  [place], [phone] [address]

## 2022-06-07 NOTE — PROGRESS NOTES
Physical Therapy  Physical Therapy Treatment Note    Name: [place]  : 1945  MRN: [id number]      Date of Service: 2022    Evaluating PT:  Shree Laboy, PT [id number]      Room #:  3747/5711-B  Diagnosis:  Trauma Beatrice Celenaers  Fall, initial encounter [W19. XXXA]  PMHx/PSHx:     has no past medical history on file. has a past surgical history that includes Hip fracture surgery (Left, 2022). · Procedure/Surgery:  22 S/P left hip cephalomedullary nail-  Precautions:  Falls,  WBAT (weight bearing as tolerated) LLE, bed alarm for safety. Equipment Needs: Wheeled Walker,    SUBJECTIVE:    Patient lives with wife and son  in a two story home resides first  with 4 steps to enter with 1Rail  Bed is on 1 floor and bath is on 1 floor. Patient ambulated independently  PTA. Equipment owned: Eufemia Velásquez      OBJECTIVE:   Initial Evaluation  Date: 22 Treatment  22 Short Term/ Long Term   Goals   AM-PAC 6 Clicks 68/05 82/85    Was pt agreeable to Eval/treatment? yes yes    Does pt have pain? Yes LLE with all activity. None     Bed Mobility  Rolling: NT   Supine to sit:   Mod    Sit to supine: Min   Scooting: Mod   Rolling: NT   Supine to sit: mod A   Sit to supine: NT   Scooting: Min A Rolling: Ind  Supine to sit: Ind  Sit to supine: Ind  Scooting: Ind   Transfers Sit to stand: Mod x 2 to fww  Stand to sit: Mod x 2  Stand pivot: Mod x 2 with fww Sit to stand: Min A  Stand to sit: Min A  Stand pivot: Min A  Sit to stand: Mod Ind  to fww  Stand to sit: Mod Ind    Stand pivot: Mod Ind  with fww   Ambulation    side steps to [facility] and fwd 3 feet and backwards to bed. Decreased tolerance to WB LLE. 20 feet and 40 feet with ww with min A.  25 feet with Mod Ind  fww   Stair negotiation: ascended and descended  NT 4 steps with bilateral rails with mod A 4 steps with Min 1 rail    ROM BUE:  Defer to OT eval  BLE:  Decreased LLE.  wfl      Strength BUE: Defer to OT eval  RLE:  Grossly 4+/5  LLE: Grossly  3+/5  4+/5   Balance Sitting EOB:  SBA for safety flexed posture. Dynamic Standing: Mod A x 2 with fww for support. Sitting EOB:  ind  Dynamic Standing: Mod Ind         Patient education  Pt educated on hand placement with transfers     Patient response to education:   Pt verbalized understanding Pt demonstrated skill Pt requires further education in this area   x Partially with verbal cues  x     ASSESSMENT:    Conditions Requiring Skilled Therapeutic Intervention:    [x]Decreased strength     [x]Decreased ROM  [x]Decreased functional mobility  [x]Decreased balance   [x]Decreased endurance   [x]Decreased posture  []Decreased sensation  []Decreased coordination   []Decreased vision  [x]Decreased safety awareness   []Increased pain       Comments:    Pt in bed upon arrival and agreed to participate in therapy. Pt completed functional mobility as noted above. Assistance with L LE management with bed mobility. Difficulty noted with trunk management also with bed mobility. Verbal cues to increased step length bilaterally especially with L LE during gait. Decreased weight bearing noted with ambulation. Pt requires hands on assist with all mobility at this time. Discussed with pt needing assist at home and pt reported that son will provide assistance. Recommend 24 hr assistance upon discharge. Pt returned to chair with call light in reach. This patient can benefit from the continuation of skilled PT  to maximize functional level and return to PLOF. Treatment:  Patient practiced and was instructed in the following treatment:    · Bed mobility training - Verbal instruction for technique with bed mobility. Assistance required to complete task. · Transfer training - Verbal instruction for hand placement and technique to improve safety and balance. Assistance required to complete task. · Gait training -Verbal instruction for sequencing and technique with gait. Assistance required to complete task. Poor carry over noted. · Stair negotiation - verbal instruction for technique and sequencing with stair negotiation. Assistance required to complete task. Constant verbal cues for technique with decreased carry over. Pt's/ family goals      1. Home with family when able. Prognosis is good  for reaching above PT goals. Patient and or family understand(s) diagnosis, prognosis, and plan of care.  yes,     PHYSICAL THERAPY PLAN OF CARE:    PT POC is established based on physician order and patient diagnosis     Referring provider/PT Order:  PT Eval and Treat   06/03/22 1715  PT evaluation and treat         Meena Antoine DO     Diagnosis:  Trauma Steven Mullet  Fall, initial encounter [W19. XXXA]  Specific instructions for next treatment:  Transfer to bedside chair, Increase ambulation distance and BLE therapeutic exercise  Current Treatment Recommendations:     [x] Strengthening to improve independence with functional mobility   [x] ROM to improve independence with functional mobility   [x] Balance Training to improve static/dynamic balance and to reduce fall risk  [x] Endurance Training to improve activity tolerance during functional mobility   [x] Transfer Training to improve safety and independence with all functional transfers   [x] Gait Training to improve gait mechanics, endurance and asses need for appropriate assistive device  [x] Stair Training in preparation for safe discharge home and/or into the community   [] Positioning to prevent skin breakdown and contractures  [x] Safety and Education Training   [] Patient/Caregiver Education   [] HEP  [] Other     PT long term treatment goals are located in above grid    Frequency of treatments: 2-5x/week x 1-2 weeks.     Time in  1055  Time out  1135    Total Treatment Time 40minutes         CPT codes:  [] Low Complexity PT evaluation 26674  [] Moderate Complexity PT evaluation 20002  [] High Complexity PT evaluation 47080  [] PT Re-evaluation 61927  [] Gait training 72372 - minutes  [] Manual therapy 62597 - minutes  [x] Therapeutic activities 10814 40 minutes  [] Therapeutic exercises 06303 - minutes  [] Neuromuscular reeducation 17758 - minutes     Joseluis Matter [id number]

## 2022-06-07 NOTE — PROGRESS NOTES
[facility]  ATTENDING PHYSICIAN PROGRESS NOTE     I have examined the patient and  reviewed the record. I have reviewed all relevant labs and imaging data. The following summarizes my clinical findings and independent assessment. CC: Fall    S. Patient was a come home. He is no complaints. He remains on room air    O. On room air  GCS 14, pleasantly confused  No apparent distress  Lungs clear  Breathing comfortably  Abdomen soft nontender nondistended  Left hip incisions dressed, compartments soft    ASSESSMENT:  Principal Problem:    Trauma  Active Problems:    Closed fracture of neck of left femur (HCC)    Fall    Closed intertrochanteric fracture of hip, left, initial encounter ([facility] [address]    Acute blood loss anemia    Alcoholic encephalopathy (HCC)  Resolved Problems:    * No resolved hospital problems. *       PLAN:  Left femoral neck fractures-status post ORIF-weight-bear as tolerated per Ortho  Alcohol encephalopathy-continue monitor neuro status  A. fib-continue rate control  Hypertension-continue losartan  General diet  Acute blood loss anemia from trauma-continue monitor CBC  DVT Proph: SCDS/Lovenox  PT OT    Plan on discharge home today-Home health care has been set up  Refer to discharge summary as part of the discharge planning. Dav Kraus MD, FACS  6/7/2022  9:28 AM    NOTE: This report was transcribed using voice recognition software. Every effort was made to ensure accuracy; however, inadvertent computerized transcription errors may be present.

## 2022-06-08 DIAGNOSIS — S72.002A CLOSED FRACTURE OF LEFT HIP, INITIAL ENCOUNTER (HCC): Primary | ICD-10-CM

## 2022-06-08 RX ORDER — SENNA AND DOCUSATE SODIUM 50; 8.6 MG/1; MG/1
1 TABLET, FILM COATED ORAL DAILY
Qty: 30 TABLET | Refills: 0 | Status: SHIPPED
Start: 2022-06-08 | End: 2022-06-16 | Stop reason: SDUPTHER

## 2022-06-08 RX ORDER — POLYETHYLENE GLYCOL 3350 17 G/17G
17 POWDER, FOR SOLUTION ORAL DAILY
Qty: 510 G | Refills: 0 | Status: SHIPPED | OUTPATIENT
Start: 2022-06-08 | End: 2022-07-08

## 2022-06-08 RX ORDER — METHOCARBAMOL 500 MG/1
1000 TABLET, FILM COATED ORAL 4 TIMES DAILY
Qty: 80 TABLET | Refills: 0 | Status: SHIPPED | OUTPATIENT
Start: 2022-06-08 | End: 2022-06-18

## 2022-06-08 NOTE — TELEPHONE ENCOUNTER
Patient was discharged from hospital without his medication. Family is requesting we send it to a pharmacy closer to home. OARRS completed. Patient is 4 days out from left intertrochanteric hip fracture ORIF. Post op appt is scheduled for 6/16.

## 2022-06-10 ENCOUNTER — TELEPHONE (OUTPATIENT)
Dept: PRIMARY CARE CLINIC | Age: 77
End: 2022-06-10

## 2022-06-10 NOTE — TELEPHONE ENCOUNTER
Leila Barker with [facility] calling to give information on a patient that was seen today. Patient had a coughing spell this morning when he woke up. Lasted several minutes and it was very hard for patient to catch his breath. Frightful for son because he was not sure what to do.  Having congestion and increased phlegm since December after being released from hospital.     Please call son with any additional information if needed

## 2022-06-13 NOTE — TELEPHONE ENCOUNTER
Son returned call and was notified. States that he had 2 more spells over the weekend. Wanted an appointment with PCP. Offered and explained express care and son said since his hip surgery patients mobility isn't the best. Wanted appointment kept with PCP, but if patient would rather come in through express, they will do that.

## 2022-06-14 LAB
EKG ATRIAL RATE: 340 BPM
EKG Q-T INTERVAL: 352 MS
EKG QRS DURATION: 86 MS
EKG QTC CALCULATION (BAZETT): 385 MS
EKG R AXIS: 5 DEGREES
EKG T AXIS: 42 DEGREES
EKG VENTRICULAR RATE: 72 BPM

## 2022-06-15 ENCOUNTER — TELEPHONE (OUTPATIENT)
Dept: ORTHOPEDIC SURGERY | Age: 77
End: 2022-06-15

## 2022-06-15 DIAGNOSIS — S72.002A CLOSED FRACTURE OF LEFT HIP, INITIAL ENCOUNTER (HCC): Primary | ICD-10-CM

## 2022-06-16 ENCOUNTER — OFFICE VISIT (OUTPATIENT)
Dept: ORTHOPEDIC SURGERY | Age: 77
End: 2022-06-16

## 2022-06-16 ENCOUNTER — OFFICE VISIT (OUTPATIENT)
Dept: PRIMARY CARE CLINIC | Age: 77
End: 2022-06-16
Payer: MEDICARE

## 2022-06-16 VITALS
OXYGEN SATURATION: 100 % | HEART RATE: 65 BPM | WEIGHT: 147 LBS | BODY MASS INDEX: 20.58 KG/M2 | DIASTOLIC BLOOD PRESSURE: 60 MMHG | HEIGHT: 71 IN | SYSTOLIC BLOOD PRESSURE: 102 MMHG | TEMPERATURE: 97.8 F

## 2022-06-16 VITALS — HEIGHT: 71 IN | WEIGHT: 147 LBS | BODY MASS INDEX: 20.58 KG/M2

## 2022-06-16 DIAGNOSIS — R05.9 COUGH: Primary | ICD-10-CM

## 2022-06-16 DIAGNOSIS — S72.002A CLOSED FRACTURE OF LEFT HIP, INITIAL ENCOUNTER (HCC): Primary | ICD-10-CM

## 2022-06-16 DIAGNOSIS — J41.1 MUCOPURULENT CHRONIC BRONCHITIS (HCC): ICD-10-CM

## 2022-06-16 DIAGNOSIS — S72.002D CLOSED FRACTURE OF NECK OF LEFT FEMUR WITH ROUTINE HEALING, SUBSEQUENT ENCOUNTER: ICD-10-CM

## 2022-06-16 PROCEDURE — 99213 OFFICE O/P EST LOW 20 MIN: CPT | Performed by: FAMILY MEDICINE

## 2022-06-16 PROCEDURE — 1124F ACP DISCUSS-NO DSCNMKR DOCD: CPT | Performed by: FAMILY MEDICINE

## 2022-06-16 PROCEDURE — 99024 POSTOP FOLLOW-UP VISIT: CPT | Performed by: ORTHOPAEDIC SURGERY

## 2022-06-16 RX ORDER — AMOXICILLIN AND CLAVULANATE POTASSIUM 875; 125 MG/1; MG/1
1 TABLET, FILM COATED ORAL 2 TIMES DAILY
Qty: 20 TABLET | Refills: 0 | Status: SHIPPED | OUTPATIENT
Start: 2022-06-16 | End: 2022-06-26

## 2022-06-16 RX ORDER — ALBUTEROL SULFATE 90 UG/1
2 AEROSOL, METERED RESPIRATORY (INHALATION) 4 TIMES DAILY PRN
Qty: 54 G | Refills: 1 | Status: SHIPPED
Start: 2022-06-16 | End: 2022-08-30

## 2022-06-16 RX ORDER — GUAIFENESIN AND CODEINE PHOSPHATE 100; 10 MG/5ML; MG/5ML
5 SOLUTION ORAL EVERY 4 HOURS PRN
Qty: 237 ML | Refills: 0 | Status: SHIPPED | OUTPATIENT
Start: 2022-06-16 | End: 2022-06-24

## 2022-06-16 RX ORDER — GLYCOPYRROLATE 1 MG/1
0.5 TABLET ORAL 2 TIMES DAILY
Qty: 90 TABLET | Refills: 3 | Status: SHIPPED | OUTPATIENT
Start: 2022-06-16

## 2022-06-16 ASSESSMENT — PATIENT HEALTH QUESTIONNAIRE - PHQ9
SUM OF ALL RESPONSES TO PHQ QUESTIONS 1-9: 0
SUM OF ALL RESPONSES TO PHQ QUESTIONS 1-9: 0
2. FEELING DOWN, DEPRESSED OR HOPELESS: 0
SUM OF ALL RESPONSES TO PHQ9 QUESTIONS 1 & 2: 0
1. LITTLE INTEREST OR PLEASURE IN DOING THINGS: 0
SUM OF ALL RESPONSES TO PHQ QUESTIONS 1-9: 0
SUM OF ALL RESPONSES TO PHQ QUESTIONS 1-9: 0

## 2022-06-16 ASSESSMENT — ENCOUNTER SYMPTOMS
SORE THROAT: 0
VOMITING: 0
COUGH: 1
BACK PAIN: 0
BLOOD IN STOOL: 0
PHOTOPHOBIA: 0
DIARRHEA: 0
SHORTNESS OF BREATH: 0
ABDOMINAL PAIN: 0
CONSTIPATION: 0
NAUSEA: 0

## 2022-06-16 NOTE — PROGRESS NOTES
emergency department after recent fall. He fell off a porch and broke his left hip. Patient was taken to surgery for ORIF. Follow-up was done today with the orthopedic surgeon who stated that all hardware appeared to be in place with expected healing parameters. Patient states that she has been having more frequent cough with sputum production. Denies any fever or chills. CT scan done in the hospital on 6/3/2022 is noted below    1.  Small areas of ground-glass opacification along with centrilobular   nodules in the posterior right upper lobe and superior segment of the right   lower lobe.  Milder findings are noted involving the lateral right upper   lobe.  Rule out aspiration pneumonia.       2.  Ill-defined, ground-glass opacity symmetrically and dependently within   the lower lobes.  Some of this again could represent aspiration pneumonia,   however, this could also represent atelectasis.  A combination of both can   also be present.       3.  Small amount of decreased attenuation within the proximal right mainstem   bronchus.  Question mucous or aspirated material.       4.  Mild centrilobular emphysema again suggested, when compared to the   previous study performed 05/05/2022.       5.  Small amount of fluid in the mid and distal esophagus, probably secondary   to reflux.       6.  Small hiatal hernia again noted.             Review of Systems   Constitutional: Negative for chills and fever. HENT: Negative for congestion, hearing loss, nosebleeds and sore throat. Eyes: Negative for photophobia. Respiratory: Positive for cough. Negative for shortness of breath. Cardiovascular: Negative for chest pain, palpitations and leg swelling. Gastrointestinal: Negative for abdominal pain, blood in stool, constipation, diarrhea, nausea and vomiting. Endocrine: Negative for polydipsia. Genitourinary: Negative for dysuria, frequency, hematuria and urgency.    Musculoskeletal: Negative for back pain and myalgias. Skin: Negative. Neurological: Negative for dizziness, tremors, weakness and headaches. Hematological: Does not bruise/bleed easily. Psychiatric/Behavioral: Positive for behavioral problems, decreased concentration and dysphoric mood. Negative for hallucinations and suicidal ideas. The patient is nervous/anxious. All other systems reviewed and are negative. Current Outpatient Medications:     amoxicillin-clavulanate (AUGMENTIN) 875-125 MG per tablet, Take 1 tablet by mouth 2 times daily for 10 days, Disp: 20 tablet, Rfl: 0    glycopyrrolate (ROBINUL) 1 MG tablet, Take 0.5 tablets by mouth 2 times daily, Disp: 90 tablet, Rfl: 3    guaiFENesin-codeine (CHERATUSSIN AC) 100-10 MG/5ML syrup, Take 5 mLs by mouth every 4 hours as needed for Cough for up to 8 days. , Disp: 237 mL, Rfl: 0    albuterol sulfate HFA (VENTOLIN HFA) 108 (90 Base) MCG/ACT inhaler, Inhale 2 puffs into the lungs 4 times daily as needed for Wheezing, Disp: 54 g, Rfl: 1    polyethylene glycol (GLYCOLAX) 17 GM/SCOOP powder, Take 17 g by mouth daily, Disp: 510 g, Rfl: 0    methocarbamol (ROBAXIN) 500 MG tablet, Take 2 tablets by mouth 4 times daily for 10 days, Disp: 80 tablet, Rfl: 0    polyethylene glycol (GLYCOLAX) 17 g packet, Take 17 g by mouth daily, Disp: 527 g, Rfl: 1    sennosides-docusate sodium (SENOKOT-S) 8.6-50 MG tablet, Take 1 tablet by mouth 2 times daily, Disp: 60 tablet, Rfl: 0    aspirin 325 mg EC tablet, Take 1 tablet by mouth 2 times daily for 28 days, Disp: 56 tablet, Rfl: 0    oxybutynin (DITROPAN) 5 MG tablet, TAKE ONE TABLET BY MOUTH TWICE A DAY, Disp: 60 tablet, Rfl: 0    tamsulosin (FLOMAX) 0.4 MG capsule, TAKE 1 CAPSULE BY MOUTH ONCE DAILY, Disp: 30 capsule, Rfl: 5    losartan (COZAAR) 50 MG tablet, TAKE 1 TABLET BY MOUTH ONCE DAILY, Disp: 30 tablet, Rfl: 2    B Complex-Biotin-FA (B COMPLEX 100 TR) TBCR, 1 tablet daily, Disp: 90 tablet, Rfl: 3    folic acid (FOLATE) 211 MCG tablet, Take 1 tablet by mouth daily, Disp: 30 tablet, Rfl: 3   Patient Active Problem List   Diagnosis    Essential hypertension    Atrial fibrillation (HCC)    Hypokalemia    Functional diarrhea    Chronic obstructive pulmonary disease (HCC)    Alcohol abuse    Hepatic abscess    Portal vein thrombosis    History of squamous cell carcinoma of skin    Generalized osteoarthritis of multiple sites    History of cholecystectomy    Malignant neoplasm of glottis (HCC)    Malignant neoplasm of skin of parts of face    Urinary frequency    Trauma    [facility] of [facility]    Fall    Closed intertrochanteric fracture of hip, left, initial encounter ([facility]    Acute blood loss anemia    Alcoholic encephalopathy (HCC)     Past Medical History:   Diagnosis Date    A-fib ([facility]     follows with PCP only    Hx of blood clots     Hypertension     Pneumonia 2021    Squamous cell skin cancer     Stomach ulcer      Past Surgical History:   Procedure Laterality Date    CHOLECYSTECTOMY, LAPAROSCOPIC N/A 2/25/2021    CHOLECYSTECTOMY LAPAROSCOPIC ROBOTIC XI performed by Julio C Beaulieu MD at [facility] CT NEEDLE BIOPSY LIVER PERCUTANEOUS  2/1/2021    CT NEEDLE BIOPSY LIVER PERCUTANEOUS 2/1/2021 [facility] CT    ERCP N/A 1/29/2021    ERCP STONE REMOVAL performed by Ana Rosa Resendez MD at [facility] ENDOSCOPY    ERCP N/A 3/22/2021    ERCP WITH STENT REMOVAL performed by Ana Rosa Resendez MD at [address] [place] [address] Pkwy Left 6/4/2022    LEFT INTERTROCHANTERIC HIP FRACTURE OPEN REDUCTION INTERNAL FIXATION performed by Ara Saab MD at [facility] OTHER SURGICAL HISTORY Left 2009    left sided ear surgery    PICC LINE INSERTION NURSE  2/6/2021          Social History     Socioeconomic History    Marital status:      Spouse name: Not on file    Number of children: Not on file    Years of education: Not on file    Highest education level: Not on file   Occupational History    Not on file   Tobacco Use    Smoking status: Former Smoker     Packs/day: 1.00     Years: 43.00     Pack years: 43.00     Start date: 1963     Quit date: 2006     Years since quittin.1    Smokeless tobacco: Never Used   Vaping Use    Vaping Use: Never used   Substance and Sexual Activity    Alcohol use: Yes     Alcohol/week: 30.0 standard drinks     Types: 30 Cans of beer per week     Comment: NONE SINCE 1/15/21    Drug use: No    Sexual activity: Not Currently     Partners: Female   Other Topics Concern    Not on file   Social History Narrative    ** Merged History Encounter **          Social Determinants of Health     Financial Resource Strain:     Difficulty of Paying Living Expenses: Not on file   Food Insecurity:     Worried About Running Out of Food in the Last Year: Not on file    Mike of Food in the Last Year: Not on file   Transportation Needs:     Lack of Transportation (Medical): Not on file    Lack of Transportation (Non-Medical): Not on file   Physical Activity:     Days of Exercise per Week: Not on file    Minutes of Exercise per Session: Not on file   Stress:     Feeling of Stress : Not on file   Social Connections:     Frequency of Communication with Friends and Family: Not on file    Frequency of Social Gatherings with Friends and Family: Not on file    Attends Worship Services: Not on file    Active Member of [address] [place] [address] or Organizations: Not on file    Attends Club or Organization Meetings: Not on file    Marital Status: Not on file   Intimate Partner Violence:     Fear of Current or Ex-Partner: Not on file    Emotionally Abused: Not on file    Physically Abused: Not on file    Sexually Abused: Not on file   Housing Stability:     Unable to Pay for Housing in the Last Year: Not on file    Number of [place] in the Last Year: Not on file    Unstable Housing in the Last Year: Not on file     No family history on file.    There are no preventive care reminders to display for this patient. There are no preventive care reminders to display for this patient. There are no preventive care reminders to display for this patient. Health Maintenance Due   Topic    Pneumococcal 65+ years Vaccine (1 - PCV)    Shingles vaccine (1 of 2)      Health Maintenance   Topic Date Due    Pneumococcal 65+ years Vaccine (1 - PCV) Never done    Shingles vaccine (1 of 2) Never done    Colorectal Cancer Screen  04/27/2022    Depression Screen  06/22/2022    Annual Wellness Visit (AWV)  06/23/2022    Flu vaccine (Season Ended) 09/01/2022    Lipids  04/04/2027    DTaP/Tdap/Td vaccine (3 - Td or Tdap) 06/03/2032    COVID-19 Vaccine  Completed    AAA screen  Completed    Hepatitis C screen  Completed    Hepatitis A vaccine  Aged Out    Hepatitis B vaccine  Aged Out    Hib vaccine  Aged Out    Meningococcal (ACWY) vaccine  Aged Out      There are no preventive care reminders to display for this patient. There are no preventive care reminders to display for this patient. /60   Pulse 65   Temp 97.8 °F (36.6 °C)   Ht 5' 11\" (1.803 m)   Wt 147 lb (66.7 kg)   SpO2 100%   BMI 20.50 kg/m²     Objective   Physical Exam  Vitals reviewed. HENT:      Head: Normocephalic and atraumatic. Right Ear: External ear normal.      Ears:      Comments: Left ear missing  Eyes:      General: No scleral icterus. Conjunctiva/sclera: Conjunctivae normal.      Pupils: Pupils are equal, round, and reactive to light. Neck:      Thyroid: No thyromegaly. Cardiovascular:      Rate and Rhythm: Normal rate. Rhythm irregular. Heart sounds: Normal heart sounds. No murmur heard. Pulmonary:      Effort: Pulmonary effort is normal.      Breath sounds: Decreased air movement present. Decreased breath sounds and wheezing present. No rales. Abdominal:      General: Bowel sounds are normal. There is no distension. Palpations: Abdomen is soft.       Tenderness: There is no abdominal tenderness. Musculoskeletal:         General: Normal range of motion. Cervical back: Neck supple. Lymphadenopathy:      Cervical: No cervical adenopathy. Skin:     General: Skin is warm and dry. Findings: No erythema or rash. Neurological:      Mental Status: He is alert and oriented to person, place, and time. Cranial Nerves: No cranial nerve deficit. Psychiatric:         Attention and Perception: Attention normal.         Mood and Affect: Affect is flat. Speech: Speech is delayed and tangential.         Judgment: Judgment normal.                  An electronic signature was used to authenticate this note.     --Ashleigh Reich, DO

## 2022-06-24 DIAGNOSIS — S72.142A CLOSED INTERTROCHANTERIC FRACTURE OF HIP, LEFT, INITIAL ENCOUNTER (HCC): ICD-10-CM

## 2022-06-24 RX ORDER — OXYCODONE HYDROCHLORIDE AND ACETAMINOPHEN 5; 325 MG/1; MG/1
1 TABLET ORAL EVERY 6 HOURS PRN
Qty: 28 TABLET | Refills: 0 | Status: SHIPPED | OUTPATIENT
Start: 2022-06-24 | End: 2022-07-01

## 2022-06-24 NOTE — TELEPHONE ENCOUNTER
Patient is requesting a medication refill, OARRS complete. Patient is 3 weeks out from surgery of LEFT INTERTROCHANTERIC HIP FRACTURE OPEN REDUCTION INTERNAL FIXATION. Patient was last seen on 6/16/22 and patients next appointment is 7/28/22. Patient was last given Percocet 5-325mg q6 prn on 6/4/22.
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
## 2022-07-06 ENCOUNTER — TELEPHONE (OUTPATIENT)
Dept: PRIMARY CARE CLINIC | Age: 77
End: 2022-07-06

## 2022-07-06 DIAGNOSIS — S72.142A CLOSED INTERTROCHANTERIC FRACTURE OF HIP, LEFT, INITIAL ENCOUNTER (HCC): Primary | ICD-10-CM

## 2022-07-06 DIAGNOSIS — J41.1 MUCOPURULENT CHRONIC BRONCHITIS (HCC): Primary | ICD-10-CM

## 2022-07-06 DIAGNOSIS — R05.8 PRODUCTIVE COUGH: ICD-10-CM

## 2022-07-06 RX ORDER — DOXYCYCLINE 100 MG/1
100 TABLET ORAL 2 TIMES DAILY
Qty: 28 TABLET | Refills: 0 | Status: SHIPPED | OUTPATIENT
Start: 2022-07-06 | End: 2022-07-20

## 2022-07-06 RX ORDER — AMOXICILLIN AND CLAVULANATE POTASSIUM 875; 125 MG/1; MG/1
1 TABLET, FILM COATED ORAL 2 TIMES DAILY
Qty: 28 TABLET | Refills: 0 | Status: SHIPPED | OUTPATIENT
Start: 2022-07-06 | End: 2022-07-20

## 2022-07-06 RX ORDER — HYDROCODONE BITARTRATE AND ACETAMINOPHEN 5; 325 MG/1; MG/1
1 TABLET ORAL EVERY 6 HOURS PRN
Qty: 28 TABLET | Refills: 0 | Status: SHIPPED | OUTPATIENT
Start: 2022-07-06 | End: 2022-07-13

## 2022-07-06 NOTE — TELEPHONE ENCOUNTER
Patient is requesting a medication refill, OARRS complete. Patient is 4.5 weeks out from surgery of Lt hip ORIF. Patient was last seen on 6/16 and patients next appointment is 7/28. Patient was last given Percocet 5mg q6hr PRN on 6/24.

## 2022-08-04 ENCOUNTER — OFFICE VISIT (OUTPATIENT)
Dept: PRIMARY CARE CLINIC | Age: 77
End: 2022-08-04
Payer: MEDICARE

## 2022-08-04 VITALS
SYSTOLIC BLOOD PRESSURE: 142 MMHG | OXYGEN SATURATION: 98 % | HEIGHT: 71 IN | HEART RATE: 47 BPM | BODY MASS INDEX: 21 KG/M2 | WEIGHT: 150 LBS | TEMPERATURE: 97.9 F | DIASTOLIC BLOOD PRESSURE: 80 MMHG

## 2022-08-04 DIAGNOSIS — M79.604 RIGHT LEG PAIN: Primary | ICD-10-CM

## 2022-08-04 DIAGNOSIS — Z91.81 AT HIGH RISK FOR FALLS: ICD-10-CM

## 2022-08-04 PROCEDURE — 99213 OFFICE O/P EST LOW 20 MIN: CPT | Performed by: FAMILY MEDICINE

## 2022-08-04 PROCEDURE — 1124F ACP DISCUSS-NO DSCNMKR DOCD: CPT | Performed by: FAMILY MEDICINE

## 2022-08-04 ASSESSMENT — ENCOUNTER SYMPTOMS
RESPIRATORY NEGATIVE: 1
GASTROINTESTINAL NEGATIVE: 1

## 2022-08-04 NOTE — PROGRESS NOTES
Davina Barrios (:  1945) is a 78 y.o. male,Established patient, here for evaluation of the following chief complaint(s):  Leg Pain (Right upper leg. Concern for blood clot)         ASSESSMENT/PLAN:  1. Right leg pain  -     XR FEMUR RIGHT (MIN 2 VIEWS); Future  -     US DUP LOWER EXTREMITY RIGHT GREGG; Future    Initial review of x-ray shows no acute fracture or dislocation. Initial review ultrasound shows no DVT at this time. Final read per radiologist.  Red flags discussed with patient. If these occur he is to go directly to the nearest emergency department. Patient voiced understanding. No follow-ups on file. Subjective   SUBJECTIVE/OBJECTIVE:  HPI  Patient presents today for right upper leg pain. He called and was told there was a concern for blood clot at which time he presented for evaluation. Most of the pain is in the lateral aspect of the left upper leg. Denies any trauma or injury. Denies any recent falls. Denies any other current issues. Review of Systems   Constitutional: Negative. HENT: Negative. Respiratory: Negative. Cardiovascular: Negative. Gastrointestinal: Negative. Musculoskeletal:  Positive for arthralgias, gait problem, joint swelling and myalgias. All other systems reviewed and are negative.        Current Outpatient Medications:     glycopyrrolate (ROBINUL) 1 MG tablet, Take 0.5 tablets by mouth 2 times daily, Disp: 90 tablet, Rfl: 3    albuterol sulfate HFA (VENTOLIN HFA) 108 (90 Base) MCG/ACT inhaler, Inhale 2 puffs into the lungs 4 times daily as needed for Wheezing, Disp: 54 g, Rfl: 1    sennosides-docusate sodium (SENOKOT-S) 8.6-50 MG tablet, Take 1 tablet by mouth 2 times daily, Disp: 60 tablet, Rfl: 0    aspirin 325 mg EC tablet, Take 1 tablet by mouth 2 times daily for 28 days, Disp: 56 tablet, Rfl: 0    oxybutynin (DITROPAN) 5 MG tablet, TAKE ONE TABLET BY MOUTH TWICE A DAY, Disp: 60 tablet, Rfl: 0    tamsulosin (FLOMAX) 0.4 MG capsule, TAKE 1 CAPSULE BY MOUTH ONCE DAILY, Disp: 30 capsule, Rfl: 5    losartan (COZAAR) 50 MG tablet, TAKE 1 TABLET BY MOUTH ONCE DAILY, Disp: 30 tablet, Rfl: 2    B Complex-Biotin-FA (B COMPLEX 100 TR) TBCR, 1 tablet daily, Disp: 90 tablet, Rfl: 3    folic acid (FOLATE) 719 MCG tablet, Take 1 tablet by mouth daily, Disp: 30 tablet, Rfl: 3   Patient Active Problem List   Diagnosis    Essential hypertension    Atrial fibrillation (HCC)    Hypokalemia    Functional diarrhea    Chronic obstructive pulmonary disease (HCC)    Alcohol abuse    Hepatic abscess    Portal vein thrombosis    History of squamous cell carcinoma of skin    Generalized osteoarthritis of multiple sites    History of cholecystectomy    Malignant neoplasm of glottis (HCC)    Malignant neoplasm of skin of parts of face    Urinary frequency    Trauma    Closed fracture of neck of left femur (HCC)    Closed intertrochanteric fracture of hip, left, initial encounter (HCC)    Acute blood loss anemia    Alcoholic encephalopathy (HCC)     Past Medical History:   Diagnosis Date    [facility]     follows with PCP only    Hx of blood clots     Hypertension     Pneumonia 2021    Squamous cell skin cancer     Stomach ulcer      Past Surgical History:   Procedure Laterality Date    CHOLECYSTECTOMY, LAPAROSCOPIC N/A 2/25/2021    CHOLECYSTECTOMY LAPAROSCOPIC ROBOTIC XI performed by Josefina Gray MD at [facility] OR    CT NEEDLE BIOPSY LIVER PERCUTANEOUS  2/1/2021    CT NEEDLE BIOPSY LIVER PERCUTANEOUS 2/1/2021 [facility]    ERCP N/A 1/29/2021    ERCP STONE REMOVAL performed by Jessica Higuera MD at [facility] ENDOSCOPY    ERCP N/A 3/22/2021    ERCP WITH STENT REMOVAL performed by Jessica [facility]uera MD at [facility] 6/4/2022    LEFT INTERTROCHANTERIC HIP FRACTURE OPEN REDUCTION INTERNAL FIXATION performed by Leodan Montgomery MD at [address] [place] [address] Road HISTORY Left 2009    left sided ear surgery    PICC LINE INSERTION NURSE Completed    Hepatitis C screen  Completed    Hepatitis A vaccine  Aged Out    Hepatitis B vaccine  Aged Out    Hib vaccine  Aged Out    Meningococcal (ACWY) vaccine  Aged Out      There are no preventive care reminders to display for this patient. There are no preventive care reminders to display for this patient. BP (!) 142/80   Pulse (!) 47   Temp 97.9 °F (36.6 °C)   Ht 5' 11\" (1.803 m)   Wt 150 lb (68 kg)   SpO2 98%   BMI 20.92 kg/m²     Objective   Physical Exam  Vitals reviewed. HENT:      Head: Normocephalic and atraumatic. Right Ear: External ear normal.      Ears:      Comments: Left ear missing  Eyes:      General: No scleral icterus. Conjunctiva/sclera: Conjunctivae normal.      Pupils: Pupils are equal, round, and reactive to light. Neck:      Thyroid: No thyromegaly. Cardiovascular:      Rate and Rhythm: Bradycardia present. Rhythm irregular. Heart sounds: Normal heart sounds. No murmur heard. Pulmonary:      Effort: Pulmonary effort is normal.      Breath sounds: Decreased air movement present. Decreased breath sounds present. No wheezing or rales. Abdominal:      General: Bowel sounds are normal. There is no distension. Palpations: Abdomen is soft. Tenderness: There is no abdominal tenderness. Musculoskeletal:         General: Tenderness present. Normal range of motion. Cervical back: Neck supple. Right upper leg: Tenderness and bony tenderness present. Legs:    Lymphadenopathy:      Cervical: No cervical adenopathy. Skin:     General: Skin is warm and dry. Findings: No erythema or rash. Neurological:      Mental Status: He is alert and oriented to person, place, and time. Cranial Nerves: No cranial nerve deficit. Gait: Gait abnormal.      Comments: Walking with a rolling walker   Psychiatric:         Attention and Perception: Attention normal.         Mood and Affect: Affect is flat.          Speech: Speech is delayed and tangential.         Judgment: Judgment normal.                An electronic signature was used to authenticate this note. --Diana Reich DO On the basis of positive falls risk screening, assessment and plan is as follows: home safety tips provided.

## 2022-08-05 DIAGNOSIS — I10 ESSENTIAL HYPERTENSION: ICD-10-CM

## 2022-08-05 RX ORDER — LOSARTAN POTASSIUM 50 MG/1
TABLET ORAL
Qty: 30 TABLET | Refills: 2 | Status: SHIPPED
Start: 2022-08-05 | End: 2022-08-30

## 2022-08-08 ENCOUNTER — TELEPHONE (OUTPATIENT)
Dept: FAMILY MEDICINE CLINIC | Age: 77
End: 2022-08-08

## 2022-08-30 ENCOUNTER — OFFICE VISIT (OUTPATIENT)
Dept: FAMILY MEDICINE CLINIC | Age: 77
End: 2022-08-30
Payer: MEDICARE

## 2022-08-30 ENCOUNTER — APPOINTMENT (OUTPATIENT)
Dept: GENERAL RADIOLOGY | Age: 77
End: 2022-08-30
Payer: MEDICARE

## 2022-08-30 ENCOUNTER — HOSPITAL ENCOUNTER (EMERGENCY)
Age: 77
Discharge: HOME | End: 2022-08-30
Attending: EMERGENCY MEDICINE
Payer: MEDICARE

## 2022-08-30 VITALS
SYSTOLIC BLOOD PRESSURE: 168 MMHG | DIASTOLIC BLOOD PRESSURE: 60 MMHG | OXYGEN SATURATION: 96 % | HEART RATE: 48 BPM | TEMPERATURE: 97.9 F

## 2022-08-30 VITALS
BODY MASS INDEX: 21.84 KG/M2 | DIASTOLIC BLOOD PRESSURE: 111 MMHG | HEART RATE: 42 BPM | OXYGEN SATURATION: 99 % | TEMPERATURE: 97.6 F | RESPIRATION RATE: 16 BRPM | HEIGHT: 71 IN | WEIGHT: 156 LBS | SYSTOLIC BLOOD PRESSURE: 199 MMHG

## 2022-08-30 DIAGNOSIS — I10 HYPERTENSION, UNSPECIFIED TYPE: ICD-10-CM

## 2022-08-30 DIAGNOSIS — I48.0 PAROXYSMAL ATRIAL FIBRILLATION (HCC): Primary | ICD-10-CM

## 2022-08-30 DIAGNOSIS — R00.1 BRADYCARDIA: ICD-10-CM

## 2022-08-30 DIAGNOSIS — R00.1 BRADYCARDIA: Primary | ICD-10-CM

## 2022-08-30 LAB
ANION GAP SERPL CALCULATED.3IONS-SCNC: 12 MMOL/L (ref 7–16)
BASOPHILS ABSOLUTE: 0.06 E9/L (ref 0–0.2)
BASOPHILS RELATIVE PERCENT: 0.8 % (ref 0–2)
BUN BLDV-MCNC: 17 MG/DL (ref 6–23)
CALCIUM SERPL-MCNC: 9.6 MG/DL (ref 8.6–10.2)
CHLORIDE BLD-SCNC: 105 MMOL/L (ref 98–107)
CO2: 25 MMOL/L (ref 22–29)
CREAT SERPL-MCNC: 0.6 MG/DL (ref 0.7–1.2)
EKG ATRIAL RATE: 241 BPM
EKG ATRIAL RATE: 326 BPM
EKG Q-T INTERVAL: 454 MS
EKG Q-T INTERVAL: 460 MS
EKG QRS DURATION: 82 MS
EKG QRS DURATION: 90 MS
EKG QTC CALCULATION (BAZETT): 413 MS
EKG QTC CALCULATION (BAZETT): 419 MS
EKG R AXIS: 23 DEGREES
EKG R AXIS: 8 DEGREES
EKG T AXIS: 19 DEGREES
EKG T AXIS: 32 DEGREES
EKG VENTRICULAR RATE: 50 BPM
EKG VENTRICULAR RATE: 50 BPM
EOSINOPHILS ABSOLUTE: 0.14 E9/L (ref 0.05–0.5)
EOSINOPHILS RELATIVE PERCENT: 2 % (ref 0–6)
GFR AFRICAN AMERICAN: >60
GFR NON-AFRICAN AMERICAN: >60 ML/MIN/1.73
GLUCOSE BLD-MCNC: 99 MG/DL (ref 74–99)
HCT VFR BLD CALC: 39.9 % (ref 37–54)
HEMOGLOBIN: 12.3 G/DL (ref 12.5–16.5)
IMMATURE GRANULOCYTES #: 0.02 E9/L
IMMATURE GRANULOCYTES %: 0.3 % (ref 0–5)
LYMPHOCYTES ABSOLUTE: 1.97 E9/L (ref 1.5–4)
LYMPHOCYTES RELATIVE PERCENT: 27.7 % (ref 20–42)
MCH RBC QN AUTO: 27.3 PG (ref 26–35)
MCHC RBC AUTO-ENTMCNC: 30.8 % (ref 32–34.5)
MCV RBC AUTO: 88.7 FL (ref 80–99.9)
MONOCYTES ABSOLUTE: 0.54 E9/L (ref 0.1–0.95)
MONOCYTES RELATIVE PERCENT: 7.6 % (ref 2–12)
NEUTROPHILS ABSOLUTE: 4.37 E9/L (ref 1.8–7.3)
NEUTROPHILS RELATIVE PERCENT: 61.6 % (ref 43–80)
PDW BLD-RTO: 14.9 FL (ref 11.5–15)
PLATELET # BLD: 274 E9/L (ref 130–450)
PMV BLD AUTO: 11.2 FL (ref 7–12)
POTASSIUM REFLEX MAGNESIUM: 3.7 MMOL/L (ref 3.5–5)
PRO-BNP: 2808 PG/ML (ref 0–450)
RBC # BLD: 4.5 E12/L (ref 3.8–5.8)
SODIUM BLD-SCNC: 142 MMOL/L (ref 132–146)
TROPONIN, HIGH SENSITIVITY: 14 NG/L (ref 0–11)
TROPONIN, HIGH SENSITIVITY: 15 NG/L (ref 0–11)
WBC # BLD: 7.1 E9/L (ref 4.5–11.5)

## 2022-08-30 PROCEDURE — 99285 EMERGENCY DEPT VISIT HI MDM: CPT

## 2022-08-30 PROCEDURE — 1124F ACP DISCUSS-NO DSCNMKR DOCD: CPT | Performed by: STUDENT IN AN ORGANIZED HEALTH CARE EDUCATION/TRAINING PROGRAM

## 2022-08-30 PROCEDURE — 83880 ASSAY OF NATRIURETIC PEPTIDE: CPT

## 2022-08-30 PROCEDURE — 71045 X-RAY EXAM CHEST 1 VIEW: CPT

## 2022-08-30 PROCEDURE — 84484 ASSAY OF TROPONIN QUANT: CPT

## 2022-08-30 PROCEDURE — 80048 BASIC METABOLIC PNL TOTAL CA: CPT

## 2022-08-30 PROCEDURE — 85025 COMPLETE CBC W/AUTO DIFF WBC: CPT

## 2022-08-30 PROCEDURE — 93000 ELECTROCARDIOGRAM COMPLETE: CPT | Performed by: STUDENT IN AN ORGANIZED HEALTH CARE EDUCATION/TRAINING PROGRAM

## 2022-08-30 PROCEDURE — 93005 ELECTROCARDIOGRAM TRACING: CPT

## 2022-08-30 PROCEDURE — 99214 OFFICE O/P EST MOD 30 MIN: CPT | Performed by: INTERNAL MEDICINE

## 2022-08-30 PROCEDURE — 99215 OFFICE O/P EST HI 40 MIN: CPT | Performed by: STUDENT IN AN ORGANIZED HEALTH CARE EDUCATION/TRAINING PROGRAM

## 2022-08-30 RX ORDER — LIFITEGRAST 50 MG/ML
1 SOLUTION/ DROPS OPHTHALMIC 2 TIMES DAILY
COMMUNITY

## 2022-08-30 RX ORDER — AVOBENZONE, HOMOSALATE, OCTISALATE, OCTOCRYLENE, OXYBENZONE 30; 130; 50; 70; 40 MG/ML; MG/ML; MG/ML; MG/ML; MG/ML
1 LOTION TOPICAL EVERY MORNING
COMMUNITY

## 2022-08-30 RX ORDER — LANOLIN ALCOHOL/MO/W.PET/CERES
400 CREAM (GRAM) TOPICAL EVERY MORNING
COMMUNITY

## 2022-08-30 RX ORDER — OXYBUTYNIN CHLORIDE 5 MG/1
5 TABLET ORAL 2 TIMES DAILY
COMMUNITY
End: 2022-09-20 | Stop reason: SDUPTHER

## 2022-08-30 RX ORDER — LOSARTAN POTASSIUM 50 MG/1
50 TABLET ORAL EVERY MORNING
COMMUNITY
End: 2022-10-05 | Stop reason: SDUPTHER

## 2022-08-30 RX ORDER — TAMSULOSIN HYDROCHLORIDE 0.4 MG/1
0.4 CAPSULE ORAL EVERY MORNING
COMMUNITY
End: 2022-11-02 | Stop reason: SDUPTHER

## 2022-08-30 RX ORDER — POLYVINYL ALCOHOL 14 MG/ML
1 SOLUTION/ DROPS OPHTHALMIC 2 TIMES DAILY
COMMUNITY

## 2022-08-30 SDOH — ECONOMIC STABILITY: FOOD INSECURITY: WITHIN THE PAST 12 MONTHS, YOU WORRIED THAT YOUR FOOD WOULD RUN OUT BEFORE YOU GOT MONEY TO BUY MORE.: NEVER TRUE

## 2022-08-30 SDOH — ECONOMIC STABILITY: FOOD INSECURITY: WITHIN THE PAST 12 MONTHS, THE FOOD YOU BOUGHT JUST DIDN'T LAST AND YOU DIDN'T HAVE MONEY TO GET MORE.: NEVER TRUE

## 2022-08-30 SDOH — ECONOMIC STABILITY: TRANSPORTATION INSECURITY
IN THE PAST 12 MONTHS, HAS LACK OF TRANSPORTATION KEPT YOU FROM MEETINGS, WORK, OR FROM GETTING THINGS NEEDED FOR DAILY LIVING?: NO

## 2022-08-30 SDOH — ECONOMIC STABILITY: TRANSPORTATION INSECURITY
IN THE PAST 12 MONTHS, HAS THE LACK OF TRANSPORTATION KEPT YOU FROM MEDICAL APPOINTMENTS OR FROM GETTING MEDICATIONS?: NO

## 2022-08-30 ASSESSMENT — ENCOUNTER SYMPTOMS
SHORTNESS OF BREATH: 0
NAUSEA: 0
ABDOMINAL PAIN: 0
VOMITING: 0
RHINORRHEA: 0
COUGH: 0

## 2022-08-30 ASSESSMENT — PAIN - FUNCTIONAL ASSESSMENT
PAIN_FUNCTIONAL_ASSESSMENT: NONE - DENIES PAIN

## 2022-08-30 ASSESSMENT — LIFESTYLE VARIABLES
HOW MANY STANDARD DRINKS CONTAINING ALCOHOL DO YOU HAVE ON A TYPICAL DAY: PATIENT DOES NOT DRINK
HOW MANY STANDARD DRINKS CONTAINING ALCOHOL DO YOU HAVE ON A TYPICAL DAY: PATIENT DOES NOT DRINK
HOW OFTEN DO YOU HAVE A DRINK CONTAINING ALCOHOL: NEVER
HOW OFTEN DO YOU HAVE A DRINK CONTAINING ALCOHOL: NEVER

## 2022-08-30 ASSESSMENT — SOCIAL DETERMINANTS OF HEALTH (SDOH): HOW HARD IS IT FOR YOU TO PAY FOR THE VERY BASICS LIKE FOOD, HOUSING, MEDICAL CARE, AND HEATING?: NOT HARD AT ALL

## 2022-08-30 NOTE — DISCHARGE INSTRUCTIONS
Please follow-up with the cardiologist office as soon as possible for further evaluation of your low heart rate. It is important that you follow-up with your primary care as well to follow-up on your symptoms and to discuss your recent emergency room visit. Please return to the emergency room if you experience severe chest pain, severe shortness of breath, loss of consciousness, fall, numbness, tingling, loss of sensation, weakness, severe abdominal pain, or fevers greater than 100.4 °F.

## 2022-08-30 NOTE — PROGRESS NOTES
[facility]  Office Note  Dr. Allan Dent      Patient:  Brie Farrell 78 y.o. male     Date of Service: 8/30/22      Chief complaint:   Chief Complaint   Patient presents with    Establish Care    Leg Pain     Left, around knee    Swelling     Feet           History of Present Illness   The patient is a 78 y.o. male  presented to the clinic with complaints as above. Cherise Tyson presents today to establish care with his son. His chief complaint is leg swelling bilaterally. He has had a new medical issues this summer including hip fracture/surgery and cholecystectomy. States his hospital stay was prolonged from electrolyte issues  He is alert and oriented to person and place but not time. He states he takes a blood thinner, not sure which one. Probably aspirin. He is not sure why. Looks like he has a fib on chart review. He is not aware of this. Denies chest pain, sob, light headedness, dizziness. Admits to falling frequently, most recently yesterday. Denies hitting head or losing consciousness  He is significantly bradycardic in office today. Not in any respiratory distress.  Med list does not list any medications that would cause bradycardia    Past Medical History:      Diagnosis Date    A-fib ([facility]     follows with PCP only    Hx of blood clots     Hypertension     Pneumonia 2021    Squamous cell skin cancer     Stomach ulcer        PastSurgical History:        Procedure Laterality Date    CHOLECYSTECTOMY, LAPAROSCOPIC N/A 2/25/2021    CHOLECYSTECTOMY LAPAROSCOPIC ROBOTIC XI performed by Vallarie Babinski, MD at [facility] OR    CT NEEDLE BIOPSY LIVER PERCUTANEOUS  2/1/2021    [facility]    ERCP N/A 1/29/2021    ERCP STONE REMOVAL performed by Nikole Keenan MD at [facility] ENDOSCOPY    ERCP N/A 3/22/2021    ERCP WITH STENT REMOVAL performed by Nikole Ke[facility]an MD at [facility] 6/4/2022    LEFT INTERTROCHANTERIC HIP FRACTURE OPEN REDUCTION INTERNAL FIXATION performed by Esmer Edwards MD at [address] [address]    left sided ear surgery    PICC LINE INSERTION NURSE  2021            Allergies:    Patient has no known allergies. Social History:   Social History     Socioeconomic History    Marital status:      Spouse name: Not on file    Number of children: Not on file    Years of education: Not on file    Highest education level: Not on file   Occupational History    Not on file   Tobacco Use    Smoking status: Former     Packs/day: 1.00     Years: 43.00     Pack years: 43.00     Types: Cigarettes     Start date: 1963     Quit date: 2006     Years since quittin.3    Smokeless tobacco: Never   Vaping Use    Vaping Use: Never used   Substance and Sexual Activity    Alcohol use: Yes     Alcohol/week: 30.0 standard drinks     Types: 30 Cans of beer per week     Comment: NONE SINCE 1/15/21    Drug use: No    Sexual activity: Not Currently     Partners: Female   Other Topics Concern    Not on file   Social History Narrative    ** Merged History Encounter **          Social Determinants of Health     Financial Resource Strain: Low Risk     Difficulty of Paying Living Expenses: Not hard at all   Food Insecurity: No Food Insecurity    Worried About [address] [employer] in the Last Year: Never true    [employer] in the Last Year: Never true   Transportation Needs: No Transportation Needs    Lack of Transportation (Medical): No    Lack of Transportation (Non-Medical): No   Physical Activity: Not on file   Stress: Not on file   Social Connections: Not on file   Intimate Partner Violence: Not on file   Housing Stability: Not on file        Family History:   No family history on file. Review of Systems:   Review of Systems   Constitutional:  Negative for chills and fever. HENT:  Negative for congestion and rhinorrhea. Respiratory:  Negative for cough and shortness of breath. effects, risks, benefits and alternatives to treatment;patient and/or guardian verbalizes understanding, agrees, feels comfortable with and wishes to proceed with above treatment plan. Call or go to ED immediately if symptoms worsen or persist. Advised patient to call with any new medication issues, and, as applicable, read all Rx info from pharmacy to assure aware of all possible risks and side effects of medicationbefore taking. Patient and/or guardian given opportunity to ask questions/raise concerns. The patient verbalized comfort and understanding ofinstructions. Return to Office: No follow-ups on file. Medication List:    Current Outpatient Medications   Medication Sig Dispense Refill    losartan (COZAAR) 50 MG tablet TAKE 1 TABLET BY MOUTH ONCE DAILY 30 tablet 2    glycopyrrolate (ROBINUL) 1 MG tablet Take 0.5 tablets by mouth 2 times daily 90 tablet 3    sennosides-docusate sodium (SENOKOT-S) 8.6-50 MG tablet Take 1 tablet by mouth 2 times daily 60 tablet 0    aspirin 325 mg EC tablet Take 1 tablet by mouth 2 times daily for 28 days 56 tablet 0    oxybutynin (DITROPAN) 5 MG tablet TAKE ONE TABLET BY MOUTH TWICE A DAY 60 tablet 0    tamsulosin (FLOMAX) 0.4 MG capsule TAKE 1 CAPSULE BY MOUTH ONCE DAILY 30 capsule 5    B Complex-Biotin-FA (B COMPLEX 100 TR) TBCR 1 tablet daily 90 tablet 3    folic acid (FOLATE) 595 MCG tablet Take 1 tablet by mouth daily 30 tablet 3    albuterol sulfate HFA (VENTOLIN HFA) 108 (90 Base) MCG/ACT inhaler Inhale 2 puffs into the lungs 4 times daily as needed for Wheezing 54 g 1     No current facility-administered medications for this visit. Myrna Enriquez MD         This document may have been prepared at least partially through the use of voice recognition software. Although effort is taken to assure the accuracy ofthis document, it is possible that grammatical, syntax, or spelling errors may occur.

## 2022-08-30 NOTE — CONSULTS
CHIEF COMPLAINT: AF with bradycardia. HISTORY OF PRESENT ILLNESS:   This is a patient that was seen on an inpatient evaluation by Dr. Damian Jarvis. He has a history of atrial fibrillation that is likely persistent, hypertension, hyperlipidemia. He was evaluated by PCP in the office today. He was complaining of pedal edema at that time. He was found to be bradycardic with atrial fibrillation. He was referred to the ED for further evaluation. EKG was reviewed by me. Shows atrial fibrillation with slow ventricular rate. Nonspecific ST changes. No evidence of high-grade AV block. I was consulted for further recommendations. Patient is somewhat of a poor historian. He does not remember if he was initiated on anticoagulation in the past.  He is on aspirin as per his home medication list he is not on any tres blocking agents. He takes Cozaar 50 mg daily. Past Medical History:   Diagnosis Date    A-fib [facility])     follows with PCP only    Hx of blood clots     Hypertension     Pneumonia 2021    Squamous cell skin cancer     Stomach ulcer        Patient Active Problem List   Diagnosis    Essential hypertension    Atrial fibrillation (HCC)    Hypokalemia    Functional diarrhea    Chronic obstructive pulmonary disease (HCC)    Alcohol abuse    Hepatic abscess    Portal vein thrombosis    History of squamous cell carcinoma of skin    Generalized osteoarthritis of multiple sites    History of cholecystectomy    Malignant neoplasm of glottis (HCC)    Malignant neoplasm of skin of parts of face    Urinary frequency    Trauma    Closed fracture of neck of left femur (HCC)    Closed intertrochanteric fracture of hip, left, initial encounter ([facility] [address]    Acute blood loss anemia    Alcoholic encephalopathy (HCC)       No Known Allergies    No current facility-administered medications for this encounter.      Current Outpatient Medications   Medication Sig Dispense Refill    losartan (COZAAR) 50 MG tablet TAKE 1 TABLET Needs: No Transportation Needs    Lack of Transportation (Medical): No    Lack of Transportation (Non-Medical): No   Physical Activity: Not on file   Stress: Not on file   Social Connections: Not on file   Intimate Partner Violence: Not on file   Housing Stability: Not on file       History reviewed. No pertinent family history. Review of Systems:   Heart: as above   Lungs: as above   Eyes: denies changes in vision or discharge. Ears: denies changes in hearing or pain. Nose: denies epistaxis or masses   Throat: denies sore throat or trouble swallowing. Neuro: denies numbness, tingling, tremors. Skin: denies rashes or itching. : denies hematuria, dysuria   GI: denies vomiting, diarrhea   Psych: denies mood changed, anxiety, depression. all others negative. Physical Exam   BP (!) 199/111   Pulse (!) 42   Temp 97.6 °F (36.4 °C)   Resp 16   Ht 5' 11\" (1.803 m)   Wt 156 lb (70.8 kg)   SpO2 99%   BMI 21.76 kg/m²   Constitutional: Oriented to person, place, and time. Well-developed and well-nourished. No distress. Head: Normocephalic and atraumatic. Eyes: EOM are normal. Pupils are equal, round, and reactive to light. Neck: Normal range of motion. Neck supple. No hepatojugular reflux and no JVD present. Carotid bruit is not present. No tracheal deviation present. No thyromegaly present. Cardiovascular: Normal rate, regular rhythm, normal heart sounds and intact distal pulses. Exam reveals no gallop and no friction rub. No murmur heard. Pulmonary/Chest: Effort normal and breath sounds normal. No respiratory distress. No wheezes. No rales. No tenderness. Abdominal: Soft. Bowel sounds are normal. No distension and no mass. No tenderness. No rebound and no guarding. Musculoskeletal: Normal range of motion. No edema and no tenderness. Lymphadenopathy:   No cervical adenopathy. No groin adenopathy. Neurological: Alert and oriented to person, place, and time.    Skin: Skin is warm and to be reassessed on outpatient basis. 2.  Hypertensive urgency: Pressures were elevated on arrival.  He is on Cozaar 50 mg daily at home. Eventual goal for him would be less than 140/90. Can consider increasing Cozaar to 100 mg daily. He is also on tamsulosin for BPH. Thank you very much for allowing to participate in the care of your patient! If he is able to ambulate without issues, he is stable for discharge from a cardiac standpoint. He should follow-up with Dr. Haseeb Cotter within the next week. Ritesh Ozuna MD, [address] [place] [address] Cardiology     NOTE: This report was transcribed using voice recognition software. Every effort was made to ensure accuracy; however, inadvertent computerized transcription errors may be present.

## 2022-08-30 NOTE — ED PROVIDER NOTES
[facility]  Department of Emergency Medicine     Written by: Sanjay Ramesh MD  Patient Name: Kaylah Allen  Attending Provider: Lindy Reddy DO  Admit Date: 2022 11:03 AM  MRN: [id number]                   : 1945        Chief Complaint   Patient presents with    Bradycardia    Hypertension    - Chief complaint    The patient is a 42-year-old male with a past medical history of atrial fibrillation not on anticoagulation, COPD not on home oxygen, history of squamous cell carcinoma skin and malignant neoplasm of the glottis presenting via EMS from [address] [place] [address] with bradycardia and hypertension. Patient was at Dr. Dianna Bone office and upon initial evaluation at the office was seen to be both hypertensive 200s over 110s with a heart rate in the 40s. The patient is asymptomatic at this time and is not quite clear on why he was brought to the hospital because he does not feel unwell. Patient denies headache, blurry vision, lightheadedness, dizziness, loss of sensation, weakness, sore throat, cough, chest pain, shortness of breath, nausea, vomiting, constipation, diarrhea, abdominal pain, blood in the stool, dysuria, increasing or decreasing urinary frequency, hematuria, leg pain, leg swelling, recent travel, recent illness, recent surgery, or sick contacts. Review of Systems   Constitutional:  Negative for activity change, chills, fatigue and fever. HENT:  Negative for congestion, postnasal drip, rhinorrhea, sinus pressure and sore throat. Eyes:  Negative for photophobia, discharge, redness and visual disturbance. Respiratory:  Negative for cough, shortness of breath and wheezing. Cardiovascular:  Negative for chest pain, palpitations and leg swelling. Gastrointestinal:  Negative for abdominal distention, abdominal pain, blood in stool, constipation, diarrhea, nausea and vomiting. Endocrine: Negative for cold intolerance and heat intolerance.    Genitourinary: Negative for decreased urine volume, difficulty urinating, dysuria, flank pain, frequency, hematuria and urgency. Musculoskeletal:  Negative for arthralgias, back pain, joint swelling and myalgias. Skin:  Negative for rash and wound. Allergic/Immunologic: Negative for immunocompromised state. Neurological:  Negative for dizziness, syncope, facial asymmetry, weakness, light-headedness, numbness and headaches. Hematological:  Does not bruise/bleed easily. Psychiatric/Behavioral:  Negative for behavioral problems and hallucinations. Physical Exam  Constitutional:       General: He is not in acute distress. Appearance: Normal appearance. He is not toxic-appearing. HENT:      Head: Normocephalic and atraumatic. Comments: Patient is missing his left ear. Right Ear: Hearing normal.      Left Ear: Hearing normal.      Nose: Nose normal.      Mouth/Throat:      Lips: Pink. Mouth: Mucous membranes are moist.      Pharynx: Oropharynx is clear. Eyes:      Extraocular Movements: Extraocular movements intact. Conjunctiva/sclera: Conjunctivae normal.      Pupils: Pupils are equal, round, and reactive to light. Cardiovascular:      Rate and Rhythm: Regular rhythm. Bradycardia present. Pulses: Normal pulses. Radial pulses are 2+ on the right side and 2+ on the left side. Dorsalis pedis pulses are 2+ on the right side and 2+ on the left side. Posterior tibial pulses are 2+ on the right side and 2+ on the left side. Heart sounds: S1 normal and S2 normal.   Pulmonary:      Effort: Pulmonary effort is normal. No tachypnea or respiratory distress. Breath sounds: Normal breath sounds and air entry. No decreased breath sounds, wheezing, rhonchi or rales. Abdominal:      General: Abdomen is flat. Bowel sounds are normal.      Palpations: Abdomen is soft. There is no mass. Tenderness: There is no abdominal tenderness.  There is no right CVA tenderness, left CVA tenderness or guarding. Musculoskeletal:         General: No swelling or tenderness. Normal range of motion. Cervical back: Normal range of motion and neck supple. No rigidity. Right lower leg: No edema. Left lower leg: No edema. Lymphadenopathy:      Cervical: No cervical adenopathy. Skin:     General: Skin is warm and dry. Capillary Refill: Capillary refill takes less than 2 seconds. Findings: No bruising, lesion or rash. Neurological:      General: No focal deficit present. Mental Status: He is alert and oriented to person, place, and time. Mental status is at baseline. Motor: No weakness. Psychiatric:         Attention and Perception: Attention normal.         Mood and Affect: Mood and affect normal.         Behavior: Behavior is cooperative. EKG Interpretation    Interpreted by emergency department physician    Rhythm: atrial fibrillation - controlled  Rate: 40-50  Axis: normal  Ectopy: none  Conduction: normal  ST Segments: no acute change  T Waves: no acute change  Q Waves: none    Clinical Impression: New bradycardia in the setting of atrial fibrillation when compared to prior EKG on 6/4/2022. Teresa Rangel MD    EKG Interpretation showing no evidence of heart failure. Interpreted by emergency department physician    Rhythm: atrial fibrillation - controlled  Rate: 40-50  Axis: normal  Ectopy: none  Conduction: normal  ST Segments: no acute change  T Waves: no acute change  Q Waves: none    Clinical Impression: No acute change from prior EKG earlier on 8/30/2022    Teresa Rangel MD    Procedures       MDM  Number of Diagnoses or Management Options  Bradycardia  Hypertension, unspecified type  Diagnosis management comments:  The patient is a 72-year-old male with a past medical history of atrial fibrillation not on anticoagulation, COPD not on home oxygen, history of squamous cell carcinoma skin and malignant neoplasm of the glottis presenting via EMS from [address] [place] [address] with bradycardia and hypertension. At the time of examination the patient is hypertensive and bradycardic but not tachypneic, hypoxic, or febrile. Initial EKG as above. Chart reveals echo performed in January 2021 shows no evidence of heart failure. CBC and BMP were reassuring. Chest x-ray shows small groundglass infiltrate seen in the inferior aspect of the right upper lobe and within the right lung base. Initial troponin was 15. Repeat troponin delta was -1.  proBNP was 2800. On reevaluation the patient's heart rate dropped into the mid 30s. Repeat EKG was ordered and is interpreted as above. Decision to admit the patient for new onset bradycardia was made. Call out to cardiology and family medicine was made. Cardiology ABE Heck evaluated the patient and says that cardiology will follow the patient. Family medicine team evaluated the patient; however, the patient is refusing admission as he has had poor experiences with [facility] in the past he would like to go to [facility] as that is where he received his cancer treatment in the past.  The risks versus benefits of transfer were explained to the patient including the possible delay in care that a transfer would entail. The patient does not want to even be admitted and transferred from inpatient to inpatient. The patient's daughter was called in the hopes of convincing the patient to remain in the hospital and possibly be transferred after admission due to the severity of his new onset bradycardia. Patient's daughter reports she will try to convince him to stay. Dr. Shirlene Benson evaluated the patient and after seeing him ambulate with his heart rate racing into the mid 62s says that the patient is actually stable enough for outpatient follow-up. We will discharge the patient with outpatient follow-up per recommendation.   The patient was educated about his condition and demonstrated good condition and the risks of delaying further evaluation. The patient's daughter says that she will try and convince him to stay however she is not confident that she will be able to make a difference. [VG]   [id number] Spoke with Dr. Mimi Collier, cardiologist, who evaluated the patient. The patient is able to ambulate well without assistance using his cane and his heart rate comes up into the mid 60s during ambulation. Dr. Mimi Collier feels that the patient is stable enough for outpatient follow-up at a cardiologist office. We will discharge the patient. [VG]      ED Course User Index  [VG] Jackie Eisenberg MD          --------------------------------------------- PAST HISTORY ---------------------------------------------  Past Medical History:  has a past medical history of A-fib ([facility] [address] Hx of blood clots, Hypertension, Pneumonia, Squamous cell skin cancer, and Stomach ulcer. 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 Social History:  reports that he quit smoking about 16 years ago. His smoking use included cigarettes. He started smoking about 59 years ago. He has a 43.00 pack-year smoking history. He has never used smokeless tobacco. He reports current alcohol use of about 30.0 standard drinks per week. He reports that he does not use drugs. Family History: family history is not on file. The patients home medications have been reviewed. Allergies: Patient has no known allergies.     -------------------------------------------------- RESULTS -------------------------------------------------  Labs:  Results for orders placed or performed during the hospital encounter of 08/30/22   CBC with Auto Differential   Result Value Ref Range    WBC 7.1 4.5 - 11.5 E9/L    RBC 4.50 3.80 - 5.80 E12/L    Hemoglobin 12.3 (L) 12.5 - 16.5 g/dL    Hematocrit 39.9 37.0 - 54.0 %    MCV 88.7 80.0 - 99.9 fL    MCH 27.3 26.0 - 35.0 pg    MCHC 30.8 (L) 32.0 - 34.5 %    RDW 14.9 11.5 - 15.0 fL    Platelets 245 983 - 887 E9/L    MPV 11.2 7.0 - 12.0 fL    Neutrophils % 61.6 43.0 - 80.0 %    Immature Granulocytes % 0.3 0.0 - 5.0 %    Lymphocytes % 27.7 20.0 - 42.0 %    Monocytes % 7.6 2.0 - 12.0 %    Eosinophils % 2.0 0.0 - 6.0 %    Basophils % 0.8 0.0 - 2.0 %    Neutrophils Absolute 4.37 1.80 - 7.30 E9/L    Immature Granulocytes # 0.02 E9/L    Lymphocytes Absolute 1.97 1.50 - 4.00 E9/L    Monocytes Absolute 0.54 0.10 - 0.95 E9/L    Eosinophils Absolute 0.14 0.05 - 0.50 E9/L    Basophils Absolute 0.06 0.00 - 0.20 V3/I   Basic Metabolic Panel w/ Reflex to MG   Result Value Ref Range    Sodium 142 132 - 146 mmol/L    Potassium reflex Magnesium 3.7 3.5 - 5.0 mmol/L    Chloride 105 98 - 107 mmol/L    CO2 25 22 - 29 mmol/L    Anion Gap 12 7 - 16 mmol/L    Glucose 99 74 - 99 mg/dL    BUN 17 6 - 23 mg/dL    Creatinine 0.6 (L) 0.7 - 1.2 mg/dL    GFR Non-African American >60 >=60 mL/min/1.73    GFR African American >60     Calcium 9.6 8.6 - 10.2 mg/dL   Troponin   Result Value Ref Range    Troponin, High Sensitivity 15 (H) 0 - 11 ng/L   Brain Natriuretic Peptide   Result Value Ref Range    Pro-BNP 2,808 (H) 0 - 450 pg/mL   Troponin   Result Value Ref Range    Troponin, High Sensitivity 14 (H) 0 - 11 ng/L   EKG 12 Lead   Result Value Ref Range    Ventricular Rate 50 BPM    Atrial Rate 241 BPM    QRS Duration 90 ms    Q-T Interval 454 ms    QTc Calculation (Bazett) 413 ms    R Axis 8 degrees    T Axis 19 degrees   EKG 12 Lead   Result Value Ref Range    Ventricular Rate 50 BPM    Atrial Rate 326 BPM    QRS Duration 82 ms    Q-T Interval 460 ms    QTc Calculation (Bazett) 419 ms    R Axis 23 degrees    T Axis 32 degrees       Radiology:  XR CHEST PORTABLE   Final Result   Small ground-glass infiltrate seen within the inferior aspect the right upper   lobe and within the right lung base.             ------------------------- NURSING NOTES AND VITALS REVIEWED ---------------------------  Date / Time Roomed:  8/30/2022 11:03 AM  ED Bed Assignment:  22/22    The nursing notes within the ED encounter and vital signs as below have been reviewed. BP (!) 199/111   Pulse (!) 42   Temp 97.6 °F (36.4 °C)   Resp 16   Ht 5' 11\" (1.803 m)   Wt 156 lb (70.8 kg)   SpO2 99%   BMI 21.76 kg/m²   Oxygen Saturation Interpretation: Normal      ------------------------------------------ PROGRESS NOTES ------------------------------------------  I have spoken with the patient and discussed todays results, in addition to providing specific details for the plan of care and counseling regarding the diagnosis and prognosis. Their questions are answered at this time and they are agreeable with the plan. I discussed at length with them reasons for immediate return here for re evaluation. They will followup with primary care by calling their office tomorrow. --------------------------------- ADDITIONAL PROVIDER NOTES ---------------------------------  At this time the patient is without objective evidence of an acute process requiring hospitalization or inpatient management. They have remained hemodynamically stable throughout their entire ED visit and are stable for discharge with outpatient follow-up. The plan has been discussed in detail and they are aware of the specific conditions for emergent return, as well as the importance of follow-up. Discharge Medication List as of 8/30/2022  4:26 PM          Diagnosis:  1. Bradycardia    2. Hypertension, unspecified type        Disposition:  Patient's disposition: Discharge to home  Patient's condition is stable. Patient was seen and evaluated by myself and my attending Subhash Anguiano DO.  Assessment and Plan discussed with attending provider, please see attestation for final plan of care.      MD Khris Alfonso MD  Resident  08/31/22 [id number]

## 2022-08-31 ASSESSMENT — ENCOUNTER SYMPTOMS
SHORTNESS OF BREATH: 0
DIARRHEA: 0
VOMITING: 0
EYE REDNESS: 0
SORE THROAT: 0
ABDOMINAL DISTENTION: 0
RHINORRHEA: 0
SINUS PRESSURE: 0
EYE DISCHARGE: 0
BLOOD IN STOOL: 0
ABDOMINAL PAIN: 0
PHOTOPHOBIA: 0
COUGH: 0
CONSTIPATION: 0
BACK PAIN: 0
WHEEZING: 0
NAUSEA: 0

## 2022-09-07 RX ORDER — OXYBUTYNIN CHLORIDE 5 MG/1
TABLET ORAL
Qty: 60 TABLET | Refills: 0 | OUTPATIENT
Start: 2022-09-07

## 2022-09-17 NOTE — PROGRESS NOTES
[facility] AT [facility]   Progress Note    Admitting Date and Time: 1/27/2021  2:40 AM  Admit Dx: Diarrhea [R19.7]     Seen for follow on multiple problems as listed below    Subjective  Comfortable and in bed, denies CP ,sob or abd pain. Uneventful night , d/w nursing. ROS: denies fever, chills, cp, sob, n/v, HA unless stated above.      pantoprazole  40 mg Intravenous BID    And    sodium chloride (PF)  10 mL Intravenous BID    cefTRIAXone (ROCEPHIN) IV  2,000 mg Intravenous Q24H    metroNIDAZOLE  500 mg Intravenous Q8H    sodium chloride flush  10 mL Intravenous 2 times per day    folic acid  1 mg Oral Daily    thiamine  100 mg Oral Daily    multivitamin  1 tablet Oral Daily    lisinopril  10 mg Oral Daily    metoprolol tartrate  25 mg Oral BID         sodium chloride flush, 10 mL, PRN      heparin (porcine), 4,000 Units, PRN      heparin (porcine), 2,000 Units, PRN      HYDROmorphone, 0.5 mg, Q4H PRN      perflutren lipid microspheres, 1.5 mL, ONCE PRN      HYDROcodone 5 mg - acetaminophen, 1 tablet, Q6H PRN      hydrALAZINE, 5 mg, Q6H PRN      sodium chloride flush, 10 mL, PRN      promethazine, 12.5 mg, Q6H PRN    Or      ondansetron, 4 mg, Q6H PRN      polyethylene glycol, 17 g, Daily PRN      acetaminophen, 650 mg, Q6H PRN    Or      acetaminophen, 650 mg, Q6H PRN      LORazepam, 1 mg, Q1H PRN    Or      LORazepam, 1 mg, Q1H PRN    Or      LORazepam, 2 mg, Q1H PRN    Or      LORazepam, 2 mg, Q1H PRN    Or      LORazepam, 3 mg, Q1H PRN    Or      LORazepam, 3 mg, Q1H PRN    Or      LORazepam, 4 mg, Q1H PRN    Or      LORazepam, 4 mg, Q1H PRN         Objective:    BP (!) 132/59   Pulse 77   Temp 98.1 °F (36.7 °C) (Oral)   Resp 18   Ht 5' 11\" (1.803 m)   Wt 162 lb (73.5 kg)   SpO2 93%   BMI 22.59 kg/m²   General Appearance: alert and oriented to person, place and time, in no acute distress  Skin: warm and dry  Head: normocephalic and atraumatic  Eyes: pupils equal, round, and reactive to light, extraocular eye movements intact, conjunctivae normal  Neck: supple and non-tender without mass  Pulmonary/Chest: clear to auscultation bilaterally  Cardiovascular: normal rate,  normal S1 and S2  Abdomen: soft, non-tender, non-distended, normal bowel sounds, no masses or organomegaly  Extremities: no cyanosis, clubbing   Musculoskeletal: normal range of motion  Neurologic:  no cranial nerve deficit,  speech normal      Recent Labs     01/31/21  0357 02/01/21  0420 02/02/21  0305    138 136   K 3.3* 4.1 3.5    105 103   CO2 27 25 25   BUN 12 8 5*   CREATININE 0.7 0.6* 0.7   GLUCOSE 105* 94 99   CALCIUM 8.0* 7.7* 7.9*       Recent Labs     02/01/21  0420 02/01/21  1418 02/02/21  0305   WBC 15.6* 15.1* 14.1*   RBC 3.53* 4.41 3.45*   HGB 10.5* 12.7 10.2*   HCT 32.4* 41.0 31.1*   MCV 91.8 93.0 90.1   MCH 29.7 28.8 29.6   MCHC 32.4 31.0* 32.8   RDW 14.1 14.0 14.1    482* 407   MPV 10.1 10.3 10.5       Labs and images reviewed     Radiology:   CT CHEST W CONTRAST   Final Result   Moderate patchy infiltrates/atelectasis and pleural effusion the right lower   lobe concerning for pneumonia. Multiple infiltrative nodular densities in the left upper lobe, lingula and   upper lobe. These are indeterminate. Malignancy is a consideration. Heterogeneous appearance of liver with pneumobilia with an indwelling biliary   stent and multiple ring-enhancing hypodense hepatic lesions concerning for   microabscesses versus malignancy. Multiple heterogeneous complex cystic masses in the caudate lobe, and upper   abdomen as noted which may be either inflammatory or malignant. Surveillance   recommended. CT GUIDED NEEDLE PLACEMENT   Final Result   Successful CT guided core biopsy of lesion in the posterior segment of the   right lobe of the liver.    Needle aspiration revealed no evidence of purulence material.  Minimal amount   of bloody fluid was aspirated that was and/or vascular prominence may reflect reactive airways   disease, vascular congestion, interstitial edema, viral process, or   interstitial pneumonitis. Slight cardiomegaly      MRI ABDOMEN W WO CONTRAST MRCP   Final Result   1. Choledocholithiasis associated with severe extrahepatic biliary dilation   and mild central intrahepatic biliary dilation. Evaluation for superimposed   cholangitis is limited. 2. Cholelithiasis with no definite findings of cholecystitis. 3. Limited evaluation of the liver due to motion artifact. Signal   abnormalities seen primarily in hepatic segment 7 correspond with the CT   appearance and are suspicious for microabscesses. However, an infiltrative   malignancy could appear similar. Consider evaluation with liver protocol   abdomen CT, which would be less affected by motion. 4. Complicated fluid collections along the caudate lobe of the liver, between   right adrenal gland right diaphragmatic deo, adjacent to the retrohepatic   inferior vena cava, and likely within the christopher hepatis are suspicious for   abscesses given the above findings. 5. Mild dilation of the downstream pancreatic duct potentially due to mass   effect from stones in the distal common bile duct. Alternatively, this could   be related to moderate parenchymal atrophy. 6. Small right pleural effusion. US GALLBLADDER RUQ   Final Result   Cholecystolithiasis. Suggestion also of choledocholithiasis with common bile   duct distention   Solid-appearing mass adjacent to the left hepatic lobe, in the christopher hepatis   region, may be artifact from the duodenum. Differential includes enlarged   lymph node   Simple appearing right renal cyst      CT ABDOMEN PELVIS W IV CONTRAST Additional Contrast? None   Final Result   There are numerous liver lesions. Multiple small lesions are possibly   cystic. Larger lesions in the left lobe are more nonspecific. The right   lobe is also heterogeneous.   MRI correlation is recommended for   characterization of these abnormalities. Cholelithiasis. Biliary dilatation and probable multiple common bile duct   stones. Soft tissue nodule adjacent to the diaphragmatic deo and right intrarenal   gland measuring 2 cm. This this may be an adrenal adenoma or lymph node. Sigmoid diverticulosis. No acute diverticulitis. Small right pleural effusion with basilar atelectasis. XR CHEST PORTABLE   Final Result   There is no acute abnormality seen. Assessment:    Active Problems:    Diarrhea    Essential hypertension    Atrial fibrillation (HCC)    Hypokalemia    Functional diarrhea    Gallstones    Choledocholithiasis    Chronic obstructive pulmonary disease (HCC)    Alcohol abuse    Atrial fibrillation, new onset (HCC)    Liver lesion    Hepatic abscess    Portal vein thrombosis  Resolved Problems:    * No resolved hospital problems. *      Plan:  Abdominal pain with diarrhea sec to  choledocholithiasis , cholangitis,resulting in rt hepatic lobe abscesses & portal vein thrombosis. GI,GS  and IR consulted &  following. Continue IV ceftriax , flagyl , IVF  supportive treatment. s/p ERCP with papillotomy, stones extraction and Wallstent placement on 1/29/2021. As per surg will need elective cholecystectomy when abscesses heals. Diarrhea has resolved. Post ERCP , lipase down trending. Follow up CT reviewed. Further plans as per GI ,GS , ID. Liver abscesses as per MRI-ID consulted and following. Has Leukocytosis and elevated pro Niranjan.  On IV ceftriaxone plus Flagyl. S/p IR  Liver bx . RLL Pneumonia/effucion  as per CT , with cough & Leucocytosis - pulm is consulted by surg ,on abx as per ID     Portal vein thrombosis-on IV heparin as per general surgery.     A. Fib/MAT -cardiology is consulted & following , present anticoagulation has not been recommended with need of arrhythmia follow-up to assess presence or absence of recurrence and potential future needs of anticoagulation especially if hypertension persist in this will contribute to his [id number] score of 2. He will need outpatient follow-up with cardiology. Continue BB for rate control. Hypokalemia-replete and follow    Hypertension-on lisinopril, hydralazine as needed    EtOH use-on IV fluids, MVI, thiamine, folic acid. Monitor if needed placed on CIWA scale. Dysphagia-speech  therapy consulted & following, on pureed diet.     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

## 2022-09-20 RX ORDER — OXYBUTYNIN CHLORIDE 5 MG/1
5 TABLET ORAL 2 TIMES DAILY
Qty: 90 TABLET | Refills: 1 | Status: SHIPPED | OUTPATIENT
Start: 2022-09-20

## 2022-10-05 ENCOUNTER — OFFICE VISIT (OUTPATIENT)
Dept: FAMILY MEDICINE CLINIC | Age: 77
End: 2022-10-05
Payer: MEDICARE

## 2022-10-05 VITALS
SYSTOLIC BLOOD PRESSURE: 200 MMHG | OXYGEN SATURATION: 98 % | HEART RATE: 60 BPM | WEIGHT: 152.4 LBS | TEMPERATURE: 98.7 F | DIASTOLIC BLOOD PRESSURE: 106 MMHG | BODY MASS INDEX: 21.26 KG/M2

## 2022-10-05 DIAGNOSIS — S79.912S HIP INJURY, LEFT, SEQUELA: Primary | ICD-10-CM

## 2022-10-05 DIAGNOSIS — Z91.81 HISTORY OF FALL: ICD-10-CM

## 2022-10-05 DIAGNOSIS — R26.81 GAIT INSTABILITY: ICD-10-CM

## 2022-10-05 DIAGNOSIS — I48.0 PAROXYSMAL ATRIAL FIBRILLATION (HCC): ICD-10-CM

## 2022-10-05 DIAGNOSIS — I10 PRIMARY HYPERTENSION: ICD-10-CM

## 2022-10-05 PROBLEM — K59.1 FUNCTIONAL DIARRHEA: Status: RESOLVED | Noted: 2021-01-27 | Resolved: 2022-10-05

## 2022-10-05 PROCEDURE — 1124F ACP DISCUSS-NO DSCNMKR DOCD: CPT | Performed by: STUDENT IN AN ORGANIZED HEALTH CARE EDUCATION/TRAINING PROGRAM

## 2022-10-05 PROCEDURE — 99214 OFFICE O/P EST MOD 30 MIN: CPT | Performed by: STUDENT IN AN ORGANIZED HEALTH CARE EDUCATION/TRAINING PROGRAM

## 2022-10-05 RX ORDER — FLUORIDE TOOTHPASTE
15 TOOTHPASTE DENTAL 3 TIMES DAILY PRN
Qty: 237 ML | Refills: 0 | Status: SHIPPED | OUTPATIENT
Start: 2022-10-05

## 2022-10-05 RX ORDER — LOSARTAN POTASSIUM 100 MG/1
100 TABLET ORAL EVERY MORNING
Qty: 90 TABLET | Refills: 1 | Status: SHIPPED | OUTPATIENT
Start: 2022-10-05

## 2022-10-05 ASSESSMENT — ENCOUNTER SYMPTOMS
RHINORRHEA: 0
COUGH: 0
SHORTNESS OF BREATH: 0
ABDOMINAL PAIN: 0
VOMITING: 0
NAUSEA: 0

## 2022-10-05 NOTE — PROGRESS NOTES
[facility]  Office Progress Note  Dr. Kelli Michael      Patient:  Yessi Oh 78 y.o. male     Date of Service: 10/5/22      Chief complaint:   Chief Complaint   Patient presents with    Bradycardia    Hypertension         History of Present Illness   The patient is a 78 y.o. male  here to follow up of their Er visit for bradycardia, a fib, hypertensive urgency    HTN-afib and bradycardia. Currently not on any blood thinners. History of falls. Not very stable. Broke his hip a few months ago and needed surgery. Also not on any blood thinners. Hx of alcohol abuse but has not drank for some time. Was evaluated by cardiology in ER who declined to initiate blood thinner at the time and I think this is a reasonable choice given his history of falls and overall health history    L sided hip surgery-2-3 months ago. Happened after a fall. Still having issues with gait and pain. Especially on the L side. He is interested in getting more therapy. Wants to be very active. Able to walk but when trying to do any activity with it is very unsteady. His biggest complaint is his teeth-his sone is present and states they are working on getting him scheduled. 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 HENT:  Positive for dental problem. Negative for congestion and rhinorrhea. Respiratory:  Negative for cough and shortness of breath. Cardiovascular:  Negative for chest pain and leg swelling. Gastrointestinal:  Negative for abdominal pain, nausea and vomiting.    Genitourinary:  Negative for dysuria and hematuria. Musculoskeletal:  Positive for arthralgias, gait problem and myalgias. Skin:  Negative for rash and wound. Neurological:  Negative for dizziness and light-headedness. Physical Exam   Vitals: BP (!) 200/106   Pulse 60   Temp 98.7 °F (37.1 °C)   Wt 152 lb 6.4 oz (69.1 kg)   SpO2 98%   BMI 21.26 kg/m²   Physical Exam    General:  Well developed, well nourished, well groomed. No apparent distress. HEENT:  Normocephalic, atraumatic. No scleral icterus. No conjunctival injection. No nasal discharge. Surgically absent L ear  Heart: bradycardia, irregular rhythm, gallops, or rubs  Lungs:  CTA bilaterally, bilat symmetrical expansion, no wheeze, rales, or rhonchi  Abdomen: Bowel sounds present, soft, nontender, no masses, no organomegaly, no peritoneal signs  Extremities:  No clubbing, cyanosis, or edema 3/5 strength with flexion of the L hip  Neuro:  Alert and oriented x3, no focal deficits      Assessment and Plan     PT for hip/gait issues. A fib-currently irregular and bradycardic  HTN-increase cozaar to 100 mg. Needs quick follow up but seems as if these pressures are chronic for him  No need for bb/rate control with his current HR  Do not think blood thinners are a good choice given history of falls, current gait instability and overall health problems. Son is present and is in agreement with the plan  1. Hip injury, left, sequela      2. Primary hypertension      3. Paroxysmal atrial fibrillation (HCC)      4. History of fall    - [address]    5. Gait instability    - [facility] regarding above diagnosis, including possible risks and complications,  especially if left uncontrolled. Counseled regarding the possible side effects, risks, benefits and alternatives to treatment;patient and/or guardian verbalizes understanding, agrees, feels comfortable with and wishes to proceed with above treatment plan.     Call or go to ED immediately if symptoms worsen or persist. Advised patient to call with any new medication issues, and, as applicable, read all Rx info from pharmacy to assure aware of all possible risks and side effects of medicationbefore taking. Patient and/or guardian given opportunity to ask questions/raise concerns. The patient verbalized comfort and understanding ofinstructions. Return to Office: Return in about 2 months (around 12/5/2022). Medication List:    Current Outpatient Medications   Medication Sig Dispense Refill    losartan (COZAAR) 100 MG tablet Take 1 tablet by mouth every morning 90 tablet 1    Mouthwashes (BIOTENE) LIQD oral solution Swish and spit 15 mLs 3 times daily as needed (dry mouth) 237 mL 0    oxybutynin (DITROPAN) 5 MG tablet Take 1 tablet by mouth 2 times daily 90 tablet 1    B Complex-Biotin-FA (B COMPLEX 100 TR) TBCR Take 1 tablet by mouth every morning      folic acid (FOLVITE) 630 MCG tablet Take 400 mcg by mouth every morning      tamsulosin (FLOMAX) 0.4 MG capsule Take 0.4 mg by mouth every morning      Cholecalciferol (VITAMIN D3 PO) Take 1 tablet by mouth every morning      Lifitegrast (XIIDRA) 5 % SOLN Apply 1 drop to eye 2 times daily      polyvinyl alcohol (LIQUIFILM TEARS) 1.4 % ophthalmic solution Place 1 drop into both eyes 2 times daily      glycopyrrolate (ROBINUL) 1 MG tablet Take 0.5 tablets by mouth 2 times daily 90 tablet 3     No current facility-administered medications for this visit. Ventura Busots MD     This document may have been prepared at least partially through the use of voice recognition software. Although effort is taken to assure the accuracy ofthis document, it is possible that grammatical, syntax, or spelling errors may occur.

## 2022-10-14 ENCOUNTER — TELEPHONE (OUTPATIENT)
Dept: FAMILY MEDICINE CLINIC | Age: 77
End: 2022-10-14

## 2022-10-14 NOTE — TELEPHONE ENCOUNTER
Ebony Kapadia from [facility] called to let you know that they did Preston's evaluation today.  They will start PT on him beginning next week, three times a week for two weeks, pending insurance approval.

## 2022-10-18 ENCOUNTER — TELEPHONE (OUTPATIENT)
Dept: FAMILY MEDICINE CLINIC | Age: 77
End: 2022-10-18

## 2022-10-18 ENCOUNTER — APPOINTMENT (OUTPATIENT)
Dept: GENERAL RADIOLOGY | Age: 77
End: 2022-10-18
Payer: MEDICARE

## 2022-10-18 ENCOUNTER — HOSPITAL ENCOUNTER (EMERGENCY)
Age: 77
Discharge: HOME | End: 2022-10-18
Attending: EMERGENCY MEDICINE
Payer: MEDICARE

## 2022-10-18 VITALS
DIASTOLIC BLOOD PRESSURE: 74 MMHG | SYSTOLIC BLOOD PRESSURE: 136 MMHG | HEART RATE: 95 BPM | RESPIRATION RATE: 18 BRPM | OXYGEN SATURATION: 99 % | TEMPERATURE: 98 F

## 2022-10-18 DIAGNOSIS — I10 HYPERTENSION, UNSPECIFIED TYPE: Primary | ICD-10-CM

## 2022-10-18 LAB
ALBUMIN SERPL-MCNC: 4.1 G/DL (ref 3.5–5.2)
ALP BLD-CCNC: 89 U/L (ref 40–129)
ALT SERPL-CCNC: 12 U/L (ref 0–40)
ANION GAP SERPL CALCULATED.3IONS-SCNC: 10 MMOL/L (ref 7–16)
AST SERPL-CCNC: 20 U/L (ref 0–39)
BASOPHILS ABSOLUTE: 0.07 E9/L (ref 0–0.2)
BASOPHILS RELATIVE PERCENT: 1.4 % (ref 0–2)
BILIRUB SERPL-MCNC: 1.1 MG/DL (ref 0–1.2)
BUN BLDV-MCNC: 13 MG/DL (ref 6–23)
CALCIUM SERPL-MCNC: 9.7 MG/DL (ref 8.6–10.2)
CHLORIDE BLD-SCNC: 104 MMOL/L (ref 98–107)
CO2: 26 MMOL/L (ref 22–29)
CREAT SERPL-MCNC: 0.6 MG/DL (ref 0.7–1.2)
EOSINOPHILS ABSOLUTE: 0.11 E9/L (ref 0.05–0.5)
EOSINOPHILS RELATIVE PERCENT: 2.1 % (ref 0–6)
GFR SERPL CREATININE-BSD FRML MDRD: >60 ML/MIN/1.73
GLUCOSE BLD-MCNC: 93 MG/DL (ref 74–99)
HCT VFR BLD CALC: 39.4 % (ref 37–54)
HEMOGLOBIN: 12.8 G/DL (ref 12.5–16.5)
IMMATURE GRANULOCYTES #: 0.01 E9/L
IMMATURE GRANULOCYTES %: 0.2 % (ref 0–5)
LYMPHOCYTES ABSOLUTE: 1.43 E9/L (ref 1.5–4)
LYMPHOCYTES RELATIVE PERCENT: 27.7 % (ref 20–42)
MCH RBC QN AUTO: 28.4 PG (ref 26–35)
MCHC RBC AUTO-ENTMCNC: 32.5 % (ref 32–34.5)
MCV RBC AUTO: 87.6 FL (ref 80–99.9)
MONOCYTES ABSOLUTE: 0.5 E9/L (ref 0.1–0.95)
MONOCYTES RELATIVE PERCENT: 9.7 % (ref 2–12)
NEUTROPHILS ABSOLUTE: 3.04 E9/L (ref 1.8–7.3)
NEUTROPHILS RELATIVE PERCENT: 58.9 % (ref 43–80)
PDW BLD-RTO: 15.5 FL (ref 11.5–15)
PLATELET # BLD: 209 E9/L (ref 130–450)
PMV BLD AUTO: 10.2 FL (ref 7–12)
POTASSIUM REFLEX MAGNESIUM: 3.8 MMOL/L (ref 3.5–5)
RBC # BLD: 4.5 E12/L (ref 3.8–5.8)
SODIUM BLD-SCNC: 140 MMOL/L (ref 132–146)
TOTAL PROTEIN: 7.6 G/DL (ref 6.4–8.3)
TROPONIN, HIGH SENSITIVITY: 15 NG/L (ref 0–11)
TROPONIN, HIGH SENSITIVITY: 16 NG/L (ref 0–11)
WBC # BLD: 5.2 E9/L (ref 4.5–11.5)

## 2022-10-18 PROCEDURE — 36415 COLL VENOUS BLD VENIPUNCTURE: CPT

## 2022-10-18 PROCEDURE — 6370000000 HC RX 637 (ALT 250 FOR IP)

## 2022-10-18 PROCEDURE — 96376 TX/PRO/DX INJ SAME DRUG ADON: CPT

## 2022-10-18 PROCEDURE — 93005 ELECTROCARDIOGRAM TRACING: CPT

## 2022-10-18 PROCEDURE — 6360000002 HC RX W HCPCS

## 2022-10-18 PROCEDURE — 71045 X-RAY EXAM CHEST 1 VIEW: CPT

## 2022-10-18 PROCEDURE — 99285 EMERGENCY DEPT VISIT HI MDM: CPT

## 2022-10-18 PROCEDURE — 85025 COMPLETE CBC W/AUTO DIFF WBC: CPT

## 2022-10-18 PROCEDURE — 84484 ASSAY OF TROPONIN QUANT: CPT

## 2022-10-18 PROCEDURE — 80053 COMPREHEN METABOLIC PANEL: CPT

## 2022-10-18 PROCEDURE — 96374 THER/PROPH/DIAG INJ IV PUSH: CPT

## 2022-10-18 RX ORDER — HYDRALAZINE HYDROCHLORIDE 20 MG/ML
10 INJECTION INTRAMUSCULAR; INTRAVENOUS ONCE
Status: COMPLETED | OUTPATIENT
Start: 2022-10-18 | End: 2022-10-18

## 2022-10-18 RX ORDER — AMLODIPINE BESYLATE 5 MG/1
5 TABLET ORAL ONCE
Status: COMPLETED | OUTPATIENT
Start: 2022-10-18 | End: 2022-10-18

## 2022-10-18 RX ORDER — AMLODIPINE BESYLATE 5 MG/1
5 TABLET ORAL DAILY
Qty: 30 TABLET | Refills: 0 | Status: SHIPPED | OUTPATIENT
Start: 2022-10-18

## 2022-10-18 RX ADMIN — HYDRALAZINE HYDROCHLORIDE 10 MG: 20 INJECTION INTRAMUSCULAR; INTRAVENOUS at 16:28

## 2022-10-18 RX ADMIN — HYDRALAZINE HYDROCHLORIDE 10 MG: 20 INJECTION INTRAMUSCULAR; INTRAVENOUS at 19:14

## 2022-10-18 RX ADMIN — AMLODIPINE BESYLATE 5 MG: 5 TABLET ORAL at 19:14

## 2022-10-18 ASSESSMENT — PAIN - FUNCTIONAL ASSESSMENT
PAIN_FUNCTIONAL_ASSESSMENT: NONE - DENIES PAIN
PAIN_FUNCTIONAL_ASSESSMENT: NONE - DENIES PAIN

## 2022-10-18 ASSESSMENT — ENCOUNTER SYMPTOMS
SHORTNESS OF BREATH: 0
NAUSEA: 0
EYE REDNESS: 0
CONSTIPATION: 0
ABDOMINAL DISTENTION: 0
BACK PAIN: 0
CHEST TIGHTNESS: 0
WHEEZING: 0
EYE ITCHING: 0
VOMITING: 0
TROUBLE SWALLOWING: 0
RHINORRHEA: 0
DIARRHEA: 0
ABDOMINAL PAIN: 0

## 2022-10-18 NOTE — ED PROVIDER NOTES
Lokesh Pittman is a 78 y.o. male    Chief Complaint   Patient presents with    Hypertension     Sent by [facility] AT [facility] nurse for hypertension / Chestine Guitar . Denies any cp         HPI   Lokesh Pittman is a 78 y.o. male presenting to the ED for Hypertension (Sent by [facility] AT [facility] nurse for hypertension / Chestine Guitar . Denies any cp)    History comes primarily from the patient and EMS. Patient presents from home due to systolic blood pressure over 200. Patient has no complaints and lives at home. His home health nurse stopped by today routinely. They noticed his BP was over 364 systolic and had a heart rate in the 40's and 50's. The patient denies any chest pain, shortness of breath, cough, fever, abdominal pain, urinary or bowel symptoms. Patient's only hypertension medication is losartan 100mg which was recent increased to that dose. Review of Systems   Constitutional:  Negative for appetite change, fatigue and fever. HENT:  Negative for congestion, rhinorrhea and trouble swallowing. Eyes:  Negative for redness and itching. Respiratory:  Negative for chest tightness, shortness of breath and wheezing. Cardiovascular:  Negative for chest pain, palpitations and leg swelling. Gastrointestinal:  Negative for abdominal distention, abdominal pain, constipation, diarrhea, nausea and vomiting. Genitourinary:  Negative for decreased urine volume, difficulty urinating and frequency. Musculoskeletal:  Negative for arthralgias, back pain and myalgias. Neurological:  Negative for dizziness, syncope, weakness, numbness and headaches. Psychiatric/Behavioral:  Negative for agitation, behavioral problems, confusion and decreased concentration. The patient is not nervous/anxious. All other systems reviewed and are negative. Physical Exam  Vitals reviewed. Constitutional:       General: He is not in acute distress. Appearance: Normal appearance. He is not ill-appearing. HENT:      Head: Normocephalic and atraumatic. Right Ear: External ear normal.      Left Ear: External ear normal.      Nose: Nose normal. No rhinorrhea. Mouth/Throat:      Mouth: Mucous membranes are moist.      Pharynx: Oropharynx is clear. Eyes:      Extraocular Movements: Extraocular movements intact. Conjunctiva/sclera: Conjunctivae normal.      Pupils: Pupils are equal, round, and reactive to light. Cardiovascular:      Rate and Rhythm: Regular rhythm. Bradycardia present. Heart sounds: Normal heart sounds. No murmur heard. Pulmonary:      Effort: Pulmonary effort is normal. No respiratory distress. Breath sounds: Normal breath sounds. Abdominal:      General: Abdomen is flat. There is no distension. Tenderness: There is no abdominal tenderness. Musculoskeletal:         General: No swelling or tenderness. Normal range of motion. Cervical back: Normal range of motion. No rigidity or tenderness. Skin:     General: Skin is warm and dry. Findings: No rash. Neurological:      General: No focal deficit present. Mental Status: He is alert and oriented to person, place, and time. Cranial Nerves: No cranial nerve deficit. Motor: No weakness. Psychiatric:         Mood and Affect: Mood normal.         Behavior: Behavior normal.        Procedures     MDM  Patient presented to the Emergency Department for Hypertension (Sent by [facility] AT [facility] nurse for hypertension / Jimmy Brooks . Denies any cp)    Patient sent in by home health nurse for hypertension >>977 systolic and bradycardia. Patient has similar vital sign findings here. He is given two doses of hydralazine and started on 5mg of amlodipine and instructed to follow up with his PCP. He has no complaints while here and seems unhappy to be here. Patient's workup is unremarkable. He appears to be chronically bradycardia around 50bpm. He wants to go home and says he doesn't want to stay in the hospital at all.  I discussed the risks and benefits and return precautions and patient will be discharged. He is A&Ox3 and has decisional competency. EKG: This EKG is signed and interpreted by me. Rate: 46  Rhythm: irregular, no defined p-waves prior to every qrs. Axis: normal  Interpretation: possibly afib, bradycardia. Comparison: stable as compared to patient's most recent EKG. Previous EKG on 8/30 looks similar - slow afib with HR 50    Lab Results   Component Value Date    LVEF 65 01/28/2021         ED Course as of 10/18/22 2200   Tue Oct 18, 2022   1832 Patient states his intention to not stay in the hospital overnight. [KS]      ED Course User Index  [KS] Angelita Leigh MD       --------------------------------------------- PAST HISTORY ---------------------------------------------  Past Medical History:  has a past medical history of A-fib ([facility] [address] Alcohol abuse, Alcoholic encephalopathy ([facility] [address] Closed intertrochanteric fracture of hip, left, initial encounter ([facility] [address] Functional diarrhea, Hx of blood clots, Hypertension, Malignant neoplasm of glottis ([facility] [address] Pneumonia, Squamous cell skin cancer, and Stomach ulcer. 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 Social History:  reports that he quit smoking about 16 years ago. His smoking use included cigarettes. He started smoking about 59 years ago. He has a 43.00 pack-year smoking history. He has never used smokeless tobacco. He reports current alcohol use of about 30.0 standard drinks per week. He reports that he does not use drugs. Family History: family history is not on file. The patients home medications have been reviewed. Allergies: Patient has no known allergies.     -------------------------------------------------- RESULTS -------------------------------------------------  Labs:  Results for orders placed or performed during the hospital encounter of 10/18/22   CBC with Auto Differential   Result Value Ref Range    WBC 5.2 4.5 - 11.5 E9/L    RBC 4.50 3.80 - 5.80 E12/L    Hemoglobin 12.8 12.5 - 16.5 g/dL    Hematocrit 39.4 37.0 - 54.0 %    MCV 87.6 80.0 - 99.9 fL    MCH 28.4 26.0 - 35.0 pg    MCHC 32.5 32.0 - 34.5 %    RDW 15.5 (H) 11.5 - 15.0 fL    Platelets 992 479 - 062 E9/L    MPV 10.2 7.0 - 12.0 fL    Neutrophils % 58.9 43.0 - 80.0 %    Immature Granulocytes % 0.2 0.0 - 5.0 %    Lymphocytes % 27.7 20.0 - 42.0 %    Monocytes % 9.7 2.0 - 12.0 %    Eosinophils % 2.1 0.0 - 6.0 %    Basophils % 1.4 0.0 - 2.0 %    Neutrophils Absolute 3.04 1.80 - 7.30 E9/L    Immature Granulocytes # 0.01 E9/L    Lymphocytes Absolute 1.43 (L) 1.50 - 4.00 E9/L    Monocytes Absolute 0.50 0.10 - 0.95 E9/L    Eosinophils Absolute 0.11 0.05 - 0.50 E9/L    Basophils Absolute 0.07 0.00 - 0.20 E9/L   Comprehensive Metabolic Panel w/ Reflex to MG   Result Value Ref Range    Sodium 140 132 - 146 mmol/L    Potassium reflex Magnesium 3.8 3.5 - 5.0 mmol/L    Chloride 104 98 - 107 mmol/L    CO2 26 22 - 29 mmol/L    Anion Gap 10 7 - 16 mmol/L    Glucose 93 74 - 99 mg/dL    BUN 13 6 - 23 mg/dL    Creatinine 0.6 (L) 0.7 - 1.2 mg/dL    Est, Glom Filt Rate >60 >=60 mL/min/1.73    Calcium 9.7 8.6 - 10.2 mg/dL    Total Protein 7.6 6.4 - 8.3 g/dL    Albumin 4.1 3.5 - 5.2 g/dL    Total Bilirubin 1.1 0.0 - 1.2 mg/dL    Alkaline Phosphatase 89 40 - 129 U/L    ALT 12 0 - 40 U/L    AST 20 0 - 39 U/L   Troponin   Result Value Ref Range    Troponin, High Sensitivity 16 (H) 0 - 11 ng/L   Troponin   Result Value Ref Range    Troponin, High Sensitivity 15 (H) 0 - 11 ng/L   EKG 12 Lead   Result Value Ref Range    Ventricular Rate 54 BPM    Atrial Rate 30 BPM    QRS Duration 68 ms    Q-T Interval 432 ms    QTc Calculation (Bazett) 409 ms    R Axis -7 degrees    T Axis 13 degrees Radiology:  XR CHEST PORTABLE   Final Result   1. Chronic interstitial opacities bilaterally. 2. No focal pneumonia or evidence of pleural effusion.             ------------------------- NURSING NOTES AND VITALS REVIEWED ---------------------------  Date / Time Roomed:  10/18/2022  3:15 PM  ED Bed Assignment:  17B/17B-17    The nursing notes within the ED encounter and vital signs as below have been reviewed. /74   Pulse 95   Temp 98 °F (36.7 °C)   Resp 18   SpO2 99%   Oxygen Saturation Interpretation: Normal      ------------------------------------------ PROGRESS NOTES ------------------------------------------  10:00 PM EDT  I have spoken with the patient and discussed todays results, in addition to providing specific details for the plan of care and counseling regarding the diagnosis and prognosis. Their questions are answered at this time and they are agreeable with the plan. I discussed at length with them reasons for immediate return here for re evaluation. They will followup with their primary care physician by calling their office tomorrow. --------------------------------- ADDITIONAL PROVIDER NOTES ---------------------------------  At this time the patient is without objective evidence of an acute process requiring hospitalization or inpatient management. They have remained hemodynamically stable throughout their entire ED visit and are stable for discharge with outpatient follow-up. The plan has been discussed in detail and they are aware of the specific conditions for emergent return, as well as the importance of follow-up. Discharge Medication List as of 10/18/2022  7:47 PM        START taking these medications    Details   amLODIPine (NORVASC) 5 MG tablet Take 1 tablet by mouth daily, Disp-30 tablet, R-0Normal             Diagnosis:  1. Hypertension, unspecified type        Disposition:  Patient's disposition: Discharge to home  Patient's condition is stable. Ktaiuska Graves MD  Resident  10/18/22 [id number]

## 2022-10-18 NOTE — ED NOTES
This RN discussed the treatment plan with patient and explained risk of leaving. Pt states that he will comply and stay for monitoring until his ride gets here.       Koby Lopes RN  10/18/22 1919

## 2022-10-18 NOTE — ED NOTES
77 y/o m to the ed with a c/c of HTN. Pt is a poor historian. Patient states that he has been compliant with his daily bp medication. He states that his BP was up while his home health n nurse was at his house. Last time bp taken was this morning. Patient denies chest pain, sob or difficulty breathing. Patient denies ABD pain or n/v/d. Patient placed on telemetry, BP and pulse ox. 12-lead EKG performed. Vitals noted as recorded. Call light placed within patient's reach, and bed in lowest position with side rails up x 2 for safety. Provider at the bedside for assessment.         [place], RN  10/18/22 [address] [facility]  10/18/22 [id number]

## 2022-10-18 NOTE — ED NOTES
Family updated via phone, pt getting different medications to reduce BP.      Veronica Olson RN  10/18/22 [id number]

## 2022-10-18 NOTE — ED NOTES
Pt found getting out of bed and states that he wants to leave. He states that he is angry and that he feels that his blood pressure will go down once he is home. Pt getting verbally belligerent and dumped his urinal in the sink.      Melba Newell RN  10/18/22 1918

## 2022-10-19 LAB
EKG ATRIAL RATE: 30 BPM
EKG Q-T INTERVAL: 432 MS
EKG QRS DURATION: 68 MS
EKG QTC CALCULATION (BAZETT): 409 MS
EKG R AXIS: -7 DEGREES
EKG T AXIS: 13 DEGREES
EKG VENTRICULAR RATE: 54 BPM

## 2022-11-02 RX ORDER — TAMSULOSIN HYDROCHLORIDE 0.4 MG/1
0.4 CAPSULE ORAL EVERY MORNING
Qty: 90 CAPSULE | Refills: 3 | Status: SHIPPED | OUTPATIENT
Start: 2022-11-02

## 2022-11-02 NOTE — TELEPHONE ENCOUNTER
Last Appointment:  10/5/2022  Future Appointments   Date Time Provider Sindy Grider   12/6/2022 11:00 AM Derek Bourgeois  [facility]

## 2022-11-08 NOTE — TELEPHONE ENCOUNTER
Last Appointment:  10/5/2022  Future Appointments   Date Time Provider Sindy Grider   12/6/2022 11:00 AM Nahum Hodge  [address] [place] [address]

## 2022-11-09 NOTE — TELEPHONE ENCOUNTER
Please call [facility] health anf ask if they have any BP readings.  I'm considering increasing his amlodipine to 10 mg

## 2022-11-15 NOTE — TELEPHONE ENCOUNTER
[facility]    She gave me the following recent blood pressure readings on him.       10/14    140/80      10/18     200/88      10/26     120/62      11/2       116/64

## 2022-11-16 RX ORDER — AMLODIPINE BESYLATE 5 MG/1
5 TABLET ORAL DAILY
Qty: 30 TABLET | Refills: 5 | Status: SHIPPED | OUTPATIENT
Start: 2022-11-16

## 2022-12-27 DIAGNOSIS — I10 PRIMARY HYPERTENSION: ICD-10-CM

## 2022-12-27 NOTE — TELEPHONE ENCOUNTER
Patient's son calling to have the Losartan refill moved from [facility]       I have tried to reach them twice, but holding for over 10 minutes. Will try again after the lunch hour.

## 2022-12-30 RX ORDER — OXYBUTYNIN CHLORIDE 5 MG/1
TABLET ORAL
Qty: 90 TABLET | Refills: 1 | Status: SHIPPED | OUTPATIENT
Start: 2022-12-30

## 2023-01-17 ENCOUNTER — OFFICE VISIT (OUTPATIENT)
Dept: FAMILY MEDICINE CLINIC | Age: 78
End: 2023-01-17
Payer: MEDICARE

## 2023-01-17 VITALS
OXYGEN SATURATION: 97 % | BODY MASS INDEX: 22.04 KG/M2 | HEART RATE: 50 BPM | WEIGHT: 158 LBS | SYSTOLIC BLOOD PRESSURE: 130 MMHG | TEMPERATURE: 97.8 F | DIASTOLIC BLOOD PRESSURE: 70 MMHG

## 2023-01-17 DIAGNOSIS — Z85.828 HISTORY OF SQUAMOUS CELL CARCINOMA OF SKIN: ICD-10-CM

## 2023-01-17 DIAGNOSIS — C44.309 MALIGNANT NEOPLASM OF SKIN OF PARTS OF FACE: ICD-10-CM

## 2023-01-17 DIAGNOSIS — R13.10 DYSPHAGIA, UNSPECIFIED TYPE: Primary | ICD-10-CM

## 2023-01-17 DIAGNOSIS — Z85.21 HISTORY OF MALIGNANT NEOPLASM OF GLOTTIS: ICD-10-CM

## 2023-01-17 PROCEDURE — 3074F SYST BP LT 130 MM HG: CPT | Performed by: STUDENT IN AN ORGANIZED HEALTH CARE EDUCATION/TRAINING PROGRAM

## 2023-01-17 PROCEDURE — 3078F DIAST BP <80 MM HG: CPT | Performed by: STUDENT IN AN ORGANIZED HEALTH CARE EDUCATION/TRAINING PROGRAM

## 2023-01-17 PROCEDURE — 1124F ACP DISCUSS-NO DSCNMKR DOCD: CPT | Performed by: STUDENT IN AN ORGANIZED HEALTH CARE EDUCATION/TRAINING PROGRAM

## 2023-01-17 PROCEDURE — 99213 OFFICE O/P EST LOW 20 MIN: CPT | Performed by: STUDENT IN AN ORGANIZED HEALTH CARE EDUCATION/TRAINING PROGRAM

## 2023-01-17 ASSESSMENT — ENCOUNTER SYMPTOMS
SHORTNESS OF BREATH: 0
COUGH: 0
RHINORRHEA: 0
VOICE CHANGE: 1
ABDOMINAL PAIN: 0
VOMITING: 0
NAUSEA: 0
TROUBLE SWALLOWING: 1

## 2023-01-17 ASSESSMENT — PATIENT HEALTH QUESTIONNAIRE - PHQ9
1. LITTLE INTEREST OR PLEASURE IN DOING THINGS: 0
2. FEELING DOWN, DEPRESSED OR HOPELESS: 0
SUM OF ALL RESPONSES TO PHQ QUESTIONS 1-9: 0
SUM OF ALL RESPONSES TO PHQ9 QUESTIONS 1 & 2: 0
SUM OF ALL RESPONSES TO PHQ QUESTIONS 1-9: 0

## 2023-01-17 NOTE — PROGRESS NOTES
[facility]  Office Progress Note  Dr. Halley Kam      Patient:  Rebel Lawton 78 y.o. male     Date of Service: 1/17/23      Chief complaint:   Chief Complaint   Patient presents with    Hypertension         History of Present Illness   The patient is a 78 y.o. male  here to follow up of their hypertension    He is not a great historian    HTN and a fib  More recent visits have milka focused on patient uncontrolled HTN and bradycardia, however these are well controlled today and he is without cardiac complaints. 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 He is having dysphagia. He has also had skin cancer on his face and feels like a lesion on his nose is getting worse. He has had some changes in his voice recently and noticing a hard time swallowing. Hard for him to pin point if it is with liquids or solids. He has a history of alcohol abuse and extensive smoking history. 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 HENT:  Positive for trouble swallowing and voice change. Negative for congestion and rhinorrhea. Respiratory:  Negative for cough and shortness of breath. Cardiovascular:  Negative for chest pain and leg swelling. Gastrointestinal:  Negative for abdominal pain, nausea and vomiting. Genitourinary:  Negative for dysuria and hematuria. Musculoskeletal:  Negative for arthralgias and myalgias. Skin:  Positive for wound. Negative for rash. Neurological:  Negative for dizziness and light-headedness. Physical Exam   Vitals: /70   Pulse 50   Temp 97.8 °F (36.6 °C)   Wt 158 lb (71.7 kg)   SpO2 97%   BMI 22.04 kg/m²   Physical Exam    General:  Well developed, well nourished, well groomed. No apparent distress. HEENT:  Normocephalic, atraumatic. No scleral icterus. No conjunctival injection. No nasal discharge. L ear is surgically absent  Heart:  RRR, no murmurs, gallops, or rubs  Lungs:  CTA bilaterally, bilat symmetrical expansion, no wheeze, rales, or rhonchi  Extremities:  No clubbing, cyanosis, or edema  Neuro:  Alert and oriented x3, no focal deficits  Skin: irregular ulceration that appears necrotic on the R nare. Assessment and Plan     Patient used to follow with specialists in [facility] for his multiple head, neck and skin cancers. He has not followed with anyone for some time. He is Willing to return to specialists to see if any further workup or treatment is needed at this time. Lesion on his nose appears cancerous to me and I am concerned about his voice changes/swallowing issues given his history of glottis cancer. HE states a few years ago he was told he was cancer free but he has not seen anyone for a while. He is a moderately reliable historian  at best.  I did bring his son into the room to explain I wanted him to see some specialists. He was in agreement with the plan. Return to follow up with me in 3 months. Hopefully will see specialists in the mean time. 1. Dysphagia, unspecified type    - Joe Andres DO, Otolaryngology, [place]    2. History of malignant neoplasm of glottis    - [facility] , [facility], [place]    3. Malignant neoplasm of skin of parts of face    - Behzad Mays DO, [facility])    Counseled regarding above diagnosis, including possible risks and complications,  especially if left uncontrolled. Counseled regarding the possible side effects, risks, benefits and alternatives to treatment;patient and/or guardian verbalizes understanding, agrees, feels comfortable with and wishes to proceed with above treatment plan. Call or go to ED immediately if symptoms worsen or persist. Advised patient to call with any new medication issues, and, as applicable, read all Rx info from pharmacy to assure aware of all possible risks and side effects of medicationbefore taking. Patient and/or guardian given opportunity to ask questions/raise concerns. The patient verbalized comfort and understanding ofinstructions. Return to Office: Return in about 3 months (around 4/17/2023) for ENT and Derm follow up.     Medication List:    Current Outpatient Medications   Medication Sig Dispense Refill    polyethylene glycol (GLYCOLAX) 17 GM/SCOOP powder Take 17 g by mouth daily 510 g 5    oxybutynin (DITROPAN) 5 MG tablet TAKE 1 TABLET BY MOUTH TWO TIMES A DAY 90 tablet 1    folic acid (FOLVITE) 649 MCG tablet Take 400 mcg by mouth every morning      Lifitegrast (XIIDRA) 5 % SOLN Apply 1 drop to eye 2 times daily      polyvinyl alcohol (LIQUIFILM TEARS) 1.4 % ophthalmic solution Place 1 drop into both eyes 2 times daily      glycopyrrolate (ROBINUL) 1 MG tablet Take 0.5 tablets by mouth 2 times daily 90 tablet 3    amLODIPine (NORVASC) 5 MG tablet Take 1 tablet by mouth daily 30 tablet 5    tamsulosin (FLOMAX) 0.4 MG capsule Take 1 capsule by mouth every morning 90 capsule 3    losartan (COZAAR) 100 MG tablet Take 1 tablet by mouth every morning 90 tablet 1    Mouthwashes (BIOTENE) LIQD oral solution Swish and spit 15 mLs 3 times daily as needed (dry mouth) 237 mL 0     No current facility-administered medications for this visit. González Brooks MD     This document may have been prepared at least partially through the use of voice recognition software. Although effort is taken to assure the accuracy ofthis document, it is possible that grammatical, syntax, or spelling errors may occur.

## 2023-01-17 NOTE — PROGRESS NOTES
Spoke with son Cam Hatchet. He stated that his father is confused but he likes to let him be independent at his appointments. he will come in the exam room with his dad if we need him or have questions. He said he has all meds at home and needs no refils at this time.

## 2023-01-18 ENCOUNTER — TELEPHONE (OUTPATIENT)
Dept: FAMILY MEDICINE CLINIC | Age: 78
End: 2023-01-18

## 2023-01-18 DIAGNOSIS — Z85.21 HISTORY OF GLOTTIC CANCER: Primary | ICD-10-CM

## 2023-01-18 PROBLEM — S72.002A CLOSED FRACTURE OF NECK OF LEFT FEMUR (HCC): Status: RESOLVED | Noted: 2022-06-04 | Resolved: 2023-01-18

## 2023-01-18 RX ORDER — POLYETHYLENE GLYCOL 3350 17 G/17G
17 POWDER, FOR SOLUTION ORAL DAILY
Qty: 510 G | Refills: 5 | Status: SHIPPED | OUTPATIENT
Start: 2023-01-18 | End: 2023-07-17

## 2023-01-18 NOTE — TELEPHONE ENCOUNTER
Martinez called w/ [facility]. Dr Kunal Batista would like you to order a swallow study and esophogram. Once testing is done, Kunal Batista will see.

## 2023-01-18 NOTE — TELEPHONE ENCOUNTER
Please let Patient/Son know ENT is requesting imaging to be done before his appointment. I have placed the order. Scheduling should be reaching out.

## 2023-01-25 ENCOUNTER — TELEPHONE (OUTPATIENT)
Dept: FAMILY MEDICINE CLINIC | Age: 78
End: 2023-01-25

## 2023-01-25 DIAGNOSIS — Z85.21 HISTORY OF GLOTTIC CANCER: Primary | ICD-10-CM

## 2023-01-25 DIAGNOSIS — R13.10 DYSPHAGIA, UNSPECIFIED TYPE: ICD-10-CM

## 2023-01-25 NOTE — TELEPHONE ENCOUNTER
Additional order placed.  Please update patient's son or daughter and let them know we added a test at ENTs request

## 2023-01-25 NOTE — TELEPHONE ENCOUNTER
Stan Mcfarlane        She is calling about his appointment on Feb 24th. Sergio Alireza needs tests done prior to that appointment. She sees that he is scheduled for a swallow study, but also needs to have a Modified Barium Swallow study done as well.

## 2023-01-30 ENCOUNTER — TELEPHONE (OUTPATIENT)
Dept: FAMILY MEDICINE CLINIC | Age: 78
End: 2023-01-30

## 2023-01-30 DIAGNOSIS — Z85.21 HISTORY OF GLOTTIC CANCER: Primary | ICD-10-CM

## 2023-01-30 DIAGNOSIS — R13.10 DYSPHAGIA, UNSPECIFIED TYPE: ICD-10-CM

## 2023-01-30 NOTE — TELEPHONE ENCOUNTER
Dr. sIaac Osman office called. They need an FL modified barium swallow order placed. The wrong one was put in.

## 2023-02-27 ENCOUNTER — HOSPITAL ENCOUNTER (OUTPATIENT)
Dept: GENERAL RADIOLOGY | Age: 78
Discharge: HOME | End: 2023-03-01
Payer: MEDICARE

## 2023-02-27 DIAGNOSIS — R13.10 DYSPHAGIA, UNSPECIFIED TYPE: ICD-10-CM

## 2023-02-27 DIAGNOSIS — Z85.21 HISTORY OF GLOTTIC CANCER: ICD-10-CM

## 2023-02-27 PROCEDURE — 74230 X-RAY XM SWLNG FUNCJ C+: CPT

## 2023-02-27 PROCEDURE — 2500000003 HC RX 250 WO HCPCS: Performed by: RADIOLOGY

## 2023-02-27 PROCEDURE — 92610 EVALUATE SWALLOWING FUNCTION: CPT | Performed by: SPEECH-LANGUAGE PATHOLOGIST

## 2023-02-27 PROCEDURE — 92526 ORAL FUNCTION THERAPY: CPT | Performed by: SPEECH-LANGUAGE PATHOLOGIST

## 2023-02-27 RX ADMIN — BARIUM SULFATE 10 ML: 400 PASTE ORAL at 11:12

## 2023-02-27 RX ADMIN — BARIUM SULFATE 70 G: 0.81 POWDER, FOR SUSPENSION ORAL at 11:12

## 2023-02-27 RX ADMIN — BARIUM SULFATE 120 ML: 400 SUSPENSION ORAL at 11:12

## 2023-02-27 NOTE — PROGRESS NOTES
SPEECH/LANGUAGE PATHOLOGY  VIDEOFLUOROSCOPIC STUDY OF SWALLOWING (MBS)   and PLAN OF CARE    PATIENT NAME:  Mariaelena Moore  (male)     MRN:  [id number]    :  1945  (78 y.o.)  STATUS:  Outpatient    TODAY'S DATE:  2023  REFERRING PROVIDER:   Michael Wharton MD   SPECIFIC PROVIDER ORDER: FL modified barium swallow with video    REASON FOR REFERRAL: pt c/o food getting stuck in throat    EVALUATING THERAPIST: Rommel Corrales SLP      RESULTS:      DYSPHAGIA DIAGNOSIS:  moderate oropharyngeal phase dysphagia including residue in pyriform sinus for solids and silent aspiration of thin liquids per straw    Pt trained on compensatory strategies for safe swallow including discontinuing use of straws     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 Pt may benefit from a GI consult to assess for possible esophageal dilation to decrease pt complaint of food getting stuck when swallowing.      DIET RECOMMENDATIONS:  Soft and bite size consistency solids (IDDSI level 6) with small sips of thin liquids (IDDSI level 0)-NO STRAWS    FEEDING RECOMMENDATIONS:    Assistance level:  No assistance needed     Compensatory strategies recommended: Double swallow, Small bites/sips, Alternate solids and liquids, and No straw     Discussed recommendations with nursing and/or faxed report to referring provider: Yes    Laryngeal Penetration and Aspiration:  Penetration WITH aspiration was observed in today's study with  thin liquid per straw    SPEECH THERAPY  PLAN OF CARE   The dysphagia POC is established based on physician order and dysphagia diagnosis    Outpatient OR Home Care Skilled SLP intervention for dysphagia management is recommended 1-2 times per week to address the established treatment plan      Conditions Requiring Skilled Therapeutic Intervention for dysphagia:    impaired mastication   swallow triggered once bolus head entered the laryngeal vestibule which increases risk of aspiration   impaired anterior hyoid movement   impaired laryngeal closure resulting in contrast entering the laryngeal vestibule  impaired pharyngeal stripping wave    SPECIFIC DYSPHAGIA INTERVENTIONS TO INCLUDE:     compensatory swallowing strategies to improve airway protection and swallow function. ongoing mealtime assessment to provide diet modification and compensatory strategy implementation to minimize risk of aspiration associated with PO intake  exercises to target laryngeal elevation   Shaker and/or Chin Tuck Against Resistance (CTAR) to increase UES relaxation diameter and increase anterior laryngeal excursion to reduce pharyngeal residuals and reduce risk of pen/asp   Effortful Swallow therapeutically to target increased oral and base of tongue pressure, increased pharyngeal constrictor contractions, and increased UES relaxation duration to reduce pharyngeal residue  Diana Maneuver therapeutically to target increased pharyngeal constrictor contractions to reduce pharyngeal residue    Specific instructions for next treatment:  therapeutic po trial to determine safety of advanced diet textures and consistencies, initiate instruction of therapeutic exercises , and initiate instruction of compensatory strategies  Treatment Goals:    Short Term Goals: On follow up MBSS will improve laryngeal closure as evidenced by decreased bolus entry into laryngeal vestibule when compared to prior MBSS   On follow up MBSS Pt will increase UES relaxation and increase anterior laryngeal excursion resulting in reduced pharyngeal residuals and reduce risk of pen/asp when compared to prior MBSS    Long Term Goals:   Pt will maintain adequate nutrition/hydration via PO intake of the least restrictive oral diet with implementation of safe swallow/ compensatory strategies and decrease signs/symptoms of aspiration to less than 1 x/day.    Pt will improve oropharyngeal swallow function to ensure airway protection during PO intake to maintain adequate nutrition/hydration and decrease signs/symptoms of aspiration to less than 1 x/day.       Patient/family Goal:    To consume solid foods without feeling like they get stuck    Plan of care discussed with Patient and Family   The Patient and Family understand(s) the diagnosis, prognosis and plan of care     Rehabilitation Potential/Prognosis: fair                      ADMITTING DIAGNOSIS: History of glottic cancer [Z85.21]  Dysphagia, unspecified type [R13.10]     VISIT DIAGNOSIS:   Visit Diagnoses         Codes    History of glottic cancer     Z85.21    Dysphagia, unspecified type     R13.10                PATIENT REPORT/COMPLAINT: food sticking in the throat    PRIOR LEVEL OF SWALLOW FUNCTION:    Past History of Dysphagia?:  yes    Home diet: Regular consistency solids (IDDSI level 7) with  thin liquids (IDDSI level 0)  Current Diet Order:  No diet orders on file    PROCEDURE:  Consistencies Administered During the Evaluation   Liquids: thin liquid and nectar thick liquid   Solids:  pureed foods and solid foods, barium tablet     Method of Intake:   cup, straw, spoon  Self fed    Position:   Seated, upright, Lateral plane    INSTRUMENTAL ASSESSMENT:    ORAL PREP/ ORAL PHASE:    Decreased mastication due to:  poor/missing dentition       PHARYNGEAL PHASE:     ONSET TIME       Delayed initiation of the pharyngeal swallow was noted with swallow reflex triggered at the level of the vallecula        PHARYNGEAL RESIDUALS        Vallecula/Pharyngeal Wall           No significant residuals were noted in the vallecula      Pyriform Sinuses      Residuals in the pyriform sinuses were noted due to cricopharyngeal dysfunction for nectar consistency liquid and solid foods  which  cleared with spontaneous double swallow      LARYNGEAL PENETRATION   Laryngeal penetration occurred in the absence of aspiration DURING the swallow for thin liquid per cup due to  inadequate laryngeal closure which cleared from the laryngeal vestibule spontaneously (transient). Laryngeal penetration was mild and occurred inconsistently   an absent cough/throat clear was noted    ASPIRATION  Aspiration occurred BEFORE the swallow for thin liquid due to delayed pharyngeal swallow . Aspiration was mild-moderate and occurred only with use of a straw . an absent cough/throat clear was noted    PENETRATION-ASPIRATION SCALE (PAS):  THIN 8 = Material enters the airway, passes below the vocal folds, and no effort is made to eject   MILDLY THICK 1 = Material does not enter the airway  MODERATELY THICK item not administered  PUREE 1 = Material does not enter the airway  HARD SOLID 1 = Material does not enter the airway       COMPENSATORY STRATEGIES    Compensatory strategies that were beneficial included Double swallow, Small bites/sips, Alternate solids and liquids, and No straw      STRUCTURAL/FUNCTIONAL ANOMALIES   No structural/functional anomalies were noted    CERVICAL ESOPHAGEAL STAGE :     The cervical esophagus appeared adequate          ___________    Cognition:   Follows 1 - step directions appropriate for this assessment    Oral Peripheral Examination   Generalized oral weakness    Current Respiratory Status   room air     Parameters of Speech Production  Respiration:  Adequate for speech production  Quality:   Within functional limits  Intensity: Within functional limits    Pain: No pain reported.    EDUCATION:   The Speech Language Pathologist (SLP) completed education regarding results of evaluation and that intervention is warranted at this time.  Learner: Patient and Family  Education: Reviewed results and recommendations of this evaluation  Evaluation of Education:  Verbalizes understanding    This plan may be re-evaluated and revised as warranted.        Evaluation Time includes thorough review of current medical information, gathering information on past medical history/social history and prior level of function, completion of  standardized testing/informal observation of tasks, assessment of data and education on plan of care and goals. INTERVENTION    Pt/family educated on above results and plan of care. Pt/family trained on compensatory strategies for safe swallow and handout provided and signs and symptoms of aspiration (throat clearing, coughing/choking, wet vocal quality. , etc.) and encouraged to notify staff if observed. Handouts provided as warranted. Pt/family encouraged to engage in question/answer session. All questions answered and pt/family verbalized understanding of above. [x]The admitting diagnosis and active problem list, have been reviewed prior to initiation of this evaluation. CPT Code: 50555  dysphagia study, 25291 dysphagia therapy    ACTIVE PROBLEM LIST:   Patient Active Problem List   Diagnosis    Essential hypertension    Atrial fibrillation (HCC)    Hypokalemia    Chronic obstructive pulmonary disease (HCC)    Hepatic abscess    Portal vein thrombosis    History of squamous cell carcinoma of skin    Generalized osteoarthritis of multiple sites    History of cholecystectomy    Malignant neoplasm of skin of parts of face    Urinary frequency    Trauma    Acute blood loss anemia       Gordon PRITCHARD CCC/SLP [id number]  Speech-Language Pathologist

## 2023-03-08 ENCOUNTER — TELEPHONE (OUTPATIENT)
Dept: ENT CLINIC | Age: 78
End: 2023-03-08

## 2023-03-08 NOTE — TELEPHONE ENCOUNTER
Patient's son Darlene Lomas called to get his father scheduled for a New appt. He stated his father is having an Esophagram on 03/27/23. He was advised by Isaac Lau's office to get seen before this procedure. He is having Difficulty swallowing. He had a Barium Swallow study on 03/31/23. Pt's son Darlene Lomas would like to speak to the office.  Please conatct

## 2023-03-24 ENCOUNTER — TELEPHONE (OUTPATIENT)
Dept: ENT CLINIC | Age: 78
End: 2023-03-24

## 2023-03-24 NOTE — TELEPHONE ENCOUNTER
Patient was a no show on 3/24/23. Spoke with son regarding no show. He states that patient missed his esophagram appointment as well. He will call to get that rescheduled first and then call our office back to reschedule appointment.  Patient's son was given radiology scheduling's phone number to reschedule esophagram.     Electronically signed by Tana Joseph on 3/24/2023 at 11:01 AM

## 2023-03-27 ENCOUNTER — TELEPHONE (OUTPATIENT)
Dept: FAMILY MEDICINE CLINIC | Age: 78
End: 2023-03-27

## 2023-03-27 NOTE — TELEPHONE ENCOUNTER
Spoke with son Sabra Hewitt. Patient has a cough.  He was agreeable to bringing pt through [facility] for evaluation

## 2023-03-27 NOTE — TELEPHONE ENCOUNTER
Received perfect serve message about patient.  Not sure exactly what his question is-please reach out to patient and see if he needs anything

## 2023-03-31 ENCOUNTER — TELEPHONE (OUTPATIENT)
Dept: ENT CLINIC | Age: 78
End: 2023-03-31

## 2023-03-31 NOTE — TELEPHONE ENCOUNTER
Son Say Daly called to r/s the appt for:  np difficultly swallowing, had swallow study needs scope.   He said that his fathers esophagram has been r/s to 5/2

## 2023-04-07 NOTE — TELEPHONE ENCOUNTER
Patient finished his antibiotics from June for pneumonia, he is still having a productive cough- asking if another round of antibiotics can be sent into the pharmacy Unknown

## 2023-04-17 NOTE — TELEPHONE ENCOUNTER
Pt called in, he would like to switch doctors. From Dr. Dottie Tomlinson to Dr. Cayetano Rodrigues. Both agreeable? [Hearing Loss] : hearing loss [Negative] : Heme/Lymph [Nasal Discharge] : nasal discharge [Postnasal Drip] : postnasal drip [Earache] : no earache [Recent Change In Weight] : ~T no recent weight change [Chest Pain] : no chest pain [Palpitations] : no palpitations [Leg Claudication] : no leg claudication [Lower Ext Edema] : no lower extremity edema [Orthopnea] : no orthopnea [Paroxysmal Nocturnal Dyspnea] : no paroxysmal nocturnal dyspnea [Shortness Of Breath] : no shortness of breath [Wheezing] : no wheezing [Cough] : no cough [Dyspnea on Exertion] : no dyspnea on exertion [Skin Rash] : no skin rash [Confusion] : no confusion [Memory Loss] : no memory loss [Unsteady Walking] : no ataxia [Swollen Glands] : no swollen glands

## 2023-05-02 ENCOUNTER — HOSPITAL ENCOUNTER (OUTPATIENT)
Dept: GENERAL RADIOLOGY | Age: 78
Discharge: HOME | End: 2023-05-04
Payer: MEDICARE

## 2023-05-02 DIAGNOSIS — Z85.21 HISTORY OF GLOTTIC CANCER: ICD-10-CM

## 2023-05-02 PROCEDURE — 74220 X-RAY XM ESOPHAGUS 1CNTRST: CPT

## 2023-05-02 PROCEDURE — 2500000003 HC RX 250 WO HCPCS: Performed by: RADIOLOGY

## 2023-05-02 RX ADMIN — BARIUM SULFATE 176 G: 960 POWDER, FOR SUSPENSION ORAL at 14:56

## 2023-05-02 RX ADMIN — BARIUM SULFATE 140 ML: 980 POWDER, FOR SUSPENSION ORAL at 14:56

## 2023-05-19 ENCOUNTER — TELEPHONE (OUTPATIENT)
Dept: ENT CLINIC | Age: 78
End: 2023-05-19

## 2023-05-19 ENCOUNTER — OFFICE VISIT (OUTPATIENT)
Dept: ENT CLINIC | Age: 78
End: 2023-05-19

## 2023-05-19 VITALS
HEIGHT: 71 IN | OXYGEN SATURATION: 99 % | BODY MASS INDEX: 22.12 KG/M2 | HEART RATE: 36 BPM | DIASTOLIC BLOOD PRESSURE: 70 MMHG | WEIGHT: 158 LBS | SYSTOLIC BLOOD PRESSURE: 143 MMHG

## 2023-05-19 DIAGNOSIS — R13.12 OROPHARYNGEAL DYSPHAGIA: Primary | ICD-10-CM

## 2023-05-19 ASSESSMENT — ENCOUNTER SYMPTOMS
EYE DISCHARGE: 0
SHORTNESS OF BREATH: 0
EYE PAIN: 0
CHEST TIGHTNESS: 0
TROUBLE SWALLOWING: 1
RESPIRATORY NEGATIVE: 1
COLOR CHANGE: 0
EYES NEGATIVE: 1
VOMITING: 0
ABDOMINAL PAIN: 0
DIARRHEA: 0
GASTROINTESTINAL NEGATIVE: 1
APNEA: 0

## 2023-05-19 NOTE — TELEPHONE ENCOUNTER
Patient was seen in office 5/19/23 by Dr. Bronwyn Hidalgo ENT. patients blood pressure was 148/77 pulse was 39 please follow up with patient.

## 2023-05-19 NOTE — PROGRESS NOTES
Subjective:      Patient ID:  Felecia Mejia is a 78 y.o. male. HPI:    Patient presents today for dysphagia. Condition has been present for 2 year(s). Pt has a history of skin cancer. Left ear was removed at the [address] [place] [address] with radiation treatments here. Pt is having difficulty with bread, has to drink to get it down. 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 [facility] [address] LIVER PERCUTANEOUS  2/1/2021    CT NEEDLE BIOPSY LIVER PERCUTANEOUS 2/1/2021 [facility]    ERCP N/A 1/29/2021    ERCP STONE REMOVAL performed by Jamarcus Schultz MD at [facility] ENDOSCOPY    ERCP N/A 3/22/2021    ERCP WITH STENT REMOVAL performed by Jamarcus Schultz MD at [facility] 6/4/2022    LEFT INTERTROCHANTERIC HIP FRACTURE OPEN REDUCTION INTERNAL FIXATION performed by Daisy Medrano MD at [facility] 2009    left sided ear surgery    PICC LINE INSERTION NURSE  2/6/2021          History reviewed. No pertinent family history.   Social History     Socioeconomic History    Marital status:      Spouse name: None    Number of children: None    Years of education: None    Highest education level: None   Tobacco Use    Smoking status: Former     Packs/day: 1.00     Years: 43.00     Pack years: 43.00     Types: Cigarettes

## 2023-06-30 ENCOUNTER — PREP FOR PROCEDURE (OUTPATIENT)
Dept: GASTROENTEROLOGY | Age: 78
End: 2023-06-30

## 2023-06-30 ENCOUNTER — TELEPHONE (OUTPATIENT)
Dept: GASTROENTEROLOGY | Age: 78
End: 2023-06-30

## 2023-07-05 DIAGNOSIS — I10 PRIMARY HYPERTENSION: ICD-10-CM

## 2023-07-06 RX ORDER — LOSARTAN POTASSIUM 100 MG/1
TABLET ORAL
Qty: 90 TABLET | Refills: 1 | Status: SHIPPED | OUTPATIENT
Start: 2023-07-06

## 2023-07-06 NOTE — TELEPHONE ENCOUNTER
Last Appointment:  1/17/2023  Future Appointments   Date Time Provider [address]   7/21/2023 11:15 AM DO Yelena Main5 Alida Patel ENT [facility]   7/25/2023  9:00 AM Anton

## 2023-07-10 ENCOUNTER — ANESTHESIA EVENT (OUTPATIENT)
Dept: ENDOSCOPY | Age: 78
End: 2023-07-10
Payer: MEDICARE

## 2023-07-11 ENCOUNTER — HOSPITAL ENCOUNTER (OUTPATIENT)
Age: 78
Setting detail: OUTPATIENT SURGERY
Discharge: HOME | End: 2023-07-11
Attending: STUDENT IN AN ORGANIZED HEALTH CARE EDUCATION/TRAINING PROGRAM | Admitting: STUDENT IN AN ORGANIZED HEALTH CARE EDUCATION/TRAINING PROGRAM
Payer: MEDICARE

## 2023-07-11 ENCOUNTER — ANESTHESIA (OUTPATIENT)
Dept: ENDOSCOPY | Age: 78
End: 2023-07-11
Payer: MEDICARE

## 2023-07-11 VITALS
HEART RATE: 54 BPM | DIASTOLIC BLOOD PRESSURE: 75 MMHG | TEMPERATURE: 97.9 F | BODY MASS INDEX: 21 KG/M2 | OXYGEN SATURATION: 99 % | HEIGHT: 71 IN | SYSTOLIC BLOOD PRESSURE: 183 MMHG | WEIGHT: 150 LBS | RESPIRATION RATE: 18 BRPM

## 2023-07-11 DIAGNOSIS — K75.0 HEPATIC ABSCESS: ICD-10-CM

## 2023-07-11 PROCEDURE — 2580000003 HC RX 258

## 2023-07-11 PROCEDURE — 7100000011 HC PHASE II RECOVERY - ADDTL 15 MIN: Performed by: STUDENT IN AN ORGANIZED HEALTH CARE EDUCATION/TRAINING PROGRAM

## 2023-07-11 PROCEDURE — 3609012400 HC EGD TRANSORAL BIOPSY SINGLE/MULTIPLE: Performed by: STUDENT IN AN ORGANIZED HEALTH CARE EDUCATION/TRAINING PROGRAM

## 2023-07-11 PROCEDURE — 6360000002 HC RX W HCPCS: Performed by: ANESTHESIOLOGY

## 2023-07-11 PROCEDURE — 6360000002 HC RX W HCPCS

## 2023-07-11 PROCEDURE — 3700000001 HC ADD 15 MINUTES (ANESTHESIA): Performed by: STUDENT IN AN ORGANIZED HEALTH CARE EDUCATION/TRAINING PROGRAM

## 2023-07-11 PROCEDURE — 3700000000 HC ANESTHESIA ATTENDED CARE: Performed by: STUDENT IN AN ORGANIZED HEALTH CARE EDUCATION/TRAINING PROGRAM

## 2023-07-11 PROCEDURE — 2709999900 HC NON-CHARGEABLE SUPPLY: Performed by: STUDENT IN AN ORGANIZED HEALTH CARE EDUCATION/TRAINING PROGRAM

## 2023-07-11 PROCEDURE — 3609017700 HC EGD DILATION GASTRIC/DUODENAL STRICTURE: Performed by: STUDENT IN AN ORGANIZED HEALTH CARE EDUCATION/TRAINING PROGRAM

## 2023-07-11 PROCEDURE — 88305 TISSUE EXAM BY PATHOLOGIST: CPT

## 2023-07-11 PROCEDURE — 7100000010 HC PHASE II RECOVERY - FIRST 15 MIN: Performed by: STUDENT IN AN ORGANIZED HEALTH CARE EDUCATION/TRAINING PROGRAM

## 2023-07-11 PROCEDURE — 88342 IMHCHEM/IMCYTCHM 1ST ANTB: CPT

## 2023-07-11 RX ORDER — ONDANSETRON 2 MG/ML
4 INJECTION INTRAMUSCULAR; INTRAVENOUS EVERY 6 HOURS PRN
Status: DISCONTINUED | OUTPATIENT
Start: 2023-07-11 | End: 2023-07-11 | Stop reason: HOSPADM

## 2023-07-11 RX ORDER — ONDANSETRON 2 MG/ML
4 INJECTION INTRAMUSCULAR; INTRAVENOUS
Status: DISCONTINUED | OUTPATIENT
Start: 2023-07-11 | End: 2023-07-11 | Stop reason: HOSPADM

## 2023-07-11 RX ORDER — HYDRALAZINE HYDROCHLORIDE 20 MG/ML
5 INJECTION INTRAMUSCULAR; INTRAVENOUS ONCE
Status: COMPLETED | OUTPATIENT
Start: 2023-07-11 | End: 2023-07-11

## 2023-07-11 RX ORDER — SODIUM CHLORIDE 0.9 % (FLUSH) 0.9 %
5-40 SYRINGE (ML) INJECTION PRN
Status: DISCONTINUED | OUTPATIENT
Start: 2023-07-11 | End: 2023-07-11 | Stop reason: HOSPADM

## 2023-07-11 RX ORDER — ONDANSETRON 4 MG/1
4 TABLET, ORALLY DISINTEGRATING ORAL EVERY 8 HOURS PRN
Status: DISCONTINUED | OUTPATIENT
Start: 2023-07-11 | End: 2023-07-11 | Stop reason: HOSPADM

## 2023-07-11 RX ORDER — HYDRALAZINE HYDROCHLORIDE 20 MG/ML
INJECTION INTRAMUSCULAR; INTRAVENOUS
Status: COMPLETED
Start: 2023-07-11 | End: 2023-07-11

## 2023-07-11 RX ORDER — SODIUM CHLORIDE 0.9 % (FLUSH) 0.9 %
5-40 SYRINGE (ML) INJECTION EVERY 12 HOURS SCHEDULED
Status: DISCONTINUED | OUTPATIENT
Start: 2023-07-11 | End: 2023-07-11 | Stop reason: HOSPADM

## 2023-07-11 RX ORDER — SODIUM CHLORIDE 9 MG/ML
INJECTION, SOLUTION INTRAVENOUS PRN
Status: DISCONTINUED | OUTPATIENT
Start: 2023-07-11 | End: 2023-07-11 | Stop reason: HOSPADM

## 2023-07-11 RX ORDER — LABETALOL HYDROCHLORIDE 5 MG/ML
5 INJECTION, SOLUTION INTRAVENOUS ONCE
Status: COMPLETED | OUTPATIENT
Start: 2023-07-11 | End: 2023-07-11

## 2023-07-11 RX ORDER — SODIUM CHLORIDE 9 MG/ML
INJECTION, SOLUTION INTRAVENOUS CONTINUOUS PRN
Status: DISCONTINUED | OUTPATIENT
Start: 2023-07-11 | End: 2023-07-11 | Stop reason: SDUPTHER

## 2023-07-11 RX ORDER — PROPOFOL 10 MG/ML
INJECTION, EMULSION INTRAVENOUS PRN
Status: DISCONTINUED | OUTPATIENT
Start: 2023-07-11 | End: 2023-07-11 | Stop reason: SDUPTHER

## 2023-07-11 RX ORDER — HYDRALAZINE HYDROCHLORIDE 20 MG/ML
10 INJECTION INTRAMUSCULAR; INTRAVENOUS ONCE
Status: COMPLETED | OUTPATIENT
Start: 2023-07-11 | End: 2023-07-11

## 2023-07-11 RX ORDER — FENTANYL CITRATE 50 UG/ML
25 INJECTION, SOLUTION INTRAMUSCULAR; INTRAVENOUS EVERY 5 MIN PRN
Status: DISCONTINUED | OUTPATIENT
Start: 2023-07-11 | End: 2023-07-11 | Stop reason: HOSPADM

## 2023-07-11 RX ORDER — LIDOCAINE HYDROCHLORIDE 20 MG/ML
INJECTION, SOLUTION INTRAVENOUS PRN
Status: DISCONTINUED | OUTPATIENT
Start: 2023-07-11 | End: 2023-07-11 | Stop reason: SDUPTHER

## 2023-07-11 RX ADMIN — PROPOFOL 100 MG: 10 INJECTION, EMULSION INTRAVENOUS at 15:21

## 2023-07-11 RX ADMIN — LIDOCAINE HYDROCHLORIDE 100 MG: 20 INJECTION, SOLUTION INTRAVENOUS at 15:21

## 2023-07-11 RX ADMIN — HYDRALAZINE HYDROCHLORIDE 5 MG: 20 INJECTION INTRAMUSCULAR; INTRAVENOUS at 13:54

## 2023-07-11 RX ADMIN — HYDRALAZINE HYDROCHLORIDE 10 MG: 20 INJECTION INTRAMUSCULAR; INTRAVENOUS at 17:45

## 2023-07-11 RX ADMIN — LABETALOL HYDROCHLORIDE 5 MG: 5 INJECTION INTRAVENOUS at 16:51

## 2023-07-11 RX ADMIN — SODIUM CHLORIDE: 9 INJECTION, SOLUTION INTRAVENOUS at 14:24

## 2023-07-11 ASSESSMENT — PAIN - FUNCTIONAL ASSESSMENT: PAIN_FUNCTIONAL_ASSESSMENT: NONE - DENIES PAIN

## 2023-07-11 NOTE — H&P
[facility] ([facility])   Gastroenterology, Hepatology, &  Advanced Endoscopy    Consult       ASSESSMENT AND PLAN:    77y/M presents with dysphagia. PLAN:  EGD with dilation        HISTORY OF PRESENT ILLNESS:      Michael Garcia is a 77y/M that presents today for dysphagia     History of skin cancer; left ear removed at the [facility] with RT     Esophagram showed:  A small reducible hiatal hernia. Could not provoked gastroesophageal reflux  during the study. Mild degree of esophageal dysmotility of the mid distal segments of the esophagus.      Patient feels the dysphagia is related to his teeth  Has called a dentist but they are booked through November  Complaints of dental pain  Patient states the liquid for the esophagram helped with the dysphagia     The dysphagia is with all food consistencies  There is regurgitation; breads are the most difficult  Not a smoker  History of excessive alcohol intake; stopped around 2006     No weight loss or abdominal pain  Tells me he is taking TUMS daily \"like candy\"    Past Medical History:        Diagnosis Date    A-fib ([address])     follows with PCP only    Alcohol abuse     Alcoholic encephalopathy ([address])     Closed fracture of neck of left femur ([address]) 6/4/2022    Closed intertrochanteric fracture of hip, left, initial encounter ([address])     Functional diarrhea 1/27/2021    Hx of blood clots     Hypertension     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

## 2023-07-11 NOTE — PROGRESS NOTES
Dr. Waylon Gore bedside & updated on patient's elevated blood pressure. Order received to medicate patient with Hydralazine 10mg IV x1 and with improvement in BP, discharge patient home. Patient to resume home blood pressure medications. Patient's son bedside.

## 2023-07-11 NOTE — OP NOTE
consistent in appearance with peptic-mediated disease. No associated strictures or large ulcerations were appreciated. Biopsies obtained. At the conclusion of the procedure, empiric passage dilation was performed. A Lackey dilator was carefully passed into the esophagus to the level of the GEJ with caution taken to not push through any appreciated resistance. Dilation was performed to a maximal dilation of 56Fr. The gastroscope was then reinserted following the dilation. Therapeutic mucosal tearing was/was not appreciated. No complications of bleeding, deep mucosal tearing, or perforation was appreciated. Recommendations:  -The patient will be observed post procedure until all discharge criteria are met. -Patient has a contact number available for emergencies. The signs and symptoms of potential delayed complications were discussed with the patient.    -Return to normal activities tomorrow. -Written discharge instructions were provided to the patient.    -Await pathology results.  -Clinic appointment scheduled 7/25/23.     Electronically signed by Lon Dowd MD on 7/11/2023 at 3:44 PM

## 2023-07-11 NOTE — ANESTHESIA POSTPROCEDURE EVALUATION
Department of Anesthesiology  Postprocedure Note    Patient: Hermelindo Bergman  MRN: [id number]  YOB: 1945  Date of evaluation: 7/11/2023      Procedure Summary     Date: 07/11/23 Room / Location: [facility] / [facility]    Anesthesia Start: 5368 Anesthesia Stop: 1548    Procedures:       EGD BIOPSY      EGD ESOPHAGOGASTRODUODENOSCOPY DILATATION Diagnosis:       Hepatic abscess      (Hepatic abscess [K75.0])    Surgeons: Pawan Torres MD Responsible Provider: Clement Juan MD    Anesthesia Type: MAC ASA Status: 3          Anesthesia Type: No value filed.     Cassi Phase I: Cassi Score: 10    Cassi Phase II: Cassi Score: 10      Anesthesia Post Evaluation    Patient location during evaluation: PACU  Patient participation: complete - patient participated  Level of consciousness: awake and alert  Pain score: 2  Airway patency: patent  Nausea & Vomiting: no nausea and no vomiting  Complications: no  Cardiovascular status: blood pressure returned to baseline  Respiratory status: acceptable  Hydration status: euvolemic

## 2023-07-11 NOTE — PROGRESS NOTES
Improvement in patient's blood pressure. Dr. López Brine bedside updating patient on EGD & also to take his home BP medications when he returns home.

## 2023-07-11 NOTE — DISCHARGE INSTRUCTIONS
Recommendations:  -The patient will be observed post procedure until all discharge criteria are met. -Patient has a contact number available for emergencies. The signs and symptoms of potential delayed complications were discussed with the patient.    -Return to normal activities tomorrow. -Written discharge instructions were provided to the patient.    -Resume anticoagulation in _____ days.  -Await pathology/cytology results.  -Timeframe until next colonoscopy will be discussed in clinic and based on Clinical Guidelines regarding size, number, and pathology of polyps removed as well as adequacy of bowel prep.   -Clinic appointment to be scheduled. EMR to be updated based on findings and discussion. -Risks/benefits of the procedure including bleeding, perforation, infection, and pancreatitis were discussed with the patient and his wife who was present.

## 2023-07-28 ENCOUNTER — TELEPHONE (OUTPATIENT)
Dept: ENT CLINIC | Age: 78
End: 2023-07-28

## 2023-07-28 NOTE — TELEPHONE ENCOUNTER
Patients son called, he is unable to bring dad to appointment because his license plates are . He is asking to reschedule. No soon appointments to offer.  Please contact son to reschedule Thank you

## 2023-08-01 NOTE — TELEPHONE ENCOUNTER
Patient's appt rescheduled to Oct.     Electronically signed by Johnny Dimas MA on 8/1/23 at 1:06 PM EDT

## 2023-08-17 DIAGNOSIS — R05.9 COUGH: ICD-10-CM

## 2023-08-17 RX ORDER — GLYCOPYRROLATE 1 MG/1
TABLET ORAL
Qty: 90 TABLET | Refills: 3 | Status: SHIPPED | OUTPATIENT
Start: 2023-08-17

## 2023-10-17 ENCOUNTER — OFFICE VISIT (OUTPATIENT)
Dept: ENT CLINIC | Age: 78
End: 2023-10-17
Payer: COMMERCIAL

## 2023-10-17 VITALS — WEIGHT: 150 LBS | HEIGHT: 71 IN | BODY MASS INDEX: 21 KG/M2

## 2023-10-17 DIAGNOSIS — R13.10 DYSPHAGIA, UNSPECIFIED TYPE: ICD-10-CM

## 2023-10-17 DIAGNOSIS — L98.9 SKIN LESION OF FACE: Primary | ICD-10-CM

## 2023-10-17 DIAGNOSIS — J38.7 PRESBYLARYNX: ICD-10-CM

## 2023-10-17 PROCEDURE — 1124F ACP DISCUSS-NO DSCNMKR DOCD: CPT | Performed by: OTOLARYNGOLOGY

## 2023-10-17 PROCEDURE — 99214 OFFICE O/P EST MOD 30 MIN: CPT | Performed by: OTOLARYNGOLOGY

## 2023-10-17 PROCEDURE — 31575 DIAGNOSTIC LARYNGOSCOPY: CPT | Performed by: OTOLARYNGOLOGY

## 2023-10-17 ASSESSMENT — ENCOUNTER SYMPTOMS
DIARRHEA: 0
RESPIRATORY NEGATIVE: 1
EYE DISCHARGE: 0
ABDOMINAL PAIN: 0
GASTROINTESTINAL NEGATIVE: 1
COLOR CHANGE: 0
EYE PAIN: 0
APNEA: 0
SHORTNESS OF BREATH: 0
EYES NEGATIVE: 1
TROUBLE SWALLOWING: 1
VOMITING: 0
CHEST TIGHTNESS: 0

## 2023-10-17 NOTE — PROGRESS NOTES
chest tightness and shortness of breath. Cardiovascular: Negative. Negative for chest pain, palpitations and leg swelling. Gastrointestinal: Negative. Negative for abdominal pain, diarrhea and vomiting. Endocrine: Negative for cold intolerance, heat intolerance and polydipsia. Genitourinary: Negative. Negative for dysuria, flank pain and hematuria. Musculoskeletal: Negative. Negative for arthralgias, gait problem and neck pain. Skin: Negative. Negative for color change, pallor and rash. Allergic/Immunologic: Negative for environmental allergies, food allergies and immunocompromised state. Neurological: Negative. Negative for dizziness, numbness and headaches. Hematological:  Negative for adenopathy. Psychiatric/Behavioral: Negative. Negative for behavioral problems and hallucinations. All other systems reviewed and are negative. Objective: There were no vitals filed for this visit. Physical Exam  Vitals and nursing note reviewed. Constitutional:       Appearance: He is well-developed. HENT:      Head: Normocephalic and atraumatic. Right Ear: Hearing, tympanic membrane, ear canal and external ear normal.      Left Ear: Hearing, tympanic membrane, ear canal and external ear normal.      Nose: Nose normal.      Comments: ~3 x 1 cm ulcer to the right lateral nasal dorsum      Mouth/Throat:      Lips: Pink. Mouth: Mucous membranes are moist.      Pharynx: Oropharynx is clear. Eyes:      Conjunctiva/sclera: Conjunctivae normal.      Pupils: Pupils are equal, round, and reactive to light. Cardiovascular:      Rate and Rhythm: Normal rate and regular rhythm. Heart sounds: Normal heart sounds. Pulmonary:      Effort: Pulmonary effort is normal.      Breath sounds: Normal breath sounds. Abdominal:      General: Bowel sounds are normal.      Palpations: Abdomen is soft. Musculoskeletal:      Cervical back: Normal range of motion and neck supple.

## 2023-12-08 DIAGNOSIS — I10 PRIMARY HYPERTENSION: ICD-10-CM

## 2023-12-12 RX ORDER — LOSARTAN POTASSIUM 100 MG/1
100 TABLET ORAL EVERY MORNING
Qty: 90 TABLET | Refills: 1 | Status: SHIPPED | OUTPATIENT
Start: 2023-12-12

## 2023-12-26 RX ORDER — OMEPRAZOLE 40 MG/1
40 CAPSULE, DELAYED RELEASE ORAL
Qty: 30 CAPSULE | Refills: 5 | Status: SHIPPED | OUTPATIENT
Start: 2023-12-26

## 2024-02-07 ENCOUNTER — TELEPHONE (OUTPATIENT)
Dept: FAMILY MEDICINE CLINIC | Age: 79
End: 2024-02-07

## 2024-02-07 NOTE — TELEPHONE ENCOUNTER
Patient's son calling in stating patient had called him this morning and said he was having stomach cramping.  At the time of the telephone encounter son stated patient was having cramping.  I asked son if patient was having pain or cramping.  Son went to check on patient who was \"doubled over\" in pain.  I advised son to have patient evaluated in ED.  Son agreeable.  Will have patient transported via ambulance.

## 2024-04-10 ENCOUNTER — APPOINTMENT (OUTPATIENT)
Dept: GENERAL RADIOLOGY | Age: 79
DRG: 064 | End: 2024-04-10
Payer: COMMERCIAL

## 2024-04-10 ENCOUNTER — HOSPITAL ENCOUNTER (INPATIENT)
Age: 79
LOS: 14 days | Discharge: HOSPICE | DRG: 064 | End: 2024-04-24
Attending: EMERGENCY MEDICINE | Admitting: SURGERY
Payer: COMMERCIAL

## 2024-04-10 ENCOUNTER — APPOINTMENT (OUTPATIENT)
Dept: CT IMAGING | Age: 79
DRG: 064 | End: 2024-04-10
Payer: COMMERCIAL

## 2024-04-10 DIAGNOSIS — R56.9 SEIZURE (HCC): ICD-10-CM

## 2024-04-10 DIAGNOSIS — I63.413 CEREBROVASCULAR ACCIDENT (CVA) DUE TO BILATERAL EMBOLISM OF MIDDLE CEREBRAL ARTERIES (HCC): ICD-10-CM
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
## 2024-04-10 LAB
ABO + RH BLD: NORMAL
ALBUMIN SERPL-MCNC: 4.3 G/DL (ref 3.5–5.2)
ALP SERPL-CCNC: 107 U/L (ref 40–129)
ALT SERPL-CCNC: 14 U/L (ref 0–40)
ANION GAP SERPL CALCULATED.3IONS-SCNC: 16 MMOL/L (ref 7–16)
APAP SERPL-MCNC: <5 UG/ML (ref 10–30)
ARM BAND NUMBER: NORMAL
AST SERPL-CCNC: 26 U/L (ref 0–39)
B.E.: -7.8 MMOL/L (ref -3–3)
BILIRUB SERPL-MCNC: 0.9 MG/DL (ref 0–1.2)
BLOOD BANK SAMPLE EXPIRATION: NORMAL
BLOOD GROUP ANTIBODIES SERPL: NEGATIVE
BUN SERPL-MCNC: 11 MG/DL (ref 6–23)
CALCIUM SERPL-MCNC: 8.8 MG/DL (ref 8.6–10.2)
CHLORIDE SERPL-SCNC: 100 MMOL/L (ref 98–107)
CLOT ANGLE.KAOLIN INDUCED BLD RES TEG: 76.1 DEG (ref 53–70)
CO2 SERPL-SCNC: 25 MMOL/L (ref 22–29)
COHB: 0.4 % (ref 0–1.5)
CREAT SERPL-MCNC: 0.6 MG/DL (ref 0.7–1.2)
CRITICAL: ABNORMAL
DATE ANALYZED: ABNORMAL
DATE OF COLLECTION: ABNORMAL
EPL-TEG: 0 % (ref 0–15)
ERYTHROCYTE [DISTWIDTH] IN BLOOD BY AUTOMATED COUNT: 13.6 % (ref 11.5–15)
ETHANOLAMINE SERPL-MCNC: <10 MG/DL
G-TEG: 14 KDYN/CM2 (ref 4.5–11)
GFR SERPL CREATININE-BSD FRML MDRD: >90 ML/MIN/1.73M2
GLUCOSE BLD-MCNC: 111 MG/DL (ref 74–99)
GLUCOSE BLD-MCNC: 164 MG/DL (ref 74–99)
GLUCOSE SERPL-MCNC: 133 MG/DL (ref 74–99)
HCO3: 20.8 MMOL/L (ref 22–26)
HCT VFR BLD AUTO: 42.9 % (ref 37–54)
HGB BLD-MCNC: 14 G/DL (ref 12.5–16.5)
HHB: 3.9 % (ref 0–5)
INR PPP: 1.2
KINETICS TEG: 0.9 MIN (ref 1–3)
LAB: ABNORMAL
LACTATE BLDV-SCNC: 4.1 MMOL/L (ref 0.5–2.2)
LY30 (LYSIS) TEG: 0 % (ref 0–8)
Lab: 2200
MA (MAX CLOT) TEG: 73.7 MM (ref 50–70)
MCH RBC QN AUTO: 29.4 PG (ref 26–35)
MCHC RBC AUTO-ENTMCNC: 32.6 G/DL (ref 32–34.5)
MCV RBC AUTO: 90.1 FL (ref 80–99.9)
METHB: 0.2 % (ref 0–1.5)
MODE: ABNORMAL
O2 SATURATION: 96.1 % (ref 92–98.5)
O2HB: 95.5 % (ref 94–97)
OPERATOR ID: 914
PARTIAL THROMBOPLASTIN TIME: 30.5 SEC (ref 24.5–35.1)
PATIENT TEMP: 37 C
PCO2: 54.8 MMHG (ref 35–45)
PH BLOOD GAS: 7.2 (ref 7.35–7.45)
PLATELET # BLD AUTO: 272 K/UL (ref 130–450)
PMV BLD AUTO: 10.7 FL (ref 7–12)
PO2: 96.3 MMHG (ref 75–100)
POTASSIUM SERPL-SCNC: 3.63 MMOL/L (ref 3.5–5)
POTASSIUM SERPL-SCNC: 3.7 MMOL/L (ref 3.5–5)
PROT SERPL-MCNC: 8.2 G/DL (ref 6.4–8.3)
PROTHROMBIN TIME: 12.9 SEC (ref 9.3–12.4)
RBC # BLD AUTO: 4.76 M/UL (ref 3.8–5.8)
REACTION TIME TEG: 3.6 MIN (ref 5–10)
SALICYLATES SERPL-MCNC: <0.3 MG/DL (ref 0–30)
SODIUM SERPL-SCNC: 141 MMOL/L (ref 132–146)
SOURCE, BLOOD GAS: ABNORMAL
THB: 14.8 G/DL (ref 11.5–16.5)
TIME ANALYZED: 2204
TOXIC TRICYCLIC SC,BLOOD: NEGATIVE
WBC OTHER # BLD: 14.4 K/UL (ref 4.5–11.5)

## 2024-04-10 PROCEDURE — 99285 EMERGENCY DEPT VISIT HI MDM: CPT

## 2024-04-10 PROCEDURE — A4216 STERILE WATER/SALINE, 10 ML: HCPCS

## 2024-04-10 PROCEDURE — 84146 ASSAY OF PROLACTIN: CPT

## 2024-04-10 PROCEDURE — 85730 THROMBOPLASTIN TIME PARTIAL: CPT

## 2024-04-10 PROCEDURE — 36415 COLL VENOUS BLD VENIPUNCTURE: CPT

## 2024-04-10 PROCEDURE — 82962 GLUCOSE BLOOD TEST: CPT

## 2024-04-10 PROCEDURE — 31500 INSERT EMERGENCY AIRWAY: CPT

## 2024-04-10 PROCEDURE — 2000000000 HC ICU R&B

## 2024-04-10 PROCEDURE — 71045 X-RAY EXAM CHEST 1 VIEW: CPT

## 2024-04-10 PROCEDURE — 72125 CT NECK SPINE W/O DYE: CPT

## 2024-04-10 PROCEDURE — 74177 CT ABD & PELVIS W/CONTRAST: CPT

## 2024-04-10 PROCEDURE — 0BH17EZ INSERTION OF ENDOTRACHEAL AIRWAY INTO TRACHEA, VIA NATURAL OR ARTIFICIAL OPENING: ICD-10-PCS | Performed by: SURGERY

## 2024-04-10 PROCEDURE — 86850 RBC ANTIBODY SCREEN: CPT

## 2024-04-10 PROCEDURE — 86900 BLOOD TYPING SEROLOGIC ABO: CPT

## 2024-04-10 PROCEDURE — 6360000002 HC RX W HCPCS

## 2024-04-10 PROCEDURE — 6370000000 HC RX 637 (ALT 250 FOR IP)

## 2024-04-10 PROCEDURE — 6360000002 HC RX W HCPCS: Performed by: EMERGENCY MEDICINE

## 2024-04-10 PROCEDURE — 83605 ASSAY OF LACTIC ACID: CPT

## 2024-04-10 PROCEDURE — 72128 CT CHEST SPINE W/O DYE: CPT

## 2024-04-10 PROCEDURE — 80307 DRUG TEST PRSMV CHEM ANLYZR: CPT

## 2024-04-10 PROCEDURE — 85390 FIBRINOLYSINS SCREEN I&R: CPT

## 2024-04-10 PROCEDURE — 70450 CT HEAD/BRAIN W/O DYE: CPT

## 2024-04-10 PROCEDURE — 94002 VENT MGMT INPAT INIT DAY: CPT

## 2024-04-10 PROCEDURE — 71260 CT THORAX DX C+: CPT

## 2024-04-10 PROCEDURE — 80179 DRUG ASSAY SALICYLATE: CPT

## 2024-04-10 PROCEDURE — 80053 COMPREHEN METABOLIC PANEL: CPT

## 2024-04-10 PROCEDURE — 85347 COAGULATION TIME ACTIVATED: CPT

## 2024-04-10 PROCEDURE — 85610 PROTHROMBIN TIME: CPT

## 2024-04-10 PROCEDURE — 86901 BLOOD TYPING SEROLOGIC RH(D): CPT

## 2024-04-10 PROCEDURE — 87081 CULTURE SCREEN ONLY: CPT

## 2024-04-10 PROCEDURE — G0480 DRUG TEST DEF 1-7 CLASSES: HCPCS

## 2024-04-10 PROCEDURE — 84132 ASSAY OF SERUM POTASSIUM: CPT

## 2024-04-10 PROCEDURE — 82805 BLOOD GASES W/O2 SATURATION: CPT

## 2024-04-10 PROCEDURE — 72170 X-RAY EXAM OF PELVIS: CPT

## 2024-04-10 PROCEDURE — 2500000003 HC RX 250 WO HCPCS: Performed by: EMERGENCY MEDICINE

## 2024-04-10 PROCEDURE — 80143 DRUG ASSAY ACETAMINOPHEN: CPT

## 2024-04-10 PROCEDURE — 72131 CT LUMBAR SPINE W/O DYE: CPT

## 2024-04-10 PROCEDURE — 6360000004 HC RX CONTRAST MEDICATION: Performed by: RADIOLOGY

## 2024-04-10 PROCEDURE — 85384 FIBRINOGEN ACTIVITY: CPT

## 2024-04-10 PROCEDURE — 70498 CT ANGIOGRAPHY NECK: CPT

## 2024-04-10 PROCEDURE — 85027 COMPLETE CBC AUTOMATED: CPT

## 2024-04-10 PROCEDURE — 70496 CT ANGIOGRAPHY HEAD: CPT

## 2024-04-10 PROCEDURE — 2580000003 HC RX 258

## 2024-04-10 PROCEDURE — 85576 BLOOD PLATELET AGGREGATION: CPT

## 2024-04-10 PROCEDURE — 5A1955Z RESPIRATORY VENTILATION, GREATER THAN 96 CONSECUTIVE HOURS: ICD-10-PCS | Performed by: SURGERY

## 2024-04-10 PROCEDURE — 2500000003 HC RX 250 WO HCPCS

## 2024-04-10 RX ORDER — ROCURONIUM BROMIDE 10 MG/ML
0.6 INJECTION, SOLUTION INTRAVENOUS ONCE
Status: COMPLETED | OUTPATIENT
Start: 2024-04-10 | End: 2024-04-10

## 2024-04-10 RX ORDER — MAGNESIUM SULFATE IN WATER 40 MG/ML
2000 INJECTION, SOLUTION INTRAVENOUS PRN
Status: DISCONTINUED | OUTPATIENT
Start: 2024-04-10 | End: 2024-04-12

## 2024-04-10 RX ORDER — POTASSIUM CHLORIDE 7.45 MG/ML
10 INJECTION INTRAVENOUS PRN
Status: DISCONTINUED | OUTPATIENT
Start: 2024-04-10 | End: 2024-04-12

## 2024-04-10 RX ORDER — ETOMIDATE 2 MG/ML
14 INJECTION INTRAVENOUS ONCE
Status: COMPLETED | OUTPATIENT
Start: 2024-04-10 | End: 2024-04-10

## 2024-04-10 RX ORDER — ONDANSETRON 4 MG/1
4 TABLET, ORALLY DISINTEGRATING ORAL EVERY 8 HOURS PRN
Status: DISCONTINUED | OUTPATIENT
Start: 2024-04-10 | End: 2024-04-24

## 2024-04-10 RX ORDER — PROPOFOL 10 MG/ML
INJECTION, EMULSION INTRAVENOUS
Status: COMPLETED
Start: 2024-04-10 | End: 2024-04-10

## 2024-04-10 RX ORDER — POLYETHYLENE GLYCOL 3350 17 G/17G
17 POWDER, FOR SOLUTION ORAL DAILY
Status: DISCONTINUED | OUTPATIENT
Start: 2024-04-11 | End: 2024-04-24

## 2024-04-10 RX ORDER — PROPOFOL 10 MG/ML
5-50 INJECTION, EMULSION INTRAVENOUS CONTINUOUS
Status: DISCONTINUED | OUTPATIENT
Start: 2024-04-10 | End: 2024-04-10 | Stop reason: SDUPTHER

## 2024-04-10 RX ORDER — ONDANSETRON 2 MG/ML
4 INJECTION INTRAMUSCULAR; INTRAVENOUS EVERY 6 HOURS PRN
Status: DISCONTINUED | OUTPATIENT
Start: 2024-04-10 | End: 2024-04-24

## 2024-04-10 RX ORDER — CHLORHEXIDINE GLUCONATE ORAL RINSE 1.2 MG/ML
15 SOLUTION DENTAL 2 TIMES DAILY
Status: DISCONTINUED | OUTPATIENT
Start: 2024-04-10 | End: 2024-04-23

## 2024-04-10 RX ORDER — LABETALOL HYDROCHLORIDE 5 MG/ML
10 INJECTION, SOLUTION INTRAVENOUS EVERY 30 MIN PRN
Status: DISCONTINUED | OUTPATIENT
Start: 2024-04-10 | End: 2024-04-24

## 2024-04-10 RX ORDER — SODIUM CHLORIDE 9 MG/ML
INJECTION, SOLUTION INTRAVENOUS CONTINUOUS
Status: DISCONTINUED | OUTPATIENT
Start: 2024-04-10 | End: 2024-04-12

## 2024-04-10 RX ORDER — LORAZEPAM 2 MG/ML
2 INJECTION INTRAMUSCULAR ONCE
Status: COMPLETED | OUTPATIENT
Start: 2024-04-10 | End: 2024-04-10

## 2024-04-10 RX ORDER — HYDRALAZINE HYDROCHLORIDE 20 MG/ML
10 INJECTION INTRAMUSCULAR; INTRAVENOUS EVERY 30 MIN PRN
Status: DISCONTINUED | OUTPATIENT
Start: 2024-04-10 | End: 2024-04-24

## 2024-04-10 RX ORDER — LEVETIRACETAM 500 MG/5ML
2000 INJECTION, SOLUTION, CONCENTRATE INTRAVENOUS ONCE
Status: COMPLETED | OUTPATIENT
Start: 2024-04-10 | End: 2024-04-10

## 2024-04-10 RX ORDER — 0.9 % SODIUM CHLORIDE 0.9 %
1000 INTRAVENOUS SOLUTION INTRAVENOUS ONCE
Status: COMPLETED | OUTPATIENT
Start: 2024-04-10 | End: 2024-04-10

## 2024-04-10 RX ORDER — SODIUM CHLORIDE 0.9 % (FLUSH) 0.9 %
10 SYRINGE (ML) INJECTION PRN
Status: DISCONTINUED | OUTPATIENT
Start: 2024-04-10 | End: 2024-04-14 | Stop reason: SDUPTHER

## 2024-04-10 RX ORDER — LEVETIRACETAM 500 MG/5ML
500 INJECTION, SOLUTION, CONCENTRATE INTRAVENOUS EVERY 12 HOURS
Status: DISCONTINUED | OUTPATIENT
Start: 2024-04-11 | End: 2024-04-11

## 2024-04-10 RX ORDER — SODIUM CHLORIDE 9 MG/ML
INJECTION, SOLUTION INTRAVENOUS PRN
Status: DISCONTINUED | OUTPATIENT
Start: 2024-04-10 | End: 2024-04-14 | Stop reason: SDUPTHER

## 2024-04-10 RX ORDER — PROPOFOL 10 MG/ML
5-50 INJECTION, EMULSION INTRAVENOUS CONTINUOUS
Status: DISCONTINUED | OUTPATIENT
Start: 2024-04-10 | End: 2024-04-11

## 2024-04-10 RX ORDER — FENTANYL CITRATE-0.9 % NACL/PF 10 MCG/ML
25-200 PLASTIC BAG, INJECTION (ML) INTRAVENOUS CONTINUOUS
Status: DISCONTINUED | OUTPATIENT
Start: 2024-04-10 | End: 2024-04-11

## 2024-04-10 RX ORDER — SODIUM CHLORIDE 0.9 % (FLUSH) 0.9 %
10 SYRINGE (ML) INJECTION EVERY 12 HOURS SCHEDULED
Status: DISCONTINUED | OUTPATIENT
Start: 2024-04-10 | End: 2024-04-14 | Stop reason: SDUPTHER

## 2024-04-10 RX ORDER — POTASSIUM CHLORIDE 29.8 MG/ML
20 INJECTION INTRAVENOUS PRN
Status: DISCONTINUED | OUTPATIENT
Start: 2024-04-10 | End: 2024-04-12

## 2024-04-10 RX ADMIN — ROCURONIUM BROMIDE 41 MG: 10 INJECTION INTRAVENOUS at 22:49

## 2024-04-10 RX ADMIN — PROPOFOL 10 MCG/KG/MIN: 10 INJECTION, EMULSION INTRAVENOUS at 22:41

## 2024-04-10 RX ADMIN — IOPAMIDOL 75 ML: 755 INJECTION, SOLUTION INTRAVENOUS at 22:17

## 2024-04-10 RX ADMIN — PROPOFOL 10 MCG/KG/MIN: 10 INJECTION, EMULSION INTRAVENOUS at 22:21

## 2024-04-10 RX ADMIN — SODIUM CHLORIDE, PRESERVATIVE FREE 10 ML: 5 INJECTION INTRAVENOUS at 23:10

## 2024-04-10 RX ADMIN — LORAZEPAM 2 MG: 2 INJECTION INTRAMUSCULAR; INTRAVENOUS at 22:38

## 2024-04-10 RX ADMIN — SODIUM CHLORIDE 1000 ML: 9 INJECTION, SOLUTION INTRAVENOUS at 22:50

## 2024-04-10 RX ADMIN — LEVETIRACETAM 2000 MG: 500 INJECTION, SOLUTION, CONCENTRATE INTRAVENOUS at 22:37

## 2024-04-10 RX ADMIN — ETOMIDATE 14 MG: 2 INJECTION, SOLUTION INTRAVENOUS at 22:37

## 2024-04-10 RX ADMIN — FAMOTIDINE 20 MG: 10 INJECTION, SOLUTION INTRAVENOUS at 23:10

## 2024-04-10 RX ADMIN — HYDRALAZINE HYDROCHLORIDE 10 MG: 20 INJECTION, SOLUTION INTRAMUSCULAR; INTRAVENOUS at 22:42

## 2024-04-10 RX ADMIN — CHLORHEXIDINE GLUCONATE 15 ML: 1.2 RINSE ORAL at 23:10

## 2024-04-10 RX ADMIN — SODIUM CHLORIDE: 9 INJECTION, SOLUTION INTRAVENOUS at 22:42

## 2024-04-10 RX ADMIN — Medication 50 MCG/HR: at 22:43

## 2024-04-10 ASSESSMENT — PULMONARY FUNCTION TESTS
PIF_VALUE: 27
PIF_VALUE: 26
PIF_VALUE: 27
PIF_VALUE: 27
PIF_VALUE: 26

## 2024-04-10 ASSESSMENT — PAIN SCALES - GENERAL
PAINLEVEL_OUTOF10: 0
PAINLEVEL_OUTOF10: 0

## 2024-04-11 ENCOUNTER — APPOINTMENT (OUTPATIENT)
Dept: NEUROLOGY | Age: 79
DRG: 064 | End: 2024-04-11
Payer: COMMERCIAL

## 2024-04-11 ENCOUNTER — APPOINTMENT (OUTPATIENT)
Dept: GENERAL RADIOLOGY | Age: 79
DRG: 064 | End: 2024-04-11
Payer: COMMERCIAL

## 2024-04-11 PROBLEM — S32.010A CLOSED WEDGE COMPRESSION FRACTURE OF L1 VERTEBRA (HCC): Status: ACTIVE | Noted: 2024-04-11

## 2024-04-11 PROBLEM — R56.9 SEIZURES (HCC): Status: ACTIVE | Noted: 2024-04-11

## 2024-04-11 PROBLEM — I67.1 ANTERIOR COMMUNICATING ARTERY ANEURYSM: Status: ACTIVE | Noted: 2024-04-11

## 2024-04-11 LAB
AADO2: 290.4 MMHG
ALBUMIN SERPL-MCNC: 3.8 G/DL (ref 3.5–5.2)
ALP SERPL-CCNC: 89 U/L (ref 40–129)
ALT SERPL-CCNC: 13 U/L (ref 0–40)
ANION GAP SERPL CALCULATED.3IONS-SCNC: 15 MMOL/L (ref 7–16)
AST SERPL-CCNC: 33 U/L (ref 0–39)
B.E.: -2.9 MMOL/L (ref -3–3)
BACTERIA URNS QL MICRO: ABNORMAL
BASOPHILS # BLD: 0.01 K/UL (ref 0–0.2)
BASOPHILS NFR BLD: 0 % (ref 0–2)
BILIRUB SERPL-MCNC: 0.9 MG/DL (ref 0–1.2)
BILIRUB UR QL STRIP: NEGATIVE
BUN SERPL-MCNC: 12 MG/DL (ref 6–23)
CA-I BLD-SCNC: 1.15 MMOL/L (ref 1.15–1.33)
CALCIUM SERPL-MCNC: 8.5 MG/DL (ref 8.6–10.2)
CHLORIDE SERPL-SCNC: 104 MMOL/L (ref 98–107)
CLARITY UR: CLEAR
CO2 SERPL-SCNC: 21 MMOL/L (ref 22–29)
COHB: 0.5 % (ref 0–1.5)
COLOR UR: YELLOW
CREAT SERPL-MCNC: 0.7 MG/DL (ref 0.7–1.2)
CRITICAL: ABNORMAL
DATE ANALYZED: ABNORMAL
DATE OF COLLECTION: ABNORMAL
EOSINOPHIL # BLD: 0 K/UL (ref 0.05–0.5)
EOSINOPHILS RELATIVE PERCENT: 0 % (ref 0–6)
ERYTHROCYTE [DISTWIDTH] IN BLOOD BY AUTOMATED COUNT: 13.9 % (ref 11.5–15)
ETHANOLAMINE SERPL-MCNC: <10 MG/DL
FIO2: 100 %
GFR SERPL CREATININE-BSD FRML MDRD: >90 ML/MIN/1.73M2
GLUCOSE BLD-MCNC: 67 MG/DL (ref 74–99)
GLUCOSE BLD-MCNC: 82 MG/DL (ref 74–99)
GLUCOSE SERPL-MCNC: 67 MG/DL (ref 74–99)
GLUCOSE UR STRIP-MCNC: NEGATIVE MG/DL
HCO3: 21.1 MMOL/L (ref 22–26)
HCT VFR BLD AUTO: 39.8 % (ref 37–54)
HGB BLD-MCNC: 13.3 G/DL (ref 12.5–16.5)
HGB UR QL STRIP.AUTO: NEGATIVE
HHB: 0.5 % (ref 0–5)
IMM GRANULOCYTES # BLD AUTO: 0.04 K/UL (ref 0–0.58)
IMM GRANULOCYTES NFR BLD: 0 % (ref 0–5)
KETONES UR STRIP-MCNC: 15 MG/DL
LAB: ABNORMAL
LACTATE BLDV-SCNC: 2 MMOL/L (ref 0.5–2.2)
LEUKOCYTE ESTERASE UR QL STRIP: NEGATIVE
LYMPHOCYTES NFR BLD: 0.7 K/UL (ref 1.5–4)
LYMPHOCYTES RELATIVE PERCENT: 6 % (ref 20–42)
Lab: 440
MAGNESIUM SERPL-MCNC: 1.5 MG/DL (ref 1.6–2.6)
MCH RBC QN AUTO: 29 PG (ref 26–35)
MCHC RBC AUTO-ENTMCNC: 33.4 G/DL (ref 32–34.5)
MCV RBC AUTO: 86.9 FL (ref 80–99.9)
METHB: 0.4 % (ref 0–1.5)
MODE: AC
MONOCYTES NFR BLD: 1.06 K/UL (ref 0.1–0.95)
MONOCYTES NFR BLD: 10 % (ref 2–12)
NEUTROPHILS NFR BLD: 84 % (ref 43–80)
NEUTS SEG NFR BLD: 9.28 K/UL (ref 1.8–7.3)
NITRITE UR QL STRIP: NEGATIVE
O2 SATURATION: 99.5 % (ref 92–98.5)
O2HB: 98.6 % (ref 94–97)
OPERATOR ID: 914
PATIENT TEMP: 37 C
PCO2: 34.9 MMHG (ref 35–45)
PEEP/CPAP: 8 CMH2O
PFO2: 3.63 MMHG/%
PH BLOOD GAS: 7.4 (ref 7.35–7.45)
PH UR STRIP: 5.5 [PH] (ref 5–9)
PHOSPHATE SERPL-MCNC: 2.7 MG/DL (ref 2.5–4.5)
PLATELET # BLD AUTO: 277 K/UL (ref 130–450)
PMV BLD AUTO: 11.2 FL (ref 7–12)
PO2: 362.7 MMHG (ref 75–100)
POTASSIUM SERPL-SCNC: 3.3 MMOL/L (ref 3.5–5)
PROLACTIN SERPL-MCNC: 20.78 NG/ML
PROT SERPL-MCNC: 7 G/DL (ref 6.4–8.3)
PROT UR STRIP-MCNC: ABNORMAL MG/DL
RBC # BLD AUTO: 4.58 M/UL (ref 3.8–5.8)
RBC #/AREA URNS HPF: ABNORMAL /HPF
RI(T): 0.8
RR MECHANICAL: 20 B/MIN
SODIUM SERPL-SCNC: 140 MMOL/L (ref 132–146)
SOURCE, BLOOD GAS: ABNORMAL
SP GR UR STRIP: 1.01 (ref 1–1.03)
THB: 14.1 G/DL (ref 11.5–16.5)
TIME ANALYZED: 441
TROPONIN I SERPL HS-MCNC: 58 NG/L (ref 0–11)
UROBILINOGEN UR STRIP-ACNC: 1 EU/DL (ref 0–1)
VT MECHANICAL: 450 ML
WBC #/AREA URNS HPF: ABNORMAL /HPF
WBC OTHER # BLD: 11.1 K/UL (ref 4.5–11.5)

## 2024-04-11 PROCEDURE — 84100 ASSAY OF PHOSPHORUS: CPT

## 2024-04-11 PROCEDURE — 2580000003 HC RX 258

## 2024-04-11 PROCEDURE — 84484 ASSAY OF TROPONIN QUANT: CPT

## 2024-04-11 PROCEDURE — 6360000002 HC RX W HCPCS

## 2024-04-11 PROCEDURE — 6360000002 HC RX W HCPCS: Performed by: STUDENT IN AN ORGANIZED HEALTH CARE EDUCATION/TRAINING PROGRAM

## 2024-04-11 PROCEDURE — 99291 CRITICAL CARE FIRST HOUR: CPT | Performed by: SURGERY

## 2024-04-11 PROCEDURE — 6360000002 HC RX W HCPCS: Performed by: SURGERY

## 2024-04-11 PROCEDURE — 2580000003 HC RX 258: Performed by: STUDENT IN AN ORGANIZED HEALTH CARE EDUCATION/TRAINING PROGRAM

## 2024-04-11 PROCEDURE — C9113 INJ PANTOPRAZOLE SODIUM, VIA: HCPCS | Performed by: SURGERY

## 2024-04-11 PROCEDURE — 2580000003 HC RX 258: Performed by: SURGERY

## 2024-04-11 PROCEDURE — 83605 ASSAY OF LACTIC ACID: CPT

## 2024-04-11 PROCEDURE — 94003 VENT MGMT INPAT SUBQ DAY: CPT

## 2024-04-11 PROCEDURE — 81001 URINALYSIS AUTO W/SCOPE: CPT

## 2024-04-11 PROCEDURE — 6370000000 HC RX 637 (ALT 250 FOR IP)

## 2024-04-11 PROCEDURE — 82805 BLOOD GASES W/O2 SATURATION: CPT

## 2024-04-11 PROCEDURE — A4216 STERILE WATER/SALINE, 10 ML: HCPCS | Performed by: SURGERY

## 2024-04-11 PROCEDURE — 80053 COMPREHEN METABOLIC PANEL: CPT

## 2024-04-11 PROCEDURE — 2000000000 HC ICU R&B

## 2024-04-11 PROCEDURE — 71045 X-RAY EXAM CHEST 1 VIEW: CPT

## 2024-04-11 PROCEDURE — 82962 GLUCOSE BLOOD TEST: CPT

## 2024-04-11 PROCEDURE — 6370000000 HC RX 637 (ALT 250 FOR IP): Performed by: SURGERY

## 2024-04-11 PROCEDURE — 82330 ASSAY OF CALCIUM: CPT

## 2024-04-11 PROCEDURE — 85025 COMPLETE CBC W/AUTO DIFF WBC: CPT

## 2024-04-11 PROCEDURE — 83735 ASSAY OF MAGNESIUM: CPT

## 2024-04-11 PROCEDURE — 95822 EEG COMA OR SLEEP ONLY: CPT

## 2024-04-11 RX ORDER — MIDAZOLAM HYDROCHLORIDE 2 MG/2ML
2 INJECTION, SOLUTION INTRAMUSCULAR; INTRAVENOUS ONCE
Status: COMPLETED | OUTPATIENT
Start: 2024-04-11 | End: 2024-04-11

## 2024-04-11 RX ORDER — ENOXAPARIN SODIUM 100 MG/ML
30 INJECTION SUBCUTANEOUS 2 TIMES DAILY
Status: DISCONTINUED | OUTPATIENT
Start: 2024-04-11 | End: 2024-04-17

## 2024-04-11 RX ORDER — MIDAZOLAM HYDROCHLORIDE 2 MG/2ML
2 INJECTION, SOLUTION INTRAMUSCULAR; INTRAVENOUS
Status: DISCONTINUED | OUTPATIENT
Start: 2024-04-11 | End: 2024-04-16

## 2024-04-11 RX ORDER — LOSARTAN POTASSIUM 25 MG/1
100 TABLET ORAL EVERY MORNING
Status: DISCONTINUED | OUTPATIENT
Start: 2024-04-11 | End: 2024-04-15

## 2024-04-11 RX ORDER — GLUCAGON 1 MG/ML
1 KIT INJECTION PRN
Status: DISCONTINUED | OUTPATIENT
Start: 2024-04-11 | End: 2024-04-24

## 2024-04-11 RX ORDER — MIDAZOLAM HYDROCHLORIDE 1 MG/ML
1-10 INJECTION, SOLUTION INTRAVENOUS CONTINUOUS
Status: DISCONTINUED | OUTPATIENT
Start: 2024-04-11 | End: 2024-04-11

## 2024-04-11 RX ORDER — LEVETIRACETAM 500 MG/5ML
1000 INJECTION, SOLUTION, CONCENTRATE INTRAVENOUS EVERY 12 HOURS
Status: DISCONTINUED | OUTPATIENT
Start: 2024-04-11 | End: 2024-04-12

## 2024-04-11 RX ORDER — MIDAZOLAM HYDROCHLORIDE 1 MG/ML
INJECTION INTRAMUSCULAR; INTRAVENOUS
Status: COMPLETED
Start: 2024-04-11 | End: 2024-04-11

## 2024-04-11 RX ORDER — MIDAZOLAM HYDROCHLORIDE 1 MG/ML
4 INJECTION, SOLUTION INTRAVENOUS CONTINUOUS
Status: DISCONTINUED | OUTPATIENT
Start: 2024-04-11 | End: 2024-04-12

## 2024-04-11 RX ORDER — DEXTROSE MONOHYDRATE 100 MG/ML
INJECTION, SOLUTION INTRAVENOUS CONTINUOUS PRN
Status: DISCONTINUED | OUTPATIENT
Start: 2024-04-11 | End: 2024-04-24

## 2024-04-11 RX ORDER — TAMSULOSIN HYDROCHLORIDE 0.4 MG/1
0.4 CAPSULE ORAL EVERY MORNING
Status: DISCONTINUED | OUTPATIENT
Start: 2024-04-11 | End: 2024-04-12

## 2024-04-11 RX ADMIN — CHLORHEXIDINE GLUCONATE 15 ML: 1.2 RINSE ORAL at 20:30

## 2024-04-11 RX ADMIN — HYDRALAZINE HYDROCHLORIDE 10 MG: 20 INJECTION, SOLUTION INTRAMUSCULAR; INTRAVENOUS at 01:08

## 2024-04-11 RX ADMIN — ENOXAPARIN SODIUM 30 MG: 100 INJECTION SUBCUTANEOUS at 08:14

## 2024-04-11 RX ADMIN — MIDAZOLAM HYDROCHLORIDE 2 MG: 1 INJECTION, SOLUTION INTRAMUSCULAR; INTRAVENOUS at 16:16

## 2024-04-11 RX ADMIN — CHLORHEXIDINE GLUCONATE 15 ML: 1.2 RINSE ORAL at 08:14

## 2024-04-11 RX ADMIN — ENOXAPARIN SODIUM 30 MG: 100 INJECTION SUBCUTANEOUS at 20:31

## 2024-04-11 RX ADMIN — HYDRALAZINE HYDROCHLORIDE 10 MG: 20 INJECTION, SOLUTION INTRAMUSCULAR; INTRAVENOUS at 20:36

## 2024-04-11 RX ADMIN — POTASSIUM CHLORIDE 10 MEQ: 7.46 INJECTION, SOLUTION INTRAVENOUS at 07:12

## 2024-04-11 RX ADMIN — SODIUM CHLORIDE: 9 INJECTION, SOLUTION INTRAVENOUS at 14:44

## 2024-04-11 RX ADMIN — LABETALOL HYDROCHLORIDE 10 MG: 5 INJECTION, SOLUTION INTRAVENOUS at 13:03

## 2024-04-11 RX ADMIN — SODIUM CHLORIDE, PRESERVATIVE FREE 40 MG: 5 INJECTION INTRAVENOUS at 08:13

## 2024-04-11 RX ADMIN — DEXTROSE MONOHYDRATE 125 ML: 100 INJECTION, SOLUTION INTRAVENOUS at 07:09

## 2024-04-11 RX ADMIN — MAGNESIUM SULFATE HEPTAHYDRATE 6000 MG: 500 INJECTION, SOLUTION INTRAMUSCULAR; INTRAVENOUS at 08:39

## 2024-04-11 RX ADMIN — POLYETHYLENE GLYCOL 3350 17 G: 17 POWDER, FOR SOLUTION ORAL at 08:14

## 2024-04-11 RX ADMIN — SODIUM CHLORIDE, PRESERVATIVE FREE 10 ML: 5 INJECTION INTRAVENOUS at 20:31

## 2024-04-11 RX ADMIN — LOSARTAN POTASSIUM 100 MG: 25 TABLET, FILM COATED ORAL at 08:14

## 2024-04-11 RX ADMIN — MIDAZOLAM 2 MG/HR: 5 INJECTION INTRAMUSCULAR; INTRAVENOUS at 18:05

## 2024-04-11 RX ADMIN — LEVETIRACETAM 1000 MG: 100 INJECTION, SOLUTION INTRAVENOUS at 08:13

## 2024-04-11 RX ADMIN — SODIUM CHLORIDE, PRESERVATIVE FREE 10 ML: 5 INJECTION INTRAVENOUS at 08:15

## 2024-04-11 RX ADMIN — LEVETIRACETAM 1000 MG: 100 INJECTION, SOLUTION INTRAVENOUS at 20:30

## 2024-04-11 RX ADMIN — MIDAZOLAM HYDROCHLORIDE 2 MG: 2 INJECTION, SOLUTION INTRAMUSCULAR; INTRAVENOUS at 16:16

## 2024-04-11 RX ADMIN — MIDAZOLAM 1 MG/HR: 5 INJECTION INTRAMUSCULAR; INTRAVENOUS at 17:16

## 2024-04-11 ASSESSMENT — PULMONARY FUNCTION TESTS
PIF_VALUE: 23
PIF_VALUE: 22
PIF_VALUE: 28
PIF_VALUE: 20
PIF_VALUE: 22
PIF_VALUE: 25
PIF_VALUE: 19
PIF_VALUE: 29
PIF_VALUE: 22
PIF_VALUE: 18
PIF_VALUE: 19
PIF_VALUE: 21
PIF_VALUE: 20
PIF_VALUE: 19
PIF_VALUE: 22
PIF_VALUE: 21
PIF_VALUE: 22
PIF_VALUE: 25
PIF_VALUE: 21
PIF_VALUE: 19
PIF_VALUE: 21
PIF_VALUE: 20
PIF_VALUE: 20
PIF_VALUE: 21
PIF_VALUE: 26
PIF_VALUE: 20
PIF_VALUE: 20

## 2024-04-11 ASSESSMENT — PAIN SCALES - GENERAL
PAINLEVEL_OUTOF10: 0

## 2024-04-11 NOTE — ED PROVIDER NOTES
Department of Emergency Medicine   ED  Provider Note  Admit Date/RoomTime: 4/10/2024  9:56 PM  ED Room: 3811/3811-A                  HPI:  4/10/24, Time: 10:09 PM EDT  .       Preston Bess is a 78 y.o. male presenting to the ED as a trauma team, beginning just prior to arrival .  The complaint has been constant, severe in severity, and worsened by nothing.  Brought by EMS after being found on the ground at home on concrete floor in the basement.  Had an unwitnessed fall.  Last seen around 2 PM this evening.  Patient seizing on arrival.  Was given 2 of Ativan followed by Keppra.  Was intubated immediately after.      Please note, this patient arrived as a Trauma team and the trauma service assumed the care of this patient on their arrival    Initial evaluation occurred with trauma services at bedside.      This patient’s disposition will be determined by trauma services.      Glascow Coma Scale at time of initial examination  Best Eye Response 2 - Opens eyes with pain   Best Verbal Response 1 - Makes no noise   Best Motor Response 1 - No motor response to pain   Total 4       Review of Systems:   A complete review of systems was performed and pertinent positives and negatives are stated within HPI, all other systems reviewed and are negative.    --------------------------------------------- PAST HISTORY ---------------------------------------------  Past Medical History:  has a past medical history of A-fib (HCC), Alcohol abuse, Alcoholic encephalopathy (HCC), Closed fracture of neck of left femur (HCC), Closed intertrochanteric fracture of hip, left, initial encounter (HCC), Functional diarrhea, Hx of blood clots, Hypertension, Malignant neoplasm of glottis (HCC), Pneumonia, Squamous cell skin cancer, and Stomach ulcer.    Past Surgical History:  has a past surgical history that includes other surgical history (Left, 2009); ERCP (N/A, 1/29/2021); CT NEEDLE BIOPSY LIVER PERCUTANEOUS (2/1/2021); picc line

## 2024-04-11 NOTE — H&P
TRAUMA HISTORY & PHYSICAL  Attending/Surgical Resident/Advance Practice Nurse  4/10/2024  10:39 PM    PRIMARY SURVEY    CHIEF COMPLAINT:  Trauma team.    Patient is a 78-year-old male who initially presented via EMS due to altered mental status after a fall down the stairs at his home onto the concrete basement which was unwitnessed per EMS.  Patient was found to have seizure-like activity.  Patient was given 2 mg of Ativan and Keppra due to seizure-like activity.  Patient was intubated in the trauma bay due to altered mental status.    AIRWAY:   Airway Normal    EMS ETT Absent  Noisy respirations Absent  Retractions: Absent  Vomiting/bleeding: Absent    BREATHING:    Midaxillary breath sound left:  Normal  Midaxillary breath sound right:  Normal    Cough sound intensity:  poor   FiO2:  Intubated. FiO2 100%    SMI unreliable/intubated mL.     CIRCULATION:   Femerol pulse intensity: Strong  Palpebral conjunctiva: Red    Vitals:    04/10/24 2230   BP: (!) 204/144   Pulse: 71   SpO2: 100%       Vitals:    04/10/24 2205 04/10/24 2209 04/10/24 2220 04/10/24 2230   BP:  (!) 180/117  (!) 204/144   Pulse: 97 89  71   TempSrc:    Axillary   SpO2:    100%   Weight:   68 kg (149 lb 14.6 oz)    Height:   1.803 m (5' 10.98\")         FAST EXAM: Deferred    Central Nervous System    GCS Initial 15 minutes   Eye  Motor  Verbal 1 - Does not open eyes  1 - No motor response to pain  1 - Makes no noise 1 - Does not open eyes  1 - No motor response to pain  1 - Makes no noise     Neuromuscular blockade: No  Pupil size:  Left 3 mm    Right 3 mm  Pupil reaction: Yes    Wiggles fingers: Left No Right No  Wiggles toes: Left No   Right No    Hand grasp:   Left  Absent      Right  Absent  Plantar flexion: Left  Absent      Right   Absent    Loss of consciousness:  Yes     History Obtained From:  Patient & EMS  Private Medical Doctor: Jemal Lau MD      Pre-exisiting Medical History:  yes    Conditions:   Past Medical History:

## 2024-04-11 NOTE — ED NOTES
Bilateral eyes deviated up and to left  Healing abrasions to clavicle, L shoulder and L shin  Patient log rolled with cspine protected - no step off or deformities noted

## 2024-04-11 NOTE — PROCEDURES
EEG Report  Preston Bess is a 78 y.o. male      Appointment Date 4/11/2024   Appointment Time  9:00am   Facility Location [facility] EEG Number [id number]   Type of Study portable Floor 3811     Technical Specifications  Technician Naomie Rothman     State of consciousness unresponsive   Sleep deprived? no   Hyperventilation tested? no   Photic stim tested? no   EEG recording Standard 10-20 electrode placement    Duration of recording 25 mins   EEG complete? yes       Clinical History   Patient is a 78-year-old male who initially presented via EMS due to altered mental status after a fall down the stairs at his home onto the concrete basement which was unwitnessed per EMS. Patient was found to have seizure-like activity. Patient was given 2 mg of Ativan and Keppra due to seizure-like activity. Patient was intubated in the trauma bay due to altered mental status.     Medications    Current Facility-Administered Medications:     glucose chewable tablet 16 g, 4 tablet, Oral, PRN, Kathy Ramsay MD    dextrose bolus 10% 125 mL, 125 mL, IntraVENous, PRN, Stopped at 04/11/24 0724 **OR** dextrose bolus 10% 250 mL, 250 mL, IntraVENous, PRN, Kathy Ramsay MD    glucagon injection 1 mg, 1 mg, SubCUTAneous, PRN, Kathy Ramsay MD    dextrose 10 % infusion, , IntraVENous, Continuous PRN, Kathy Ramsay MD    magnesium sulfate 6,000 mg in sodium chloride 0.9 % 250 mL IVPB, 6,000 mg, IntraVENous, Once, Geoff Sy MD, Last Rate: 41.7 mL/hr at 04/11/24 0839, 6,000 mg at 04/11/24 0839    levETIRAcetam (KEPPRA) injection 1,000 mg, 1,000 mg, IntraVENous, Q12H, Geoff Sy MD, 1,000 mg at 04/11/24 0813    pantoprazole (PROTONIX) 40 mg in sodium chloride (PF) 0.9 % 10 mL injection, 40 mg, IntraVENous, Daily, Geoff Sy MD, 40 mg at 04/11/24 0813    tamsulosin (FLOMAX) capsule 0.4 mg, 0.4 mg, Oral, Yossi JONES Gregory S, MD    losartan (COZAAR) tablet 100 mg, 100 mg, Oral, Yossi JONES Gregory S, MD, 100 mg at 04/11/24

## 2024-04-11 NOTE — DISCHARGE INSTRUCTIONS
TRAUMA SERVICES DISCHARGE INSTRUCTIONS    Call [phone], option 2, for any questions/concerns and for follow-up appointment in 1 week(s).    Please follow the instructions checked below:    During the course of your workup, we identified an incidental finding of:  2 mm L GRANT aneurysm  Please follow-up with your primary care provider.    ACTIVITY INSTRUCTIONS  Increase activity as tolerated  No heavy lifting or strenuous activity  Take your incentive spirometer home and use 4-6 times/day   []  No driving until cleared by trauma, neurosurgery, neurology    WOUND/DRESSING INSTRUCTIONS:  You may shower.  No sitting in bath tub, hot tub or swimming until cleared by physician.  Ice to areas of pain for first 24 hours.  Heat to areas of pain after that.  Wash areas of lacerations/abrasions with soap & water.  Rinse well.  Pat dry with clean towel.  Apply thin layer of Bacitracin, Neosporin, or triple antibiotic cream to affected area 2-3 times per day.  Keep wounds clean and dry.   []  Sutures/Staples are to be removed in *** {DAY/WK/MON/YRS:20513}.    MEDICATION INSTRUCTIONS  Take medication as prescribed.  When taking pain medications, you may experience dizziness or drowsiness.  Do not drink alcohol or drive when taking these medications.  You may experience constipation while taking pain medication.  You may take over the counter stool softeners such as docusate (Colace), sennosides S (Senokot-S), or Miralax.   []  You may take Ibuprofen (over the counter) as directed for mild pain.     --You may take up to 800mg every 8 hours for pain, please take with food or milk.   [x]  You may take acetaminophen (Tylenol) products.  Do NOT take more than 4000mg of Tylenol in 24h.   []  Do not take any other acetaminophen (Tylenol) products if you are taking Percocet or Norco, as these contain Tylenol.   --Do NOT take more than 4000mg of Tylenol in 24h.    OPIOID MEDICATION INSTRUCTIONS  Read the medication guide that is

## 2024-04-11 NOTE — DISCHARGE SUMMARY
4/10/2024 11:02 PM Culture, MRSA, Screening In process             Patient Instructions:   Current Discharge Medication List             Details   omeprazole (PRILOSEC) 40 MG delayed release capsule TAKE 1 CAPSULE BY MOUTH EVERY MORNING BEFORE BREAKFAST  Qty: 30 capsule, Refills: 5      tamsulosin (FLOMAX) 0.4 MG capsule TAKE ONE CAPSULE BY MOUTH EVERY MORNING  Qty: 90 capsule, Refills: 3      losartan (COZAAR) 100 MG tablet TAKE 1 TABLET BY MOUTH EVERY MORNING  Qty: 90 tablet, Refills: 1    Associated Diagnoses: Primary hypertension      glycopyrrolate (ROBINUL) 1 MG tablet TAKE ONE-HALF TABLET BY MOUTH TWICE A DAY  Qty: 90 tablet, Refills: 3    Associated Diagnoses: Cough      amLODIPine (NORVASC) 5 MG tablet TAKE ONE TABLET BY MOUTH EVERY DAY  Qty: 90 tablet, Refills: 1      oxybutynin (DITROPAN) 5 MG tablet TAKE 1 TABLET BY MOUTH TWO TIMES A DAY  Qty: 90 tablet, Refills: 1      Lifitegrast (XIIDRA) 5 % SOLN Apply 1 drop to eye 2 times daily                 Follow up:   No follow-up provider specified.     Signed:  Kathy Ramsay MD  4/11/2024  1:33 PM

## 2024-04-12 ENCOUNTER — APPOINTMENT (OUTPATIENT)
Dept: GENERAL RADIOLOGY | Age: 79
DRG: 064 | End: 2024-04-12
Payer: COMMERCIAL

## 2024-04-12 LAB
AADO2: 150.8 MMHG
ALBUMIN SERPL-MCNC: 3 G/DL (ref 3.5–5.2)
ALP SERPL-CCNC: 76 U/L (ref 40–129)
ALT SERPL-CCNC: 15 U/L (ref 0–40)
ANION GAP SERPL CALCULATED.3IONS-SCNC: 15 MMOL/L (ref 7–16)
AST SERPL-CCNC: 39 U/L (ref 0–39)
B.E.: -2.4 MMOL/L (ref -3–3)
BASOPHILS # BLD: 0.02 K/UL (ref 0–0.2)
BASOPHILS NFR BLD: 0 % (ref 0–2)
BILIRUB SERPL-MCNC: 1.1 MG/DL (ref 0–1.2)
BUN SERPL-MCNC: 17 MG/DL (ref 6–23)
CA-I BLD-SCNC: 1.16 MMOL/L (ref 1.15–1.33)
CALCIUM SERPL-MCNC: 8.5 MG/DL (ref 8.6–10.2)
CHLORIDE SERPL-SCNC: 104 MMOL/L (ref 98–107)
CO2 SERPL-SCNC: 18 MMOL/L (ref 22–29)
COHB: 0.4 % (ref 0–1.5)
CREAT SERPL-MCNC: 0.7 MG/DL (ref 0.7–1.2)
CRITICAL: ABNORMAL
DATE ANALYZED: ABNORMAL
DATE OF COLLECTION: ABNORMAL
EOSINOPHIL # BLD: 0 K/UL (ref 0.05–0.5)
EOSINOPHILS RELATIVE PERCENT: 0 % (ref 0–6)
ERYTHROCYTE [DISTWIDTH] IN BLOOD BY AUTOMATED COUNT: 14.7 % (ref 11.5–15)
FIO2: 35 %
GFR SERPL CREATININE-BSD FRML MDRD: >90 ML/MIN/1.73M2
GLUCOSE SERPL-MCNC: 123 MG/DL (ref 74–99)
HCO3: 20.5 MMOL/L (ref 22–26)
HCT VFR BLD AUTO: 37.5 % (ref 37–54)
HGB BLD-MCNC: 12.7 G/DL (ref 12.5–16.5)
HHB: 11.4 % (ref 0–5)
IMM GRANULOCYTES # BLD AUTO: 0.15 K/UL (ref 0–0.58)
IMM GRANULOCYTES NFR BLD: 1 % (ref 0–5)
LAB: ABNORMAL
LYMPHOCYTES NFR BLD: 0.84 K/UL (ref 1.5–4)
LYMPHOCYTES RELATIVE PERCENT: 5 % (ref 20–42)
Lab: 500
MAGNESIUM SERPL-MCNC: 2.4 MG/DL (ref 1.6–2.6)
MCH RBC QN AUTO: 29.6 PG (ref 26–35)
MCHC RBC AUTO-ENTMCNC: 33.9 G/DL (ref 32–34.5)
MCV RBC AUTO: 87.4 FL (ref 80–99.9)
METHB: 0.1 % (ref 0–1.5)
MICROORGANISM SPEC CULT: ABNORMAL
MODE: AC
MONOCYTES NFR BLD: 0.8 K/UL (ref 0.1–0.95)
MONOCYTES NFR BLD: 5 % (ref 2–12)
NEUTROPHILS NFR BLD: 90 % (ref 43–80)
NEUTS SEG NFR BLD: 15.52 K/UL (ref 1.8–7.3)
O2 SATURATION: 88.5 % (ref 92–98.5)
O2HB: 88.1 % (ref 94–97)
OPERATOR ID: 3342
PATIENT TEMP: 37 C
PCO2: 30.3 MMHG (ref 35–45)
PEEP/CPAP: 8 CMH2O
PFO2: 1.57 MMHG/%
PH BLOOD GAS: 7.45 (ref 7.35–7.45)
PHOSPHATE SERPL-MCNC: 2.6 MG/DL (ref 2.5–4.5)
PLATELET CONFIRMATION: NORMAL
PLATELET, FLUORESCENCE: 203 K/UL (ref 130–450)
PMV BLD AUTO: 11.5 FL (ref 7–12)
PO2: 54.8 MMHG (ref 75–100)
POTASSIUM SERPL-SCNC: 3.5 MMOL/L (ref 3.5–5)
PROT SERPL-MCNC: 6.2 G/DL (ref 6.4–8.3)
RBC # BLD AUTO: 4.29 M/UL (ref 3.8–5.8)
RI(T): 2.75
RR MECHANICAL: 20 B/MIN
SODIUM SERPL-SCNC: 137 MMOL/L (ref 132–146)
SOURCE, BLOOD GAS: ABNORMAL
SPECIMEN DESCRIPTION: ABNORMAL
THB: 12.9 G/DL (ref 11.5–16.5)
TIME ANALYZED: 504
VT MECHANICAL: 450 ML
WBC OTHER # BLD: 17.3 K/UL (ref 4.5–11.5)

## 2024-04-12 PROCEDURE — 6360000002 HC RX W HCPCS: Performed by: SURGERY

## 2024-04-12 PROCEDURE — 6370000000 HC RX 637 (ALT 250 FOR IP)

## 2024-04-12 PROCEDURE — C9254 INJECTION, LACOSAMIDE: HCPCS | Performed by: STUDENT IN AN ORGANIZED HEALTH CARE EDUCATION/TRAINING PROGRAM

## 2024-04-12 PROCEDURE — 2580000003 HC RX 258: Performed by: SURGERY

## 2024-04-12 PROCEDURE — 2580000003 HC RX 258: Performed by: PSYCHIATRY & NEUROLOGY

## 2024-04-12 PROCEDURE — A4216 STERILE WATER/SALINE, 10 ML: HCPCS | Performed by: SURGERY

## 2024-04-12 PROCEDURE — 80053 COMPREHEN METABOLIC PANEL: CPT

## 2024-04-12 PROCEDURE — 71045 X-RAY EXAM CHEST 1 VIEW: CPT

## 2024-04-12 PROCEDURE — 2000000000 HC ICU R&B

## 2024-04-12 PROCEDURE — 94003 VENT MGMT INPAT SUBQ DAY: CPT

## 2024-04-12 PROCEDURE — 51798 US URINE CAPACITY MEASURE: CPT

## 2024-04-12 PROCEDURE — 2580000003 HC RX 258

## 2024-04-12 PROCEDURE — 6370000000 HC RX 637 (ALT 250 FOR IP): Performed by: STUDENT IN AN ORGANIZED HEALTH CARE EDUCATION/TRAINING PROGRAM

## 2024-04-12 PROCEDURE — 84100 ASSAY OF PHOSPHORUS: CPT

## 2024-04-12 PROCEDURE — 6360000002 HC RX W HCPCS: Performed by: STUDENT IN AN ORGANIZED HEALTH CARE EDUCATION/TRAINING PROGRAM

## 2024-04-12 PROCEDURE — 2580000003 HC RX 258: Performed by: STUDENT IN AN ORGANIZED HEALTH CARE EDUCATION/TRAINING PROGRAM

## 2024-04-12 PROCEDURE — 85025 COMPLETE CBC W/AUTO DIFF WBC: CPT

## 2024-04-12 PROCEDURE — 82330 ASSAY OF CALCIUM: CPT

## 2024-04-12 PROCEDURE — C9113 INJ PANTOPRAZOLE SODIUM, VIA: HCPCS | Performed by: SURGERY

## 2024-04-12 PROCEDURE — 99291 CRITICAL CARE FIRST HOUR: CPT | Performed by: SURGERY

## 2024-04-12 PROCEDURE — C9254 INJECTION, LACOSAMIDE: HCPCS | Performed by: PSYCHIATRY & NEUROLOGY

## 2024-04-12 PROCEDURE — 6360000002 HC RX W HCPCS: Performed by: PSYCHIATRY & NEUROLOGY

## 2024-04-12 PROCEDURE — 6370000000 HC RX 637 (ALT 250 FOR IP): Performed by: SURGERY

## 2024-04-12 PROCEDURE — 82805 BLOOD GASES W/O2 SATURATION: CPT

## 2024-04-12 PROCEDURE — 51702 INSERT TEMP BLADDER CATH: CPT

## 2024-04-12 PROCEDURE — 6360000002 HC RX W HCPCS

## 2024-04-12 PROCEDURE — 83735 ASSAY OF MAGNESIUM: CPT

## 2024-04-12 RX ORDER — LEVETIRACETAM 500 MG/5ML
2000 INJECTION, SOLUTION, CONCENTRATE INTRAVENOUS EVERY 12 HOURS
Status: DISCONTINUED | OUTPATIENT
Start: 2024-04-12 | End: 2024-04-15

## 2024-04-12 RX ORDER — MIDAZOLAM HYDROCHLORIDE 1 MG/ML
6 INJECTION, SOLUTION INTRAVENOUS CONTINUOUS
Status: DISCONTINUED | OUTPATIENT
Start: 2024-04-12 | End: 2024-04-16

## 2024-04-12 RX ORDER — ACETAMINOPHEN 160 MG/5ML
650 LIQUID ORAL EVERY 6 HOURS PRN
Status: DISCONTINUED | OUTPATIENT
Start: 2024-04-12 | End: 2024-04-22

## 2024-04-12 RX ORDER — FINASTERIDE 5 MG/1
5 TABLET, FILM COATED ORAL DAILY
Status: DISCONTINUED | OUTPATIENT
Start: 2024-04-12 | End: 2024-04-12

## 2024-04-12 RX ORDER — LEVETIRACETAM 500 MG/5ML
1500 INJECTION, SOLUTION, CONCENTRATE INTRAVENOUS EVERY 12 HOURS
Status: DISCONTINUED | OUTPATIENT
Start: 2024-04-12 | End: 2024-04-12

## 2024-04-12 RX ORDER — FINASTERIDE 5 MG/1
5 TABLET, FILM COATED ORAL DAILY
Status: DISCONTINUED | OUTPATIENT
Start: 2024-04-12 | End: 2024-04-24

## 2024-04-12 RX ORDER — POTASSIUM CHLORIDE 7.45 MG/ML
10 INJECTION INTRAVENOUS ONCE
Status: COMPLETED | OUTPATIENT
Start: 2024-04-12 | End: 2024-04-12

## 2024-04-12 RX ORDER — POTASSIUM CHLORIDE 7.45 MG/ML
10 INJECTION INTRAVENOUS
Status: DISCONTINUED | OUTPATIENT
Start: 2024-04-12 | End: 2024-04-12

## 2024-04-12 RX ORDER — AMLODIPINE BESYLATE 5 MG/1
5 TABLET ORAL DAILY
Status: DISCONTINUED | OUTPATIENT
Start: 2024-04-12 | End: 2024-04-24

## 2024-04-12 RX ORDER — LEVETIRACETAM 500 MG/5ML
500 INJECTION, SOLUTION, CONCENTRATE INTRAVENOUS ONCE
Status: COMPLETED | OUTPATIENT
Start: 2024-04-12 | End: 2024-04-12

## 2024-04-12 RX ADMIN — ACETAMINOPHEN 650 MG: 650 SOLUTION ORAL at 23:40

## 2024-04-12 RX ADMIN — POTASSIUM CHLORIDE 10 MEQ: 7.46 INJECTION, SOLUTION INTRAVENOUS at 06:28

## 2024-04-12 RX ADMIN — MIDAZOLAM HYDROCHLORIDE 2 MG: 1 INJECTION, SOLUTION INTRAMUSCULAR; INTRAVENOUS at 08:15

## 2024-04-12 RX ADMIN — SODIUM CHLORIDE, PRESERVATIVE FREE 40 MG: 5 INJECTION INTRAVENOUS at 07:58

## 2024-04-12 RX ADMIN — LACOSAMIDE 100 MG: 10 INJECTION INTRAVENOUS at 09:00

## 2024-04-12 RX ADMIN — LEVETIRACETAM 1000 MG: 100 INJECTION, SOLUTION INTRAVENOUS at 07:59

## 2024-04-12 RX ADMIN — LACOSAMIDE 100 MG: 10 INJECTION INTRAVENOUS at 10:24

## 2024-04-12 RX ADMIN — CHLORHEXIDINE GLUCONATE 15 ML: 1.2 RINSE ORAL at 20:41

## 2024-04-12 RX ADMIN — FINASTERIDE 5 MG: 5 TABLET, FILM COATED ORAL at 08:46

## 2024-04-12 RX ADMIN — POLYETHYLENE GLYCOL 3350 17 G: 17 POWDER, FOR SOLUTION ORAL at 07:58

## 2024-04-12 RX ADMIN — LACOSAMIDE 200 MG: 10 INJECTION INTRAVENOUS at 16:01

## 2024-04-12 RX ADMIN — Medication 4 MG/HR: at 14:30

## 2024-04-12 RX ADMIN — POTASSIUM CHLORIDE 10 MEQ: 7.46 INJECTION, SOLUTION INTRAVENOUS at 07:17

## 2024-04-12 RX ADMIN — LEVETIRACETAM 2000 MG: 100 INJECTION, SOLUTION INTRAVENOUS at 20:40

## 2024-04-12 RX ADMIN — MUPIROCIN: 20 OINTMENT TOPICAL at 20:41

## 2024-04-12 RX ADMIN — SODIUM CHLORIDE, PRESERVATIVE FREE 10 ML: 5 INJECTION INTRAVENOUS at 07:58

## 2024-04-12 RX ADMIN — LOSARTAN POTASSIUM 100 MG: 25 TABLET, FILM COATED ORAL at 07:58

## 2024-04-12 RX ADMIN — LEVETIRACETAM 500 MG: 100 INJECTION, SOLUTION INTRAVENOUS at 08:46

## 2024-04-12 RX ADMIN — ENOXAPARIN SODIUM 30 MG: 100 INJECTION SUBCUTANEOUS at 20:41

## 2024-04-12 RX ADMIN — SODIUM CHLORIDE, PRESERVATIVE FREE 10 ML: 5 INJECTION INTRAVENOUS at 20:41

## 2024-04-12 RX ADMIN — LACOSAMIDE 200 MG: 10 INJECTION INTRAVENOUS at 20:39

## 2024-04-12 RX ADMIN — CHLORHEXIDINE GLUCONATE 15 ML: 1.2 RINSE ORAL at 07:58

## 2024-04-12 RX ADMIN — MUPIROCIN: 20 OINTMENT TOPICAL at 11:25

## 2024-04-12 RX ADMIN — ENOXAPARIN SODIUM 30 MG: 100 INJECTION SUBCUTANEOUS at 07:58

## 2024-04-12 ASSESSMENT — PULMONARY FUNCTION TESTS
PIF_VALUE: 24
PIF_VALUE: 20
PIF_VALUE: 21
PIF_VALUE: 24
PIF_VALUE: 21
PIF_VALUE: 20
PIF_VALUE: 21
PIF_VALUE: 21
PIF_VALUE: 20
PIF_VALUE: 20
PIF_VALUE: 23
PIF_VALUE: 28
PIF_VALUE: 22
PIF_VALUE: 29
PIF_VALUE: 21
PIF_VALUE: 20
PIF_VALUE: 21
PIF_VALUE: 20
PIF_VALUE: 24
PIF_VALUE: 26
PIF_VALUE: 21

## 2024-04-12 ASSESSMENT — PAIN SCALES - GENERAL
PAINLEVEL_OUTOF10: 0

## 2024-04-12 NOTE — CARE COORDINATION
Patient intubated, no family at bedside, propofol stopped yesterday and versed gtt today. Needs unclear at this point, will follow up Monday.     For questions I can be reached at [phone]. ANAYELI Crenshaw

## 2024-04-13 ENCOUNTER — APPOINTMENT (OUTPATIENT)
Dept: GENERAL RADIOLOGY | Age: 79
DRG: 064 | End: 2024-04-13
Payer: COMMERCIAL

## 2024-04-13 ENCOUNTER — APPOINTMENT (OUTPATIENT)
Dept: CT IMAGING | Age: 79
DRG: 064 | End: 2024-04-13
Payer: COMMERCIAL

## 2024-04-13 LAB
AADO2: 194.8 MMHG
ALBUMIN SERPL-MCNC: 2.5 G/DL (ref 3.5–5.2)
ALP SERPL-CCNC: 90 U/L (ref 40–129)
ALT SERPL-CCNC: 20 U/L (ref 0–40)
ANION GAP SERPL CALCULATED.3IONS-SCNC: 10 MMOL/L (ref 7–16)
AST SERPL-CCNC: 32 U/L (ref 0–39)
B.E.: -1.8 MMOL/L (ref -3–3)
BASOPHILS # BLD: 0.01 K/UL (ref 0–0.2)
BASOPHILS NFR BLD: 0 % (ref 0–2)
BILIRUB SERPL-MCNC: 1.6 MG/DL (ref 0–1.2)
BUN SERPL-MCNC: 25 MG/DL (ref 6–23)
CA-I BLD-SCNC: 1.16 MMOL/L (ref 1.15–1.33)
CALCIUM SERPL-MCNC: 8.1 MG/DL (ref 8.6–10.2)
CHLORIDE SERPL-SCNC: 105 MMOL/L (ref 98–107)
CO2 SERPL-SCNC: 21 MMOL/L (ref 22–29)
COHB: 0.3 % (ref 0–1.5)
CORTIS SERPL-MCNC: 15.5 UG/DL (ref 2.7–18.4)
CORTISOL COLLECTION INFO: NORMAL
CREAT SERPL-MCNC: 0.6 MG/DL (ref 0.7–1.2)
CRITICAL: ABNORMAL
DATE ANALYZED: ABNORMAL
DATE OF COLLECTION: ABNORMAL
EOSINOPHIL # BLD: 0 K/UL (ref 0.05–0.5)
EOSINOPHILS RELATIVE PERCENT: 0 % (ref 0–6)
ERYTHROCYTE [DISTWIDTH] IN BLOOD BY AUTOMATED COUNT: 15 % (ref 11.5–15)
FIO2: 45 %
GFR SERPL CREATININE-BSD FRML MDRD: >90 ML/MIN/1.73M2
GLUCOSE SERPL-MCNC: 157 MG/DL (ref 74–99)
HCO3: 21.3 MMOL/L (ref 22–26)
HCT VFR BLD AUTO: 32.8 % (ref 37–54)
HGB BLD-MCNC: 10.9 G/DL (ref 12.5–16.5)
HHB: 4.3 % (ref 0–5)
IMM GRANULOCYTES # BLD AUTO: 0.1 K/UL (ref 0–0.58)
IMM GRANULOCYTES NFR BLD: 1 % (ref 0–5)
LAB: ABNORMAL
LYMPHOCYTES NFR BLD: 0.73 K/UL (ref 1.5–4)
LYMPHOCYTES RELATIVE PERCENT: 6 % (ref 20–42)
Lab: 500
MAGNESIUM SERPL-MCNC: 2.2 MG/DL (ref 1.6–2.6)
MCH RBC QN AUTO: 29.4 PG (ref 26–35)
MCHC RBC AUTO-ENTMCNC: 33.2 G/DL (ref 32–34.5)
MCV RBC AUTO: 88.4 FL (ref 80–99.9)
METHB: 0.2 % (ref 0–1.5)
MODE: AC
MONOCYTES NFR BLD: 0.82 K/UL (ref 0.1–0.95)
MONOCYTES NFR BLD: 6 % (ref 2–12)
NEUTROPHILS NFR BLD: 87 % (ref 43–80)
NEUTS SEG NFR BLD: 11.25 K/UL (ref 1.8–7.3)
O2 SATURATION: 95.7 % (ref 92–98.5)
O2HB: 95.2 % (ref 94–97)
OPERATOR ID: 7221
PATIENT TEMP: 37 C
PCO2: 31.1 MMHG (ref 35–45)
PEEP/CPAP: 8 CMH2O
PFO2: 1.76 MMHG/%
PH BLOOD GAS: 7.45 (ref 7.35–7.45)
PHOSPHATE SERPL-MCNC: 1.2 MG/DL (ref 2.5–4.5)
PLATELET # BLD AUTO: 180 K/UL (ref 130–450)
PMV BLD AUTO: 11.9 FL (ref 7–12)
PO2: 79.4 MMHG (ref 75–100)
POTASSIUM SERPL-SCNC: 3.9 MMOL/L (ref 3.5–5)
PROT SERPL-MCNC: 5.5 G/DL (ref 6.4–8.3)
RBC # BLD AUTO: 3.71 M/UL (ref 3.8–5.8)
RI(T): 2.45
RR MECHANICAL: 20 B/MIN
SODIUM SERPL-SCNC: 136 MMOL/L (ref 132–146)
SOURCE, BLOOD GAS: ABNORMAL
THB: 12.7 G/DL (ref 11.5–16.5)
TIME ANALYZED: 508
VT MECHANICAL: 450 ML
WBC OTHER # BLD: 12.9 K/UL (ref 4.5–11.5)

## 2024-04-13 PROCEDURE — 2500000003 HC RX 250 WO HCPCS

## 2024-04-13 PROCEDURE — 82330 ASSAY OF CALCIUM: CPT

## 2024-04-13 PROCEDURE — 80053 COMPREHEN METABOLIC PANEL: CPT

## 2024-04-13 PROCEDURE — 82805 BLOOD GASES W/O2 SATURATION: CPT

## 2024-04-13 PROCEDURE — 86403 PARTICLE AGGLUT ANTBDY SCRN: CPT

## 2024-04-13 PROCEDURE — 99233 SBSQ HOSP IP/OBS HIGH 50: CPT | Performed by: PHYSICIAN ASSISTANT

## 2024-04-13 PROCEDURE — 2580000003 HC RX 258: Performed by: PHYSICIAN ASSISTANT

## 2024-04-13 PROCEDURE — 6360000002 HC RX W HCPCS: Performed by: PHYSICIAN ASSISTANT

## 2024-04-13 PROCEDURE — C9113 INJ PANTOPRAZOLE SODIUM, VIA: HCPCS | Performed by: SURGERY

## 2024-04-13 PROCEDURE — 2580000003 HC RX 258

## 2024-04-13 PROCEDURE — 6360000002 HC RX W HCPCS: Performed by: PSYCHIATRY & NEUROLOGY

## 2024-04-13 PROCEDURE — 94003 VENT MGMT INPAT SUBQ DAY: CPT

## 2024-04-13 PROCEDURE — 87070 CULTURE OTHR SPECIMN AEROBIC: CPT

## 2024-04-13 PROCEDURE — 70450 CT HEAD/BRAIN W/O DYE: CPT

## 2024-04-13 PROCEDURE — 6360000002 HC RX W HCPCS

## 2024-04-13 PROCEDURE — 71045 X-RAY EXAM CHEST 1 VIEW: CPT

## 2024-04-13 PROCEDURE — 85025 COMPLETE CBC W/AUTO DIFF WBC: CPT

## 2024-04-13 PROCEDURE — 83735 ASSAY OF MAGNESIUM: CPT

## 2024-04-13 PROCEDURE — 6370000000 HC RX 637 (ALT 250 FOR IP)

## 2024-04-13 PROCEDURE — 2580000003 HC RX 258: Performed by: SURGERY

## 2024-04-13 PROCEDURE — 87205 SMEAR GRAM STAIN: CPT

## 2024-04-13 PROCEDURE — 99291 CRITICAL CARE FIRST HOUR: CPT | Performed by: SURGERY

## 2024-04-13 PROCEDURE — 6360000002 HC RX W HCPCS: Performed by: SURGERY

## 2024-04-13 PROCEDURE — 2580000003 HC RX 258: Performed by: PSYCHIATRY & NEUROLOGY

## 2024-04-13 PROCEDURE — 2000000000 HC ICU R&B

## 2024-04-13 PROCEDURE — 87077 CULTURE AEROBIC IDENTIFY: CPT

## 2024-04-13 PROCEDURE — C9254 INJECTION, LACOSAMIDE: HCPCS | Performed by: PSYCHIATRY & NEUROLOGY

## 2024-04-13 PROCEDURE — 6370000000 HC RX 637 (ALT 250 FOR IP): Performed by: STUDENT IN AN ORGANIZED HEALTH CARE EDUCATION/TRAINING PROGRAM

## 2024-04-13 PROCEDURE — 82533 TOTAL CORTISOL: CPT

## 2024-04-13 PROCEDURE — 84100 ASSAY OF PHOSPHORUS: CPT

## 2024-04-13 RX ORDER — DIMETHICONE, OXYBENZONE, AND PADIMATE O 2; 2.5; 6.6 G/100G; G/100G; G/100G
STICK TOPICAL PRN
Status: DISCONTINUED | OUTPATIENT
Start: 2024-04-13 | End: 2024-04-24

## 2024-04-13 RX ORDER — SENNOSIDES A AND B 8.6 MG/1
1 TABLET, FILM COATED ORAL NIGHTLY
Status: DISCONTINUED | OUTPATIENT
Start: 2024-04-13 | End: 2024-04-24

## 2024-04-13 RX ORDER — SODIUM CHLORIDE, SODIUM LACTATE, POTASSIUM CHLORIDE, CALCIUM CHLORIDE 600; 310; 30; 20 MG/100ML; MG/100ML; MG/100ML; MG/100ML
INJECTION, SOLUTION INTRAVENOUS CONTINUOUS
Status: DISCONTINUED | OUTPATIENT
Start: 2024-04-13 | End: 2024-04-13

## 2024-04-13 RX ORDER — SODIUM CHLORIDE, SODIUM LACTATE, POTASSIUM CHLORIDE, AND CALCIUM CHLORIDE .6; .31; .03; .02 G/100ML; G/100ML; G/100ML; G/100ML
1000 INJECTION, SOLUTION INTRAVENOUS ONCE
Status: COMPLETED | OUTPATIENT
Start: 2024-04-13 | End: 2024-04-13

## 2024-04-13 RX ORDER — FOLIC ACID 5 MG/ML
1 INJECTION, SOLUTION INTRAMUSCULAR; INTRAVENOUS; SUBCUTANEOUS DAILY
Status: DISCONTINUED | OUTPATIENT
Start: 2024-04-13 | End: 2024-04-15

## 2024-04-13 RX ORDER — SODIUM CHLORIDE, SODIUM LACTATE, POTASSIUM CHLORIDE, AND CALCIUM CHLORIDE .6; .31; .03; .02 G/100ML; G/100ML; G/100ML; G/100ML
500 INJECTION, SOLUTION INTRAVENOUS ONCE
Status: COMPLETED | OUTPATIENT
Start: 2024-04-13 | End: 2024-04-13

## 2024-04-13 RX ORDER — SODIUM CHLORIDE, SODIUM LACTATE, POTASSIUM CHLORIDE, CALCIUM CHLORIDE 600; 310; 30; 20 MG/100ML; MG/100ML; MG/100ML; MG/100ML
INJECTION, SOLUTION INTRAVENOUS ONCE
Status: DISCONTINUED | OUTPATIENT
Start: 2024-04-13 | End: 2024-04-15

## 2024-04-13 RX ORDER — SODIUM CHLORIDE 9 MG/ML
INJECTION, SOLUTION INTRAVENOUS CONTINUOUS
Status: DISCONTINUED | OUTPATIENT
Start: 2024-04-13 | End: 2024-04-15

## 2024-04-13 RX ORDER — THIAMINE HYDROCHLORIDE 100 MG/ML
100 INJECTION, SOLUTION INTRAMUSCULAR; INTRAVENOUS DAILY
Status: DISCONTINUED | OUTPATIENT
Start: 2024-04-13 | End: 2024-04-15

## 2024-04-13 RX ADMIN — LACOSAMIDE 200 MG: 10 INJECTION INTRAVENOUS at 20:43

## 2024-04-13 RX ADMIN — Medication 500 MG: at 12:35

## 2024-04-13 RX ADMIN — SODIUM CHLORIDE, POTASSIUM CHLORIDE, SODIUM LACTATE AND CALCIUM CHLORIDE 1000 ML: 600; 310; 30; 20 INJECTION, SOLUTION INTRAVENOUS at 15:11

## 2024-04-13 RX ADMIN — FOLIC ACID 1 MG: 5 INJECTION, SOLUTION INTRAMUSCULAR; INTRAVENOUS; SUBCUTANEOUS at 10:01

## 2024-04-13 RX ADMIN — CHLORHEXIDINE GLUCONATE 15 ML: 1.2 RINSE ORAL at 19:38

## 2024-04-13 RX ADMIN — SODIUM CHLORIDE: 9 INJECTION, SOLUTION INTRAVENOUS at 10:16

## 2024-04-13 RX ADMIN — ACETAMINOPHEN 650 MG: 650 SOLUTION ORAL at 14:34

## 2024-04-13 RX ADMIN — Medication 500 MG: at 08:03

## 2024-04-13 RX ADMIN — FOSPHENYTOIN SODIUM 100 MG PE: 50 INJECTION, SOLUTION INTRAMUSCULAR; INTRAVENOUS at 18:11

## 2024-04-13 RX ADMIN — MUPIROCIN: 20 OINTMENT TOPICAL at 08:05

## 2024-04-13 RX ADMIN — POLYETHYLENE GLYCOL 3350 17 G: 17 POWDER, FOR SOLUTION ORAL at 08:03

## 2024-04-13 RX ADMIN — SODIUM CHLORIDE, PRESERVATIVE FREE 40 MG: 5 INJECTION INTRAVENOUS at 08:04

## 2024-04-13 RX ADMIN — SODIUM CHLORIDE 1450 MG PE: 9 INJECTION, SOLUTION INTRAVENOUS at 10:19

## 2024-04-13 RX ADMIN — LEVETIRACETAM 2000 MG: 100 INJECTION, SOLUTION INTRAVENOUS at 08:04

## 2024-04-13 RX ADMIN — LACOSAMIDE 200 MG: 10 INJECTION INTRAVENOUS at 08:12

## 2024-04-13 RX ADMIN — SODIUM CHLORIDE, PRESERVATIVE FREE 10 ML: 5 INJECTION INTRAVENOUS at 19:38

## 2024-04-13 RX ADMIN — ENOXAPARIN SODIUM 30 MG: 100 INJECTION SUBCUTANEOUS at 20:45

## 2024-04-13 RX ADMIN — MUPIROCIN: 20 OINTMENT TOPICAL at 19:39

## 2024-04-13 RX ADMIN — THIAMINE HYDROCHLORIDE 100 MG: 100 INJECTION, SOLUTION INTRAMUSCULAR; INTRAVENOUS at 09:22

## 2024-04-13 RX ADMIN — SODIUM CHLORIDE: 9 INJECTION, SOLUTION INTRAVENOUS at 09:23

## 2024-04-13 RX ADMIN — Medication 6 MG/HR: at 08:35

## 2024-04-13 RX ADMIN — LEVETIRACETAM 2000 MG: 100 INJECTION, SOLUTION INTRAVENOUS at 20:44

## 2024-04-13 RX ADMIN — Medication 500 MG: at 16:09

## 2024-04-13 RX ADMIN — SODIUM CHLORIDE: 9 INJECTION, SOLUTION INTRAVENOUS at 18:21

## 2024-04-13 RX ADMIN — AMPICILLIN SODIUM AND SULBACTAM SODIUM 3000 MG: 2; 1 INJECTION, POWDER, FOR SOLUTION INTRAMUSCULAR; INTRAVENOUS at 08:08

## 2024-04-13 RX ADMIN — Medication 500 MG: at 19:37

## 2024-04-13 RX ADMIN — AMPICILLIN SODIUM AND SULBACTAM SODIUM 3000 MG: 2; 1 INJECTION, POWDER, FOR SOLUTION INTRAMUSCULAR; INTRAVENOUS at 13:55

## 2024-04-13 RX ADMIN — ENOXAPARIN SODIUM 30 MG: 100 INJECTION SUBCUTANEOUS at 08:03

## 2024-04-13 RX ADMIN — FINASTERIDE 5 MG: 5 TABLET, FILM COATED ORAL at 08:03

## 2024-04-13 RX ADMIN — CHLORHEXIDINE GLUCONATE 15 ML: 1.2 RINSE ORAL at 08:03

## 2024-04-13 RX ADMIN — AMPICILLIN SODIUM AND SULBACTAM SODIUM 3000 MG: 2; 1 INJECTION, POWDER, FOR SOLUTION INTRAMUSCULAR; INTRAVENOUS at 19:36

## 2024-04-13 RX ADMIN — SODIUM CHLORIDE, PRESERVATIVE FREE 10 ML: 5 INJECTION INTRAVENOUS at 08:03

## 2024-04-13 RX ADMIN — SODIUM CHLORIDE, POTASSIUM CHLORIDE, SODIUM LACTATE AND CALCIUM CHLORIDE 500 ML: 600; 310; 30; 20 INJECTION, SOLUTION INTRAVENOUS at 04:11

## 2024-04-13 ASSESSMENT — PAIN SCALES - GENERAL
PAINLEVEL_OUTOF10: 0

## 2024-04-13 ASSESSMENT — PULMONARY FUNCTION TESTS
PIF_VALUE: 20
PIF_VALUE: 23
PIF_VALUE: 21
PIF_VALUE: 21
PIF_VALUE: 25
PIF_VALUE: 23
PIF_VALUE: 22
PIF_VALUE: 21
PIF_VALUE: 20
PIF_VALUE: 21
PIF_VALUE: 20
PIF_VALUE: 19
PIF_VALUE: 23
PIF_VALUE: 19
PIF_VALUE: 22
PIF_VALUE: 21
PIF_VALUE: 21
PIF_VALUE: 22
PIF_VALUE: 19
PIF_VALUE: 21
PIF_VALUE: 22
PIF_VALUE: 21
PIF_VALUE: 22
PIF_VALUE: 23
PIF_VALUE: 23
PIF_VALUE: 20

## 2024-04-14 ENCOUNTER — APPOINTMENT (OUTPATIENT)
Dept: GENERAL RADIOLOGY | Age: 79
DRG: 064 | End: 2024-04-14
Payer: COMMERCIAL

## 2024-04-14 ENCOUNTER — APPOINTMENT (OUTPATIENT)
Dept: MRI IMAGING | Age: 79
DRG: 064 | End: 2024-04-14
Payer: COMMERCIAL

## 2024-04-14 LAB
AADO2: 193 MMHG
B.E.: -1 MMOL/L (ref -3–3)
COHB: 0.3 % (ref 0–1.5)
CRITICAL: ABNORMAL
DATE ANALYZED: ABNORMAL
DATE OF COLLECTION: ABNORMAL
FIO2: 45 %
HCO3: 22 MMOL/L (ref 22–26)
HHB: 4.1 % (ref 0–5)
LAB: ABNORMAL
Lab: 435
METHB: 0.4 % (ref 0–1.5)
MODE: AC
O2 SATURATION: 95.9 % (ref 92–98.5)
O2HB: 95.2 % (ref 94–97)
OPERATOR ID: 2962
PATIENT TEMP: 37 C
PCO2: 30.8 MMHG (ref 35–45)
PEEP/CPAP: 8 CMH2O
PFO2: 1.81 MMHG/%
PH BLOOD GAS: 7.47 (ref 7.35–7.45)
PO2: 81.6 MMHG (ref 75–100)
RI(T): 2.36
RR MECHANICAL: 20 B/MIN
SOURCE, BLOOD GAS: ABNORMAL
THB: 11.4 G/DL (ref 11.5–16.5)
TIME ANALYZED: 446
VT MECHANICAL: 450 ML

## 2024-04-14 PROCEDURE — 2580000003 HC RX 258

## 2024-04-14 PROCEDURE — 6360000002 HC RX W HCPCS: Performed by: PHYSICIAN ASSISTANT

## 2024-04-14 PROCEDURE — 6370000000 HC RX 637 (ALT 250 FOR IP)

## 2024-04-14 PROCEDURE — 2580000003 HC RX 258: Performed by: PSYCHIATRY & NEUROLOGY

## 2024-04-14 PROCEDURE — 71045 X-RAY EXAM CHEST 1 VIEW: CPT

## 2024-04-14 PROCEDURE — 2580000003 HC RX 258: Performed by: SURGERY

## 2024-04-14 PROCEDURE — 82805 BLOOD GASES W/O2 SATURATION: CPT

## 2024-04-14 PROCEDURE — 80053 COMPREHEN METABOLIC PANEL: CPT

## 2024-04-14 PROCEDURE — 2000000000 HC ICU R&B

## 2024-04-14 PROCEDURE — 70553 MRI BRAIN STEM W/O & W/DYE: CPT

## 2024-04-14 PROCEDURE — 6360000004 HC RX CONTRAST MEDICATION: Performed by: RADIOLOGY

## 2024-04-14 PROCEDURE — 2500000003 HC RX 250 WO HCPCS

## 2024-04-14 PROCEDURE — 99291 CRITICAL CARE FIRST HOUR: CPT | Performed by: SURGERY

## 2024-04-14 PROCEDURE — A9579 GAD-BASE MR CONTRAST NOS,1ML: HCPCS | Performed by: RADIOLOGY

## 2024-04-14 PROCEDURE — 85025 COMPLETE CBC W/AUTO DIFF WBC: CPT

## 2024-04-14 PROCEDURE — 6360000002 HC RX W HCPCS: Performed by: PSYCHIATRY & NEUROLOGY

## 2024-04-14 PROCEDURE — 76937 US GUIDE VASCULAR ACCESS: CPT

## 2024-04-14 PROCEDURE — 02HV33Z INSERTION OF INFUSION DEVICE INTO SUPERIOR VENA CAVA, PERCUTANEOUS APPROACH: ICD-10-PCS | Performed by: SURGERY

## 2024-04-14 PROCEDURE — 83735 ASSAY OF MAGNESIUM: CPT

## 2024-04-14 PROCEDURE — 36569 INSJ PICC 5 YR+ W/O IMAGING: CPT

## 2024-04-14 PROCEDURE — 84100 ASSAY OF PHOSPHORUS: CPT

## 2024-04-14 PROCEDURE — C9254 INJECTION, LACOSAMIDE: HCPCS | Performed by: PSYCHIATRY & NEUROLOGY

## 2024-04-14 PROCEDURE — 6360000002 HC RX W HCPCS: Performed by: SURGERY

## 2024-04-14 PROCEDURE — 94003 VENT MGMT INPAT SUBQ DAY: CPT

## 2024-04-14 PROCEDURE — 6370000000 HC RX 637 (ALT 250 FOR IP): Performed by: STUDENT IN AN ORGANIZED HEALTH CARE EDUCATION/TRAINING PROGRAM

## 2024-04-14 PROCEDURE — C1751 CATH, INF, PER/CENT/MIDLINE: HCPCS

## 2024-04-14 PROCEDURE — 6360000002 HC RX W HCPCS

## 2024-04-14 PROCEDURE — C9113 INJ PANTOPRAZOLE SODIUM, VIA: HCPCS | Performed by: SURGERY

## 2024-04-14 PROCEDURE — 2580000003 HC RX 258: Performed by: PHYSICIAN ASSISTANT

## 2024-04-14 PROCEDURE — 36592 COLLECT BLOOD FROM PICC: CPT

## 2024-04-14 PROCEDURE — 99233 SBSQ HOSP IP/OBS HIGH 50: CPT | Performed by: PHYSICIAN ASSISTANT

## 2024-04-14 PROCEDURE — 82330 ASSAY OF CALCIUM: CPT

## 2024-04-14 PROCEDURE — 80185 ASSAY OF PHENYTOIN TOTAL: CPT

## 2024-04-14 PROCEDURE — 72148 MRI LUMBAR SPINE W/O DYE: CPT

## 2024-04-14 RX ORDER — HEPARIN 100 UNIT/ML
3 SYRINGE INTRAVENOUS PRN
Status: DISCONTINUED | OUTPATIENT
Start: 2024-04-14 | End: 2024-04-24

## 2024-04-14 RX ORDER — SODIUM CHLORIDE 0.9 % (FLUSH) 0.9 %
5-40 SYRINGE (ML) INJECTION EVERY 12 HOURS SCHEDULED
Status: DISCONTINUED | OUTPATIENT
Start: 2024-04-14 | End: 2024-04-24

## 2024-04-14 RX ORDER — HEPARIN 100 UNIT/ML
3 SYRINGE INTRAVENOUS EVERY 12 HOURS SCHEDULED
Status: DISCONTINUED | OUTPATIENT
Start: 2024-04-14 | End: 2024-04-24

## 2024-04-14 RX ORDER — SODIUM CHLORIDE 9 MG/ML
INJECTION, SOLUTION INTRAVENOUS PRN
Status: DISCONTINUED | OUTPATIENT
Start: 2024-04-14 | End: 2024-04-24

## 2024-04-14 RX ORDER — SODIUM CHLORIDE 0.9 % (FLUSH) 0.9 %
5-40 SYRINGE (ML) INJECTION PRN
Status: DISCONTINUED | OUTPATIENT
Start: 2024-04-14 | End: 2024-04-24

## 2024-04-14 RX ORDER — MINERAL OIL AND WHITE PETROLATUM 150; 830 MG/G; MG/G
OINTMENT OPHTHALMIC EVERY 4 HOURS
Status: DISCONTINUED | OUTPATIENT
Start: 2024-04-14 | End: 2024-04-23

## 2024-04-14 RX ORDER — LIDOCAINE HYDROCHLORIDE 10 MG/ML
5 INJECTION, SOLUTION EPIDURAL; INFILTRATION; INTRACAUDAL; PERINEURAL ONCE
Status: DISCONTINUED | OUTPATIENT
Start: 2024-04-14 | End: 2024-04-19

## 2024-04-14 RX ADMIN — LACOSAMIDE 200 MG: 10 INJECTION INTRAVENOUS at 08:03

## 2024-04-14 RX ADMIN — ENOXAPARIN SODIUM 30 MG: 100 INJECTION SUBCUTANEOUS at 20:30

## 2024-04-14 RX ADMIN — HYDRALAZINE HYDROCHLORIDE 10 MG: 20 INJECTION, SOLUTION INTRAMUSCULAR; INTRAVENOUS at 20:03

## 2024-04-14 RX ADMIN — Medication 500 MG: at 17:10

## 2024-04-14 RX ADMIN — AMPICILLIN SODIUM AND SULBACTAM SODIUM 3000 MG: 2; 1 INJECTION, POWDER, FOR SOLUTION INTRAMUSCULAR; INTRAVENOUS at 13:47

## 2024-04-14 RX ADMIN — POLYVINYL ALCOHOL, POVIDONE 1 DROP: 14; 6 SOLUTION/ DROPS OPHTHALMIC at 11:58

## 2024-04-14 RX ADMIN — MINERAL OIL, WHITE PETROLATUM: .03; .94 OINTMENT OPHTHALMIC at 19:51

## 2024-04-14 RX ADMIN — FOSPHENYTOIN SODIUM 100 MG PE: 50 INJECTION, SOLUTION INTRAMUSCULAR; INTRAVENOUS at 09:42

## 2024-04-14 RX ADMIN — Medication 6 MG/HR: at 03:17

## 2024-04-14 RX ADMIN — Medication 500 MG: at 11:58

## 2024-04-14 RX ADMIN — SODIUM CHLORIDE, PRESERVATIVE FREE 10 ML: 5 INJECTION INTRAVENOUS at 08:04

## 2024-04-14 RX ADMIN — FOSPHENYTOIN SODIUM 100 MG PE: 50 INJECTION, SOLUTION INTRAMUSCULAR; INTRAVENOUS at 18:13

## 2024-04-14 RX ADMIN — HYDROCORTISONE SODIUM SUCCINATE 100 MG: 100 INJECTION, POWDER, FOR SOLUTION INTRAMUSCULAR; INTRAVENOUS at 23:23

## 2024-04-14 RX ADMIN — ENOXAPARIN SODIUM 30 MG: 100 INJECTION SUBCUTANEOUS at 08:04

## 2024-04-14 RX ADMIN — FINASTERIDE 5 MG: 5 TABLET, FILM COATED ORAL at 08:04

## 2024-04-14 RX ADMIN — CALCIUM GLUCONATE 2000 MG: 98 INJECTION, SOLUTION INTRAVENOUS at 08:14

## 2024-04-14 RX ADMIN — SODIUM CHLORIDE: 9 INJECTION, SOLUTION INTRAVENOUS at 05:44

## 2024-04-14 RX ADMIN — AMPICILLIN SODIUM AND SULBACTAM SODIUM 3000 MG: 2; 1 INJECTION, POWDER, FOR SOLUTION INTRAMUSCULAR; INTRAVENOUS at 19:54

## 2024-04-14 RX ADMIN — MUPIROCIN: 20 OINTMENT TOPICAL at 08:05

## 2024-04-14 RX ADMIN — SODIUM CHLORIDE: 9 INJECTION, SOLUTION INTRAVENOUS at 19:46

## 2024-04-14 RX ADMIN — CHLORHEXIDINE GLUCONATE 15 ML: 1.2 RINSE ORAL at 19:50

## 2024-04-14 RX ADMIN — AMPICILLIN SODIUM AND SULBACTAM SODIUM 3000 MG: 2; 1 INJECTION, POWDER, FOR SOLUTION INTRAMUSCULAR; INTRAVENOUS at 08:58

## 2024-04-14 RX ADMIN — POLYVINYL ALCOHOL, POVIDONE 1 DROP: 14; 6 SOLUTION/ DROPS OPHTHALMIC at 17:10

## 2024-04-14 RX ADMIN — LEVETIRACETAM 2000 MG: 100 INJECTION, SOLUTION INTRAVENOUS at 08:05

## 2024-04-14 RX ADMIN — SODIUM CHLORIDE, PRESERVATIVE FREE 40 MG: 5 INJECTION INTRAVENOUS at 08:04

## 2024-04-14 RX ADMIN — THIAMINE HYDROCHLORIDE 100 MG: 100 INJECTION, SOLUTION INTRAMUSCULAR; INTRAVENOUS at 08:04

## 2024-04-14 RX ADMIN — FOSPHENYTOIN SODIUM 100 MG PE: 50 INJECTION, SOLUTION INTRAMUSCULAR; INTRAVENOUS at 03:16

## 2024-04-14 RX ADMIN — HYDROCORTISONE SODIUM SUCCINATE 100 MG: 100 INJECTION, POWDER, FOR SOLUTION INTRAMUSCULAR; INTRAVENOUS at 17:10

## 2024-04-14 RX ADMIN — HYDROCORTISONE SODIUM SUCCINATE 100 MG: 100 INJECTION, POWDER, FOR SOLUTION INTRAMUSCULAR; INTRAVENOUS at 00:46

## 2024-04-14 RX ADMIN — Medication 500 MG: at 19:54

## 2024-04-14 RX ADMIN — MUPIROCIN: 20 OINTMENT TOPICAL at 19:51

## 2024-04-14 RX ADMIN — SODIUM CHLORIDE, PRESERVATIVE FREE 10 ML: 5 INJECTION INTRAVENOUS at 19:51

## 2024-04-14 RX ADMIN — Medication 500 MG: at 08:04

## 2024-04-14 RX ADMIN — HYDROCORTISONE SODIUM SUCCINATE 100 MG: 100 INJECTION, POWDER, FOR SOLUTION INTRAMUSCULAR; INTRAVENOUS at 08:04

## 2024-04-14 RX ADMIN — LACOSAMIDE 200 MG: 10 INJECTION INTRAVENOUS at 21:09

## 2024-04-14 RX ADMIN — CHLORHEXIDINE GLUCONATE 15 ML: 1.2 RINSE ORAL at 08:05

## 2024-04-14 RX ADMIN — GADOTERIDOL 15 ML: 279.3 INJECTION, SOLUTION INTRAVENOUS at 14:37

## 2024-04-14 RX ADMIN — AMPICILLIN SODIUM AND SULBACTAM SODIUM 3000 MG: 2; 1 INJECTION, POWDER, FOR SOLUTION INTRAMUSCULAR; INTRAVENOUS at 02:36

## 2024-04-14 RX ADMIN — MINERAL OIL, WHITE PETROLATUM: .03; .94 OINTMENT OPHTHALMIC at 23:20

## 2024-04-14 RX ADMIN — LEVETIRACETAM 2000 MG: 100 INJECTION, SOLUTION INTRAVENOUS at 20:30

## 2024-04-14 RX ADMIN — FOLIC ACID 1 MG: 5 INJECTION, SOLUTION INTRAMUSCULAR; INTRAVENOUS; SUBCUTANEOUS at 08:04

## 2024-04-14 ASSESSMENT — PULMONARY FUNCTION TESTS
PIF_VALUE: 22
PIF_VALUE: 21
PIF_VALUE: 25
PIF_VALUE: 23
PIF_VALUE: 22
PIF_VALUE: 21
PIF_VALUE: 23
PIF_VALUE: 24
PIF_VALUE: 25
PIF_VALUE: 22
PIF_VALUE: 8
PIF_VALUE: 22
PIF_VALUE: 22
PIF_VALUE: 21
PIF_VALUE: 26
PIF_VALUE: 22
PIF_VALUE: 23
PIF_VALUE: 23
PIF_VALUE: 33
PIF_VALUE: 20
PIF_VALUE: 21
PIF_VALUE: 20
PIF_VALUE: 21
PIF_VALUE: 26
PIF_VALUE: 20

## 2024-04-14 ASSESSMENT — PAIN SCALES - GENERAL
PAINLEVEL_OUTOF10: 0

## 2024-04-15 ENCOUNTER — APPOINTMENT (OUTPATIENT)
Dept: GENERAL RADIOLOGY | Age: 79
DRG: 064 | End: 2024-04-15
Payer: COMMERCIAL

## 2024-04-15 ENCOUNTER — APPOINTMENT (OUTPATIENT)
Dept: NEUROLOGY | Age: 79
DRG: 064 | End: 2024-04-15
Payer: COMMERCIAL

## 2024-04-15 PROBLEM — R13.12 OROPHARYNGEAL DYSPHAGIA: Status: ACTIVE | Noted: 2024-04-15

## 2024-04-15 PROBLEM — I63.413 CEREBROVASCULAR ACCIDENT (CVA) DUE TO BILATERAL EMBOLISM OF MIDDLE CEREBRAL ARTERIES (HCC): Status: ACTIVE | Noted: 2024-04-15

## 2024-04-15 PROBLEM — J96.01 ACUTE RESPIRATORY FAILURE WITH HYPOXIA (HCC): Status: ACTIVE | Noted: 2024-04-15

## 2024-04-15 LAB
AADO2: 160.4 MMHG
ALBUMIN SERPL-MCNC: 2.7 G/DL (ref 3.5–5.2)
ALP SERPL-CCNC: 113 U/L (ref 40–129)
ALT SERPL-CCNC: 41 U/L (ref 0–40)
ANION GAP SERPL CALCULATED.3IONS-SCNC: 11 MMOL/L (ref 7–16)
AST SERPL-CCNC: 33 U/L (ref 0–39)
B.E.: 1.1 MMOL/L (ref -3–3)
BASOPHILS # BLD: 0.03 K/UL (ref 0–0.2)
BASOPHILS NFR BLD: 0 % (ref 0–2)
BILIRUB SERPL-MCNC: 0.5 MG/DL (ref 0–1.2)
BUN SERPL-MCNC: 21 MG/DL (ref 6–23)
CA-I BLD-SCNC: 1.21 MMOL/L (ref 1.15–1.33)
CALCIUM SERPL-MCNC: 8.2 MG/DL (ref 8.6–10.2)
CHLORIDE SERPL-SCNC: 110 MMOL/L (ref 98–107)
CO2 SERPL-SCNC: 22 MMOL/L (ref 22–29)
COHB: 0.3 % (ref 0–1.5)
CREAT SERPL-MCNC: 0.5 MG/DL (ref 0.7–1.2)
CRITICAL: ABNORMAL
DATE ANALYZED: ABNORMAL
DATE LAST DOSE: NORMAL
DATE OF COLLECTION: ABNORMAL
EKG ATRIAL RATE: 326 BPM
EKG Q-T INTERVAL: 406 MS
EKG QRS DURATION: 74 MS
EKG QTC CALCULATION (BAZETT): 351 MS
EKG R AXIS: 16 DEGREES
EKG T AXIS: 21 DEGREES
EKG VENTRICULAR RATE: 45 BPM
EOSINOPHIL # BLD: 0 K/UL (ref 0.05–0.5)
EOSINOPHILS RELATIVE PERCENT: 0 % (ref 0–6)
ERYTHROCYTE [DISTWIDTH] IN BLOOD BY AUTOMATED COUNT: 15.7 % (ref 11.5–15)
FIO2: 40 %
GFR SERPL CREATININE-BSD FRML MDRD: >90 ML/MIN/1.73M2
GLUCOSE SERPL-MCNC: 180 MG/DL (ref 74–99)
HCO3: 24 MMOL/L (ref 22–26)
HCT VFR BLD AUTO: 37.2 % (ref 37–54)
HGB BLD-MCNC: 11.8 G/DL (ref 12.5–16.5)
HHB: 4.6 % (ref 0–5)
IMM GRANULOCYTES # BLD AUTO: 0.09 K/UL (ref 0–0.58)
IMM GRANULOCYTES NFR BLD: 1 % (ref 0–5)
LAB: ABNORMAL
LYMPHOCYTES NFR BLD: 0.62 K/UL (ref 1.5–4)
LYMPHOCYTES RELATIVE PERCENT: 5 % (ref 20–42)
Lab: 418
MAGNESIUM SERPL-MCNC: 1.9 MG/DL (ref 1.6–2.6)
MCH RBC QN AUTO: 29.1 PG (ref 26–35)
MCHC RBC AUTO-ENTMCNC: 31.7 G/DL (ref 32–34.5)
MCV RBC AUTO: 91.9 FL (ref 80–99.9)
METHB: 0.1 % (ref 0–1.5)
MICROORGANISM SPEC CULT: ABNORMAL
MICROORGANISM/AGENT SPEC: ABNORMAL
MODE: AC
MONOCYTES NFR BLD: 0.47 K/UL (ref 0.1–0.95)
MONOCYTES NFR BLD: 4 % (ref 2–12)
NEUTROPHILS NFR BLD: 90 % (ref 43–80)
NEUTS SEG NFR BLD: 10.66 K/UL (ref 1.8–7.3)
O2 SATURATION: 95.4 % (ref 92–98.5)
O2HB: 95 % (ref 94–97)
OPERATOR ID: 8219
PATIENT TEMP: 37 C
PCO2: 33 MMHG (ref 35–45)
PEEP/CPAP: 8 CMH2O
PFO2: 1.92 MMHG/%
PH BLOOD GAS: 7.48 (ref 7.35–7.45)
PHENYTOIN DOSE: NORMAL MG
PHENYTOIN SERPL-MCNC: 11.7 UG/ML (ref 10–20)
PHOSPHATE SERPL-MCNC: 3.4 MG/DL (ref 2.5–4.5)
PLATELET # BLD AUTO: 242 K/UL (ref 130–450)
PMV BLD AUTO: 11.9 FL (ref 7–12)
PO2: 76.9 MMHG (ref 75–100)
POTASSIUM SERPL-SCNC: 4.1 MMOL/L (ref 3.5–5)
PROT SERPL-MCNC: 5.9 G/DL (ref 6.4–8.3)
RBC # BLD AUTO: 4.05 M/UL (ref 3.8–5.8)
RI(T): 2.09
RR MECHANICAL: 18 B/MIN
SODIUM SERPL-SCNC: 143 MMOL/L (ref 132–146)
SOURCE, BLOOD GAS: ABNORMAL
SPECIMEN DESCRIPTION: ABNORMAL
THB: 13.2 G/DL (ref 11.5–16.5)
TIME ANALYZED: 421
TME LAST DOSE: NORMAL H
VT MECHANICAL: 450 ML
WBC OTHER # BLD: 11.9 K/UL (ref 4.5–11.5)

## 2024-04-15 PROCEDURE — 2580000003 HC RX 258

## 2024-04-15 PROCEDURE — 93010 ELECTROCARDIOGRAM REPORT: CPT | Performed by: INTERNAL MEDICINE

## 2024-04-15 PROCEDURE — APPSS60 APP SPLIT SHARED TIME 46-60 MINUTES

## 2024-04-15 PROCEDURE — 6370000000 HC RX 637 (ALT 250 FOR IP): Performed by: STUDENT IN AN ORGANIZED HEALTH CARE EDUCATION/TRAINING PROGRAM

## 2024-04-15 PROCEDURE — 99291 CRITICAL CARE FIRST HOUR: CPT | Performed by: INTERNAL MEDICINE

## 2024-04-15 PROCEDURE — 85025 COMPLETE CBC W/AUTO DIFF WBC: CPT

## 2024-04-15 PROCEDURE — 99291 CRITICAL CARE FIRST HOUR: CPT | Performed by: SURGERY

## 2024-04-15 PROCEDURE — 6360000002 HC RX W HCPCS

## 2024-04-15 PROCEDURE — 6370000000 HC RX 637 (ALT 250 FOR IP)

## 2024-04-15 PROCEDURE — 99233 SBSQ HOSP IP/OBS HIGH 50: CPT | Performed by: CLINICAL NURSE SPECIALIST

## 2024-04-15 PROCEDURE — 80053 COMPREHEN METABOLIC PANEL: CPT

## 2024-04-15 PROCEDURE — 2580000003 HC RX 258: Performed by: PHYSICIAN ASSISTANT

## 2024-04-15 PROCEDURE — 2580000003 HC RX 258: Performed by: PSYCHIATRY & NEUROLOGY

## 2024-04-15 PROCEDURE — 83735 ASSAY OF MAGNESIUM: CPT

## 2024-04-15 PROCEDURE — 2580000003 HC RX 258: Performed by: SURGERY

## 2024-04-15 PROCEDURE — 2000000000 HC ICU R&B

## 2024-04-15 PROCEDURE — 2500000003 HC RX 250 WO HCPCS

## 2024-04-15 PROCEDURE — 6360000002 HC RX W HCPCS: Performed by: PHYSICIAN ASSISTANT

## 2024-04-15 PROCEDURE — 94003 VENT MGMT INPAT SUBQ DAY: CPT

## 2024-04-15 PROCEDURE — 93005 ELECTROCARDIOGRAM TRACING: CPT | Performed by: STUDENT IN AN ORGANIZED HEALTH CARE EDUCATION/TRAINING PROGRAM

## 2024-04-15 PROCEDURE — A4216 STERILE WATER/SALINE, 10 ML: HCPCS | Performed by: SURGERY

## 2024-04-15 PROCEDURE — C9113 INJ PANTOPRAZOLE SODIUM, VIA: HCPCS | Performed by: SURGERY

## 2024-04-15 PROCEDURE — 6360000002 HC RX W HCPCS: Performed by: PSYCHIATRY & NEUROLOGY

## 2024-04-15 PROCEDURE — C9254 INJECTION, LACOSAMIDE: HCPCS | Performed by: PSYCHIATRY & NEUROLOGY

## 2024-04-15 PROCEDURE — 80185 ASSAY OF PHENYTOIN TOTAL: CPT

## 2024-04-15 PROCEDURE — 71045 X-RAY EXAM CHEST 1 VIEW: CPT

## 2024-04-15 PROCEDURE — 36592 COLLECT BLOOD FROM PICC: CPT

## 2024-04-15 PROCEDURE — 82330 ASSAY OF CALCIUM: CPT

## 2024-04-15 PROCEDURE — 82805 BLOOD GASES W/O2 SATURATION: CPT

## 2024-04-15 PROCEDURE — 6360000002 HC RX W HCPCS: Performed by: SURGERY

## 2024-04-15 PROCEDURE — 84100 ASSAY OF PHOSPHORUS: CPT

## 2024-04-15 PROCEDURE — 6360000002 HC RX W HCPCS: Performed by: STUDENT IN AN ORGANIZED HEALTH CARE EDUCATION/TRAINING PROGRAM

## 2024-04-15 PROCEDURE — 95822 EEG COMA OR SLEEP ONLY: CPT

## 2024-04-15 PROCEDURE — 95822 EEG COMA OR SLEEP ONLY: CPT | Performed by: PSYCHIATRY & NEUROLOGY

## 2024-04-15 PROCEDURE — 2580000003 HC RX 258: Performed by: STUDENT IN AN ORGANIZED HEALTH CARE EDUCATION/TRAINING PROGRAM

## 2024-04-15 RX ORDER — LEVETIRACETAM 100 MG/ML
2000 SOLUTION ORAL 2 TIMES DAILY
Status: DISCONTINUED | OUTPATIENT
Start: 2024-04-15 | End: 2024-04-24

## 2024-04-15 RX ORDER — LOSARTAN POTASSIUM 25 MG/1
50 TABLET ORAL EVERY MORNING
Status: DISCONTINUED | OUTPATIENT
Start: 2024-04-16 | End: 2024-04-24

## 2024-04-15 RX ORDER — FOLIC ACID 1 MG/1
1 TABLET ORAL DAILY
Status: DISCONTINUED | OUTPATIENT
Start: 2024-04-16 | End: 2024-04-24

## 2024-04-15 RX ORDER — LANOLIN ALCOHOL/MO/W.PET/CERES
100 CREAM (GRAM) TOPICAL DAILY
Status: DISCONTINUED | OUTPATIENT
Start: 2024-04-16 | End: 2024-04-24

## 2024-04-15 RX ORDER — LEVETIRACETAM 500 MG/1
2000 TABLET ORAL 2 TIMES DAILY
Status: DISCONTINUED | OUTPATIENT
Start: 2024-04-15 | End: 2024-04-15

## 2024-04-15 RX ORDER — ASPIRIN 81 MG/1
81 TABLET, CHEWABLE ORAL DAILY
Status: DISCONTINUED | OUTPATIENT
Start: 2024-04-15 | End: 2024-04-24

## 2024-04-15 RX ORDER — ATORVASTATIN CALCIUM 40 MG/1
40 TABLET, FILM COATED ORAL NIGHTLY
Status: DISCONTINUED | OUTPATIENT
Start: 2024-04-15 | End: 2024-04-24

## 2024-04-15 RX ORDER — LANSOPRAZOLE 30 MG/1
30 TABLET, ORALLY DISINTEGRATING, DELAYED RELEASE ORAL 2 TIMES DAILY
Status: DISCONTINUED | OUTPATIENT
Start: 2024-04-15 | End: 2024-04-24

## 2024-04-15 RX ADMIN — HYDRALAZINE HYDROCHLORIDE 10 MG: 20 INJECTION, SOLUTION INTRAMUSCULAR; INTRAVENOUS at 05:28

## 2024-04-15 RX ADMIN — AMPICILLIN SODIUM AND SULBACTAM SODIUM 3000 MG: 2; 1 INJECTION, POWDER, FOR SOLUTION INTRAMUSCULAR; INTRAVENOUS at 14:23

## 2024-04-15 RX ADMIN — Medication 2 MG/HR: at 14:05

## 2024-04-15 RX ADMIN — Medication 500 MG: at 13:35

## 2024-04-15 RX ADMIN — FOSPHENYTOIN SODIUM 100 MG PE: 50 INJECTION, SOLUTION INTRAMUSCULAR; INTRAVENOUS at 17:25

## 2024-04-15 RX ADMIN — AMPICILLIN SODIUM AND SULBACTAM SODIUM 3000 MG: 2; 1 INJECTION, POWDER, FOR SOLUTION INTRAMUSCULAR; INTRAVENOUS at 01:39

## 2024-04-15 RX ADMIN — ATORVASTATIN CALCIUM 40 MG: 40 TABLET, FILM COATED ORAL at 20:12

## 2024-04-15 RX ADMIN — HYDROCORTISONE SODIUM SUCCINATE 100 MG: 100 INJECTION, POWDER, FOR SOLUTION INTRAMUSCULAR; INTRAVENOUS at 08:12

## 2024-04-15 RX ADMIN — LACOSAMIDE 200 MG: 10 INJECTION INTRAVENOUS at 20:55

## 2024-04-15 RX ADMIN — MINERAL OIL, WHITE PETROLATUM: .03; .94 OINTMENT OPHTHALMIC at 15:56

## 2024-04-15 RX ADMIN — AMPICILLIN SODIUM AND SULBACTAM SODIUM 3000 MG: 2; 1 INJECTION, POWDER, FOR SOLUTION INTRAMUSCULAR; INTRAVENOUS at 08:38

## 2024-04-15 RX ADMIN — LEVETIRACETAM 2000 MG: 100 SOLUTION ORAL at 21:08

## 2024-04-15 RX ADMIN — LACOSAMIDE 200 MG: 10 INJECTION INTRAVENOUS at 08:11

## 2024-04-15 RX ADMIN — THIAMINE HYDROCHLORIDE 100 MG: 100 INJECTION, SOLUTION INTRAMUSCULAR; INTRAVENOUS at 08:12

## 2024-04-15 RX ADMIN — VANCOMYCIN HYDROCHLORIDE 1750 MG: 10 INJECTION, POWDER, LYOPHILIZED, FOR SOLUTION INTRAVENOUS at 11:41

## 2024-04-15 RX ADMIN — ENOXAPARIN SODIUM 30 MG: 100 INJECTION SUBCUTANEOUS at 20:12

## 2024-04-15 RX ADMIN — ENOXAPARIN SODIUM 30 MG: 100 INJECTION SUBCUTANEOUS at 08:11

## 2024-04-15 RX ADMIN — FINASTERIDE 5 MG: 5 TABLET, FILM COATED ORAL at 08:11

## 2024-04-15 RX ADMIN — Medication 500 MG: at 15:56

## 2024-04-15 RX ADMIN — CHLORHEXIDINE GLUCONATE 15 ML: 1.2 RINSE ORAL at 08:12

## 2024-04-15 RX ADMIN — FOLIC ACID 1 MG: 5 INJECTION, SOLUTION INTRAMUSCULAR; INTRAVENOUS; SUBCUTANEOUS at 08:15

## 2024-04-15 RX ADMIN — MINERAL OIL, WHITE PETROLATUM: .03; .94 OINTMENT OPHTHALMIC at 13:31

## 2024-04-15 RX ADMIN — Medication 500 MG: at 08:12

## 2024-04-15 RX ADMIN — MUPIROCIN: 20 OINTMENT TOPICAL at 08:13

## 2024-04-15 RX ADMIN — MINERAL OIL, WHITE PETROLATUM: .03; .94 OINTMENT OPHTHALMIC at 20:11

## 2024-04-15 RX ADMIN — SODIUM CHLORIDE: 9 INJECTION, SOLUTION INTRAVENOUS at 07:16

## 2024-04-15 RX ADMIN — CHLORHEXIDINE GLUCONATE 15 ML: 1.2 RINSE ORAL at 20:12

## 2024-04-15 RX ADMIN — POLYVINYL ALCOHOL, POVIDONE 1 DROP: 14; 6 SOLUTION/ DROPS OPHTHALMIC at 05:26

## 2024-04-15 RX ADMIN — MINERAL OIL, WHITE PETROLATUM: .03; .94 OINTMENT OPHTHALMIC at 08:13

## 2024-04-15 RX ADMIN — FOSPHENYTOIN SODIUM 100 MG PE: 50 INJECTION, SOLUTION INTRAMUSCULAR; INTRAVENOUS at 02:23

## 2024-04-15 RX ADMIN — LANSOPRAZOLE 30 MG: 30 TABLET, ORALLY DISINTEGRATING ORAL at 20:12

## 2024-04-15 RX ADMIN — MUPIROCIN: 20 OINTMENT TOPICAL at 20:14

## 2024-04-15 RX ADMIN — ASPIRIN 81 MG: 81 TABLET, CHEWABLE ORAL at 17:22

## 2024-04-15 RX ADMIN — SODIUM CHLORIDE, PRESERVATIVE FREE 40 MG: 5 INJECTION INTRAVENOUS at 08:12

## 2024-04-15 RX ADMIN — AMPICILLIN SODIUM AND SULBACTAM SODIUM 3000 MG: 2; 1 INJECTION, POWDER, FOR SOLUTION INTRAMUSCULAR; INTRAVENOUS at 20:19

## 2024-04-15 RX ADMIN — SODIUM CHLORIDE, PRESERVATIVE FREE 10 ML: 5 INJECTION INTRAVENOUS at 20:15

## 2024-04-15 RX ADMIN — SENNOSIDES 8.6 MG: 8.6 TABLET, FILM COATED ORAL at 21:09

## 2024-04-15 RX ADMIN — LEVETIRACETAM 2000 MG: 100 INJECTION, SOLUTION INTRAVENOUS at 08:12

## 2024-04-15 RX ADMIN — HYDROCORTISONE SODIUM SUCCINATE 100 MG: 100 INJECTION, POWDER, FOR SOLUTION INTRAMUSCULAR; INTRAVENOUS at 15:56

## 2024-04-15 RX ADMIN — FOSPHENYTOIN SODIUM 100 MG PE: 50 INJECTION, SOLUTION INTRAMUSCULAR; INTRAVENOUS at 09:37

## 2024-04-15 RX ADMIN — SODIUM CHLORIDE, PRESERVATIVE FREE 10 ML: 5 INJECTION INTRAVENOUS at 08:13

## 2024-04-15 ASSESSMENT — PULMONARY FUNCTION TESTS
PIF_VALUE: 29
PIF_VALUE: 26
PIF_VALUE: 27
PIF_VALUE: 28
PIF_VALUE: 30
PIF_VALUE: 21
PIF_VALUE: 27
PIF_VALUE: 26
PIF_VALUE: 24
PIF_VALUE: 26
PIF_VALUE: 25
PIF_VALUE: 21
PIF_VALUE: 26
PIF_VALUE: 22
PIF_VALUE: 23
PIF_VALUE: 22
PIF_VALUE: 28

## 2024-04-15 ASSESSMENT — PAIN SCALES - GENERAL
PAINLEVEL_OUTOF10: 0

## 2024-04-15 NOTE — CARE COORDINATION
4/15 Care Coordination: Pt remains in SICU. Acute CVA in BL occipital lobes. Pt remains intubated and sedated.  respiratory cx for MRSA and e coli, on IV unasyn and Vanco. With Current status unclear on discharge needs.  [facility] door to [facility].CM/SW will continue to follow for discharge planning.   Ranjit BELL,RN-CV-BC  [phone]

## 2024-04-15 NOTE — ACP (ADVANCE CARE PLANNING)
Advance Care Planning   Healthcare Decision Maker:    Primary Decision Maker: Liz Donaldson - Child - [phone]    Secondary Decision Maker: Preston Bess  - Child - [phone]    Click here to complete Healthcare Decision Makers including selection of the Healthcare Decision Maker Relationship (ie \"Primary\").

## 2024-04-15 NOTE — PROCEDURES
EEG report    This 78-year-old man, on vancomycin, ampicillin/sulbactam, thiamine, hydrocortisone, fosphenytoin, finasteride, amlodipine, lacosamide, pantoprazole, midazolam, labetalol, tamsulosin, losartan and oxybutynin, displayed the following underlying rhythms---minimal underlying 5 µV 2 to 3 Hz delta rhythms in both anterior posterior regions.  Every 3 to 4 seconds, these rhythms were interrupted with 70 µV, generalized, isolated spike and slow wave discharges, at times with more right preponderance.  These were not associated clinical seizure accompaniments.    Alpha and beta rhythms were not present throughout the tracing.  There were no other other epileptiform discharges or focal abnormalities.    Impression---abnormal portable comatose EEG with a burst suppression pattern    Comments----burst suppression pattern reflects diffuse destructive lesions versus medications' effect.  Clinical correlation was highly advised.

## 2024-04-16 ENCOUNTER — APPOINTMENT (OUTPATIENT)
Dept: GENERAL RADIOLOGY | Age: 79
DRG: 064 | End: 2024-04-16
Payer: COMMERCIAL

## 2024-04-16 ENCOUNTER — APPOINTMENT (OUTPATIENT)
Age: 79
DRG: 064 | End: 2024-04-16
Payer: COMMERCIAL

## 2024-04-16 ENCOUNTER — APPOINTMENT (OUTPATIENT)
Dept: NEUROLOGY | Age: 79
DRG: 064 | End: 2024-04-16
Payer: COMMERCIAL

## 2024-04-16 LAB
AADO2: 156.3 MMHG
ALBUMIN SERPL-MCNC: 2.7 G/DL (ref 3.5–5.2)
ALP SERPL-CCNC: 112 U/L (ref 40–129)
ALT SERPL-CCNC: 61 U/L (ref 0–40)
ANION GAP SERPL CALCULATED.3IONS-SCNC: 11 MMOL/L (ref 7–16)
AST SERPL-CCNC: 47 U/L (ref 0–39)
B.E.: 1.5 MMOL/L (ref -3–3)
BASOPHILS # BLD: 0 K/UL (ref 0–0.2)
BASOPHILS NFR BLD: 0 % (ref 0–2)
BILIRUB SERPL-MCNC: 0.4 MG/DL (ref 0–1.2)
BUN SERPL-MCNC: 22 MG/DL (ref 6–23)
CA-I BLD-SCNC: 1.17 MMOL/L (ref 1.15–1.33)
CALCIUM SERPL-MCNC: 8.2 MG/DL (ref 8.6–10.2)
CHLORIDE SERPL-SCNC: 112 MMOL/L (ref 98–107)
CO2 SERPL-SCNC: 24 MMOL/L (ref 22–29)
COHB: 0.5 % (ref 0–1.5)
CREAT SERPL-MCNC: 0.5 MG/DL (ref 0.7–1.2)
CRITICAL: ABNORMAL
DATE ANALYZED: ABNORMAL
DATE LAST DOSE: ABNORMAL
DATE OF COLLECTION: ABNORMAL
ECHO AO ASC DIAM: 3 CM
ECHO AO ASCENDING AORTA INDEX: 1.49 CM/M2
ECHO AV AREA PEAK VELOCITY: 2 CM2
ECHO AV AREA VTI: 2 CM2
ECHO AV AREA/BSA PEAK VELOCITY: 1 CM2/M2
ECHO AV AREA/BSA VTI: 1 CM2/M2
ECHO AV CUSP MM: 1.9 CM
ECHO AV MEAN GRADIENT: 3 MMHG
ECHO AV MEAN VELOCITY: 0.8 M/S
ECHO AV PEAK GRADIENT: 7 MMHG
ECHO AV PEAK VELOCITY: 1.3 M/S
ECHO AV VELOCITY RATIO: 0.69
ECHO AV VTI: 25.3 CM
ECHO BSA: 1.85 M2
ECHO EST RA PRESSURE: 15 MMHG
ECHO LA DIAMETER INDEX: 1.74 CM/M2
ECHO LA DIAMETER: 3.5 CM
ECHO LA VOL A-L A2C: 43 ML (ref 18–58)
ECHO LA VOL A-L A4C: 34 ML (ref 18–58)
ECHO LA VOL MOD A2C: 41 ML (ref 18–58)
ECHO LA VOL MOD A4C: 32 ML (ref 18–58)
ECHO LA VOLUME AREA LENGTH: 38 ML
ECHO LA VOLUME INDEX A-L A2C: 21 ML/M2 (ref 16–34)
ECHO LA VOLUME INDEX A-L A4C: 17 ML/M2 (ref 16–34)
ECHO LA VOLUME INDEX AREA LENGTH: 19 ML/M2 (ref 16–34)
ECHO LA VOLUME INDEX MOD A2C: 20 ML/M2 (ref 16–34)
ECHO LA VOLUME INDEX MOD A4C: 16 ML/M2 (ref 16–34)
ECHO LV FRACTIONAL SHORTENING: 55 % (ref 28–44)
ECHO LV INTERNAL DIMENSION DIASTOLE INDEX: 2.09 CM/M2
ECHO LV INTERNAL DIMENSION DIASTOLIC: 4.2 CM (ref 4.2–5.9)
ECHO LV INTERNAL DIMENSION SYSTOLIC INDEX: 0.95 CM/M2
ECHO LV INTERNAL DIMENSION SYSTOLIC: 1.9 CM
ECHO LV ISOVOLUMETRIC RELAXATION TIME (IVRT): 27.7 MS
ECHO LV ISOVOLUMETRIC RELAXATION TIME (IVRT): 78.4 MS
ECHO LV IVSD: 0.8 CM (ref 0.6–1)
ECHO LV IVSS: 1.1 CM
ECHO LV MASS 2D: 137.2 G (ref 88–224)
ECHO LV MASS INDEX 2D: 68.3 G/M2 (ref 49–115)
ECHO LV POSTERIOR WALL DIASTOLIC: 1.2 CM (ref 0.6–1)
ECHO LV POSTERIOR WALL SYSTOLIC: 1.8 CM
ECHO LV RELATIVE WALL THICKNESS RATIO: 0.57
ECHO LVOT AREA: 3.1 CM2
ECHO LVOT AV VTI INDEX: 0.67
ECHO LVOT DIAM: 2 CM
ECHO LVOT MEAN GRADIENT: 2 MMHG
ECHO LVOT PEAK GRADIENT: 3 MMHG
ECHO LVOT PEAK VELOCITY: 0.9 M/S
ECHO LVOT STROKE VOLUME INDEX: 26.6 ML/M2
ECHO LVOT SV: 53.4 ML
ECHO LVOT VTI: 17 CM
ECHO MV A VELOCITY: 0.44 M/S
ECHO MV AREA PHT: 2.5 CM2
ECHO MV AREA VTI: 1.9 CM2
ECHO MV E DECELERATION TIME (DT): 171.5 MS
ECHO MV E VELOCITY: 1.26 M/S
ECHO MV E/A RATIO: 2.86
ECHO MV LVOT VTI INDEX: 1.67
ECHO MV MAX VELOCITY: 1.3 M/S
ECHO MV MEAN GRADIENT: 3 MMHG
ECHO MV MEAN VELOCITY: 0.7 M/S
ECHO MV PEAK GRADIENT: 7 MMHG
ECHO MV PRESSURE HALF TIME (PHT): 87.6 MS
ECHO MV VTI: 28.4 CM
ECHO PV MAX VELOCITY: 1 M/S
ECHO PV MEAN GRADIENT: 2 MMHG
ECHO PV MEAN VELOCITY: 0.6 M/S
ECHO PV PEAK GRADIENT: 4 MMHG
ECHO PV VTI: 19.6 CM
ECHO PVEIN PEAK D VELOCITY: 0.3 M/S
ECHO PVEIN PEAK S VELOCITY: 0.7 M/S
ECHO PVEIN S/D RATIO: 2.3
ECHO RIGHT VENTRICULAR SYSTOLIC PRESSURE (RVSP): 43 MMHG
ECHO RV INTERNAL DIMENSION: 4.4 CM
ECHO RVOT MEAN GRADIENT: 1 MMHG
ECHO RVOT PEAK GRADIENT: 1 MMHG
ECHO RVOT PEAK VELOCITY: 0.6 M/S
ECHO RVOT VTI: 12.4 CM
ECHO TV REGURGITANT MAX VELOCITY: 2.63 M/S
ECHO TV REGURGITANT PEAK GRADIENT: 28 MMHG
EOSINOPHIL # BLD: 0 K/UL (ref 0.05–0.5)
EOSINOPHILS RELATIVE PERCENT: 0 % (ref 0–6)
ERYTHROCYTE [DISTWIDTH] IN BLOOD BY AUTOMATED COUNT: 15.8 % (ref 11.5–15)
FIO2: 40 %
GFR SERPL CREATININE-BSD FRML MDRD: >90 ML/MIN/1.73M2
GLUCOSE SERPL-MCNC: 158 MG/DL (ref 74–99)
HCO3: 24.3 MMOL/L (ref 22–26)
HCT VFR BLD AUTO: 38.9 % (ref 37–54)
HGB BLD-MCNC: 12.7 G/DL (ref 12.5–16.5)
HHB: 3.8 % (ref 0–5)
LAB: ABNORMAL
LYMPHOCYTES NFR BLD: 0.37 K/UL (ref 1.5–4)
LYMPHOCYTES RELATIVE PERCENT: 3 % (ref 20–42)
Lab: 430
MAGNESIUM SERPL-MCNC: 1.8 MG/DL (ref 1.6–2.6)
MCH RBC QN AUTO: 29.6 PG (ref 26–35)
MCHC RBC AUTO-ENTMCNC: 32.6 G/DL (ref 32–34.5)
MCV RBC AUTO: 90.7 FL (ref 80–99.9)
METAMYELOCYTES ABSOLUTE COUNT: 0.12 K/UL (ref 0–0.12)
METAMYELOCYTES: 1 % (ref 0–1)
METHB: 0.3 % (ref 0–1.5)
MODE: AC
MONOCYTES NFR BLD: 0.25 K/UL (ref 0.1–0.95)
MONOCYTES NFR BLD: 2 % (ref 2–12)
NEUTROPHILS NFR BLD: 95 % (ref 43–80)
NEUTS SEG NFR BLD: 13.36 K/UL (ref 1.8–7.3)
O2 SATURATION: 96.2 % (ref 92–98.5)
O2HB: 95.4 % (ref 94–97)
OPERATOR ID: 914
PATIENT TEMP: 37 C
PCO2: 33.1 MMHG (ref 35–45)
PEEP/CPAP: 8 CMH2O
PFO2: 2.02 MMHG/%
PH BLOOD GAS: 7.48 (ref 7.35–7.45)
PHENYTOIN DOSE: ABNORMAL MG
PHENYTOIN SERPL-MCNC: 9 UG/ML (ref 10–20)
PHOSPHATE SERPL-MCNC: 3.3 MG/DL (ref 2.5–4.5)
PLATELET # BLD AUTO: 286 K/UL (ref 130–450)
PMV BLD AUTO: 11.2 FL (ref 7–12)
PO2: 80.8 MMHG (ref 75–100)
POTASSIUM SERPL-SCNC: 3.8 MMOL/L (ref 3.5–5)
PROT SERPL-MCNC: 5.8 G/DL (ref 6.4–8.3)
RBC # BLD AUTO: 4.29 M/UL (ref 3.8–5.8)
RBC # BLD: ABNORMAL 10*6/UL
RI(T): 1.93
RR MECHANICAL: 16 B/MIN
SODIUM SERPL-SCNC: 147 MMOL/L (ref 132–146)
SOURCE, BLOOD GAS: ABNORMAL
THB: 13.9 G/DL (ref 11.5–16.5)
TIME ANALYZED: 433
TME LAST DOSE: ABNORMAL H
VT MECHANICAL: 450 ML
WBC OTHER # BLD: 14.1 K/UL (ref 4.5–11.5)

## 2024-04-16 PROCEDURE — 93306 TTE W/DOPPLER COMPLETE: CPT

## 2024-04-16 PROCEDURE — 6370000000 HC RX 637 (ALT 250 FOR IP)

## 2024-04-16 PROCEDURE — 51798 US URINE CAPACITY MEASURE: CPT

## 2024-04-16 PROCEDURE — 99291 CRITICAL CARE FIRST HOUR: CPT | Performed by: INTERNAL MEDICINE

## 2024-04-16 PROCEDURE — 51701 INSERT BLADDER CATHETER: CPT

## 2024-04-16 PROCEDURE — 84100 ASSAY OF PHOSPHORUS: CPT

## 2024-04-16 PROCEDURE — 2000000000 HC ICU R&B

## 2024-04-16 PROCEDURE — C9254 INJECTION, LACOSAMIDE: HCPCS | Performed by: PSYCHIATRY & NEUROLOGY

## 2024-04-16 PROCEDURE — 2500000003 HC RX 250 WO HCPCS: Performed by: STUDENT IN AN ORGANIZED HEALTH CARE EDUCATION/TRAINING PROGRAM

## 2024-04-16 PROCEDURE — 6360000002 HC RX W HCPCS: Performed by: SURGERY

## 2024-04-16 PROCEDURE — 94640 AIRWAY INHALATION TREATMENT: CPT

## 2024-04-16 PROCEDURE — 2580000003 HC RX 258: Performed by: PHYSICIAN ASSISTANT

## 2024-04-16 PROCEDURE — 6360000002 HC RX W HCPCS

## 2024-04-16 PROCEDURE — 2580000003 HC RX 258

## 2024-04-16 PROCEDURE — 6360000002 HC RX W HCPCS: Performed by: STUDENT IN AN ORGANIZED HEALTH CARE EDUCATION/TRAINING PROGRAM

## 2024-04-16 PROCEDURE — 2580000003 HC RX 258: Performed by: PSYCHIATRY & NEUROLOGY

## 2024-04-16 PROCEDURE — 99222 1ST HOSP IP/OBS MODERATE 55: CPT | Performed by: EMERGENCY MEDICINE

## 2024-04-16 PROCEDURE — 2580000003 HC RX 258: Performed by: SURGERY

## 2024-04-16 PROCEDURE — 6360000002 HC RX W HCPCS: Performed by: PHYSICIAN ASSISTANT

## 2024-04-16 PROCEDURE — 82805 BLOOD GASES W/O2 SATURATION: CPT

## 2024-04-16 PROCEDURE — 6370000000 HC RX 637 (ALT 250 FOR IP): Performed by: STUDENT IN AN ORGANIZED HEALTH CARE EDUCATION/TRAINING PROGRAM

## 2024-04-16 PROCEDURE — 95714 VEEG EA 12-26 HR UNMNTR: CPT

## 2024-04-16 PROCEDURE — 94003 VENT MGMT INPAT SUBQ DAY: CPT

## 2024-04-16 PROCEDURE — 99233 SBSQ HOSP IP/OBS HIGH 50: CPT | Performed by: CLINICAL NURSE SPECIALIST

## 2024-04-16 PROCEDURE — 80053 COMPREHEN METABOLIC PANEL: CPT

## 2024-04-16 PROCEDURE — 93306 TTE W/DOPPLER COMPLETE: CPT | Performed by: INTERNAL MEDICINE

## 2024-04-16 PROCEDURE — 2580000003 HC RX 258: Performed by: STUDENT IN AN ORGANIZED HEALTH CARE EDUCATION/TRAINING PROGRAM

## 2024-04-16 PROCEDURE — 71045 X-RAY EXAM CHEST 1 VIEW: CPT

## 2024-04-16 PROCEDURE — 85025 COMPLETE CBC W/AUTO DIFF WBC: CPT

## 2024-04-16 PROCEDURE — 80185 ASSAY OF PHENYTOIN TOTAL: CPT

## 2024-04-16 PROCEDURE — 6370000000 HC RX 637 (ALT 250 FOR IP): Performed by: SURGERY

## 2024-04-16 PROCEDURE — 99291 CRITICAL CARE FIRST HOUR: CPT | Performed by: SURGERY

## 2024-04-16 PROCEDURE — 36592 COLLECT BLOOD FROM PICC: CPT

## 2024-04-16 PROCEDURE — 83735 ASSAY OF MAGNESIUM: CPT

## 2024-04-16 PROCEDURE — 82330 ASSAY OF CALCIUM: CPT

## 2024-04-16 PROCEDURE — 6360000002 HC RX W HCPCS: Performed by: PSYCHIATRY & NEUROLOGY

## 2024-04-16 RX ORDER — MAGNESIUM SULFATE IN WATER 40 MG/ML
2000 INJECTION, SOLUTION INTRAVENOUS ONCE
Status: COMPLETED | OUTPATIENT
Start: 2024-04-16 | End: 2024-04-16

## 2024-04-16 RX ORDER — GUAIFENESIN 200 MG/10ML
200 LIQUID ORAL EVERY 6 HOURS
Status: DISCONTINUED | OUTPATIENT
Start: 2024-04-16 | End: 2024-04-24

## 2024-04-16 RX ORDER — SODIUM CHLORIDE FOR INHALATION 3 %
4 VIAL, NEBULIZER (ML) INHALATION EVERY 4 HOURS
Status: DISCONTINUED | OUTPATIENT
Start: 2024-04-16 | End: 2024-04-19

## 2024-04-16 RX ORDER — MIDAZOLAM HYDROCHLORIDE 1 MG/ML
4 INJECTION, SOLUTION INTRAVENOUS CONTINUOUS
Status: DISCONTINUED | OUTPATIENT
Start: 2024-04-16 | End: 2024-04-16

## 2024-04-16 RX ADMIN — LACOSAMIDE 200 MG: 10 INJECTION INTRAVENOUS at 08:13

## 2024-04-16 RX ADMIN — SODIUM CHLORIDE, PRESERVATIVE FREE 10 ML: 5 INJECTION INTRAVENOUS at 08:19

## 2024-04-16 RX ADMIN — HYDROCORTISONE SODIUM SUCCINATE 50 MG: 100 INJECTION, POWDER, FOR SOLUTION INTRAMUSCULAR; INTRAVENOUS at 00:04

## 2024-04-16 RX ADMIN — FOSPHENYTOIN SODIUM 100 MG PE: 50 INJECTION, SOLUTION INTRAMUSCULAR; INTRAVENOUS at 17:34

## 2024-04-16 RX ADMIN — AMPICILLIN SODIUM AND SULBACTAM SODIUM 3000 MG: 2; 1 INJECTION, POWDER, FOR SOLUTION INTRAMUSCULAR; INTRAVENOUS at 13:22

## 2024-04-16 RX ADMIN — LOSARTAN POTASSIUM 50 MG: 25 TABLET, FILM COATED ORAL at 08:19

## 2024-04-16 RX ADMIN — FOSPHENYTOIN SODIUM 100 MG PE: 50 INJECTION, SOLUTION INTRAMUSCULAR; INTRAVENOUS at 10:05

## 2024-04-16 RX ADMIN — GUAIFENESIN 200 MG: 100 LIQUID ORAL at 08:18

## 2024-04-16 RX ADMIN — CHLORHEXIDINE GLUCONATE 15 ML: 1.2 RINSE ORAL at 20:20

## 2024-04-16 RX ADMIN — MINERAL OIL, WHITE PETROLATUM: .03; .94 OINTMENT OPHTHALMIC at 05:48

## 2024-04-16 RX ADMIN — SODIUM CHLORIDE, PRESERVATIVE FREE 10 ML: 5 INJECTION INTRAVENOUS at 20:23

## 2024-04-16 RX ADMIN — ASPIRIN 81 MG: 81 TABLET, CHEWABLE ORAL at 08:19

## 2024-04-16 RX ADMIN — AMPICILLIN SODIUM AND SULBACTAM SODIUM 3000 MG: 2; 1 INJECTION, POWDER, FOR SOLUTION INTRAMUSCULAR; INTRAVENOUS at 08:49

## 2024-04-16 RX ADMIN — MINERAL OIL, WHITE PETROLATUM: .03; .94 OINTMENT OPHTHALMIC at 08:17

## 2024-04-16 RX ADMIN — ENOXAPARIN SODIUM 30 MG: 100 INJECTION SUBCUTANEOUS at 20:20

## 2024-04-16 RX ADMIN — Medication 4 ML: at 20:37

## 2024-04-16 RX ADMIN — AMPICILLIN SODIUM AND SULBACTAM SODIUM 3000 MG: 2; 1 INJECTION, POWDER, FOR SOLUTION INTRAMUSCULAR; INTRAVENOUS at 01:23

## 2024-04-16 RX ADMIN — Medication 4 ML: at 14:36

## 2024-04-16 RX ADMIN — MAGNESIUM SULFATE HEPTAHYDRATE 2000 MG: 40 INJECTION, SOLUTION INTRAVENOUS at 08:21

## 2024-04-16 RX ADMIN — MUPIROCIN: 20 OINTMENT TOPICAL at 20:22

## 2024-04-16 RX ADMIN — LEVETIRACETAM 2000 MG: 100 SOLUTION ORAL at 20:19

## 2024-04-16 RX ADMIN — SENNOSIDES 8.6 MG: 8.6 TABLET, FILM COATED ORAL at 21:44

## 2024-04-16 RX ADMIN — ENOXAPARIN SODIUM 30 MG: 100 INJECTION SUBCUTANEOUS at 08:18

## 2024-04-16 RX ADMIN — CHLORHEXIDINE GLUCONATE 15 ML: 1.2 RINSE ORAL at 08:19

## 2024-04-16 RX ADMIN — Medication 4 ML: at 11:23

## 2024-04-16 RX ADMIN — HYDROCORTISONE SODIUM SUCCINATE 50 MG: 100 INJECTION, POWDER, FOR SOLUTION INTRAMUSCULAR; INTRAVENOUS at 08:18

## 2024-04-16 RX ADMIN — MINERAL OIL, WHITE PETROLATUM: .03; .94 OINTMENT OPHTHALMIC at 15:36

## 2024-04-16 RX ADMIN — Medication 4 ML: at 09:27

## 2024-04-16 RX ADMIN — HYDRALAZINE HYDROCHLORIDE 10 MG: 20 INJECTION, SOLUTION INTRAMUSCULAR; INTRAVENOUS at 02:00

## 2024-04-16 RX ADMIN — POLYVINYL ALCOHOL, POVIDONE 1 DROP: 14; 6 SOLUTION/ DROPS OPHTHALMIC at 20:20

## 2024-04-16 RX ADMIN — LANSOPRAZOLE 30 MG: 30 TABLET, ORALLY DISINTEGRATING ORAL at 08:18

## 2024-04-16 RX ADMIN — MUPIROCIN: 20 OINTMENT TOPICAL at 08:18

## 2024-04-16 RX ADMIN — MINERAL OIL, WHITE PETROLATUM: .03; .94 OINTMENT OPHTHALMIC at 11:39

## 2024-04-16 RX ADMIN — GUAIFENESIN 200 MG: 100 LIQUID ORAL at 20:19

## 2024-04-16 RX ADMIN — ATORVASTATIN CALCIUM 40 MG: 40 TABLET, FILM COATED ORAL at 20:20

## 2024-04-16 RX ADMIN — LEVETIRACETAM 2000 MG: 100 SOLUTION ORAL at 08:18

## 2024-04-16 RX ADMIN — HYDROCORTISONE SODIUM SUCCINATE 50 MG: 100 INJECTION, POWDER, FOR SOLUTION INTRAMUSCULAR; INTRAVENOUS at 15:36

## 2024-04-16 RX ADMIN — POLYETHYLENE GLYCOL 3350 17 G: 17 POWDER, FOR SOLUTION ORAL at 08:17

## 2024-04-16 RX ADMIN — FOSPHENYTOIN SODIUM 100 MG PE: 50 INJECTION, SOLUTION INTRAMUSCULAR; INTRAVENOUS at 02:25

## 2024-04-16 RX ADMIN — LANSOPRAZOLE 30 MG: 30 TABLET, ORALLY DISINTEGRATING ORAL at 20:22

## 2024-04-16 RX ADMIN — AMPICILLIN SODIUM AND SULBACTAM SODIUM 3000 MG: 2; 1 INJECTION, POWDER, FOR SOLUTION INTRAMUSCULAR; INTRAVENOUS at 21:15

## 2024-04-16 RX ADMIN — LACOSAMIDE 200 MG: 10 INJECTION INTRAVENOUS at 20:16

## 2024-04-16 RX ADMIN — FOLIC ACID 1 MG: 1 TABLET ORAL at 08:19

## 2024-04-16 RX ADMIN — Medication 300 UNITS: at 20:21

## 2024-04-16 RX ADMIN — VANCOMYCIN HYDROCHLORIDE 1250 MG: 10 INJECTION, POWDER, LYOPHILIZED, FOR SOLUTION INTRAVENOUS at 11:36

## 2024-04-16 RX ADMIN — MINERAL OIL, WHITE PETROLATUM: .03; .94 OINTMENT OPHTHALMIC at 00:05

## 2024-04-16 RX ADMIN — HYDRALAZINE HYDROCHLORIDE 10 MG: 20 INJECTION, SOLUTION INTRAMUSCULAR; INTRAVENOUS at 10:33

## 2024-04-16 RX ADMIN — Medication 100 MG: at 08:19

## 2024-04-16 RX ADMIN — FINASTERIDE 5 MG: 5 TABLET, FILM COATED ORAL at 08:19

## 2024-04-16 RX ADMIN — GUAIFENESIN 200 MG: 100 LIQUID ORAL at 14:21

## 2024-04-16 ASSESSMENT — PULMONARY FUNCTION TESTS
PIF_VALUE: 21
PIF_VALUE: 19
PIF_VALUE: 21
PIF_VALUE: 29
PIF_VALUE: 28
PIF_VALUE: 27
PIF_VALUE: 26
PIF_VALUE: 26
PIF_VALUE: 22
PIF_VALUE: 22
PIF_VALUE: 24
PIF_VALUE: 21
PIF_VALUE: 32
PIF_VALUE: 22
PIF_VALUE: 19
PIF_VALUE: 24
PIF_VALUE: 21

## 2024-04-16 ASSESSMENT — PAIN SCALES - GENERAL
PAINLEVEL_OUTOF10: 0
PAINLEVEL_OUTOF10: 0

## 2024-04-16 NOTE — CONSULTS
N/A 7/11/2023    EGD [place]SY [place] [facility]    UPPER GASTROINTESTINAL ENDOSCOPY N/A 7/11/2023    EGD ESOPHAGOGASTRODUODENOSCOPY DILATATION performed by Jimmy Simon [place] [facility]       Prior Cardiac Testing:    Echocardiogram 1/28/2021   Summary   Left ventricular internal dimensions were normal in diastole and systole.  EF 65%.   Borderline concentric left ventricular hypertrophy.   No regional wall motion abnormalities seen.   Normal left ventricular ejection fraction.   Borderline dilated right ventricle.    Medications Prior to admit:  Prior to Admission medications    Medication Sig Start Date End Date Taking? Authorizing Provider   omeprazole (PRILOSEC) 40 MG delayed release capsule TAKE 1 CAPSULE BY MOUTH EVERY MORNING BEFORE BREAKFAST 12/26/23   Unique Braga APRN - CNP   tamsulosin (FLOMAX) 0.4 MG capsule TAKE ONE CAPSULE BY MOUTH EVERY MORNING 12/19/23   Predebon, MD Jemal   losartan (COZAAR) 100 MG tablet TAKE 1 TABLET BY MOUTH EVERY MORNING 12/12/23   Predebon, MD Jemal   glycopyrrolate (ROBINUL) 1 MG tablet TAKE ONE-HALF TABLET BY MOUTH TWICE A DAY 8/17/23   Predebon, MD Jemal   amLODIPine (NORVASC) 5 MG tablet TAKE ONE TABLET BY MOUTH EVERY DAY 6/14/23   Predebon, MD Jemal   oxybutynin (DITROPAN) 5 MG tablet TAKE 1 TABLET BY MOUTH TWO TIMES A DAY 6/14/23   Predebon, MD Jemal   Lifitegrast (XIIDRA) 5 % SOLN Apply 1 drop to eye 2 times daily    Provider, MD Martha       Current Medications:    Current Facility-Administered Medications: [COMPLETED] vancomycin (VANCOCIN) 1,750 mg in sodium chloride 0.9 % 500 mL IVPB, 1,750 mg, IntraVENous, Once **FOLLOWED BY** [START ON 4/16/2024] vancomycin (VANCOCIN) 1,250 mg in sodium chloride 0.9 % 250 mL IVPB, 1,250 mg, IntraVENous, Q24H  lidocaine PF 1 % injection 5 mL, 5 mL, IntraDERmal, Once  sodium chloride flush 0.9 % injection 5-40 mL, 5-40 mL, IntraVENous, 2 times per day  sodium chloride flush 0.9 
1.803 m (5' 11\")   Wt 80.8 kg (178 lb 2.1 oz)   SpO2 98%   BMI 24.84 kg/m²     Gen: Elderly, frail appearing male lying in bed, in no acute distress.  He is intubated/sedated.  HEENT:  Normocephalic.  Conjunctival clear, PERRL.  OGT present.  Neck: Trachea midline.  Lungs: Intubated on mechanical ventilation.  Ventilated breath sounds bilaterally.  No wheezes, rales or rhonchi.    Heart: Irregular, rate 56, appears atrial fibrillation on monitor.  No murmur appreciated on auscultation.  Abd: Bowel sounds present.  Soft, no distention.  No tenderness to palpation.    M/S/Ext: No edema.  Dorsalis pedis pulses are strong and symmetric bilaterally.  Skin: Warm and dry.  No rashes visualized.  Neuro: Intubated/sedated.  No active tremulous or seizure-like activity visualized.    Objective data reviewed: labs, images, records, medication use, vitals, and chart    MRI BRAIN, LUMBAR SPINE 04/14/24    Impression:        MRI BRAIN:    Scattered acute infarcts involving the bilateral posterior cingulate gyri,  right occipital lobe and to lesser extent the left occipital lobe. There is  also restricted diffusion throughout the right hippocampus and amygdala which  may also reflect additional area of ischemia within the posterior  circulation, however underlying encephalitis is not excluded.  No  corresponding abnormal enhancement.  No acute intracranial hemorrhage or  significant mass effect.    MR LUMBAR SPINE:    Multilevel degenerative changes without high-grade canal or neural foraminal  stenosis.    Mild remote compression fracture deformity of L1.  No acute osseous findings  within the lumbar spine.       MDM/Time:  The total encounter time on this service date was 55 minutes, which was spent performing a face-to-face encounter and personally completing the provider-level activities documented in the note.  This includes time spent prior to the visit and after the visit in direct care of the patient.  This time does 
internal carotid, anterior cerebral, or middle cerebral arteries.  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 POSTERIOR CIRCULATION: No significant stenosis of the vertebral, basilar, or posterior cerebral arteries. No aneurysm. OTHER: No dural venous sinus thrombosis on this non-dedicated study.     CT HEAD: 1. No acute intracranial abnormality. 2. Mild-to-moderate cerebral volume loss and chronic microvascular ischemic CTA HEAD: 1. No large vessel occlusion or significant stenosis. 2. 2 mm anterior communicating artery ANEURYSM. CTA NECK: 1. No dissection or significant stenosis of the major arteries of the neck. 2. Ground-glass opacities in the partially visualized right upper lobe, likely infectious/inflammatory.     XR PELVIS (1-2 VIEWS)    Result Date: 4/10/2024  EXAMINATION: ONE XRAY VIEW OF THE PELVIS 4/10/2024 8:14 pm COMPARISON: None. HISTORY: ORDERING SYSTEM PROVIDED HISTORY: trauma TECHNOLOGIST PROVIDED HISTORY: Reason for exam:->trauma FINDINGS: No evidence of pelvic fracture.  Bilateral hips demonstrate normal alignment. No focal osseous lesion.  Old healed left proximal femur fracture status post ORIF noted.  SI joints are symmetric.     No acute abnormality of the pelvis.     XR CHEST PORTABLE    Result Date: 4/10/2024  EXAMINATION: ONE XRAY VIEW OF THE CHEST 4/10/2024 10:14 pm COMPARISON: Chest series from October 18, 2022 HISTORY: ORDERING SYSTEM PROVIDED HISTORY: trauma TECHNOLOGIST PROVIDED HISTORY: Reason for exam:->trauma FINDINGS: Endotracheal tube extends to the midthoracic trachea. Enteric tube extends subdiaphragmatic.  Side port is just below the GE junction. Left costophrenic angle not included in the field of view. Symmetric lung inflation with background coarsened interstitial markings.  No airspace disease, pleural effusion, or pneumothorax.  Atherosclerotic disease in the aortic arch.  Fullness of the bilateral pulmonary priscila.  Normal pulmonary 
arteries. No aneurysm. OTHER: No dural venous sinus thrombosis on this non-dedicated study.     CT HEAD: 1. No acute intracranial abnormality. 2. Mild-to-moderate cerebral volume loss and chronic microvascular ischemic CTA HEAD: 1. No large vessel occlusion or significant stenosis. 2. 2 mm anterior communicating artery ANEURYSM. CTA NECK: 1. No dissection or significant stenosis of the major arteries of the neck. 2. Ground-glass opacities in the partially visualized right upper lobe, likely infectious/inflammatory.     CTA NECK W CONTRAST    Result Date: 4/10/2024  EXAMINATION: CTA OF THE HEAD WITH CONTRAST; CT OF THE HEAD WITHOUT CONTRAST; CTA OF THE NECK 4/10/2024 10:18 pm: TECHNIQUE: CTA of the head/brain was performed with the administration of intravenous contrast. Multiplanar reformatted images are provided for review.  MIP images are provided for review. Automated exposure control, iterative reconstruction, and/or weight based adjustment of the mA/kV was utilized to reduce the radiation dose to as low as reasonably achievable.; CT of the head was performed without the administration of intravenous contrast. Automated exposure control, iterative reconstruction, and/or weight based adjustment of the mA/kV was utilized to reduce the radiation dose to as low as reasonably achievable.; CTA of the neck was performed with the administration of intravenous contrast. Multiplanar reformatted images are provided for review. MIP images are provided for review. Stenosis of the internal carotid arteries measured using NASCET criteria. Automated exposure control, iterative reconstruction, and/or weight based adjustment of the mA/kV was utilized to reduce the radiation dose to as low as reasonably achievable. Noncontrast CT of the head with reconstructed 2-D images are also provided for review. COMPARISON: None. HISTORY: ORDERING SYSTEM PROVIDED HISTORY: AMS, found down TECHNOLOGIST PROVIDED HISTORY: Reason for exam:->AMS,

## 2024-04-17 ENCOUNTER — APPOINTMENT (OUTPATIENT)
Dept: GENERAL RADIOLOGY | Age: 79
DRG: 064 | End: 2024-04-17
Payer: COMMERCIAL

## 2024-04-17 LAB
AADO2: 169 MMHG
ALBUMIN SERPL-MCNC: 2.6 G/DL (ref 3.5–5.2)
ALP SERPL-CCNC: 110 U/L (ref 40–129)
ALT SERPL-CCNC: 80 U/L (ref 0–40)
ANION GAP SERPL CALCULATED.3IONS-SCNC: 9 MMOL/L (ref 7–16)
AST SERPL-CCNC: 53 U/L (ref 0–39)
B.E.: 1.6 MMOL/L (ref -3–3)
BASOPHILS # BLD: 0 K/UL (ref 0–0.2)
BASOPHILS NFR BLD: 0 % (ref 0–2)
BILIRUB SERPL-MCNC: 0.3 MG/DL (ref 0–1.2)
BUN SERPL-MCNC: 22 MG/DL (ref 6–23)
CA-I BLD-SCNC: 1.17 MMOL/L (ref 1.15–1.33)
CALCIUM SERPL-MCNC: 8 MG/DL (ref 8.6–10.2)
CHLORIDE SERPL-SCNC: 112 MMOL/L (ref 98–107)
CO2 SERPL-SCNC: 26 MMOL/L (ref 22–29)
COHB: 0.3 % (ref 0–1.5)
CREAT SERPL-MCNC: 0.5 MG/DL (ref 0.7–1.2)
CRITICAL: ABNORMAL
DATE ANALYZED: ABNORMAL
DATE LAST DOSE: ABNORMAL
DATE LAST DOSE: ABNORMAL
DATE OF COLLECTION: ABNORMAL
EOSINOPHIL # BLD: 0 K/UL (ref 0.05–0.5)
EOSINOPHILS RELATIVE PERCENT: 0 % (ref 0–6)
ERYTHROCYTE [DISTWIDTH] IN BLOOD BY AUTOMATED COUNT: 15.5 % (ref 11.5–15)
FIO2: 40 %
GFR SERPL CREATININE-BSD FRML MDRD: >90 ML/MIN/1.73M2
GLUCOSE SERPL-MCNC: 146 MG/DL (ref 74–99)
HCO3: 25.7 MMOL/L (ref 22–26)
HCT VFR BLD AUTO: 39.9 % (ref 37–54)
HGB BLD-MCNC: 13 G/DL (ref 12.5–16.5)
HHB: 8.8 % (ref 0–5)
LAB: ABNORMAL
LYMPHOCYTES NFR BLD: 0.94 K/UL (ref 1.5–4)
LYMPHOCYTES RELATIVE PERCENT: 9 % (ref 20–42)
Lab: 538
MAGNESIUM SERPL-MCNC: 2.1 MG/DL (ref 1.6–2.6)
MCH RBC QN AUTO: 30.2 PG (ref 26–35)
MCHC RBC AUTO-ENTMCNC: 32.6 G/DL (ref 32–34.5)
MCV RBC AUTO: 92.6 FL (ref 80–99.9)
METHB: 0.3 % (ref 0–1.5)
MODE: AC
MONOCYTES NFR BLD: 0.94 K/UL (ref 0.1–0.95)
MONOCYTES NFR BLD: 9 % (ref 2–12)
MYELOCYTES ABSOLUTE COUNT: 0.28 K/UL
MYELOCYTES: 3 %
NEUTROPHILS NFR BLD: 80 % (ref 43–80)
NEUTS SEG NFR BLD: 8.64 K/UL (ref 1.8–7.3)
O2 SATURATION: 91.1 % (ref 92–98.5)
O2HB: 90.6 % (ref 94–97)
OPERATOR ID: ABNORMAL
PATIENT TEMP: 37 C
PCO2: 38.5 MMHG (ref 35–45)
PEEP/CPAP: 8 CMH2O
PFO2: 1.55 MMHG/%
PH BLOOD GAS: 7.44 (ref 7.35–7.45)
PHENYTOIN DOSE: ABNORMAL MG
PHENYTOIN SERPL-MCNC: 6.6 UG/ML (ref 10–20)
PHOSPHATE SERPL-MCNC: 3.2 MG/DL (ref 2.5–4.5)
PLATELET # BLD AUTO: 296 K/UL (ref 130–450)
PMV BLD AUTO: 11.1 FL (ref 7–12)
PO2: 61.9 MMHG (ref 75–100)
POTASSIUM SERPL-SCNC: 3.9 MMOL/L (ref 3.5–5)
PROT SERPL-MCNC: 5.8 G/DL (ref 6.4–8.3)
RBC # BLD AUTO: 4.31 M/UL (ref 3.8–5.8)
RBC # BLD: ABNORMAL 10*6/UL
RI(T): 2.73
RR MECHANICAL: 16 B/MIN
SODIUM SERPL-SCNC: 147 MMOL/L (ref 132–146)
SOURCE, BLOOD GAS: ABNORMAL
THB: 14.1 G/DL (ref 11.5–16.5)
TIME ANALYZED: 548
TME LAST DOSE: ABNORMAL H
TME LAST DOSE: ABNORMAL H
VANCOMYCIN DOSE: ABNORMAL MG
VANCOMYCIN TROUGH SERPL-MCNC: 4.6 UG/ML (ref 5–16)
VT MECHANICAL: 420 ML
WBC OTHER # BLD: 10.8 K/UL (ref 4.5–11.5)

## 2024-04-17 PROCEDURE — 2580000003 HC RX 258: Performed by: PHYSICIAN ASSISTANT

## 2024-04-17 PROCEDURE — 51798 US URINE CAPACITY MEASURE: CPT

## 2024-04-17 PROCEDURE — 6360000002 HC RX W HCPCS: Performed by: STUDENT IN AN ORGANIZED HEALTH CARE EDUCATION/TRAINING PROGRAM

## 2024-04-17 PROCEDURE — 2580000003 HC RX 258

## 2024-04-17 PROCEDURE — 80185 ASSAY OF PHENYTOIN TOTAL: CPT

## 2024-04-17 PROCEDURE — 84100 ASSAY OF PHOSPHORUS: CPT

## 2024-04-17 PROCEDURE — 6360000002 HC RX W HCPCS: Performed by: PSYCHIATRY & NEUROLOGY

## 2024-04-17 PROCEDURE — 94640 AIRWAY INHALATION TREATMENT: CPT

## 2024-04-17 PROCEDURE — 2500000003 HC RX 250 WO HCPCS: Performed by: STUDENT IN AN ORGANIZED HEALTH CARE EDUCATION/TRAINING PROGRAM

## 2024-04-17 PROCEDURE — 71045 X-RAY EXAM CHEST 1 VIEW: CPT

## 2024-04-17 PROCEDURE — 6370000000 HC RX 637 (ALT 250 FOR IP)

## 2024-04-17 PROCEDURE — 6360000002 HC RX W HCPCS

## 2024-04-17 PROCEDURE — 6370000000 HC RX 637 (ALT 250 FOR IP): Performed by: STUDENT IN AN ORGANIZED HEALTH CARE EDUCATION/TRAINING PROGRAM

## 2024-04-17 PROCEDURE — 6360000002 HC RX W HCPCS: Performed by: CLINICAL NURSE SPECIALIST

## 2024-04-17 PROCEDURE — 6360000002 HC RX W HCPCS: Performed by: SURGERY

## 2024-04-17 PROCEDURE — 99291 CRITICAL CARE FIRST HOUR: CPT | Performed by: SURGERY

## 2024-04-17 PROCEDURE — 80053 COMPREHEN METABOLIC PANEL: CPT

## 2024-04-17 PROCEDURE — 6370000000 HC RX 637 (ALT 250 FOR IP): Performed by: SURGERY

## 2024-04-17 PROCEDURE — 80202 ASSAY OF VANCOMYCIN: CPT

## 2024-04-17 PROCEDURE — 6360000002 HC RX W HCPCS: Performed by: PHYSICIAN ASSISTANT

## 2024-04-17 PROCEDURE — 99232 SBSQ HOSP IP/OBS MODERATE 35: CPT

## 2024-04-17 PROCEDURE — 2580000003 HC RX 258: Performed by: STUDENT IN AN ORGANIZED HEALTH CARE EDUCATION/TRAINING PROGRAM

## 2024-04-17 PROCEDURE — 94003 VENT MGMT INPAT SUBQ DAY: CPT

## 2024-04-17 PROCEDURE — 82805 BLOOD GASES W/O2 SATURATION: CPT

## 2024-04-17 PROCEDURE — C9254 INJECTION, LACOSAMIDE: HCPCS | Performed by: PSYCHIATRY & NEUROLOGY

## 2024-04-17 PROCEDURE — 85025 COMPLETE CBC W/AUTO DIFF WBC: CPT

## 2024-04-17 PROCEDURE — 82330 ASSAY OF CALCIUM: CPT

## 2024-04-17 PROCEDURE — 2580000003 HC RX 258: Performed by: SURGERY

## 2024-04-17 PROCEDURE — 2580000003 HC RX 258: Performed by: PSYCHIATRY & NEUROLOGY

## 2024-04-17 PROCEDURE — 2000000000 HC ICU R&B

## 2024-04-17 PROCEDURE — 36592 COLLECT BLOOD FROM PICC: CPT

## 2024-04-17 PROCEDURE — 51701 INSERT BLADDER CATHETER: CPT

## 2024-04-17 PROCEDURE — 2580000003 HC RX 258: Performed by: CLINICAL NURSE SPECIALIST

## 2024-04-17 PROCEDURE — 83735 ASSAY OF MAGNESIUM: CPT

## 2024-04-17 PROCEDURE — 99233 SBSQ HOSP IP/OBS HIGH 50: CPT | Performed by: CLINICAL NURSE SPECIALIST

## 2024-04-17 RX ORDER — ENOXAPARIN SODIUM 100 MG/ML
1 INJECTION SUBCUTANEOUS 2 TIMES DAILY
Status: DISCONTINUED | OUTPATIENT
Start: 2024-04-17 | End: 2024-04-24

## 2024-04-17 RX ADMIN — SODIUM CHLORIDE, PRESERVATIVE FREE 10 ML: 5 INJECTION INTRAVENOUS at 20:24

## 2024-04-17 RX ADMIN — HYDRALAZINE HYDROCHLORIDE 10 MG: 20 INJECTION, SOLUTION INTRAMUSCULAR; INTRAVENOUS at 07:00

## 2024-04-17 RX ADMIN — POLYVINYL ALCOHOL, POVIDONE 1 DROP: 14; 6 SOLUTION/ DROPS OPHTHALMIC at 04:22

## 2024-04-17 RX ADMIN — POLYVINYL ALCOHOL, POVIDONE 1 DROP: 14; 6 SOLUTION/ DROPS OPHTHALMIC at 00:23

## 2024-04-17 RX ADMIN — LANSOPRAZOLE 30 MG: 30 TABLET, ORALLY DISINTEGRATING ORAL at 20:27

## 2024-04-17 RX ADMIN — LEVETIRACETAM 2000 MG: 100 SOLUTION ORAL at 20:30

## 2024-04-17 RX ADMIN — MIDAZOLAM HYDROCHLORIDE 4 MG/HR: 5 INJECTION, SOLUTION INTRAMUSCULAR; INTRAVENOUS at 01:24

## 2024-04-17 RX ADMIN — Medication 4 ML: at 19:32

## 2024-04-17 RX ADMIN — FINASTERIDE 5 MG: 5 TABLET, FILM COATED ORAL at 07:50

## 2024-04-17 RX ADMIN — HYDRALAZINE HYDROCHLORIDE 10 MG: 20 INJECTION, SOLUTION INTRAMUSCULAR; INTRAVENOUS at 14:05

## 2024-04-17 RX ADMIN — POLYVINYL ALCOHOL, POVIDONE 1 DROP: 14; 6 SOLUTION/ DROPS OPHTHALMIC at 07:40

## 2024-04-17 RX ADMIN — POLYETHYLENE GLYCOL 3350 17 G: 17 POWDER, FOR SOLUTION ORAL at 07:51

## 2024-04-17 RX ADMIN — LANSOPRAZOLE 30 MG: 30 TABLET, ORALLY DISINTEGRATING ORAL at 07:54

## 2024-04-17 RX ADMIN — LOSARTAN POTASSIUM 50 MG: 25 TABLET, FILM COATED ORAL at 07:52

## 2024-04-17 RX ADMIN — GUAIFENESIN 200 MG: 100 LIQUID ORAL at 14:08

## 2024-04-17 RX ADMIN — HYDRALAZINE HYDROCHLORIDE 10 MG: 20 INJECTION, SOLUTION INTRAMUSCULAR; INTRAVENOUS at 20:39

## 2024-04-17 RX ADMIN — POLYVINYL ALCOHOL, POVIDONE 1 DROP: 14; 6 SOLUTION/ DROPS OPHTHALMIC at 23:45

## 2024-04-17 RX ADMIN — Medication 300 UNITS: at 20:30

## 2024-04-17 RX ADMIN — HYDROCORTISONE SODIUM SUCCINATE 25 MG: 100 INJECTION, POWDER, FOR SOLUTION INTRAMUSCULAR; INTRAVENOUS at 23:45

## 2024-04-17 RX ADMIN — MUPIROCIN: 20 OINTMENT TOPICAL at 20:26

## 2024-04-17 RX ADMIN — Medication 4 ML: at 07:31

## 2024-04-17 RX ADMIN — Medication 4 ML: at 00:09

## 2024-04-17 RX ADMIN — ATORVASTATIN CALCIUM 40 MG: 40 TABLET, FILM COATED ORAL at 20:24

## 2024-04-17 RX ADMIN — FOSPHENYTOIN SODIUM 100 MG PE: 50 INJECTION, SOLUTION INTRAMUSCULAR; INTRAVENOUS at 18:01

## 2024-04-17 RX ADMIN — LACOSAMIDE 200 MG: 10 INJECTION INTRAVENOUS at 20:36

## 2024-04-17 RX ADMIN — ASPIRIN 81 MG: 81 TABLET, CHEWABLE ORAL at 07:50

## 2024-04-17 RX ADMIN — Medication 4 ML: at 05:46

## 2024-04-17 RX ADMIN — ENOXAPARIN SODIUM 80 MG: 100 INJECTION SUBCUTANEOUS at 20:25

## 2024-04-17 RX ADMIN — HYDROCORTISONE SODIUM SUCCINATE 50 MG: 100 INJECTION, POWDER, FOR SOLUTION INTRAMUSCULAR; INTRAVENOUS at 00:23

## 2024-04-17 RX ADMIN — Medication 4 ML: at 23:37

## 2024-04-17 RX ADMIN — POLYVINYL ALCOHOL, POVIDONE 1 DROP: 14; 6 SOLUTION/ DROPS OPHTHALMIC at 20:26

## 2024-04-17 RX ADMIN — CHLORHEXIDINE GLUCONATE 15 ML: 1.2 RINSE ORAL at 20:26

## 2024-04-17 RX ADMIN — Medication 100 MG: at 07:50

## 2024-04-17 RX ADMIN — Medication 4 ML: at 16:00

## 2024-04-17 RX ADMIN — GUAIFENESIN 200 MG: 100 LIQUID ORAL at 07:50

## 2024-04-17 RX ADMIN — HYDRALAZINE HYDROCHLORIDE 10 MG: 20 INJECTION, SOLUTION INTRAMUSCULAR; INTRAVENOUS at 15:28

## 2024-04-17 RX ADMIN — HYDROCORTISONE SODIUM SUCCINATE 50 MG: 100 INJECTION, POWDER, FOR SOLUTION INTRAMUSCULAR; INTRAVENOUS at 12:17

## 2024-04-17 RX ADMIN — AMPICILLIN SODIUM AND SULBACTAM SODIUM 3000 MG: 2; 1 INJECTION, POWDER, FOR SOLUTION INTRAMUSCULAR; INTRAVENOUS at 14:09

## 2024-04-17 RX ADMIN — POLYVINYL ALCOHOL, POVIDONE 1 DROP: 14; 6 SOLUTION/ DROPS OPHTHALMIC at 15:22

## 2024-04-17 RX ADMIN — GUAIFENESIN 200 MG: 100 LIQUID ORAL at 01:39

## 2024-04-17 RX ADMIN — MUPIROCIN: 20 OINTMENT TOPICAL at 07:41

## 2024-04-17 RX ADMIN — AMPICILLIN SODIUM AND SULBACTAM SODIUM 3000 MG: 2; 1 INJECTION, POWDER, FOR SOLUTION INTRAMUSCULAR; INTRAVENOUS at 20:24

## 2024-04-17 RX ADMIN — Medication 4 ML: at 12:01

## 2024-04-17 RX ADMIN — ENOXAPARIN SODIUM 30 MG: 100 INJECTION SUBCUTANEOUS at 07:50

## 2024-04-17 RX ADMIN — VANCOMYCIN HYDROCHLORIDE 1750 MG: 10 INJECTION, POWDER, LYOPHILIZED, FOR SOLUTION INTRAVENOUS at 15:33

## 2024-04-17 RX ADMIN — LEVETIRACETAM 2000 MG: 100 SOLUTION ORAL at 10:22

## 2024-04-17 RX ADMIN — CHLORHEXIDINE GLUCONATE 15 ML: 1.2 RINSE ORAL at 07:40

## 2024-04-17 RX ADMIN — LACOSAMIDE 200 MG: 10 INJECTION INTRAVENOUS at 08:37

## 2024-04-17 RX ADMIN — POLYVINYL ALCOHOL, POVIDONE 1 DROP: 14; 6 SOLUTION/ DROPS OPHTHALMIC at 12:17

## 2024-04-17 RX ADMIN — SENNOSIDES 8.6 MG: 8.6 TABLET, FILM COATED ORAL at 20:24

## 2024-04-17 RX ADMIN — FOSPHENYTOIN SODIUM 100 MG PE: 50 INJECTION, SOLUTION INTRAMUSCULAR; INTRAVENOUS at 01:39

## 2024-04-17 RX ADMIN — GUAIFENESIN 200 MG: 100 LIQUID ORAL at 20:24

## 2024-04-17 RX ADMIN — AMPICILLIN SODIUM AND SULBACTAM SODIUM 3000 MG: 2; 1 INJECTION, POWDER, FOR SOLUTION INTRAMUSCULAR; INTRAVENOUS at 07:56

## 2024-04-17 RX ADMIN — SODIUM CHLORIDE, PRESERVATIVE FREE 10 ML: 5 INJECTION INTRAVENOUS at 07:41

## 2024-04-17 RX ADMIN — FOLIC ACID 1 MG: 1 TABLET ORAL at 07:50

## 2024-04-17 RX ADMIN — HYDRALAZINE HYDROCHLORIDE 10 MG: 20 INJECTION, SOLUTION INTRAMUSCULAR; INTRAVENOUS at 04:00

## 2024-04-17 RX ADMIN — FOSPHENYTOIN SODIUM 100 MG PE: 50 INJECTION, SOLUTION INTRAMUSCULAR; INTRAVENOUS at 10:26

## 2024-04-17 RX ADMIN — AMPICILLIN SODIUM AND SULBACTAM SODIUM 3000 MG: 2; 1 INJECTION, POWDER, FOR SOLUTION INTRAMUSCULAR; INTRAVENOUS at 02:28

## 2024-04-17 ASSESSMENT — PULMONARY FUNCTION TESTS
PIF_VALUE: 21
PIF_VALUE: 23
PIF_VALUE: 26
PIF_VALUE: 23
PIF_VALUE: 23
PIF_VALUE: 21
PIF_VALUE: 24
PIF_VALUE: 23
PIF_VALUE: 24
PIF_VALUE: 28
PIF_VALUE: 31
PIF_VALUE: 22
PIF_VALUE: 20
PIF_VALUE: 23
PIF_VALUE: 28
PIF_VALUE: 26
PIF_VALUE: 26
PIF_VALUE: 22
PIF_VALUE: 23
PIF_VALUE: 25
PIF_VALUE: 24
PIF_VALUE: 20
PIF_VALUE: 20
PIF_VALUE: 21
PIF_VALUE: 25
PIF_VALUE: 29
PIF_VALUE: 20
PIF_VALUE: 28

## 2024-04-17 ASSESSMENT — PAIN SCALES - GENERAL
PAINLEVEL_OUTOF10: 0

## 2024-04-17 NOTE — CARE COORDINATION
4/17 Care Coordination:Pt remains in SICU, S/P fall down stairs at his home. Patient was found to have seizure-like activity, Intubated in ED, Remains unresponsive, NS, Neuro and Cardiology consults. Remains on IV Versed drip. Palliative Care was consulted,pt remains full code for now per his daughter.With Current status unclear on discharge needs.  [facility] door to [facility].    CM/SW will continue to follow for discharge planning.   Ranjit GRIFFITHSN,RN--BC  [phone]

## 2024-04-18 ENCOUNTER — APPOINTMENT (OUTPATIENT)
Dept: GENERAL RADIOLOGY | Age: 79
DRG: 064 | End: 2024-04-18
Payer: COMMERCIAL

## 2024-04-18 LAB
AADO2: 155.6 MMHG
ALBUMIN SERPL-MCNC: 2.6 G/DL (ref 3.5–5.2)
ALP SERPL-CCNC: 104 U/L (ref 40–129)
ALT SERPL-CCNC: 65 U/L (ref 0–40)
ANION GAP SERPL CALCULATED.3IONS-SCNC: 13 MMOL/L (ref 7–16)
AST SERPL-CCNC: 32 U/L (ref 0–39)
B.E.: 3.8 MMOL/L (ref -3–3)
BASOPHILS # BLD: 0 K/UL (ref 0–0.2)
BASOPHILS NFR BLD: 0 % (ref 0–2)
BILIRUB SERPL-MCNC: 0.4 MG/DL (ref 0–1.2)
BUN SERPL-MCNC: 18 MG/DL (ref 6–23)
CA-I BLD-SCNC: 1.15 MMOL/L (ref 1.15–1.33)
CALCIUM SERPL-MCNC: 8 MG/DL (ref 8.6–10.2)
CHLORIDE SERPL-SCNC: 109 MMOL/L (ref 98–107)
CO2 SERPL-SCNC: 23 MMOL/L (ref 22–29)
COHB: 0.1 % (ref 0–1.5)
CREAT SERPL-MCNC: 0.5 MG/DL (ref 0.7–1.2)
CRITICAL: ABNORMAL
DATE ANALYZED: ABNORMAL
DATE LAST DOSE: ABNORMAL
DATE OF COLLECTION: ABNORMAL
EOSINOPHIL # BLD: 0 K/UL (ref 0.05–0.5)
EOSINOPHILS RELATIVE PERCENT: 0 % (ref 0–6)
ERYTHROCYTE [DISTWIDTH] IN BLOOD BY AUTOMATED COUNT: 15.4 % (ref 11.5–15)
FIO2: 40 %
GFR SERPL CREATININE-BSD FRML MDRD: >90 ML/MIN/1.73M2
GLUCOSE SERPL-MCNC: 103 MG/DL (ref 74–99)
HCO3: 27 MMOL/L (ref 22–26)
HCT VFR BLD AUTO: 38.7 % (ref 37–54)
HGB BLD-MCNC: 12.2 G/DL (ref 12.5–16.5)
HHB: 4.6 % (ref 0–5)
LAB: ABNORMAL
LYMPHOCYTES NFR BLD: 0.54 K/UL (ref 1.5–4)
LYMPHOCYTES RELATIVE PERCENT: 4 % (ref 20–42)
Lab: 520
MAGNESIUM SERPL-MCNC: 1.9 MG/DL (ref 1.6–2.6)
MCH RBC QN AUTO: 28.8 PG (ref 26–35)
MCHC RBC AUTO-ENTMCNC: 31.5 G/DL (ref 32–34.5)
MCV RBC AUTO: 91.5 FL (ref 80–99.9)
METAMYELOCYTES ABSOLUTE COUNT: 0.11 K/UL (ref 0–0.12)
METAMYELOCYTES: 1 % (ref 0–1)
METHB: 0.3 % (ref 0–1.5)
MODE: AC
MONOCYTES NFR BLD: 0.54 K/UL (ref 0.1–0.95)
MONOCYTES NFR BLD: 4 % (ref 2–12)
MYELOCYTES ABSOLUTE COUNT: 0.32 K/UL
MYELOCYTES: 3 %
NEUTROPHILS NFR BLD: 88 % (ref 43–80)
NEUTS SEG NFR BLD: 10.89 K/UL (ref 1.8–7.3)
O2 SATURATION: 95.4 % (ref 92–98.5)
O2HB: 95 % (ref 94–97)
OPERATOR ID: ABNORMAL
PATIENT TEMP: 37 C
PCO2: 36 MMHG (ref 35–45)
PEEP/CPAP: 8 CMH2O
PFO2: 1.95 MMHG/%
PH BLOOD GAS: 7.49 (ref 7.35–7.45)
PHENYTOIN DOSE: ABNORMAL MG
PHENYTOIN SERPL-MCNC: 5.9 UG/ML (ref 10–20)
PHOSPHATE SERPL-MCNC: 3.2 MG/DL (ref 2.5–4.5)
PLATELET # BLD AUTO: 303 K/UL (ref 130–450)
PMV BLD AUTO: 11.1 FL (ref 7–12)
PO2: 78.2 MMHG (ref 75–100)
POTASSIUM SERPL-SCNC: 3.5 MMOL/L (ref 3.5–5)
PROT SERPL-MCNC: 5.4 G/DL (ref 6.4–8.3)
RBC # BLD AUTO: 4.23 M/UL (ref 3.8–5.8)
RBC # BLD: ABNORMAL 10*6/UL
RI(T): 1.99
RR MECHANICAL: 16 B/MIN
SODIUM SERPL-SCNC: 145 MMOL/L (ref 132–146)
SOURCE, BLOOD GAS: ABNORMAL
THB: 13.7 G/DL (ref 11.5–16.5)
TIME ANALYZED: 522
TME LAST DOSE: ABNORMAL H
VT MECHANICAL: 420 ML
WBC OTHER # BLD: 12.4 K/UL (ref 4.5–11.5)

## 2024-04-18 PROCEDURE — 99232 SBSQ HOSP IP/OBS MODERATE 35: CPT | Performed by: EMERGENCY MEDICINE

## 2024-04-18 PROCEDURE — 99291 CRITICAL CARE FIRST HOUR: CPT | Performed by: SURGERY

## 2024-04-18 PROCEDURE — 6370000000 HC RX 637 (ALT 250 FOR IP)

## 2024-04-18 PROCEDURE — 6370000000 HC RX 637 (ALT 250 FOR IP): Performed by: STUDENT IN AN ORGANIZED HEALTH CARE EDUCATION/TRAINING PROGRAM

## 2024-04-18 PROCEDURE — 82805 BLOOD GASES W/O2 SATURATION: CPT

## 2024-04-18 PROCEDURE — 6360000002 HC RX W HCPCS: Performed by: SURGERY

## 2024-04-18 PROCEDURE — 2580000003 HC RX 258

## 2024-04-18 PROCEDURE — 2580000003 HC RX 258: Performed by: PHYSICIAN ASSISTANT

## 2024-04-18 PROCEDURE — 6360000002 HC RX W HCPCS: Performed by: STUDENT IN AN ORGANIZED HEALTH CARE EDUCATION/TRAINING PROGRAM

## 2024-04-18 PROCEDURE — 85025 COMPLETE CBC W/AUTO DIFF WBC: CPT

## 2024-04-18 PROCEDURE — 6360000002 HC RX W HCPCS: Performed by: PSYCHIATRY & NEUROLOGY

## 2024-04-18 PROCEDURE — C9254 INJECTION, LACOSAMIDE: HCPCS | Performed by: PSYCHIATRY & NEUROLOGY

## 2024-04-18 PROCEDURE — 82330 ASSAY OF CALCIUM: CPT

## 2024-04-18 PROCEDURE — 2000000000 HC ICU R&B

## 2024-04-18 PROCEDURE — 6360000002 HC RX W HCPCS: Performed by: PHYSICIAN ASSISTANT

## 2024-04-18 PROCEDURE — 2580000003 HC RX 258: Performed by: STUDENT IN AN ORGANIZED HEALTH CARE EDUCATION/TRAINING PROGRAM

## 2024-04-18 PROCEDURE — 84100 ASSAY OF PHOSPHORUS: CPT

## 2024-04-18 PROCEDURE — 80185 ASSAY OF PHENYTOIN TOTAL: CPT

## 2024-04-18 PROCEDURE — 94640 AIRWAY INHALATION TREATMENT: CPT

## 2024-04-18 PROCEDURE — 99233 SBSQ HOSP IP/OBS HIGH 50: CPT | Performed by: CLINICAL NURSE SPECIALIST

## 2024-04-18 PROCEDURE — 2580000003 HC RX 258: Performed by: SURGERY

## 2024-04-18 PROCEDURE — 2580000003 HC RX 258: Performed by: PSYCHIATRY & NEUROLOGY

## 2024-04-18 PROCEDURE — 80053 COMPREHEN METABOLIC PANEL: CPT

## 2024-04-18 PROCEDURE — 36592 COLLECT BLOOD FROM PICC: CPT

## 2024-04-18 PROCEDURE — 94003 VENT MGMT INPAT SUBQ DAY: CPT

## 2024-04-18 PROCEDURE — 6370000000 HC RX 637 (ALT 250 FOR IP): Performed by: SURGERY

## 2024-04-18 PROCEDURE — 83735 ASSAY OF MAGNESIUM: CPT

## 2024-04-18 PROCEDURE — 2500000003 HC RX 250 WO HCPCS: Performed by: STUDENT IN AN ORGANIZED HEALTH CARE EDUCATION/TRAINING PROGRAM

## 2024-04-18 PROCEDURE — 6360000002 HC RX W HCPCS

## 2024-04-18 PROCEDURE — 71045 X-RAY EXAM CHEST 1 VIEW: CPT

## 2024-04-18 RX ORDER — FUROSEMIDE 10 MG/ML
20 INJECTION INTRAMUSCULAR; INTRAVENOUS ONCE
Status: COMPLETED | OUTPATIENT
Start: 2024-04-18 | End: 2024-04-18

## 2024-04-18 RX ADMIN — Medication 4 ML: at 16:00

## 2024-04-18 RX ADMIN — ENOXAPARIN SODIUM 80 MG: 100 INJECTION SUBCUTANEOUS at 20:31

## 2024-04-18 RX ADMIN — FUROSEMIDE 20 MG: 10 INJECTION, SOLUTION INTRAMUSCULAR; INTRAVENOUS at 16:26

## 2024-04-18 RX ADMIN — Medication 4 ML: at 08:20

## 2024-04-18 RX ADMIN — Medication 4 ML: at 11:22

## 2024-04-18 RX ADMIN — HYDROCORTISONE SODIUM SUCCINATE 25 MG: 100 INJECTION, POWDER, FOR SOLUTION INTRAMUSCULAR; INTRAVENOUS at 11:27

## 2024-04-18 RX ADMIN — LANSOPRAZOLE 30 MG: 30 TABLET, ORALLY DISINTEGRATING ORAL at 20:31

## 2024-04-18 RX ADMIN — LOSARTAN POTASSIUM 50 MG: 25 TABLET, FILM COATED ORAL at 07:54

## 2024-04-18 RX ADMIN — LACOSAMIDE 200 MG: 10 INJECTION INTRAVENOUS at 20:37

## 2024-04-18 RX ADMIN — POLYVINYL ALCOHOL, POVIDONE 1 DROP: 14; 6 SOLUTION/ DROPS OPHTHALMIC at 07:54

## 2024-04-18 RX ADMIN — AMPICILLIN SODIUM AND SULBACTAM SODIUM 3000 MG: 2; 1 INJECTION, POWDER, FOR SOLUTION INTRAMUSCULAR; INTRAVENOUS at 08:01

## 2024-04-18 RX ADMIN — Medication 4 ML: at 04:16

## 2024-04-18 RX ADMIN — AMPICILLIN SODIUM AND SULBACTAM SODIUM 3000 MG: 2; 1 INJECTION, POWDER, FOR SOLUTION INTRAMUSCULAR; INTRAVENOUS at 14:04

## 2024-04-18 RX ADMIN — GUAIFENESIN 200 MG: 100 LIQUID ORAL at 07:54

## 2024-04-18 RX ADMIN — POLYVINYL ALCOHOL, POVIDONE 1 DROP: 14; 6 SOLUTION/ DROPS OPHTHALMIC at 11:26

## 2024-04-18 RX ADMIN — VANCOMYCIN HYDROCHLORIDE 1250 MG: 10 INJECTION, POWDER, LYOPHILIZED, FOR SOLUTION INTRAVENOUS at 16:31

## 2024-04-18 RX ADMIN — LABETALOL HYDROCHLORIDE 10 MG: 5 INJECTION, SOLUTION INTRAVENOUS at 01:09

## 2024-04-18 RX ADMIN — CHLORHEXIDINE GLUCONATE 15 ML: 1.2 RINSE ORAL at 20:33

## 2024-04-18 RX ADMIN — HYDRALAZINE HYDROCHLORIDE 10 MG: 20 INJECTION, SOLUTION INTRAMUSCULAR; INTRAVENOUS at 02:04

## 2024-04-18 RX ADMIN — GUAIFENESIN 200 MG: 100 LIQUID ORAL at 02:07

## 2024-04-18 RX ADMIN — AMPICILLIN SODIUM AND SULBACTAM SODIUM 3000 MG: 2; 1 INJECTION, POWDER, FOR SOLUTION INTRAMUSCULAR; INTRAVENOUS at 02:07

## 2024-04-18 RX ADMIN — LEVETIRACETAM 2000 MG: 100 SOLUTION ORAL at 09:07

## 2024-04-18 RX ADMIN — ASPIRIN 81 MG: 81 TABLET, CHEWABLE ORAL at 07:54

## 2024-04-18 RX ADMIN — FOLIC ACID 1 MG: 1 TABLET ORAL at 07:54

## 2024-04-18 RX ADMIN — LACOSAMIDE 200 MG: 10 INJECTION INTRAVENOUS at 09:08

## 2024-04-18 RX ADMIN — MUPIROCIN: 20 OINTMENT TOPICAL at 07:54

## 2024-04-18 RX ADMIN — SODIUM CHLORIDE, PRESERVATIVE FREE 10 ML: 5 INJECTION INTRAVENOUS at 07:55

## 2024-04-18 RX ADMIN — MUPIROCIN: 20 OINTMENT TOPICAL at 20:32

## 2024-04-18 RX ADMIN — VANCOMYCIN HYDROCHLORIDE 1250 MG: 10 INJECTION, POWDER, LYOPHILIZED, FOR SOLUTION INTRAVENOUS at 05:05

## 2024-04-18 RX ADMIN — POLYETHYLENE GLYCOL 3350 17 G: 17 POWDER, FOR SOLUTION ORAL at 07:54

## 2024-04-18 RX ADMIN — SENNOSIDES 8.6 MG: 8.6 TABLET, FILM COATED ORAL at 20:31

## 2024-04-18 RX ADMIN — FOSPHENYTOIN SODIUM 100 MG PE: 50 INJECTION, SOLUTION INTRAMUSCULAR; INTRAVENOUS at 02:05

## 2024-04-18 RX ADMIN — ENOXAPARIN SODIUM 80 MG: 100 INJECTION SUBCUTANEOUS at 09:07

## 2024-04-18 RX ADMIN — ATORVASTATIN CALCIUM 40 MG: 40 TABLET, FILM COATED ORAL at 20:31

## 2024-04-18 RX ADMIN — FOSPHENYTOIN SODIUM 100 MG PE: 50 INJECTION, SOLUTION INTRAMUSCULAR; INTRAVENOUS at 09:47

## 2024-04-18 RX ADMIN — Medication 4 ML: at 19:49

## 2024-04-18 RX ADMIN — GUAIFENESIN 200 MG: 100 LIQUID ORAL at 20:31

## 2024-04-18 RX ADMIN — SODIUM CHLORIDE, PRESERVATIVE FREE 10 ML: 5 INJECTION INTRAVENOUS at 20:32

## 2024-04-18 RX ADMIN — FINASTERIDE 5 MG: 5 TABLET, FILM COATED ORAL at 07:54

## 2024-04-18 RX ADMIN — POLYVINYL ALCOHOL, POVIDONE 1 DROP: 14; 6 SOLUTION/ DROPS OPHTHALMIC at 20:32

## 2024-04-18 RX ADMIN — Medication 100 MG: at 07:54

## 2024-04-18 RX ADMIN — MINERAL OIL, WHITE PETROLATUM: .03; .94 OINTMENT OPHTHALMIC at 16:27

## 2024-04-18 RX ADMIN — LEVETIRACETAM 2000 MG: 100 SOLUTION ORAL at 20:31

## 2024-04-18 RX ADMIN — FOSPHENYTOIN SODIUM 100 MG PE: 50 INJECTION, SOLUTION INTRAMUSCULAR; INTRAVENOUS at 17:44

## 2024-04-18 RX ADMIN — AMPICILLIN SODIUM AND SULBACTAM SODIUM 3000 MG: 2; 1 INJECTION, POWDER, FOR SOLUTION INTRAMUSCULAR; INTRAVENOUS at 20:30

## 2024-04-18 RX ADMIN — LANSOPRAZOLE 30 MG: 30 TABLET, ORALLY DISINTEGRATING ORAL at 07:59

## 2024-04-18 RX ADMIN — POLYVINYL ALCOHOL, POVIDONE 1 DROP: 14; 6 SOLUTION/ DROPS OPHTHALMIC at 03:53

## 2024-04-18 RX ADMIN — GUAIFENESIN 200 MG: 100 LIQUID ORAL at 14:03

## 2024-04-18 RX ADMIN — CHLORHEXIDINE GLUCONATE 15 ML: 1.2 RINSE ORAL at 07:55

## 2024-04-18 ASSESSMENT — PULMONARY FUNCTION TESTS
PIF_VALUE: 18
PIF_VALUE: 18
PIF_VALUE: 19
PIF_VALUE: 27
PIF_VALUE: 25
PIF_VALUE: 23
PIF_VALUE: 19
PIF_VALUE: 23
PIF_VALUE: 19
PIF_VALUE: 18
PIF_VALUE: 19
PIF_VALUE: 19
PIF_VALUE: 27
PIF_VALUE: 27
PIF_VALUE: 29
PIF_VALUE: 18
PIF_VALUE: 28
PIF_VALUE: 18
PIF_VALUE: 24
PIF_VALUE: 18
PIF_VALUE: 19
PIF_VALUE: 22
PIF_VALUE: 22
PIF_VALUE: 19
PIF_VALUE: 18
PIF_VALUE: 18
PIF_VALUE: 33
PIF_VALUE: 31
PIF_VALUE: 27

## 2024-04-18 ASSESSMENT — PAIN SCALES - GENERAL
PAINLEVEL_OUTOF10: 0

## 2024-04-18 ASSESSMENT — PAIN SCALES - WONG BAKER: WONGBAKER_NUMERICALRESPONSE: NO HURT

## 2024-04-19 ENCOUNTER — APPOINTMENT (OUTPATIENT)
Dept: GENERAL RADIOLOGY | Age: 79
DRG: 064 | End: 2024-04-19
Payer: COMMERCIAL

## 2024-04-19 LAB
AADO2: 165.6 MMHG
ALBUMIN SERPL-MCNC: 2.5 G/DL (ref 3.5–5.2)
ALP SERPL-CCNC: 108 U/L (ref 40–129)
ALT SERPL-CCNC: 52 U/L (ref 0–40)
ANION GAP SERPL CALCULATED.3IONS-SCNC: 11 MMOL/L (ref 7–16)
AST SERPL-CCNC: 29 U/L (ref 0–39)
B.E.: 5 MMOL/L (ref -3–3)
BASOPHILS # BLD: 0.05 K/UL (ref 0–0.2)
BASOPHILS NFR BLD: 1 % (ref 0–2)
BILIRUB SERPL-MCNC: 0.5 MG/DL (ref 0–1.2)
BUN SERPL-MCNC: 16 MG/DL (ref 6–23)
CA-I BLD-SCNC: 1.12 MMOL/L (ref 1.15–1.33)
CALCIUM SERPL-MCNC: 7.8 MG/DL (ref 8.6–10.2)
CHLORIDE SERPL-SCNC: 104 MMOL/L (ref 98–107)
CO2 SERPL-SCNC: 25 MMOL/L (ref 22–29)
COHB: 0.3 % (ref 0–1.5)
CREAT SERPL-MCNC: 0.5 MG/DL (ref 0.7–1.2)
CRITICAL: ABNORMAL
DATE ANALYZED: ABNORMAL
DATE LAST DOSE: NORMAL
DATE OF COLLECTION: ABNORMAL
EOSINOPHIL # BLD: 0.16 K/UL (ref 0.05–0.5)
EOSINOPHILS RELATIVE PERCENT: 1 % (ref 0–6)
ERYTHROCYTE [DISTWIDTH] IN BLOOD BY AUTOMATED COUNT: 14.8 % (ref 11.5–15)
FIO2: 40 %
GFR SERPL CREATININE-BSD FRML MDRD: >90 ML/MIN/1.73M2
GLUCOSE SERPL-MCNC: 109 MG/DL (ref 74–99)
HCO3: 27.6 MMOL/L (ref 22–26)
HCT VFR BLD AUTO: 38.1 % (ref 37–54)
HGB BLD-MCNC: 12.3 G/DL (ref 12.5–16.5)
HHB: 5.3 % (ref 0–5)
IMM GRANULOCYTES # BLD AUTO: 0.49 K/UL (ref 0–0.58)
IMM GRANULOCYTES NFR BLD: 4 % (ref 0–5)
LAB: ABNORMAL
LYMPHOCYTES NFR BLD: 1.66 K/UL (ref 1.5–4)
LYMPHOCYTES RELATIVE PERCENT: 15 % (ref 20–42)
Lab: 430
MAGNESIUM SERPL-MCNC: 1.8 MG/DL (ref 1.6–2.6)
MCH RBC QN AUTO: 29.2 PG (ref 26–35)
MCHC RBC AUTO-ENTMCNC: 32.3 G/DL (ref 32–34.5)
MCV RBC AUTO: 90.5 FL (ref 80–99.9)
METHB: 0.3 % (ref 0–1.5)
MODE: ABNORMAL
MONOCYTES NFR BLD: 1.16 K/UL (ref 0.1–0.95)
MONOCYTES NFR BLD: 11 % (ref 2–12)
NEUTROPHILS NFR BLD: 68 % (ref 43–80)
NEUTS SEG NFR BLD: 7.52 K/UL (ref 1.8–7.3)
O2 SATURATION: 94.7 % (ref 92–98.5)
O2HB: 94.1 % (ref 94–97)
OPERATOR ID: 2962
PATIENT TEMP: 37 C
PCO2: 34 MMHG (ref 35–45)
PEEP/CPAP: 8 CMH2O
PFO2: 1.76 MMHG/%
PH BLOOD GAS: 7.53 (ref 7.35–7.45)
PHOSPHATE SERPL-MCNC: 3 MG/DL (ref 2.5–4.5)
PLATELET # BLD AUTO: 276 K/UL (ref 130–450)
PMV BLD AUTO: 10.7 FL (ref 7–12)
PO2: 70.5 MMHG (ref 75–100)
POTASSIUM SERPL-SCNC: 3.5 MMOL/L (ref 3.5–5)
PROT SERPL-MCNC: 5.5 G/DL (ref 6.4–8.3)
PS: 10 CMH20
RBC # BLD AUTO: 4.21 M/UL (ref 3.8–5.8)
RI(T): 2.35
SODIUM SERPL-SCNC: 140 MMOL/L (ref 132–146)
SOURCE, BLOOD GAS: ABNORMAL
THB: 13.3 G/DL (ref 11.5–16.5)
TIME ANALYZED: 438
TME LAST DOSE: NORMAL H
VANCOMYCIN DOSE: NORMAL MG
VANCOMYCIN TROUGH SERPL-MCNC: 9 UG/ML (ref 5–16)
WBC OTHER # BLD: 11 K/UL (ref 4.5–11.5)

## 2024-04-19 PROCEDURE — 2580000003 HC RX 258: Performed by: STUDENT IN AN ORGANIZED HEALTH CARE EDUCATION/TRAINING PROGRAM

## 2024-04-19 PROCEDURE — 6370000000 HC RX 637 (ALT 250 FOR IP)

## 2024-04-19 PROCEDURE — 85025 COMPLETE CBC W/AUTO DIFF WBC: CPT

## 2024-04-19 PROCEDURE — 2580000003 HC RX 258: Performed by: PHYSICIAN ASSISTANT

## 2024-04-19 PROCEDURE — 6370000000 HC RX 637 (ALT 250 FOR IP): Performed by: STUDENT IN AN ORGANIZED HEALTH CARE EDUCATION/TRAINING PROGRAM

## 2024-04-19 PROCEDURE — 2580000003 HC RX 258: Performed by: PSYCHIATRY & NEUROLOGY

## 2024-04-19 PROCEDURE — 94003 VENT MGMT INPAT SUBQ DAY: CPT

## 2024-04-19 PROCEDURE — 2580000003 HC RX 258: Performed by: SURGERY

## 2024-04-19 PROCEDURE — 84100 ASSAY OF PHOSPHORUS: CPT

## 2024-04-19 PROCEDURE — 6360000002 HC RX W HCPCS: Performed by: PSYCHIATRY & NEUROLOGY

## 2024-04-19 PROCEDURE — 36592 COLLECT BLOOD FROM PICC: CPT

## 2024-04-19 PROCEDURE — 99232 SBSQ HOSP IP/OBS MODERATE 35: CPT | Performed by: CLINICAL NURSE SPECIALIST

## 2024-04-19 PROCEDURE — 6360000002 HC RX W HCPCS: Performed by: SURGERY

## 2024-04-19 PROCEDURE — 80202 ASSAY OF VANCOMYCIN: CPT

## 2024-04-19 PROCEDURE — 99291 CRITICAL CARE FIRST HOUR: CPT | Performed by: SURGERY

## 2024-04-19 PROCEDURE — 82330 ASSAY OF CALCIUM: CPT

## 2024-04-19 PROCEDURE — 71045 X-RAY EXAM CHEST 1 VIEW: CPT

## 2024-04-19 PROCEDURE — 6360000002 HC RX W HCPCS: Performed by: PHYSICIAN ASSISTANT

## 2024-04-19 PROCEDURE — 6360000002 HC RX W HCPCS: Performed by: STUDENT IN AN ORGANIZED HEALTH CARE EDUCATION/TRAINING PROGRAM

## 2024-04-19 PROCEDURE — 2000000000 HC ICU R&B

## 2024-04-19 PROCEDURE — C9254 INJECTION, LACOSAMIDE: HCPCS | Performed by: PSYCHIATRY & NEUROLOGY

## 2024-04-19 PROCEDURE — 2580000003 HC RX 258

## 2024-04-19 PROCEDURE — 94640 AIRWAY INHALATION TREATMENT: CPT

## 2024-04-19 PROCEDURE — 83735 ASSAY OF MAGNESIUM: CPT

## 2024-04-19 PROCEDURE — 2500000003 HC RX 250 WO HCPCS: Performed by: STUDENT IN AN ORGANIZED HEALTH CARE EDUCATION/TRAINING PROGRAM

## 2024-04-19 PROCEDURE — 6360000002 HC RX W HCPCS

## 2024-04-19 PROCEDURE — 6370000000 HC RX 637 (ALT 250 FOR IP): Performed by: SURGERY

## 2024-04-19 PROCEDURE — 80053 COMPREHEN METABOLIC PANEL: CPT

## 2024-04-19 PROCEDURE — 82805 BLOOD GASES W/O2 SATURATION: CPT

## 2024-04-19 RX ORDER — IPRATROPIUM BROMIDE AND ALBUTEROL SULFATE 2.5; .5 MG/3ML; MG/3ML
1 SOLUTION RESPIRATORY (INHALATION)
Status: DISCONTINUED | OUTPATIENT
Start: 2024-04-19 | End: 2024-04-24

## 2024-04-19 RX ORDER — FUROSEMIDE 10 MG/ML
20 INJECTION INTRAMUSCULAR; INTRAVENOUS ONCE
Status: COMPLETED | OUTPATIENT
Start: 2024-04-19 | End: 2024-04-19

## 2024-04-19 RX ORDER — SODIUM CHLORIDE FOR INHALATION 3 %
4 VIAL, NEBULIZER (ML) INHALATION PRN
Status: DISCONTINUED | OUTPATIENT
Start: 2024-04-19 | End: 2024-04-20

## 2024-04-19 RX ADMIN — LEVETIRACETAM 2000 MG: 100 SOLUTION ORAL at 20:47

## 2024-04-19 RX ADMIN — POLYVINYL ALCOHOL, POVIDONE 1 DROP: 14; 6 SOLUTION/ DROPS OPHTHALMIC at 16:11

## 2024-04-19 RX ADMIN — SENNOSIDES 8.6 MG: 8.6 TABLET, FILM COATED ORAL at 20:47

## 2024-04-19 RX ADMIN — AMPICILLIN SODIUM AND SULBACTAM SODIUM 3000 MG: 2; 1 INJECTION, POWDER, FOR SOLUTION INTRAMUSCULAR; INTRAVENOUS at 08:25

## 2024-04-19 RX ADMIN — ENOXAPARIN SODIUM 80 MG: 100 INJECTION SUBCUTANEOUS at 08:58

## 2024-04-19 RX ADMIN — Medication 4 ML: at 03:27

## 2024-04-19 RX ADMIN — VANCOMYCIN HYDROCHLORIDE 1250 MG: 10 INJECTION, POWDER, LYOPHILIZED, FOR SOLUTION INTRAVENOUS at 05:09

## 2024-04-19 RX ADMIN — LANSOPRAZOLE 30 MG: 30 TABLET, ORALLY DISINTEGRATING ORAL at 08:58

## 2024-04-19 RX ADMIN — LOSARTAN POTASSIUM 50 MG: 25 TABLET, FILM COATED ORAL at 08:49

## 2024-04-19 RX ADMIN — CHLORHEXIDINE GLUCONATE 15 ML: 1.2 RINSE ORAL at 20:47

## 2024-04-19 RX ADMIN — IPRATROPIUM BROMIDE AND ALBUTEROL SULFATE 1 DOSE: .5; 2.5 SOLUTION RESPIRATORY (INHALATION) at 12:56

## 2024-04-19 RX ADMIN — SODIUM CHLORIDE, PRESERVATIVE FREE 10 ML: 5 INJECTION INTRAVENOUS at 20:50

## 2024-04-19 RX ADMIN — AMPICILLIN SODIUM AND SULBACTAM SODIUM 3000 MG: 2; 1 INJECTION, POWDER, FOR SOLUTION INTRAMUSCULAR; INTRAVENOUS at 20:47

## 2024-04-19 RX ADMIN — Medication 300 UNITS: at 20:50

## 2024-04-19 RX ADMIN — IPRATROPIUM BROMIDE AND ALBUTEROL SULFATE 1 DOSE: .5; 2.5 SOLUTION RESPIRATORY (INHALATION) at 20:02

## 2024-04-19 RX ADMIN — GUAIFENESIN 200 MG: 100 LIQUID ORAL at 13:52

## 2024-04-19 RX ADMIN — POLYETHYLENE GLYCOL 3350 17 G: 17 POWDER, FOR SOLUTION ORAL at 08:50

## 2024-04-19 RX ADMIN — POLYVINYL ALCOHOL, POVIDONE 1 DROP: 14; 6 SOLUTION/ DROPS OPHTHALMIC at 03:11

## 2024-04-19 RX ADMIN — Medication 4 ML: at 09:20

## 2024-04-19 RX ADMIN — CHLORHEXIDINE GLUCONATE 15 ML: 1.2 RINSE ORAL at 08:26

## 2024-04-19 RX ADMIN — ACETAMINOPHEN 650 MG: 650 SOLUTION ORAL at 16:28

## 2024-04-19 RX ADMIN — FOLIC ACID 1 MG: 1 TABLET ORAL at 08:49

## 2024-04-19 RX ADMIN — ENOXAPARIN SODIUM 80 MG: 100 INJECTION SUBCUTANEOUS at 20:53

## 2024-04-19 RX ADMIN — POLYVINYL ALCOHOL, POVIDONE 1 DROP: 14; 6 SOLUTION/ DROPS OPHTHALMIC at 20:50

## 2024-04-19 RX ADMIN — Medication 250 MG: at 20:50

## 2024-04-19 RX ADMIN — IPRATROPIUM BROMIDE AND ALBUTEROL SULFATE 1 DOSE: .5; 2.5 SOLUTION RESPIRATORY (INHALATION) at 16:35

## 2024-04-19 RX ADMIN — POLYVINYL ALCOHOL, POVIDONE 1 DROP: 14; 6 SOLUTION/ DROPS OPHTHALMIC at 12:24

## 2024-04-19 RX ADMIN — GUAIFENESIN 200 MG: 100 LIQUID ORAL at 08:26

## 2024-04-19 RX ADMIN — POTASSIUM BICARBONATE 20 MEQ: 782 TABLET, EFFERVESCENT ORAL at 09:22

## 2024-04-19 RX ADMIN — MINERAL OIL, WHITE PETROLATUM: .03; .94 OINTMENT OPHTHALMIC at 08:27

## 2024-04-19 RX ADMIN — LEVETIRACETAM 2000 MG: 100 SOLUTION ORAL at 08:50

## 2024-04-19 RX ADMIN — FUROSEMIDE 20 MG: 10 INJECTION, SOLUTION INTRAMUSCULAR; INTRAVENOUS at 09:22

## 2024-04-19 RX ADMIN — Medication 100 MG: at 08:49

## 2024-04-19 RX ADMIN — FOSPHENYTOIN SODIUM 100 MG PE: 50 INJECTION, SOLUTION INTRAMUSCULAR; INTRAVENOUS at 18:02

## 2024-04-19 RX ADMIN — FOSPHENYTOIN SODIUM 100 MG PE: 50 INJECTION, SOLUTION INTRAMUSCULAR; INTRAVENOUS at 02:04

## 2024-04-19 RX ADMIN — ASPIRIN 81 MG: 81 TABLET, CHEWABLE ORAL at 08:49

## 2024-04-19 RX ADMIN — Medication 250 MG: at 16:28

## 2024-04-19 RX ADMIN — FINASTERIDE 5 MG: 5 TABLET, FILM COATED ORAL at 08:49

## 2024-04-19 RX ADMIN — GUAIFENESIN 200 MG: 100 LIQUID ORAL at 20:47

## 2024-04-19 RX ADMIN — HYDRALAZINE HYDROCHLORIDE 10 MG: 20 INJECTION, SOLUTION INTRAMUSCULAR; INTRAVENOUS at 03:04

## 2024-04-19 RX ADMIN — ATORVASTATIN CALCIUM 40 MG: 40 TABLET, FILM COATED ORAL at 20:47

## 2024-04-19 RX ADMIN — FOSPHENYTOIN SODIUM 100 MG PE: 50 INJECTION, SOLUTION INTRAMUSCULAR; INTRAVENOUS at 09:34

## 2024-04-19 RX ADMIN — LACOSAMIDE 200 MG: 10 INJECTION INTRAVENOUS at 08:52

## 2024-04-19 RX ADMIN — Medication 4 ML: at 00:09

## 2024-04-19 RX ADMIN — GUAIFENESIN 200 MG: 100 LIQUID ORAL at 00:18

## 2024-04-19 RX ADMIN — CALCIUM GLUCONATE 2000 MG: 98 INJECTION, SOLUTION INTRAVENOUS at 10:25

## 2024-04-19 RX ADMIN — AMPICILLIN SODIUM AND SULBACTAM SODIUM 3000 MG: 2; 1 INJECTION, POWDER, FOR SOLUTION INTRAMUSCULAR; INTRAVENOUS at 02:05

## 2024-04-19 RX ADMIN — Medication 250 MG: at 13:52

## 2024-04-19 RX ADMIN — LACOSAMIDE 200 MG: 10 INJECTION INTRAVENOUS at 20:49

## 2024-04-19 RX ADMIN — POLYVINYL ALCOHOL, POVIDONE 1 DROP: 14; 6 SOLUTION/ DROPS OPHTHALMIC at 00:18

## 2024-04-19 RX ADMIN — AMPICILLIN SODIUM AND SULBACTAM SODIUM 3000 MG: 2; 1 INJECTION, POWDER, FOR SOLUTION INTRAMUSCULAR; INTRAVENOUS at 13:54

## 2024-04-19 RX ADMIN — VANCOMYCIN HYDROCHLORIDE 1250 MG: 10 INJECTION, POWDER, LYOPHILIZED, FOR SOLUTION INTRAVENOUS at 16:37

## 2024-04-19 ASSESSMENT — PULMONARY FUNCTION TESTS
PIF_VALUE: 14
PIF_VALUE: 14
PIF_VALUE: 18
PIF_VALUE: 19
PIF_VALUE: 15
PIF_VALUE: 24
PIF_VALUE: 14
PIF_VALUE: 14
PIF_VALUE: 22
PIF_VALUE: 14
PIF_VALUE: 14
PIF_VALUE: 15
PIF_VALUE: 18
PIF_VALUE: 14
PIF_VALUE: 14
PIF_VALUE: 15
PIF_VALUE: 14
PIF_VALUE: 14
PIF_VALUE: 20
PIF_VALUE: 20
PIF_VALUE: 14
PIF_VALUE: 19
PIF_VALUE: 14
PIF_VALUE: 23
PIF_VALUE: 14
PIF_VALUE: 14
PIF_VALUE: 15
PIF_VALUE: 15
PIF_VALUE: 22
PIF_VALUE: 14
PIF_VALUE: 14
PIF_VALUE: 20
PIF_VALUE: 14

## 2024-04-19 ASSESSMENT — PAIN SCALES - GENERAL
PAINLEVEL_OUTOF10: 0

## 2024-04-19 NOTE — CARE COORDINATION
4/19 Care Coordination:Pt remains in SICU. Remains on Vent. Started to follow commands opens eyes to voice. Pt remains DNR-CCA, Palliative Care is following. With Current status unclear on discharge needs. [facility] door to [facility].  CM/SW will continue to follow for discharge planning.   Ranjit GRIFFITHSN,RN--BC  [phone]

## 2024-04-20 ENCOUNTER — APPOINTMENT (OUTPATIENT)
Dept: GENERAL RADIOLOGY | Age: 79
DRG: 064 | End: 2024-04-20
Payer: COMMERCIAL

## 2024-04-20 LAB
AADO2: 166.2 MMHG
ALBUMIN SERPL-MCNC: 2.4 G/DL (ref 3.5–5.2)
ALP SERPL-CCNC: 124 U/L (ref 40–129)
ALT SERPL-CCNC: 58 U/L (ref 0–40)
ANION GAP SERPL CALCULATED.3IONS-SCNC: 11 MMOL/L (ref 7–16)
ANION GAP SERPL CALCULATED.3IONS-SCNC: 12 MMOL/L (ref 7–16)
AST SERPL-CCNC: 39 U/L (ref 0–39)
B.E.: 1.6 MMOL/L (ref -3–3)
BASOPHILS # BLD: 0.04 K/UL (ref 0–0.2)
BASOPHILS NFR BLD: 0 % (ref 0–2)
BILIRUB SERPL-MCNC: 0.6 MG/DL (ref 0–1.2)
BUN SERPL-MCNC: 12 MG/DL (ref 6–23)
BUN SERPL-MCNC: 12 MG/DL (ref 6–23)
CA-I BLD-SCNC: 1.11 MMOL/L (ref 1.15–1.33)
CALCIUM SERPL-MCNC: 7.7 MG/DL (ref 8.6–10.2)
CALCIUM SERPL-MCNC: 7.7 MG/DL (ref 8.6–10.2)
CHLORIDE SERPL-SCNC: 102 MMOL/L (ref 98–107)
CHLORIDE SERPL-SCNC: 103 MMOL/L (ref 98–107)
CO2 SERPL-SCNC: 25 MMOL/L (ref 22–29)
CO2 SERPL-SCNC: 25 MMOL/L (ref 22–29)
COHB: 0.5 % (ref 0–1.5)
CREAT SERPL-MCNC: 0.4 MG/DL (ref 0.7–1.2)
CREAT SERPL-MCNC: 0.4 MG/DL (ref 0.7–1.2)
CRITICAL: ABNORMAL
DATE ANALYZED: ABNORMAL
DATE OF COLLECTION: ABNORMAL
EOSINOPHIL # BLD: 0.13 K/UL (ref 0.05–0.5)
EOSINOPHILS RELATIVE PERCENT: 1 % (ref 0–6)
ERYTHROCYTE [DISTWIDTH] IN BLOOD BY AUTOMATED COUNT: 14.6 % (ref 11.5–15)
FIO2: 40 %
GFR SERPL CREATININE-BSD FRML MDRD: >90 ML/MIN/1.73M2
GFR SERPL CREATININE-BSD FRML MDRD: >90 ML/MIN/1.73M2
GLUCOSE SERPL-MCNC: 111 MG/DL (ref 74–99)
GLUCOSE SERPL-MCNC: 124 MG/DL (ref 74–99)
HCO3: 23.6 MMOL/L (ref 22–26)
HCT VFR BLD AUTO: 35.1 % (ref 37–54)
HGB BLD-MCNC: 11.5 G/DL (ref 12.5–16.5)
HHB: 4.7 % (ref 0–5)
IMM GRANULOCYTES # BLD AUTO: 0.31 K/UL (ref 0–0.58)
IMM GRANULOCYTES NFR BLD: 2 % (ref 0–5)
LAB: ABNORMAL
LYMPHOCYTES NFR BLD: 1.16 K/UL (ref 1.5–4)
LYMPHOCYTES RELATIVE PERCENT: 9 % (ref 20–42)
Lab: 550
MAGNESIUM SERPL-MCNC: 1.7 MG/DL (ref 1.6–2.6)
MCH RBC QN AUTO: 29.5 PG (ref 26–35)
MCHC RBC AUTO-ENTMCNC: 32.8 G/DL (ref 32–34.5)
MCV RBC AUTO: 90 FL (ref 80–99.9)
METHB: 0.4 % (ref 0–1.5)
MODE: ABNORMAL
MONOCYTES NFR BLD: 1.2 K/UL (ref 0.1–0.95)
MONOCYTES NFR BLD: 9 % (ref 2–12)
NEUTROPHILS NFR BLD: 78 % (ref 43–80)
NEUTS SEG NFR BLD: 10.09 K/UL (ref 1.8–7.3)
O2 SATURATION: 95.3 % (ref 92–98.5)
O2HB: 94.4 % (ref 94–97)
OPERATOR ID: 2577
PATIENT TEMP: 37 C
PCO2: 29.6 MMHG (ref 35–45)
PEEP/CPAP: 8 CMH2O
PFO2: 1.88 MMHG/%
PH BLOOD GAS: 7.52 (ref 7.35–7.45)
PHOSPHATE SERPL-MCNC: 3.4 MG/DL (ref 2.5–4.5)
PLATELET # BLD AUTO: 239 K/UL (ref 130–450)
PMV BLD AUTO: 10.9 FL (ref 7–12)
PO2: 75 MMHG (ref 75–100)
POTASSIUM SERPL-SCNC: 3.6 MMOL/L (ref 3.5–5)
POTASSIUM SERPL-SCNC: 3.7 MMOL/L (ref 3.5–5)
PROT SERPL-MCNC: 5.4 G/DL (ref 6.4–8.3)
PS: 6 CMH20
RBC # BLD AUTO: 3.9 M/UL (ref 3.8–5.8)
RI(T): 2.22
SODIUM SERPL-SCNC: 139 MMOL/L (ref 132–146)
SODIUM SERPL-SCNC: 139 MMOL/L (ref 132–146)
SOURCE, BLOOD GAS: ABNORMAL
THB: 13.5 G/DL (ref 11.5–16.5)
TIME ANALYZED: 555
WBC OTHER # BLD: 12.9 K/UL (ref 4.5–11.5)

## 2024-04-20 PROCEDURE — 87070 CULTURE OTHR SPECIMN AEROBIC: CPT

## 2024-04-20 PROCEDURE — 2580000003 HC RX 258: Performed by: STUDENT IN AN ORGANIZED HEALTH CARE EDUCATION/TRAINING PROGRAM

## 2024-04-20 PROCEDURE — 80053 COMPREHEN METABOLIC PANEL: CPT

## 2024-04-20 PROCEDURE — C9254 INJECTION, LACOSAMIDE: HCPCS | Performed by: PSYCHIATRY & NEUROLOGY

## 2024-04-20 PROCEDURE — 2580000003 HC RX 258: Performed by: PSYCHIATRY & NEUROLOGY

## 2024-04-20 PROCEDURE — 2580000003 HC RX 258: Performed by: SURGERY

## 2024-04-20 PROCEDURE — 2580000003 HC RX 258

## 2024-04-20 PROCEDURE — 6360000002 HC RX W HCPCS: Performed by: PHYSICIAN ASSISTANT

## 2024-04-20 PROCEDURE — 87077 CULTURE AEROBIC IDENTIFY: CPT

## 2024-04-20 PROCEDURE — 82330 ASSAY OF CALCIUM: CPT

## 2024-04-20 PROCEDURE — 36592 COLLECT BLOOD FROM PICC: CPT

## 2024-04-20 PROCEDURE — 6370000000 HC RX 637 (ALT 250 FOR IP): Performed by: STUDENT IN AN ORGANIZED HEALTH CARE EDUCATION/TRAINING PROGRAM

## 2024-04-20 PROCEDURE — 84100 ASSAY OF PHOSPHORUS: CPT

## 2024-04-20 PROCEDURE — 6370000000 HC RX 637 (ALT 250 FOR IP): Performed by: SURGERY

## 2024-04-20 PROCEDURE — 2500000003 HC RX 250 WO HCPCS: Performed by: STUDENT IN AN ORGANIZED HEALTH CARE EDUCATION/TRAINING PROGRAM

## 2024-04-20 PROCEDURE — 6360000002 HC RX W HCPCS: Performed by: PSYCHIATRY & NEUROLOGY

## 2024-04-20 PROCEDURE — 6370000000 HC RX 637 (ALT 250 FOR IP)

## 2024-04-20 PROCEDURE — 94640 AIRWAY INHALATION TREATMENT: CPT

## 2024-04-20 PROCEDURE — 2000000000 HC ICU R&B

## 2024-04-20 PROCEDURE — 82805 BLOOD GASES W/O2 SATURATION: CPT

## 2024-04-20 PROCEDURE — 87205 SMEAR GRAM STAIN: CPT

## 2024-04-20 PROCEDURE — 2580000003 HC RX 258: Performed by: PHYSICIAN ASSISTANT

## 2024-04-20 PROCEDURE — 80048 BASIC METABOLIC PNL TOTAL CA: CPT

## 2024-04-20 PROCEDURE — 83735 ASSAY OF MAGNESIUM: CPT

## 2024-04-20 PROCEDURE — 99232 SBSQ HOSP IP/OBS MODERATE 35: CPT | Performed by: CLINICAL NURSE SPECIALIST

## 2024-04-20 PROCEDURE — 6360000002 HC RX W HCPCS

## 2024-04-20 PROCEDURE — 99291 CRITICAL CARE FIRST HOUR: CPT | Performed by: SURGERY

## 2024-04-20 PROCEDURE — 2580000003 HC RX 258: Performed by: CLINICAL NURSE SPECIALIST

## 2024-04-20 PROCEDURE — 71045 X-RAY EXAM CHEST 1 VIEW: CPT

## 2024-04-20 PROCEDURE — 6360000002 HC RX W HCPCS: Performed by: STUDENT IN AN ORGANIZED HEALTH CARE EDUCATION/TRAINING PROGRAM

## 2024-04-20 PROCEDURE — 85025 COMPLETE CBC W/AUTO DIFF WBC: CPT

## 2024-04-20 PROCEDURE — 94003 VENT MGMT INPAT SUBQ DAY: CPT

## 2024-04-20 PROCEDURE — 6360000002 HC RX W HCPCS: Performed by: SURGERY

## 2024-04-20 PROCEDURE — 6360000002 HC RX W HCPCS: Performed by: CLINICAL NURSE SPECIALIST

## 2024-04-20 RX ORDER — SODIUM CHLORIDE FOR INHALATION 3 %
4 VIAL, NEBULIZER (ML) INHALATION PRN
Status: DISCONTINUED | OUTPATIENT
Start: 2024-04-20 | End: 2024-04-24

## 2024-04-20 RX ADMIN — FOSPHENYTOIN SODIUM 100 MG PE: 50 INJECTION, SOLUTION INTRAMUSCULAR; INTRAVENOUS at 02:30

## 2024-04-20 RX ADMIN — POLYVINYL ALCOHOL, POVIDONE 1 DROP: 14; 6 SOLUTION/ DROPS OPHTHALMIC at 12:15

## 2024-04-20 RX ADMIN — IPRATROPIUM BROMIDE AND ALBUTEROL SULFATE 1 DOSE: .5; 2.5 SOLUTION RESPIRATORY (INHALATION) at 16:10

## 2024-04-20 RX ADMIN — Medication 250 MG: at 16:37

## 2024-04-20 RX ADMIN — POLYVINYL ALCOHOL, POVIDONE 1 DROP: 14; 6 SOLUTION/ DROPS OPHTHALMIC at 02:22

## 2024-04-20 RX ADMIN — POLYVINYL ALCOHOL, POVIDONE 1 DROP: 14; 6 SOLUTION/ DROPS OPHTHALMIC at 16:37

## 2024-04-20 RX ADMIN — Medication 250 MG: at 19:51

## 2024-04-20 RX ADMIN — VANCOMYCIN HYDROCHLORIDE 1250 MG: 10 INJECTION, POWDER, LYOPHILIZED, FOR SOLUTION INTRAVENOUS at 16:39

## 2024-04-20 RX ADMIN — SODIUM CHLORIDE, PRESERVATIVE FREE 10 ML: 5 INJECTION INTRAVENOUS at 19:51

## 2024-04-20 RX ADMIN — Medication 250 MG: at 08:19

## 2024-04-20 RX ADMIN — ACETAMINOPHEN 650 MG: 650 SOLUTION ORAL at 01:02

## 2024-04-20 RX ADMIN — GUAIFENESIN 200 MG: 100 LIQUID ORAL at 08:19

## 2024-04-20 RX ADMIN — IPRATROPIUM BROMIDE AND ALBUTEROL SULFATE 1 DOSE: .5; 2.5 SOLUTION RESPIRATORY (INHALATION) at 12:28

## 2024-04-20 RX ADMIN — ACETAMINOPHEN 650 MG: 650 SOLUTION ORAL at 18:15

## 2024-04-20 RX ADMIN — POTASSIUM BICARBONATE 40 MEQ: 782 TABLET, EFFERVESCENT ORAL at 06:55

## 2024-04-20 RX ADMIN — POLYVINYL ALCOHOL, POVIDONE 1 DROP: 14; 6 SOLUTION/ DROPS OPHTHALMIC at 08:15

## 2024-04-20 RX ADMIN — PETROLATUM: 420 OINTMENT TOPICAL at 12:15

## 2024-04-20 RX ADMIN — POLYVINYL ALCOHOL, POVIDONE 1 DROP: 14; 6 SOLUTION/ DROPS OPHTHALMIC at 23:43

## 2024-04-20 RX ADMIN — IPRATROPIUM BROMIDE AND ALBUTEROL SULFATE 1 DOSE: .5; 2.5 SOLUTION RESPIRATORY (INHALATION) at 08:33

## 2024-04-20 RX ADMIN — PETROLATUM: 420 OINTMENT TOPICAL at 18:33

## 2024-04-20 RX ADMIN — ASPIRIN 81 MG: 81 TABLET, CHEWABLE ORAL at 08:20

## 2024-04-20 RX ADMIN — ATORVASTATIN CALCIUM 40 MG: 40 TABLET, FILM COATED ORAL at 19:52

## 2024-04-20 RX ADMIN — Medication 4 ML: at 16:11

## 2024-04-20 RX ADMIN — FOLIC ACID 1 MG: 1 TABLET ORAL at 08:20

## 2024-04-20 RX ADMIN — Medication 4 ML: at 12:28

## 2024-04-20 RX ADMIN — POLYVINYL ALCOHOL, POVIDONE 1 DROP: 14; 6 SOLUTION/ DROPS OPHTHALMIC at 00:15

## 2024-04-20 RX ADMIN — ENOXAPARIN SODIUM 80 MG: 100 INJECTION SUBCUTANEOUS at 20:30

## 2024-04-20 RX ADMIN — CHLORHEXIDINE GLUCONATE 15 ML: 1.2 RINSE ORAL at 08:16

## 2024-04-20 RX ADMIN — GUAIFENESIN 200 MG: 100 LIQUID ORAL at 14:00

## 2024-04-20 RX ADMIN — POLYVINYL ALCOHOL, POVIDONE 1 DROP: 14; 6 SOLUTION/ DROPS OPHTHALMIC at 19:51

## 2024-04-20 RX ADMIN — Medication 300 UNITS: at 19:51

## 2024-04-20 RX ADMIN — LEVETIRACETAM 2000 MG: 100 SOLUTION ORAL at 20:36

## 2024-04-20 RX ADMIN — ENOXAPARIN SODIUM 80 MG: 100 INJECTION SUBCUTANEOUS at 08:46

## 2024-04-20 RX ADMIN — VANCOMYCIN HYDROCHLORIDE 1250 MG: 10 INJECTION, POWDER, LYOPHILIZED, FOR SOLUTION INTRAVENOUS at 04:37

## 2024-04-20 RX ADMIN — LANSOPRAZOLE 30 MG: 30 TABLET, ORALLY DISINTEGRATING ORAL at 00:15

## 2024-04-20 RX ADMIN — Medication 100 MG: at 08:20

## 2024-04-20 RX ADMIN — IPRATROPIUM BROMIDE AND ALBUTEROL SULFATE 1 DOSE: .5; 2.5 SOLUTION RESPIRATORY (INHALATION) at 20:22

## 2024-04-20 RX ADMIN — LEVETIRACETAM 2000 MG: 100 SOLUTION ORAL at 08:46

## 2024-04-20 RX ADMIN — SENNOSIDES 8.6 MG: 8.6 TABLET, FILM COATED ORAL at 19:51

## 2024-04-20 RX ADMIN — CHLORHEXIDINE GLUCONATE 15 ML: 1.2 RINSE ORAL at 19:52

## 2024-04-20 RX ADMIN — LACOSAMIDE 200 MG: 10 INJECTION INTRAVENOUS at 10:30

## 2024-04-20 RX ADMIN — LANSOPRAZOLE 30 MG: 30 TABLET, ORALLY DISINTEGRATING ORAL at 08:19

## 2024-04-20 RX ADMIN — FOSPHENYTOIN SODIUM 100 MG PE: 50 INJECTION, SOLUTION INTRAMUSCULAR; INTRAVENOUS at 08:48

## 2024-04-20 RX ADMIN — GUAIFENESIN 200 MG: 100 LIQUID ORAL at 19:50

## 2024-04-20 RX ADMIN — FOSPHENYTOIN SODIUM 100 MG PE: 50 INJECTION, SOLUTION INTRAMUSCULAR; INTRAVENOUS at 20:33

## 2024-04-20 RX ADMIN — ACETAMINOPHEN 650 MG: 650 SOLUTION ORAL at 08:19

## 2024-04-20 RX ADMIN — LACOSAMIDE 200 MG: 10 INJECTION INTRAVENOUS at 20:35

## 2024-04-20 RX ADMIN — Medication 250 MG: at 14:00

## 2024-04-20 RX ADMIN — GUAIFENESIN 200 MG: 100 LIQUID ORAL at 01:02

## 2024-04-20 RX ADMIN — FINASTERIDE 5 MG: 5 TABLET, FILM COATED ORAL at 08:20

## 2024-04-20 RX ADMIN — LOSARTAN POTASSIUM 50 MG: 25 TABLET, FILM COATED ORAL at 08:20

## 2024-04-20 RX ADMIN — Medication 4 ML: at 20:22

## 2024-04-20 RX ADMIN — CALCIUM GLUCONATE 2000 MG: 98 INJECTION, SOLUTION INTRAVENOUS at 08:19

## 2024-04-20 RX ADMIN — HYDRALAZINE HYDROCHLORIDE 10 MG: 20 INJECTION, SOLUTION INTRAMUSCULAR; INTRAVENOUS at 04:33

## 2024-04-20 RX ADMIN — AMPICILLIN SODIUM AND SULBACTAM SODIUM 3000 MG: 2; 1 INJECTION, POWDER, FOR SOLUTION INTRAMUSCULAR; INTRAVENOUS at 02:22

## 2024-04-20 RX ADMIN — LANSOPRAZOLE 30 MG: 30 TABLET, ORALLY DISINTEGRATING ORAL at 19:52

## 2024-04-20 ASSESSMENT — PULMONARY FUNCTION TESTS
PIF_VALUE: 14
PIF_VALUE: 17
PIF_VALUE: 14
PIF_VALUE: 14
PIF_VALUE: 15
PIF_VALUE: 14
PIF_VALUE: 17
PIF_VALUE: 14
PIF_VALUE: 15
PIF_VALUE: 14
PIF_VALUE: 18
PIF_VALUE: 14
PIF_VALUE: 14
PIF_VALUE: 15
PIF_VALUE: 15
PIF_VALUE: 14
PIF_VALUE: 14
PIF_VALUE: 15
PIF_VALUE: 15
PIF_VALUE: 14
PIF_VALUE: 18
PIF_VALUE: 14
PIF_VALUE: 14
PIF_VALUE: 15

## 2024-04-20 ASSESSMENT — PAIN SCALES - GENERAL
PAINLEVEL_OUTOF10: 0

## 2024-04-20 ASSESSMENT — PAIN - FUNCTIONAL ASSESSMENT: PAIN_FUNCTIONAL_ASSESSMENT: ACTIVITIES ARE NOT PREVENTED

## 2024-04-21 ENCOUNTER — APPOINTMENT (OUTPATIENT)
Dept: GENERAL RADIOLOGY | Age: 79
DRG: 064 | End: 2024-04-21
Payer: COMMERCIAL

## 2024-04-21 LAB
ALBUMIN SERPL-MCNC: 2.3 G/DL (ref 3.5–5.2)
ALP SERPL-CCNC: 154 U/L (ref 40–129)
ALT SERPL-CCNC: 58 U/L (ref 0–40)
ANION GAP SERPL CALCULATED.3IONS-SCNC: 12 MMOL/L (ref 7–16)
AST SERPL-CCNC: 40 U/L (ref 0–39)
BASOPHILS # BLD: 0.04 K/UL (ref 0–0.2)
BASOPHILS NFR BLD: 0 % (ref 0–2)
BILIRUB SERPL-MCNC: 0.6 MG/DL (ref 0–1.2)
BUN SERPL-MCNC: 12 MG/DL (ref 6–23)
CA-I BLD-SCNC: 1.11 MMOL/L (ref 1.15–1.33)
CALCIUM SERPL-MCNC: 7.8 MG/DL (ref 8.6–10.2)
CHLORIDE SERPL-SCNC: 101 MMOL/L (ref 98–107)
CO2 SERPL-SCNC: 23 MMOL/L (ref 22–29)
CREAT SERPL-MCNC: 0.5 MG/DL (ref 0.7–1.2)
EOSINOPHIL # BLD: 0.11 K/UL (ref 0.05–0.5)
EOSINOPHILS RELATIVE PERCENT: 1 % (ref 0–6)
ERYTHROCYTE [DISTWIDTH] IN BLOOD BY AUTOMATED COUNT: 14.6 % (ref 11.5–15)
GFR SERPL CREATININE-BSD FRML MDRD: >90 ML/MIN/1.73M2
GLUCOSE SERPL-MCNC: 121 MG/DL (ref 74–99)
HCT VFR BLD AUTO: 32.2 % (ref 37–54)
HGB BLD-MCNC: 10.5 G/DL (ref 12.5–16.5)
IMM GRANULOCYTES # BLD AUTO: 0.18 K/UL (ref 0–0.58)
IMM GRANULOCYTES NFR BLD: 1 % (ref 0–5)
LYMPHOCYTES NFR BLD: 1.79 K/UL (ref 1.5–4)
LYMPHOCYTES RELATIVE PERCENT: 12 % (ref 20–42)
MAGNESIUM SERPL-MCNC: 1.8 MG/DL (ref 1.6–2.6)
MCH RBC QN AUTO: 29.6 PG (ref 26–35)
MCHC RBC AUTO-ENTMCNC: 32.6 G/DL (ref 32–34.5)
MCV RBC AUTO: 90.7 FL (ref 80–99.9)
MONOCYTES NFR BLD: 1.23 K/UL (ref 0.1–0.95)
MONOCYTES NFR BLD: 8 % (ref 2–12)
NEUTROPHILS NFR BLD: 78 % (ref 43–80)
NEUTS SEG NFR BLD: 11.72 K/UL (ref 1.8–7.3)
PHOSPHATE SERPL-MCNC: 3.3 MG/DL (ref 2.5–4.5)
PLATELET # BLD AUTO: 229 K/UL (ref 130–450)
PMV BLD AUTO: 11.5 FL (ref 7–12)
POTASSIUM SERPL-SCNC: 4 MMOL/L (ref 3.5–5)
PROT SERPL-MCNC: 5.4 G/DL (ref 6.4–8.3)
RBC # BLD AUTO: 3.55 M/UL (ref 3.8–5.8)
SODIUM SERPL-SCNC: 136 MMOL/L (ref 132–146)
WBC OTHER # BLD: 15.1 K/UL (ref 4.5–11.5)

## 2024-04-21 PROCEDURE — 6370000000 HC RX 637 (ALT 250 FOR IP): Performed by: STUDENT IN AN ORGANIZED HEALTH CARE EDUCATION/TRAINING PROGRAM

## 2024-04-21 PROCEDURE — 6370000000 HC RX 637 (ALT 250 FOR IP)

## 2024-04-21 PROCEDURE — 6360000002 HC RX W HCPCS: Performed by: STUDENT IN AN ORGANIZED HEALTH CARE EDUCATION/TRAINING PROGRAM

## 2024-04-21 PROCEDURE — 82330 ASSAY OF CALCIUM: CPT

## 2024-04-21 PROCEDURE — 6370000000 HC RX 637 (ALT 250 FOR IP): Performed by: SURGERY

## 2024-04-21 PROCEDURE — 6360000002 HC RX W HCPCS

## 2024-04-21 PROCEDURE — 36592 COLLECT BLOOD FROM PICC: CPT

## 2024-04-21 PROCEDURE — 6360000002 HC RX W HCPCS: Performed by: PSYCHIATRY & NEUROLOGY

## 2024-04-21 PROCEDURE — 99291 CRITICAL CARE FIRST HOUR: CPT | Performed by: SURGERY

## 2024-04-21 PROCEDURE — 6360000002 HC RX W HCPCS: Performed by: CLINICAL NURSE SPECIALIST

## 2024-04-21 PROCEDURE — 94799 UNLISTED PULMONARY SVC/PX: CPT

## 2024-04-21 PROCEDURE — 2500000003 HC RX 250 WO HCPCS: Performed by: STUDENT IN AN ORGANIZED HEALTH CARE EDUCATION/TRAINING PROGRAM

## 2024-04-21 PROCEDURE — C9254 INJECTION, LACOSAMIDE: HCPCS | Performed by: PSYCHIATRY & NEUROLOGY

## 2024-04-21 PROCEDURE — 2700000000 HC OXYGEN THERAPY PER DAY

## 2024-04-21 PROCEDURE — 2580000003 HC RX 258: Performed by: STUDENT IN AN ORGANIZED HEALTH CARE EDUCATION/TRAINING PROGRAM

## 2024-04-21 PROCEDURE — 85025 COMPLETE CBC W/AUTO DIFF WBC: CPT

## 2024-04-21 PROCEDURE — 84100 ASSAY OF PHOSPHORUS: CPT

## 2024-04-21 PROCEDURE — 2000000000 HC ICU R&B

## 2024-04-21 PROCEDURE — 2580000003 HC RX 258

## 2024-04-21 PROCEDURE — 7100000000 HC PACU RECOVERY - FIRST 15 MIN

## 2024-04-21 PROCEDURE — 2580000003 HC RX 258: Performed by: PSYCHIATRY & NEUROLOGY

## 2024-04-21 PROCEDURE — 83735 ASSAY OF MAGNESIUM: CPT

## 2024-04-21 PROCEDURE — 94640 AIRWAY INHALATION TREATMENT: CPT

## 2024-04-21 PROCEDURE — 99232 SBSQ HOSP IP/OBS MODERATE 35: CPT | Performed by: CLINICAL NURSE SPECIALIST

## 2024-04-21 PROCEDURE — 74018 RADEX ABDOMEN 1 VIEW: CPT

## 2024-04-21 PROCEDURE — 2580000003 HC RX 258: Performed by: CLINICAL NURSE SPECIALIST

## 2024-04-21 PROCEDURE — 7100000001 HC PACU RECOVERY - ADDTL 15 MIN

## 2024-04-21 PROCEDURE — 94003 VENT MGMT INPAT SUBQ DAY: CPT

## 2024-04-21 PROCEDURE — 80053 COMPREHEN METABOLIC PANEL: CPT

## 2024-04-21 PROCEDURE — 71045 X-RAY EXAM CHEST 1 VIEW: CPT

## 2024-04-21 RX ORDER — MAGNESIUM SULFATE IN WATER 40 MG/ML
2000 INJECTION, SOLUTION INTRAVENOUS ONCE
Status: COMPLETED | OUTPATIENT
Start: 2024-04-21 | End: 2024-04-21

## 2024-04-21 RX ADMIN — ATORVASTATIN CALCIUM 40 MG: 40 TABLET, FILM COATED ORAL at 20:06

## 2024-04-21 RX ADMIN — MAGNESIUM SULFATE HEPTAHYDRATE 2000 MG: 40 INJECTION, SOLUTION INTRAVENOUS at 10:47

## 2024-04-21 RX ADMIN — IPRATROPIUM BROMIDE AND ALBUTEROL SULFATE 1 DOSE: .5; 2.5 SOLUTION RESPIRATORY (INHALATION) at 19:33

## 2024-04-21 RX ADMIN — Medication 300 UNITS: at 20:07

## 2024-04-21 RX ADMIN — POLYVINYL ALCOHOL, POVIDONE 1 DROP: 14; 6 SOLUTION/ DROPS OPHTHALMIC at 09:30

## 2024-04-21 RX ADMIN — GUAIFENESIN 200 MG: 100 LIQUID ORAL at 09:31

## 2024-04-21 RX ADMIN — AMPICILLIN SODIUM AND SULBACTAM SODIUM 3000 MG: 2; 1 INJECTION, POWDER, FOR SOLUTION INTRAMUSCULAR; INTRAVENOUS at 23:54

## 2024-04-21 RX ADMIN — IPRATROPIUM BROMIDE AND ALBUTEROL SULFATE 1 DOSE: .5; 2.5 SOLUTION RESPIRATORY (INHALATION) at 06:17

## 2024-04-21 RX ADMIN — LOSARTAN POTASSIUM 50 MG: 25 TABLET, FILM COATED ORAL at 09:30

## 2024-04-21 RX ADMIN — Medication 4 ML: at 11:53

## 2024-04-21 RX ADMIN — LEVETIRACETAM 2000 MG: 100 SOLUTION ORAL at 20:36

## 2024-04-21 RX ADMIN — AMPICILLIN SODIUM AND SULBACTAM SODIUM 3000 MG: 2; 1 INJECTION, POWDER, FOR SOLUTION INTRAMUSCULAR; INTRAVENOUS at 18:29

## 2024-04-21 RX ADMIN — CHLORHEXIDINE GLUCONATE 15 ML: 1.2 RINSE ORAL at 20:06

## 2024-04-21 RX ADMIN — VANCOMYCIN HYDROCHLORIDE 1250 MG: 10 INJECTION, POWDER, LYOPHILIZED, FOR SOLUTION INTRAVENOUS at 05:30

## 2024-04-21 RX ADMIN — MINERAL OIL, WHITE PETROLATUM: .03; .94 OINTMENT OPHTHALMIC at 20:08

## 2024-04-21 RX ADMIN — SODIUM CHLORIDE, PRESERVATIVE FREE 10 ML: 5 INJECTION INTRAVENOUS at 20:08

## 2024-04-21 RX ADMIN — SENNOSIDES 8.6 MG: 8.6 TABLET, FILM COATED ORAL at 20:08

## 2024-04-21 RX ADMIN — SODIUM CHLORIDE, PRESERVATIVE FREE 10 ML: 5 INJECTION INTRAVENOUS at 09:32

## 2024-04-21 RX ADMIN — CALCIUM GLUCONATE 3000 MG: 98 INJECTION, SOLUTION INTRAVENOUS at 10:49

## 2024-04-21 RX ADMIN — FOSPHENYTOIN SODIUM 100 MG PE: 50 INJECTION, SOLUTION INTRAMUSCULAR; INTRAVENOUS at 10:11

## 2024-04-21 RX ADMIN — Medication 250 MG: at 09:30

## 2024-04-21 RX ADMIN — LACOSAMIDE 200 MG: 10 INJECTION INTRAVENOUS at 20:38

## 2024-04-21 RX ADMIN — CHLORHEXIDINE GLUCONATE 15 ML: 1.2 RINSE ORAL at 09:29

## 2024-04-21 RX ADMIN — Medication 250 MG: at 17:24

## 2024-04-21 RX ADMIN — VANCOMYCIN HYDROCHLORIDE 1250 MG: 10 INJECTION, POWDER, LYOPHILIZED, FOR SOLUTION INTRAVENOUS at 17:12

## 2024-04-21 RX ADMIN — ENOXAPARIN SODIUM 80 MG: 100 INJECTION SUBCUTANEOUS at 20:15

## 2024-04-21 RX ADMIN — Medication 300 UNITS: at 09:30

## 2024-04-21 RX ADMIN — LACOSAMIDE 200 MG: 10 INJECTION INTRAVENOUS at 10:09

## 2024-04-21 RX ADMIN — IPRATROPIUM BROMIDE AND ALBUTEROL SULFATE 1 DOSE: .5; 2.5 SOLUTION RESPIRATORY (INHALATION) at 11:53

## 2024-04-21 RX ADMIN — LANSOPRAZOLE 30 MG: 30 TABLET, ORALLY DISINTEGRATING ORAL at 09:33

## 2024-04-21 RX ADMIN — MINERAL OIL, WHITE PETROLATUM: .03; .94 OINTMENT OPHTHALMIC at 23:54

## 2024-04-21 RX ADMIN — Medication 250 MG: at 20:08

## 2024-04-21 RX ADMIN — Medication 100 MG: at 09:30

## 2024-04-21 RX ADMIN — IPRATROPIUM BROMIDE AND ALBUTEROL SULFATE 1 DOSE: .5; 2.5 SOLUTION RESPIRATORY (INHALATION) at 16:31

## 2024-04-21 RX ADMIN — Medication 4 ML: at 16:31

## 2024-04-21 RX ADMIN — ENOXAPARIN SODIUM 80 MG: 100 INJECTION SUBCUTANEOUS at 09:30

## 2024-04-21 RX ADMIN — Medication 4 ML: at 06:17

## 2024-04-21 RX ADMIN — GUAIFENESIN 200 MG: 100 LIQUID ORAL at 14:35

## 2024-04-21 RX ADMIN — POLYVINYL ALCOHOL, POVIDONE 1 DROP: 14; 6 SOLUTION/ DROPS OPHTHALMIC at 17:24

## 2024-04-21 RX ADMIN — FINASTERIDE 5 MG: 5 TABLET, FILM COATED ORAL at 09:30

## 2024-04-21 RX ADMIN — FOLIC ACID 1 MG: 1 TABLET ORAL at 09:31

## 2024-04-21 RX ADMIN — FOSPHENYTOIN SODIUM 100 MG PE: 50 INJECTION, SOLUTION INTRAMUSCULAR; INTRAVENOUS at 20:41

## 2024-04-21 RX ADMIN — POLYVINYL ALCOHOL, POVIDONE 1 DROP: 14; 6 SOLUTION/ DROPS OPHTHALMIC at 05:47

## 2024-04-21 RX ADMIN — GUAIFENESIN 200 MG: 100 LIQUID ORAL at 20:06

## 2024-04-21 RX ADMIN — Medication 250 MG: at 14:35

## 2024-04-21 RX ADMIN — LANSOPRAZOLE 30 MG: 30 TABLET, ORALLY DISINTEGRATING ORAL at 20:07

## 2024-04-21 RX ADMIN — POLYVINYL ALCOHOL, POVIDONE 1 DROP: 14; 6 SOLUTION/ DROPS OPHTHALMIC at 13:17

## 2024-04-21 RX ADMIN — LEVETIRACETAM 2000 MG: 100 SOLUTION ORAL at 09:30

## 2024-04-21 RX ADMIN — ASPIRIN 81 MG: 81 TABLET, CHEWABLE ORAL at 09:30

## 2024-04-21 RX ADMIN — ACETAMINOPHEN 650 MG: 650 SOLUTION ORAL at 10:05

## 2024-04-21 RX ADMIN — GUAIFENESIN 200 MG: 100 LIQUID ORAL at 03:00

## 2024-04-21 RX ADMIN — PETROLATUM: 420 OINTMENT TOPICAL at 17:44

## 2024-04-21 ASSESSMENT — PULMONARY FUNCTION TESTS
PIF_VALUE: 19
PIF_VALUE: 15
PIF_VALUE: 14
PIF_VALUE: 18
PIF_VALUE: 14
PIF_VALUE: 15
PIF_VALUE: 14

## 2024-04-21 ASSESSMENT — PAIN SCALES - PAIN ASSESSMENT IN ADVANCED DEMENTIA (PAINAD)
CONSOLABILITY: DISTRACTED OR REASSURED BY VOICE/TOUCH
BREATHING: NORMAL
FACIALEXPRESSION: SMILING OR INEXPRESSIVE
BODYLANGUAGE: RELAXED
BREATHING: NORMAL
TOTALSCORE: 1
CONSOLABILITY: DISTRACTED OR REASSURED BY VOICE/TOUCH
BODYLANGUAGE: RELAXED
FACIALEXPRESSION: SMILING OR INEXPRESSIVE
TOTALSCORE: 1

## 2024-04-21 ASSESSMENT — PAIN SCALES - GENERAL
PAINLEVEL_OUTOF10: 0

## 2024-04-21 ASSESSMENT — PAIN - FUNCTIONAL ASSESSMENT: PAIN_FUNCTIONAL_ASSESSMENT: ACTIVITIES ARE NOT PREVENTED

## 2024-04-21 NOTE — FLOWSHEET NOTE
Mary Alonso and Jana notified of inability to obtain ABGs with RN and Resp. Therapist Jose attempts ineffective.

## 2024-04-22 ENCOUNTER — APPOINTMENT (OUTPATIENT)
Dept: GENERAL RADIOLOGY | Age: 79
DRG: 064 | End: 2024-04-22
Payer: COMMERCIAL

## 2024-04-22 PROBLEM — R79.89 ELEVATED LFTS: Status: ACTIVE | Noted: 2024-04-22

## 2024-04-22 PROBLEM — E87.8 ELECTROLYTE IMBALANCE: Status: ACTIVE | Noted: 2024-04-22

## 2024-04-22 PROBLEM — E88.09 HYPOALBUMINEMIA: Status: ACTIVE | Noted: 2024-04-22

## 2024-04-22 PROBLEM — E83.51 HYPOCALCEMIA: Status: ACTIVE | Noted: 2024-04-22

## 2024-04-22 LAB
ALBUMIN SERPL-MCNC: 2.6 G/DL (ref 3.5–5.2)
ALP SERPL-CCNC: 211 U/L (ref 40–129)
ALT SERPL-CCNC: 88 U/L (ref 0–40)
ANION GAP SERPL CALCULATED.3IONS-SCNC: 12 MMOL/L (ref 7–16)
AST SERPL-CCNC: 73 U/L (ref 0–39)
BASOPHILS # BLD: 0.06 K/UL (ref 0–0.2)
BASOPHILS NFR BLD: 0 % (ref 0–2)
BILIRUB SERPL-MCNC: 0.6 MG/DL (ref 0–1.2)
BUN SERPL-MCNC: 10 MG/DL (ref 6–23)
CA-I BLD-SCNC: 1.11 MMOL/L (ref 1.15–1.33)
CALCIUM SERPL-MCNC: 8.1 MG/DL (ref 8.6–10.2)
CHLORIDE SERPL-SCNC: 103 MMOL/L (ref 98–107)
CO2 SERPL-SCNC: 24 MMOL/L (ref 22–29)
CREAT SERPL-MCNC: 0.5 MG/DL (ref 0.7–1.2)
EOSINOPHIL # BLD: 0.15 K/UL (ref 0.05–0.5)
EOSINOPHILS RELATIVE PERCENT: 1 % (ref 0–6)
ERYTHROCYTE [DISTWIDTH] IN BLOOD BY AUTOMATED COUNT: 14.4 % (ref 11.5–15)
GFR SERPL CREATININE-BSD FRML MDRD: >90 ML/MIN/1.73M2
GLUCOSE SERPL-MCNC: 100 MG/DL (ref 74–99)
HCT VFR BLD AUTO: 32.4 % (ref 37–54)
HGB BLD-MCNC: 10.4 G/DL (ref 12.5–16.5)
IMM GRANULOCYTES # BLD AUTO: 0.17 K/UL (ref 0–0.58)
IMM GRANULOCYTES NFR BLD: 1 % (ref 0–5)
LYMPHOCYTES NFR BLD: 1.09 K/UL (ref 1.5–4)
LYMPHOCYTES RELATIVE PERCENT: 7 % (ref 20–42)
MAGNESIUM SERPL-MCNC: 1.8 MG/DL (ref 1.6–2.6)
MCH RBC QN AUTO: 29.5 PG (ref 26–35)
MCHC RBC AUTO-ENTMCNC: 32.1 G/DL (ref 32–34.5)
MCV RBC AUTO: 92 FL (ref 80–99.9)
MONOCYTES NFR BLD: 1.17 K/UL (ref 0.1–0.95)
MONOCYTES NFR BLD: 8 % (ref 2–12)
NEUTROPHILS NFR BLD: 82 % (ref 43–80)
NEUTS SEG NFR BLD: 12.26 K/UL (ref 1.8–7.3)
PHOSPHATE SERPL-MCNC: 3.3 MG/DL (ref 2.5–4.5)
PLATELET # BLD AUTO: 285 K/UL (ref 130–450)
PMV BLD AUTO: 11.5 FL (ref 7–12)
POTASSIUM SERPL-SCNC: 3.9 MMOL/L (ref 3.5–5)
PROT SERPL-MCNC: 5.9 G/DL (ref 6.4–8.3)
RBC # BLD AUTO: 3.52 M/UL (ref 3.8–5.8)
SODIUM SERPL-SCNC: 139 MMOL/L (ref 132–146)
WBC OTHER # BLD: 14.9 K/UL (ref 4.5–11.5)

## 2024-04-22 PROCEDURE — 36592 COLLECT BLOOD FROM PICC: CPT

## 2024-04-22 PROCEDURE — 6360000002 HC RX W HCPCS

## 2024-04-22 PROCEDURE — 6360000002 HC RX W HCPCS: Performed by: PSYCHIATRY & NEUROLOGY

## 2024-04-22 PROCEDURE — 6370000000 HC RX 637 (ALT 250 FOR IP): Performed by: STUDENT IN AN ORGANIZED HEALTH CARE EDUCATION/TRAINING PROGRAM

## 2024-04-22 PROCEDURE — 6360000002 HC RX W HCPCS: Performed by: STUDENT IN AN ORGANIZED HEALTH CARE EDUCATION/TRAINING PROGRAM

## 2024-04-22 PROCEDURE — 6370000000 HC RX 637 (ALT 250 FOR IP)

## 2024-04-22 PROCEDURE — 6360000002 HC RX W HCPCS: Performed by: CLINICAL NURSE SPECIALIST

## 2024-04-22 PROCEDURE — 2000000000 HC ICU R&B

## 2024-04-22 PROCEDURE — 6370000000 HC RX 637 (ALT 250 FOR IP): Performed by: SURGERY

## 2024-04-22 PROCEDURE — 84100 ASSAY OF PHOSPHORUS: CPT

## 2024-04-22 PROCEDURE — 2580000003 HC RX 258: Performed by: CLINICAL NURSE SPECIALIST

## 2024-04-22 PROCEDURE — 2580000003 HC RX 258

## 2024-04-22 PROCEDURE — 2580000003 HC RX 258: Performed by: STUDENT IN AN ORGANIZED HEALTH CARE EDUCATION/TRAINING PROGRAM

## 2024-04-22 PROCEDURE — 85025 COMPLETE CBC W/AUTO DIFF WBC: CPT

## 2024-04-22 PROCEDURE — 2500000003 HC RX 250 WO HCPCS: Performed by: STUDENT IN AN ORGANIZED HEALTH CARE EDUCATION/TRAINING PROGRAM

## 2024-04-22 PROCEDURE — C9254 INJECTION, LACOSAMIDE: HCPCS | Performed by: PSYCHIATRY & NEUROLOGY

## 2024-04-22 PROCEDURE — 83735 ASSAY OF MAGNESIUM: CPT

## 2024-04-22 PROCEDURE — 99233 SBSQ HOSP IP/OBS HIGH 50: CPT | Performed by: PHYSICIAN ASSISTANT

## 2024-04-22 PROCEDURE — 82330 ASSAY OF CALCIUM: CPT

## 2024-04-22 PROCEDURE — 99231 SBSQ HOSP IP/OBS SF/LOW 25: CPT | Performed by: EMERGENCY MEDICINE

## 2024-04-22 PROCEDURE — 2580000003 HC RX 258: Performed by: PSYCHIATRY & NEUROLOGY

## 2024-04-22 PROCEDURE — 2500000003 HC RX 250 WO HCPCS

## 2024-04-22 PROCEDURE — 2700000000 HC OXYGEN THERAPY PER DAY

## 2024-04-22 PROCEDURE — 80053 COMPREHEN METABOLIC PANEL: CPT

## 2024-04-22 PROCEDURE — 94640 AIRWAY INHALATION TREATMENT: CPT

## 2024-04-22 RX ORDER — MAGNESIUM SULFATE IN WATER 40 MG/ML
2000 INJECTION, SOLUTION INTRAVENOUS ONCE
Status: COMPLETED | OUTPATIENT
Start: 2024-04-22 | End: 2024-04-22

## 2024-04-22 RX ORDER — CALCIUM GLUCONATE 10 MG/ML
1000 INJECTION, SOLUTION INTRAVENOUS ONCE
Status: COMPLETED | OUTPATIENT
Start: 2024-04-22 | End: 2024-04-22

## 2024-04-22 RX ADMIN — MINERAL OIL, WHITE PETROLATUM: .03; .94 OINTMENT OPHTHALMIC at 12:14

## 2024-04-22 RX ADMIN — Medication 100 MG: at 09:08

## 2024-04-22 RX ADMIN — CHLORHEXIDINE GLUCONATE 15 ML: 1.2 RINSE ORAL at 09:07

## 2024-04-22 RX ADMIN — CALCIUM GLUCONATE 1000 MG: 10 INJECTION, SOLUTION INTRAVENOUS at 09:19

## 2024-04-22 RX ADMIN — GUAIFENESIN 200 MG: 100 LIQUID ORAL at 02:40

## 2024-04-22 RX ADMIN — SODIUM CHLORIDE, PRESERVATIVE FREE 10 ML: 5 INJECTION INTRAVENOUS at 09:45

## 2024-04-22 RX ADMIN — LACOSAMIDE 200 MG: 10 INJECTION INTRAVENOUS at 20:21

## 2024-04-22 RX ADMIN — IPRATROPIUM BROMIDE AND ALBUTEROL SULFATE 1 DOSE: .5; 2.5 SOLUTION RESPIRATORY (INHALATION) at 14:32

## 2024-04-22 RX ADMIN — MINERAL OIL, WHITE PETROLATUM: .03; .94 OINTMENT OPHTHALMIC at 02:40

## 2024-04-22 RX ADMIN — POLYVINYL ALCOHOL, POVIDONE 1 DROP: 14; 6 SOLUTION/ DROPS OPHTHALMIC at 19:37

## 2024-04-22 RX ADMIN — PETROLATUM: 420 OINTMENT TOPICAL at 09:50

## 2024-04-22 RX ADMIN — IPRATROPIUM BROMIDE AND ALBUTEROL SULFATE 1 DOSE: .5; 2.5 SOLUTION RESPIRATORY (INHALATION) at 20:03

## 2024-04-22 RX ADMIN — GUAIFENESIN 200 MG: 100 LIQUID ORAL at 09:07

## 2024-04-22 RX ADMIN — ASPIRIN 81 MG: 81 TABLET, CHEWABLE ORAL at 09:16

## 2024-04-22 RX ADMIN — SODIUM CHLORIDE, PRESERVATIVE FREE 10 ML: 5 INJECTION INTRAVENOUS at 17:57

## 2024-04-22 RX ADMIN — AMPICILLIN SODIUM AND SULBACTAM SODIUM 3000 MG: 2; 1 INJECTION, POWDER, FOR SOLUTION INTRAMUSCULAR; INTRAVENOUS at 05:21

## 2024-04-22 RX ADMIN — ENOXAPARIN SODIUM 80 MG: 100 INJECTION SUBCUTANEOUS at 09:39

## 2024-04-22 RX ADMIN — AMPICILLIN SODIUM AND SULBACTAM SODIUM 3000 MG: 2; 1 INJECTION, POWDER, FOR SOLUTION INTRAMUSCULAR; INTRAVENOUS at 12:13

## 2024-04-22 RX ADMIN — PETROLATUM: 420 OINTMENT TOPICAL at 17:57

## 2024-04-22 RX ADMIN — Medication 250 MG: at 12:16

## 2024-04-22 RX ADMIN — Medication 250 MG: at 17:00

## 2024-04-22 RX ADMIN — AMPICILLIN SODIUM AND SULBACTAM SODIUM 3000 MG: 2; 1 INJECTION, POWDER, FOR SOLUTION INTRAMUSCULAR; INTRAVENOUS at 18:06

## 2024-04-22 RX ADMIN — Medication 250 MG: at 19:36

## 2024-04-22 RX ADMIN — IPRATROPIUM BROMIDE AND ALBUTEROL SULFATE 1 DOSE: .5; 2.5 SOLUTION RESPIRATORY (INHALATION) at 16:57

## 2024-04-22 RX ADMIN — IPRATROPIUM BROMIDE AND ALBUTEROL SULFATE 1 DOSE: .5; 2.5 SOLUTION RESPIRATORY (INHALATION) at 10:57

## 2024-04-22 RX ADMIN — LANSOPRAZOLE 30 MG: 30 TABLET, ORALLY DISINTEGRATING ORAL at 19:37

## 2024-04-22 RX ADMIN — SODIUM CHLORIDE, PRESERVATIVE FREE 10 ML: 5 INJECTION INTRAVENOUS at 19:37

## 2024-04-22 RX ADMIN — Medication 250 MG: at 09:07

## 2024-04-22 RX ADMIN — Medication 300 UNITS: at 09:00

## 2024-04-22 RX ADMIN — CHLORHEXIDINE GLUCONATE 15 ML: 1.2 RINSE ORAL at 19:36

## 2024-04-22 RX ADMIN — POLYETHYLENE GLYCOL 3350 17 G: 17 POWDER, FOR SOLUTION ORAL at 09:12

## 2024-04-22 RX ADMIN — POLYVINYL ALCOHOL, POVIDONE 1 DROP: 14; 6 SOLUTION/ DROPS OPHTHALMIC at 16:15

## 2024-04-22 RX ADMIN — POTASSIUM BICARBONATE 20 MEQ: 782 TABLET, EFFERVESCENT ORAL at 09:07

## 2024-04-22 RX ADMIN — FOSPHENYTOIN SODIUM 100 MG PE: 50 INJECTION, SOLUTION INTRAMUSCULAR; INTRAVENOUS at 09:10

## 2024-04-22 RX ADMIN — ACETAMINOPHEN 650 MG: 650 SOLUTION ORAL at 10:11

## 2024-04-22 RX ADMIN — LANSOPRAZOLE 30 MG: 30 TABLET, ORALLY DISINTEGRATING ORAL at 09:11

## 2024-04-22 RX ADMIN — LEVETIRACETAM 2000 MG: 100 SOLUTION ORAL at 19:36

## 2024-04-22 RX ADMIN — ENOXAPARIN SODIUM 80 MG: 100 INJECTION SUBCUTANEOUS at 19:43

## 2024-04-22 RX ADMIN — FOSPHENYTOIN SODIUM 100 MG PE: 50 INJECTION, SOLUTION INTRAMUSCULAR; INTRAVENOUS at 21:41

## 2024-04-22 RX ADMIN — GUAIFENESIN 200 MG: 100 LIQUID ORAL at 19:36

## 2024-04-22 RX ADMIN — LACOSAMIDE 200 MG: 10 INJECTION INTRAVENOUS at 09:42

## 2024-04-22 RX ADMIN — FOLIC ACID 1 MG: 1 TABLET ORAL at 09:08

## 2024-04-22 RX ADMIN — SENNOSIDES 8.6 MG: 8.6 TABLET, FILM COATED ORAL at 19:36

## 2024-04-22 RX ADMIN — GUAIFENESIN 200 MG: 100 LIQUID ORAL at 15:11

## 2024-04-22 RX ADMIN — MAGNESIUM SULFATE HEPTAHYDRATE 2000 MG: 40 INJECTION, SOLUTION INTRAVENOUS at 10:24

## 2024-04-22 RX ADMIN — ATORVASTATIN CALCIUM 40 MG: 40 TABLET, FILM COATED ORAL at 19:36

## 2024-04-22 RX ADMIN — MINERAL OIL, WHITE PETROLATUM: .03; .94 OINTMENT OPHTHALMIC at 09:16

## 2024-04-22 RX ADMIN — HEPARIN 300 UNITS: 100 SYRINGE at 17:57

## 2024-04-22 RX ADMIN — LOSARTAN POTASSIUM 50 MG: 25 TABLET, FILM COATED ORAL at 09:37

## 2024-04-22 RX ADMIN — LEVETIRACETAM 2000 MG: 100 SOLUTION ORAL at 09:37

## 2024-04-22 RX ADMIN — FINASTERIDE 5 MG: 5 TABLET, FILM COATED ORAL at 09:11

## 2024-04-22 ASSESSMENT — PAIN SCALES - PAIN ASSESSMENT IN ADVANCED DEMENTIA (PAINAD)
BODYLANGUAGE: RELAXED
TOTALSCORE: 1
CONSOLABILITY: DISTRACTED OR REASSURED BY VOICE/TOUCH
BODYLANGUAGE: RELAXED
FACIALEXPRESSION: SMILING OR INEXPRESSIVE
TOTALSCORE: 1
BREATHING: NORMAL
BREATHING: NORMAL
FACIALEXPRESSION: SMILING OR INEXPRESSIVE
TOTALSCORE: 1
BODYLANGUAGE: RELAXED
TOTALSCORE: 1
TOTALSCORE: 1
CONSOLABILITY: DISTRACTED OR REASSURED BY VOICE/TOUCH
FACIALEXPRESSION: SMILING OR INEXPRESSIVE
FACIALEXPRESSION: SMILING OR INEXPRESSIVE
CONSOLABILITY: DISTRACTED OR REASSURED BY VOICE/TOUCH
FACIALEXPRESSION: SMILING OR INEXPRESSIVE
BREATHING: NORMAL
BODYLANGUAGE: RELAXED
CONSOLABILITY: DISTRACTED OR REASSURED BY VOICE/TOUCH
TOTALSCORE: 1
BODYLANGUAGE: RELAXED
TOTALSCORE: 1
BREATHING: NORMAL
BODYLANGUAGE: RELAXED
CONSOLABILITY: DISTRACTED OR REASSURED BY VOICE/TOUCH
CONSOLABILITY: DISTRACTED OR REASSURED BY VOICE/TOUCH
BREATHING: NORMAL
FACIALEXPRESSION: SMILING OR INEXPRESSIVE
BREATHING: NORMAL
FACIALEXPRESSION: SMILING OR INEXPRESSIVE
BREATHING: NORMAL
CONSOLABILITY: DISTRACTED OR REASSURED BY VOICE/TOUCH
BODYLANGUAGE: RELAXED

## 2024-04-22 ASSESSMENT — PAIN SCALES - GENERAL
PAINLEVEL_OUTOF10: 0

## 2024-04-22 NOTE — CARE COORDINATION
4/22 Care Coordination: Pt remains in SICU.Dx Seizures. Pt now extubated. Is a DNR-CCA, No re intubation.Palliative Care is following. With Current status unclear on discharge needs. Back door to [facility]. Now will need to touch base with family. Will need PT/OT evals if able. CM/SW will continue to follow for discharge planning.   Ranjit BELL,RN--BC  [phone]

## 2024-04-23 ENCOUNTER — APPOINTMENT (OUTPATIENT)
Dept: GENERAL RADIOLOGY | Age: 79
DRG: 064 | End: 2024-04-23
Payer: COMMERCIAL

## 2024-04-23 PROBLEM — I63.9 ACUTE ISCHEMIC STROKE (HCC): Status: ACTIVE | Noted: 2024-04-23

## 2024-04-23 LAB
ALBUMIN SERPL-MCNC: 2.4 G/DL (ref 3.5–5.2)
ALBUMIN SERPL-MCNC: 2.7 G/DL (ref 3.5–5.2)
ALP SERPL-CCNC: 106 U/L (ref 40–129)
ALP SERPL-CCNC: 289 U/L (ref 40–129)
ALT SERPL-CCNC: 30 U/L (ref 0–40)
ALT SERPL-CCNC: 91 U/L (ref 0–40)
ANION GAP SERPL CALCULATED.3IONS-SCNC: 11 MMOL/L (ref 7–16)
ANION GAP SERPL CALCULATED.3IONS-SCNC: 14 MMOL/L (ref 7–16)
AST SERPL-CCNC: 30 U/L (ref 0–39)
AST SERPL-CCNC: 74 U/L (ref 0–39)
BASOPHILS # BLD: 0.04 K/UL (ref 0–0.2)
BASOPHILS # BLD: 0.2 K/UL (ref 0–0.2)
BASOPHILS NFR BLD: 0 % (ref 0–2)
BASOPHILS NFR BLD: 2 % (ref 0–2)
BILIRUB SERPL-MCNC: 0.6 MG/DL (ref 0–1.2)
BILIRUB SERPL-MCNC: 1.3 MG/DL (ref 0–1.2)
BUN SERPL-MCNC: 11 MG/DL (ref 6–23)
BUN SERPL-MCNC: 24 MG/DL (ref 6–23)
CA-I BLD-SCNC: 1.09 MMOL/L (ref 1.15–1.33)
CA-I BLD-SCNC: 1.12 MMOL/L (ref 1.15–1.33)
CALCIUM SERPL-MCNC: 7.9 MG/DL (ref 8.6–10.2)
CALCIUM SERPL-MCNC: 8.2 MG/DL (ref 8.6–10.2)
CHLORIDE SERPL-SCNC: 103 MMOL/L (ref 98–107)
CHLORIDE SERPL-SCNC: 110 MMOL/L (ref 98–107)
CO2 SERPL-SCNC: 21 MMOL/L (ref 22–29)
CO2 SERPL-SCNC: 22 MMOL/L (ref 22–29)
CREAT SERPL-MCNC: 0.5 MG/DL (ref 0.7–1.2)
CREAT SERPL-MCNC: 0.6 MG/DL (ref 0.7–1.2)
DATE LAST DOSE: NORMAL
EOSINOPHIL # BLD: 0 K/UL (ref 0.05–0.5)
EOSINOPHIL # BLD: 0.11 K/UL (ref 0.05–0.5)
EOSINOPHILS RELATIVE PERCENT: 0 % (ref 0–6)
EOSINOPHILS RELATIVE PERCENT: 1 % (ref 0–6)
ERYTHROCYTE [DISTWIDTH] IN BLOOD BY AUTOMATED COUNT: 14.2 % (ref 11.5–15)
ERYTHROCYTE [DISTWIDTH] IN BLOOD BY AUTOMATED COUNT: 15.5 % (ref 11.5–15)
GFR SERPL CREATININE-BSD FRML MDRD: >90 ML/MIN/1.73M2
GFR SERPL CREATININE-BSD FRML MDRD: >90 ML/MIN/1.73M2
GLUCOSE SERPL-MCNC: 116 MG/DL (ref 74–99)
GLUCOSE SERPL-MCNC: 159 MG/DL (ref 74–99)
HCT VFR BLD AUTO: 33 % (ref 37–54)
HCT VFR BLD AUTO: 35.2 % (ref 37–54)
HGB BLD-MCNC: 10.8 G/DL (ref 12.5–16.5)
HGB BLD-MCNC: 11.6 G/DL (ref 12.5–16.5)
IMM GRANULOCYTES # BLD AUTO: 0.11 K/UL (ref 0–0.58)
IMM GRANULOCYTES NFR BLD: 1 % (ref 0–5)
LYMPHOCYTES NFR BLD: 0.5 K/UL (ref 1.5–4)
LYMPHOCYTES NFR BLD: 0.97 K/UL (ref 1.5–4)
LYMPHOCYTES RELATIVE PERCENT: 4 % (ref 20–42)
LYMPHOCYTES RELATIVE PERCENT: 6 % (ref 20–42)
MAGNESIUM SERPL-MCNC: 2 MG/DL (ref 1.6–2.6)
MAGNESIUM SERPL-MCNC: 2 MG/DL (ref 1.6–2.6)
MCH RBC QN AUTO: 29.5 PG (ref 26–35)
MCH RBC QN AUTO: 29.9 PG (ref 26–35)
MCHC RBC AUTO-ENTMCNC: 32.7 G/DL (ref 32–34.5)
MCHC RBC AUTO-ENTMCNC: 33 G/DL (ref 32–34.5)
MCV RBC AUTO: 89.6 FL (ref 80–99.9)
MCV RBC AUTO: 91.4 FL (ref 80–99.9)
MICROORGANISM SPEC CULT: ABNORMAL
MICROORGANISM/AGENT SPEC: ABNORMAL
MONOCYTES NFR BLD: 0.3 K/UL (ref 0.1–0.95)
MONOCYTES NFR BLD: 1.23 K/UL (ref 0.1–0.95)
MONOCYTES NFR BLD: 3 % (ref 2–12)
MONOCYTES NFR BLD: 8 % (ref 2–12)
NEUTROPHILS NFR BLD: 84 % (ref 43–80)
NEUTROPHILS NFR BLD: 91 % (ref 43–80)
NEUTS SEG NFR BLD: 10.5 K/UL (ref 1.8–7.3)
NEUTS SEG NFR BLD: 12.72 K/UL (ref 1.8–7.3)
PHENYTOIN DOSE: NORMAL MG
PHENYTOIN SERPL-MCNC: 12.3 UG/ML (ref 10–20)
PHOSPHATE SERPL-MCNC: 3.4 MG/DL (ref 2.5–4.5)
PHOSPHATE SERPL-MCNC: 3.6 MG/DL (ref 2.5–4.5)
PLATELET # BLD AUTO: 202 K/UL (ref 130–450)
PLATELET # BLD AUTO: 353 K/UL (ref 130–450)
PMV BLD AUTO: 11.3 FL (ref 7–12)
PMV BLD AUTO: 11.7 FL (ref 7–12)
POTASSIUM SERPL-SCNC: 4.3 MMOL/L (ref 3.5–5)
POTASSIUM SERPL-SCNC: 4.6 MMOL/L (ref 3.5–5)
PROT SERPL-MCNC: 5.7 G/DL (ref 6.4–8.3)
PROT SERPL-MCNC: 6.3 G/DL (ref 6.4–8.3)
RBC # BLD AUTO: 3.61 M/UL (ref 3.8–5.8)
RBC # BLD AUTO: 3.93 M/UL (ref 3.8–5.8)
RBC # BLD: ABNORMAL 10*6/UL
SODIUM SERPL-SCNC: 139 MMOL/L (ref 132–146)
SODIUM SERPL-SCNC: 142 MMOL/L (ref 132–146)
SPECIMEN DESCRIPTION: ABNORMAL
TME LAST DOSE: NORMAL H
WBC # BLD: ABNORMAL 10*3/UL
WBC OTHER # BLD: 11.5 K/UL (ref 4.5–11.5)
WBC OTHER # BLD: 15.2 K/UL (ref 4.5–11.5)

## 2024-04-23 PROCEDURE — 99233 SBSQ HOSP IP/OBS HIGH 50: CPT | Performed by: PHYSICIAN ASSISTANT

## 2024-04-23 PROCEDURE — 94640 AIRWAY INHALATION TREATMENT: CPT

## 2024-04-23 PROCEDURE — 82330 ASSAY OF CALCIUM: CPT

## 2024-04-23 PROCEDURE — 83735 ASSAY OF MAGNESIUM: CPT

## 2024-04-23 PROCEDURE — 6370000000 HC RX 637 (ALT 250 FOR IP): Performed by: SURGERY

## 2024-04-23 PROCEDURE — 84100 ASSAY OF PHOSPHORUS: CPT

## 2024-04-23 PROCEDURE — 99232 SBSQ HOSP IP/OBS MODERATE 35: CPT | Performed by: EMERGENCY MEDICINE

## 2024-04-23 PROCEDURE — 6360000002 HC RX W HCPCS: Performed by: STUDENT IN AN ORGANIZED HEALTH CARE EDUCATION/TRAINING PROGRAM

## 2024-04-23 PROCEDURE — 6360000002 HC RX W HCPCS

## 2024-04-23 PROCEDURE — C9254 INJECTION, LACOSAMIDE: HCPCS | Performed by: PSYCHIATRY & NEUROLOGY

## 2024-04-23 PROCEDURE — 6370000000 HC RX 637 (ALT 250 FOR IP)

## 2024-04-23 PROCEDURE — 2580000003 HC RX 258: Performed by: CLINICAL NURSE SPECIALIST

## 2024-04-23 PROCEDURE — 2000000000 HC ICU R&B

## 2024-04-23 PROCEDURE — 2580000003 HC RX 258

## 2024-04-23 PROCEDURE — 6370000000 HC RX 637 (ALT 250 FOR IP): Performed by: STUDENT IN AN ORGANIZED HEALTH CARE EDUCATION/TRAINING PROGRAM

## 2024-04-23 PROCEDURE — 2580000003 HC RX 258: Performed by: PSYCHIATRY & NEUROLOGY

## 2024-04-23 PROCEDURE — 2700000000 HC OXYGEN THERAPY PER DAY

## 2024-04-23 PROCEDURE — 36592 COLLECT BLOOD FROM PICC: CPT

## 2024-04-23 PROCEDURE — 85025 COMPLETE CBC W/AUTO DIFF WBC: CPT

## 2024-04-23 PROCEDURE — 6360000002 HC RX W HCPCS: Performed by: PSYCHIATRY & NEUROLOGY

## 2024-04-23 PROCEDURE — 2500000003 HC RX 250 WO HCPCS: Performed by: STUDENT IN AN ORGANIZED HEALTH CARE EDUCATION/TRAINING PROGRAM

## 2024-04-23 PROCEDURE — 80053 COMPREHEN METABOLIC PANEL: CPT

## 2024-04-23 PROCEDURE — 6360000002 HC RX W HCPCS: Performed by: CLINICAL NURSE SPECIALIST

## 2024-04-23 PROCEDURE — 2580000003 HC RX 258: Performed by: STUDENT IN AN ORGANIZED HEALTH CARE EDUCATION/TRAINING PROGRAM

## 2024-04-23 RX ORDER — FUROSEMIDE 10 MG/ML
20 INJECTION INTRAMUSCULAR; INTRAVENOUS 2 TIMES DAILY
Status: COMPLETED | OUTPATIENT
Start: 2024-04-23 | End: 2024-04-23

## 2024-04-23 RX ORDER — ACETAMINOPHEN 160 MG/5ML
650 LIQUID ORAL EVERY 4 HOURS PRN
Status: DISCONTINUED | OUTPATIENT
Start: 2024-04-23 | End: 2024-04-24

## 2024-04-23 RX ADMIN — LABETALOL HYDROCHLORIDE 10 MG: 5 INJECTION, SOLUTION INTRAVENOUS at 02:38

## 2024-04-23 RX ADMIN — FUROSEMIDE 20 MG: 10 INJECTION, SOLUTION INTRAMUSCULAR; INTRAVENOUS at 17:07

## 2024-04-23 RX ADMIN — LACOSAMIDE 200 MG: 10 INJECTION INTRAVENOUS at 09:06

## 2024-04-23 RX ADMIN — GUAIFENESIN 200 MG: 100 LIQUID ORAL at 02:35

## 2024-04-23 RX ADMIN — ENOXAPARIN SODIUM 80 MG: 100 INJECTION SUBCUTANEOUS at 08:19

## 2024-04-23 RX ADMIN — ENOXAPARIN SODIUM 80 MG: 100 INJECTION SUBCUTANEOUS at 20:38

## 2024-04-23 RX ADMIN — ATORVASTATIN CALCIUM 40 MG: 40 TABLET, FILM COATED ORAL at 20:38

## 2024-04-23 RX ADMIN — ASPIRIN 81 MG: 81 TABLET, CHEWABLE ORAL at 07:51

## 2024-04-23 RX ADMIN — FOLIC ACID 1 MG: 1 TABLET ORAL at 07:51

## 2024-04-23 RX ADMIN — IPRATROPIUM BROMIDE AND ALBUTEROL SULFATE 1 DOSE: .5; 2.5 SOLUTION RESPIRATORY (INHALATION) at 11:44

## 2024-04-23 RX ADMIN — CALCIUM GLUCONATE 2000 MG: 98 INJECTION, SOLUTION INTRAVENOUS at 08:27

## 2024-04-23 RX ADMIN — LEVETIRACETAM 2000 MG: 100 SOLUTION ORAL at 07:50

## 2024-04-23 RX ADMIN — IPRATROPIUM BROMIDE AND ALBUTEROL SULFATE 1 DOSE: .5; 2.5 SOLUTION RESPIRATORY (INHALATION) at 15:40

## 2024-04-23 RX ADMIN — Medication 250 MG: at 20:38

## 2024-04-23 RX ADMIN — Medication 250 MG: at 12:26

## 2024-04-23 RX ADMIN — LANSOPRAZOLE 30 MG: 30 TABLET, ORALLY DISINTEGRATING ORAL at 20:40

## 2024-04-23 RX ADMIN — SODIUM CHLORIDE, PRESERVATIVE FREE 10 ML: 5 INJECTION INTRAVENOUS at 07:51

## 2024-04-23 RX ADMIN — SODIUM CHLORIDE, PRESERVATIVE FREE 10 ML: 5 INJECTION INTRAVENOUS at 20:39

## 2024-04-23 RX ADMIN — LOSARTAN POTASSIUM 50 MG: 25 TABLET, FILM COATED ORAL at 07:51

## 2024-04-23 RX ADMIN — Medication 300 UNITS: at 07:51

## 2024-04-23 RX ADMIN — LEVETIRACETAM 2000 MG: 100 SOLUTION ORAL at 20:38

## 2024-04-23 RX ADMIN — AMPICILLIN SODIUM AND SULBACTAM SODIUM 3000 MG: 2; 1 INJECTION, POWDER, FOR SOLUTION INTRAMUSCULAR; INTRAVENOUS at 05:00

## 2024-04-23 RX ADMIN — FINASTERIDE 5 MG: 5 TABLET, FILM COATED ORAL at 07:51

## 2024-04-23 RX ADMIN — GUAIFENESIN 200 MG: 100 LIQUID ORAL at 07:51

## 2024-04-23 RX ADMIN — FUROSEMIDE 20 MG: 10 INJECTION, SOLUTION INTRAMUSCULAR; INTRAVENOUS at 09:05

## 2024-04-23 RX ADMIN — LABETALOL HYDROCHLORIDE 10 MG: 5 INJECTION, SOLUTION INTRAVENOUS at 05:27

## 2024-04-23 RX ADMIN — IPRATROPIUM BROMIDE AND ALBUTEROL SULFATE 1 DOSE: .5; 2.5 SOLUTION RESPIRATORY (INHALATION) at 20:23

## 2024-04-23 RX ADMIN — ACETAMINOPHEN 650 MG: 650 SOLUTION ORAL at 05:31

## 2024-04-23 RX ADMIN — Medication 250 MG: at 17:07

## 2024-04-23 RX ADMIN — GUAIFENESIN 200 MG: 100 LIQUID ORAL at 12:26

## 2024-04-23 RX ADMIN — FOSPHENYTOIN SODIUM 100 MG PE: 50 INJECTION, SOLUTION INTRAMUSCULAR; INTRAVENOUS at 08:21

## 2024-04-23 RX ADMIN — IPRATROPIUM BROMIDE AND ALBUTEROL SULFATE 1 DOSE: .5; 2.5 SOLUTION RESPIRATORY (INHALATION) at 06:41

## 2024-04-23 RX ADMIN — Medication 300 UNITS: at 20:38

## 2024-04-23 RX ADMIN — POLYVINYL ALCOHOL, POVIDONE 1 DROP: 14; 6 SOLUTION/ DROPS OPHTHALMIC at 01:45

## 2024-04-23 RX ADMIN — GUAIFENESIN 200 MG: 100 LIQUID ORAL at 20:38

## 2024-04-23 RX ADMIN — SENNOSIDES 8.6 MG: 8.6 TABLET, FILM COATED ORAL at 20:42

## 2024-04-23 RX ADMIN — LACOSAMIDE 200 MG: 10 INJECTION INTRAVENOUS at 20:40

## 2024-04-23 RX ADMIN — WATER 2000 MG: 1 INJECTION INTRAMUSCULAR; INTRAVENOUS; SUBCUTANEOUS at 12:26

## 2024-04-23 RX ADMIN — LANSOPRAZOLE 30 MG: 30 TABLET, ORALLY DISINTEGRATING ORAL at 08:20

## 2024-04-23 RX ADMIN — Medication 100 MG: at 07:51

## 2024-04-23 RX ADMIN — Medication 250 MG: at 07:51

## 2024-04-23 RX ADMIN — POLYVINYL ALCOHOL, POVIDONE 1 DROP: 14; 6 SOLUTION/ DROPS OPHTHALMIC at 04:59

## 2024-04-23 RX ADMIN — AMPICILLIN SODIUM AND SULBACTAM SODIUM 3000 MG: 2; 1 INJECTION, POWDER, FOR SOLUTION INTRAMUSCULAR; INTRAVENOUS at 00:55

## 2024-04-23 RX ADMIN — ACETAMINOPHEN 650 MG: 650 SOLUTION ORAL at 17:07

## 2024-04-23 ASSESSMENT — PAIN SCALES - GENERAL
PAINLEVEL_OUTOF10: 0

## 2024-04-23 NOTE — CARE COORDINATION
4/23 Care Coordination: Pt remains in SICU, Dx Seizures, Stroke. Lt hemiparesis. Following simple commands on the R intermittently. Neurologically no significant changes. Neurology and Palliative met with daughter today. Still awaiting decision from family, Pt is Limited code no to everything. Pt can not participate with PT/OT. Pt has NG with TF's no to peg. Family stating he would not want to live in a Nursing Home. Will try to touch base again today. Daughter wanted to talk with Neurology before further decisions.  CM/SW will continue to follow for discharge planning.   Ranjit BELL,ANA LUISA-ANGELA-BC  [phone]     4/23  Care Coordination: CM left message for pt's daughter Damaris. No family has been back since talking with Neurology and Palliative Care. CM/SW will continue to follow for discharge planning.   Ranjit BELL,ANA LUISA-SANG  [phone]

## 2024-04-23 NOTE — CARE COORDINATION
4/23 Care Coordination: CM spoke with Daughter, diiscussed plan for her father Preston. She would like HOTV consult and also ICU team to call her and update her. Order placed for HOTV [facility]ER explained to the daughter about Home with Hospice or [facility] with Hospice. Told at [facility] family would be responsible for Daily bed charge.  Family also interested in Hospice House. CM/JOSÉ will continue to follow for discharge planning.   Ranjit BELL,RN--BC  [phone]

## 2024-04-24 ENCOUNTER — APPOINTMENT (OUTPATIENT)
Dept: GENERAL RADIOLOGY | Age: 79
DRG: 064 | End: 2024-04-24
Payer: COMMERCIAL

## 2024-04-24 VITALS
DIASTOLIC BLOOD PRESSURE: 62 MMHG | SYSTOLIC BLOOD PRESSURE: 128 MMHG | HEART RATE: 84 BPM | BODY MASS INDEX: 27.28 KG/M2 | TEMPERATURE: 99.1 F | WEIGHT: 194.89 LBS | OXYGEN SATURATION: 92 % | RESPIRATION RATE: 19 BRPM | HEIGHT: 71 IN

## 2024-04-24 LAB
ALBUMIN SERPL-MCNC: 2.7 G/DL (ref 3.5–5.2)
ALP SERPL-CCNC: 308 U/L (ref 40–129)
ALT SERPL-CCNC: 80 U/L (ref 0–40)
ANION GAP SERPL CALCULATED.3IONS-SCNC: 15 MMOL/L (ref 7–16)
AST SERPL-CCNC: 60 U/L (ref 0–39)
BACTERIA URNS QL MICRO: ABNORMAL
BASOPHILS # BLD: 0.05 K/UL (ref 0–0.2)
BASOPHILS NFR BLD: 0 % (ref 0–2)
BILIRUB SERPL-MCNC: 0.7 MG/DL (ref 0–1.2)
BILIRUB UR QL STRIP: NEGATIVE
BUN SERPL-MCNC: 12 MG/DL (ref 6–23)
CA-I BLD-SCNC: 1.14 MMOL/L (ref 1.15–1.33)
CALCIUM SERPL-MCNC: 8.2 MG/DL (ref 8.6–10.2)
CHLORIDE SERPL-SCNC: 102 MMOL/L (ref 98–107)
CLARITY UR: CLEAR
CO2 SERPL-SCNC: 23 MMOL/L (ref 22–29)
COLOR UR: YELLOW
CREAT SERPL-MCNC: 0.5 MG/DL (ref 0.7–1.2)
EOSINOPHIL # BLD: 0.03 K/UL (ref 0.05–0.5)
EOSINOPHILS RELATIVE PERCENT: 0 % (ref 0–6)
ERYTHROCYTE [DISTWIDTH] IN BLOOD BY AUTOMATED COUNT: 14.2 % (ref 11.5–15)
GFR SERPL CREATININE-BSD FRML MDRD: >90 ML/MIN/1.73M2
GLUCOSE SERPL-MCNC: 106 MG/DL (ref 74–99)
GLUCOSE UR STRIP-MCNC: NEGATIVE MG/DL
HCT VFR BLD AUTO: 32.6 % (ref 37–54)
HGB BLD-MCNC: 10.7 G/DL (ref 12.5–16.5)
HGB UR QL STRIP.AUTO: ABNORMAL
IMM GRANULOCYTES # BLD AUTO: 0.1 K/UL (ref 0–0.58)
IMM GRANULOCYTES NFR BLD: 1 % (ref 0–5)
KETONES UR STRIP-MCNC: NEGATIVE MG/DL
LEUKOCYTE ESTERASE UR QL STRIP: ABNORMAL
LYMPHOCYTES NFR BLD: 1.22 K/UL (ref 1.5–4)
LYMPHOCYTES RELATIVE PERCENT: 8 % (ref 20–42)
MAGNESIUM SERPL-MCNC: 1.8 MG/DL (ref 1.6–2.6)
MCH RBC QN AUTO: 29.9 PG (ref 26–35)
MCHC RBC AUTO-ENTMCNC: 32.8 G/DL (ref 32–34.5)
MCV RBC AUTO: 91.1 FL (ref 80–99.9)
MONOCYTES NFR BLD: 1.63 K/UL (ref 0.1–0.95)
MONOCYTES NFR BLD: 11 % (ref 2–12)
NEUTROPHILS NFR BLD: 80 % (ref 43–80)
NEUTS SEG NFR BLD: 11.73 K/UL (ref 1.8–7.3)
NITRITE UR QL STRIP: NEGATIVE
PH UR STRIP: 7.5 [PH] (ref 5–9)
PHOSPHATE SERPL-MCNC: 3.3 MG/DL (ref 2.5–4.5)
PLATELET # BLD AUTO: 403 K/UL (ref 130–450)
PMV BLD AUTO: 11.4 FL (ref 7–12)
POTASSIUM SERPL-SCNC: 3.9 MMOL/L (ref 3.5–5)
PROT SERPL-MCNC: 6.3 G/DL (ref 6.4–8.3)
PROT UR STRIP-MCNC: NEGATIVE MG/DL
RBC # BLD AUTO: 3.58 M/UL (ref 3.8–5.8)
RBC # BLD: ABNORMAL 10*6/UL
RBC #/AREA URNS HPF: ABNORMAL /HPF
SODIUM SERPL-SCNC: 140 MMOL/L (ref 132–146)
SP GR UR STRIP: 1.01 (ref 1–1.03)
UROBILINOGEN UR STRIP-ACNC: >8 EU/DL (ref 0–1)
WBC #/AREA URNS HPF: ABNORMAL /HPF
WBC OTHER # BLD: 14.8 K/UL (ref 4.5–11.5)

## 2024-04-24 PROCEDURE — 83735 ASSAY OF MAGNESIUM: CPT

## 2024-04-24 PROCEDURE — 84100 ASSAY OF PHOSPHORUS: CPT

## 2024-04-24 PROCEDURE — 6370000000 HC RX 637 (ALT 250 FOR IP)

## 2024-04-24 PROCEDURE — 6370000000 HC RX 637 (ALT 250 FOR IP): Performed by: SURGERY

## 2024-04-24 PROCEDURE — 80053 COMPREHEN METABOLIC PANEL: CPT

## 2024-04-24 PROCEDURE — 6370000000 HC RX 637 (ALT 250 FOR IP): Performed by: STUDENT IN AN ORGANIZED HEALTH CARE EDUCATION/TRAINING PROGRAM

## 2024-04-24 PROCEDURE — 2580000003 HC RX 258: Performed by: PSYCHIATRY & NEUROLOGY

## 2024-04-24 PROCEDURE — 6360000002 HC RX W HCPCS: Performed by: STUDENT IN AN ORGANIZED HEALTH CARE EDUCATION/TRAINING PROGRAM

## 2024-04-24 PROCEDURE — C9254 INJECTION, LACOSAMIDE: HCPCS | Performed by: PSYCHIATRY & NEUROLOGY

## 2024-04-24 PROCEDURE — 6360000002 HC RX W HCPCS

## 2024-04-24 PROCEDURE — 85025 COMPLETE CBC W/AUTO DIFF WBC: CPT

## 2024-04-24 PROCEDURE — 71045 X-RAY EXAM CHEST 1 VIEW: CPT

## 2024-04-24 PROCEDURE — 6360000002 HC RX W HCPCS: Performed by: PHYSICIAN ASSISTANT

## 2024-04-24 PROCEDURE — 6360000002 HC RX W HCPCS: Performed by: PSYCHIATRY & NEUROLOGY

## 2024-04-24 PROCEDURE — 36592 COLLECT BLOOD FROM PICC: CPT

## 2024-04-24 PROCEDURE — 2500000003 HC RX 250 WO HCPCS

## 2024-04-24 PROCEDURE — 2580000003 HC RX 258: Performed by: PHYSICIAN ASSISTANT

## 2024-04-24 PROCEDURE — 82330 ASSAY OF CALCIUM: CPT

## 2024-04-24 PROCEDURE — 36415 COLL VENOUS BLD VENIPUNCTURE: CPT

## 2024-04-24 PROCEDURE — 94640 AIRWAY INHALATION TREATMENT: CPT

## 2024-04-24 PROCEDURE — 81001 URINALYSIS AUTO W/SCOPE: CPT

## 2024-04-24 PROCEDURE — 2580000003 HC RX 258

## 2024-04-24 PROCEDURE — 87040 BLOOD CULTURE FOR BACTERIA: CPT

## 2024-04-24 PROCEDURE — 2500000003 HC RX 250 WO HCPCS: Performed by: STUDENT IN AN ORGANIZED HEALTH CARE EDUCATION/TRAINING PROGRAM

## 2024-04-24 PROCEDURE — 2700000000 HC OXYGEN THERAPY PER DAY

## 2024-04-24 RX ORDER — LORAZEPAM 2 MG/ML
0.5 INJECTION INTRAMUSCULAR EVERY 4 HOURS PRN
Status: DISCONTINUED | OUTPATIENT
Start: 2024-04-24 | End: 2024-04-24 | Stop reason: HOSPADM

## 2024-04-24 RX ORDER — FUROSEMIDE 10 MG/ML
20 INJECTION INTRAMUSCULAR; INTRAVENOUS 2 TIMES DAILY
Status: DISCONTINUED | OUTPATIENT
Start: 2024-04-24 | End: 2024-04-24

## 2024-04-24 RX ORDER — GLYCOPYRROLATE 0.2 MG/ML
0.4 INJECTION INTRAMUSCULAR; INTRAVENOUS EVERY 4 HOURS PRN
Status: DISCONTINUED | OUTPATIENT
Start: 2024-04-24 | End: 2024-04-24 | Stop reason: HOSPADM

## 2024-04-24 RX ORDER — MORPHINE SULFATE 2 MG/ML
2 INJECTION, SOLUTION INTRAMUSCULAR; INTRAVENOUS
Status: DISCONTINUED | OUTPATIENT
Start: 2024-04-24 | End: 2024-04-24 | Stop reason: HOSPADM

## 2024-04-24 RX ORDER — MAGNESIUM SULFATE IN WATER 40 MG/ML
2000 INJECTION, SOLUTION INTRAVENOUS ONCE
Status: COMPLETED | OUTPATIENT
Start: 2024-04-24 | End: 2024-04-24

## 2024-04-24 RX ORDER — CALCIUM GLUCONATE 10 MG/ML
1000 INJECTION, SOLUTION INTRAVENOUS ONCE
Status: COMPLETED | OUTPATIENT
Start: 2024-04-24 | End: 2024-04-24

## 2024-04-24 RX ORDER — GLYCOPYRROLATE 0.2 MG/ML
0.4 INJECTION INTRAMUSCULAR; INTRAVENOUS ONCE
Status: COMPLETED | OUTPATIENT
Start: 2024-04-24 | End: 2024-04-24

## 2024-04-24 RX ADMIN — SODIUM CHLORIDE, PRESERVATIVE FREE 20 ML: 5 INJECTION INTRAVENOUS at 08:13

## 2024-04-24 RX ADMIN — LANSOPRAZOLE 30 MG: 30 TABLET, ORALLY DISINTEGRATING ORAL at 08:12

## 2024-04-24 RX ADMIN — LACOSAMIDE 200 MG: 10 INJECTION INTRAVENOUS at 08:25

## 2024-04-24 RX ADMIN — FINASTERIDE 5 MG: 5 TABLET, FILM COATED ORAL at 08:11

## 2024-04-24 RX ADMIN — MAGNESIUM SULFATE HEPTAHYDRATE 2000 MG: 40 INJECTION, SOLUTION INTRAVENOUS at 08:36

## 2024-04-24 RX ADMIN — MORPHINE SULFATE 2 MG: 2 INJECTION, SOLUTION INTRAMUSCULAR; INTRAVENOUS at 14:40

## 2024-04-24 RX ADMIN — GLYCOPYRROLATE 0.4 MG: 0.2 INJECTION INTRAMUSCULAR; INTRAVENOUS at 14:40

## 2024-04-24 RX ADMIN — LORAZEPAM 0.5 MG: 2 INJECTION INTRAMUSCULAR; INTRAVENOUS at 11:15

## 2024-04-24 RX ADMIN — ASPIRIN 81 MG: 81 TABLET, CHEWABLE ORAL at 08:15

## 2024-04-24 RX ADMIN — POLYETHYLENE GLYCOL 3350 17 G: 17 POWDER, FOR SOLUTION ORAL at 08:12

## 2024-04-24 RX ADMIN — FUROSEMIDE 20 MG: 10 INJECTION, SOLUTION INTRAMUSCULAR; INTRAVENOUS at 08:12

## 2024-04-24 RX ADMIN — Medication 250 MG: at 08:11

## 2024-04-24 RX ADMIN — GUAIFENESIN 200 MG: 100 LIQUID ORAL at 08:11

## 2024-04-24 RX ADMIN — LEVETIRACETAM 2000 MG: 100 SOLUTION ORAL at 08:30

## 2024-04-24 RX ADMIN — IPRATROPIUM BROMIDE AND ALBUTEROL SULFATE 1 DOSE: .5; 2.5 SOLUTION RESPIRATORY (INHALATION) at 12:12

## 2024-04-24 RX ADMIN — GUAIFENESIN 200 MG: 100 LIQUID ORAL at 02:36

## 2024-04-24 RX ADMIN — LOSARTAN POTASSIUM 50 MG: 25 TABLET, FILM COATED ORAL at 08:12

## 2024-04-24 RX ADMIN — Medication 300 UNITS: at 08:30

## 2024-04-24 RX ADMIN — LABETALOL HYDROCHLORIDE 10 MG: 5 INJECTION, SOLUTION INTRAVENOUS at 08:11

## 2024-04-24 RX ADMIN — POTASSIUM BICARBONATE 20 MEQ: 782 TABLET, EFFERVESCENT ORAL at 08:11

## 2024-04-24 RX ADMIN — GLYCOPYRROLATE 0.4 MG: 0.2 INJECTION INTRAMUSCULAR; INTRAVENOUS at 11:15

## 2024-04-24 RX ADMIN — IPRATROPIUM BROMIDE AND ALBUTEROL SULFATE 1 DOSE: .5; 2.5 SOLUTION RESPIRATORY (INHALATION) at 06:59

## 2024-04-24 RX ADMIN — CALCIUM GLUCONATE 1000 MG: 10 INJECTION, SOLUTION INTRAVENOUS at 08:32

## 2024-04-24 RX ADMIN — MORPHINE SULFATE 2 MG: 2 INJECTION, SOLUTION INTRAMUSCULAR; INTRAVENOUS at 11:15

## 2024-04-24 RX ADMIN — ENOXAPARIN SODIUM 80 MG: 100 INJECTION SUBCUTANEOUS at 08:30

## 2024-04-24 RX ADMIN — Medication 100 MG: at 08:11

## 2024-04-24 RX ADMIN — LORAZEPAM 0.5 MG: 2 INJECTION INTRAMUSCULAR; INTRAVENOUS at 14:40

## 2024-04-24 RX ADMIN — LABETALOL HYDROCHLORIDE 10 MG: 5 INJECTION, SOLUTION INTRAVENOUS at 06:40

## 2024-04-24 RX ADMIN — FOLIC ACID 1 MG: 1 TABLET ORAL at 08:11

## 2024-04-24 RX ADMIN — LABETALOL HYDROCHLORIDE 10 MG: 5 INJECTION, SOLUTION INTRAVENOUS at 06:03

## 2024-04-24 RX ADMIN — ACETAMINOPHEN 650 MG: 650 SOLUTION ORAL at 08:22

## 2024-04-24 RX ADMIN — FOSPHENYTOIN SODIUM 100 MG PE: 50 INJECTION, SOLUTION INTRAMUSCULAR; INTRAVENOUS at 09:43

## 2024-04-24 ASSESSMENT — PAIN SCALES - GENERAL
PAINLEVEL_OUTOF10: 0

## 2024-04-24 ASSESSMENT — PAIN SCALES - PAIN ASSESSMENT IN ADVANCED DEMENTIA (PAINAD)
CONSOLABILITY: NO NEED TO CONSOLE
TOTALSCORE: 0
CONSOLABILITY: NO NEED TO CONSOLE
TOTALSCORE: 0
BREATHING: NORMAL
FACIALEXPRESSION: SMILING OR INEXPRESSIVE
BODYLANGUAGE: RELAXED
BREATHING: NORMAL
BODYLANGUAGE: RELAXED
FACIALEXPRESSION: SMILING OR INEXPRESSIVE

## 2024-04-24 NOTE — PROGRESS NOTES
04/11/24 0747   Patient Observation   Pulse 68   Respirations 20   SpO2 100 %   Breath Sounds   Breath Sounds Bilateral Clear   Right Upper Lobe Clear   Right Middle Lobe Clear   Right Lower Lobe Clear   Left Upper Lobe Clear   Left Lower Lobe Clear   Vent Information   Ventilator Day(s) 1   Ventilator ID [id number]   Ventilator Safety Check Performed Pre-Use Yes   Vent Mode AC/VC   Ventilator Settings   Vt (Set, mL) 450 mL   Resp Rate (Set) 20 bpm   PEEP/CPAP (cmH2O) 8   FiO2  60 %   Peak Inspiratory Flow (Set) 65 L/sec   Vent Patient Data (Readings)   Vt (Measured) 470 mL   Peak Inspiratory Pressure (cmH2O) 22 cmH2O   Rate Measured 20 br/min   Minute Volume (L/min) 9.37 Liters   Mean Airway Pressure (cmH2O) 12 cmH20   Plateau Pressure (cm H2O) 21 cm H2O   Driving Pressure 13   I:E Ratio 1:2.90   PEEP Intrinsic (cm H2O) 0.5 cm H2O   Static Compliance (L/cm H2O) 47   Backup Apnea On   Backup Rate 20 Breaths Per Minute   Backup Vt 450   Vent Alarm Settings   High Pressure (cmH2O) 40 cmH2O   Low Minute Volume (lpm) 5 L/min   High Minute Volume (lpm) 20 L/min   High Exhaled Vt (ml) 800 mL   RR Low (bpm) 20   RR High (bpm) 35 br/min   Apnea (secs) 20 secs   Additional Respiratoray Assessments   Humidification Source Heated wire   Humidification Temp 37   Circuit Condensation Drained   Airway Clearance   Suction ET Tube   Suction Device Inline suction catheter   Sputum Method Obtained Endotracheal   Sputum Amount Scant   Sputum Color/Odor Clear   Sputum Consistency Thin   ETT    Placement Date/Time: 04/10/24 1000   Present on Admission/Arrival: No  Placed By: Licensed provider  Placement Verified By: Chest X-ray;Capnometry;Auscultation  Preoxygenation: Yes  Airway Type: Cuffed  Airway Tube Size: 8 mm  Location: Oral  Secured ...   Secured At 25 cm   Measured From Lips   ETT Placement Center   Secured By Commercial tube reid       
   04/11/24 1044   Patient Observation   Pulse 90   Respirations 22   SpO2 98 %   Breath Sounds   Breath Sounds Bilateral Clear   Right Upper Lobe Clear   Right Middle Lobe Clear   Right Lower Lobe Clear   Left Upper Lobe Clear   Left Lower Lobe Clear   Vent Information   Ventilator Day(s) 1   Ventilator ID [id number]   Vent Mode AC/VC   Ventilator Settings   Vt (Set, mL) 450 mL   Resp Rate (Set) 20 bpm   PEEP/CPAP (cmH2O) 8   FiO2  50 %   Peak Inspiratory Flow (Set) 65 L/sec   Vent Patient Data (Readings)   Vt (Measured) 471 mL   Peak Inspiratory Pressure (cmH2O) 21 cmH2O   Rate Measured 23 br/min   Minute Volume (L/min) 10.5 Liters   Mean Airway Pressure (cmH2O) 12 cmH20   Plateau Pressure (cm H2O) 18 cm H2O   Driving Pressure 10   I:E Ratio 1:2.90   Backup Apnea On   Backup Rate 20 Breaths Per Minute   Backup Vt 450   Vent Alarm Settings   High Pressure (cmH2O) 40 cmH2O   Low Minute Volume (lpm) 5 L/min   High Minute Volume (lpm) 20 L/min   Low Exhaled Vt (ml) 350 mL   High Exhaled Vt (ml) 800 mL   RR Low (bpm) 20   RR High (bpm) 35 br/min   Apnea (secs) 20 secs   Additional Respiratoray Assessments   Humidification Source Heated wire   Humidification Temp 37   Circuit Condensation Drained   Ambu Bag With Mask At Bedside Yes   Airway Clearance   Suction ET Tube   Suction Device Inline suction catheter   Sputum Method Obtained Endotracheal   Sputum Amount Small   Sputum Color/Odor Clear;White   Sputum Consistency Thin   ETT    Placement Date/Time: 04/10/24 1000   Present on Admission/Arrival: No  Placed By: Licensed provider  Placement Verified By: Chest X-ray;Capnometry;Auscultation  Preoxygenation: Yes  Airway Type: Cuffed  Airway Tube Size: 8 mm  Location: Oral  Secured ...   Secured At 25 cm   Measured From Lips   Secured By Commercial tube reid       
   04/11/24 1315   Encounter Summary   Encounter Overview/Reason  Initial Encounter   Service Provided For:   (son by phone)   [facility]   Support System Children   Last Encounter  04/11/24  (dl)   Complexity of Encounter High   Spiritual/Emotional needs   Type Spiritual Support   Assessment/Intervention/Outcome   Assessment Coping;Calm   Intervention Discussed belief system/Buddhist practices/marybeth;Explored/Affirmed feelings, thoughts, concerns;Sustaining Presence/Ministry of presence   Outcome Engaged in conversation;Expressed feelings, needs, and concerns;Expressed Gratitude      called patient's son Preston to inquire about spiritual needs. Preston appreciated the call but says no spiritual needs.    janet Bess, [facility], [facility] Contact at Ext: [phone]  
   04/11/24 1547   Patient Observation   Pulse 68   Respirations 21   SpO2 97 %   Breath Sounds   Breath Sounds Bilateral Clear   Right Upper Lobe Clear   Right Middle Lobe Clear   Right Lower Lobe Clear   Left Upper Lobe Clear   Left Lower Lobe Clear   Vent Information   Ventilator Day(s) 1   Ventilator ID [id number]   Vent Mode AC/VC   Ventilator Settings   Vt (Set, mL) 450 mL   Resp Rate (Set) 20 bpm   PEEP/CPAP (cmH2O) 8   FiO2  (S)  40 %  (rt titrated)   Vent Patient Data (Readings)   Vt (Measured) 545 mL   Peak Inspiratory Pressure (cmH2O) 19 cmH2O   Rate Measured 24 br/min   Minute Volume (L/min) 11.3 Liters   Mean Airway Pressure (cmH2O) 12 cmH20   Plateau Pressure (cm H2O) 16 cm H2O   Driving Pressure 8   I:E Ratio 1:2.90   PEEP Intrinsic (cm H2O) 0 cm H2O   Static Compliance (L/cm H2O) 72   Backup Apnea On   Backup Rate 20 Breaths Per Minute   Backup Vt 450   Vent Alarm Settings   High Pressure (cmH2O) 40 cmH2O   Low Minute Volume (lpm) 5 L/min   High Minute Volume (lpm) 20 L/min   Low Exhaled Vt (ml) 350 mL   High Exhaled Vt (ml) 800 mL   RR Low (bpm) 20   RR High (bpm) 35 br/min   Apnea (secs) 20 secs   Additional Respiratoray Assessments   Humidification Source Heated wire   Humidification Temp 36.9   Circuit Condensation Drained   Ambu Bag With Mask At Bedside Yes   Airway Clearance   Suction ET Tube   Suction Device Inline suction catheter   Sputum Method Obtained Endotracheal   Sputum Amount Small   Sputum Color/Odor Clear;White   Sputum Consistency Thin   ETT    Placement Date/Time: 04/10/24 1000   Present on Admission/Arrival: No  Placed By: Licensed provider  Placement Verified By: Chest X-ray;Capnometry;Auscultation  Preoxygenation: Yes  Airway Type: Cuffed  Airway Tube Size: 8 mm  Location: Oral  Secured ...   Secured At 25 cm   Measured From Lips   ETT Placement Left   Secured By Commercial tube reid       
   04/13/24 0819   Patient Observation   Pulse 53   Respirations 21   SpO2 100 %   Vent Information   Vent Mode AC/VC   Ventilator Settings   Vt (Set, mL) 450 mL   Resp Rate (Set) 20 bpm   PEEP/CPAP (cmH2O) 8   FiO2  45 %   Peak Inspiratory Flow (Set) 65 L/sec   Vent Patient Data (Readings)   Vt (Measured) 448 mL   Peak Inspiratory Pressure (cmH2O) 23 cmH2O   Rate Measured 21 br/min   Minute Volume (L/min) 9.55 Liters   Peak Inspiratory Flow (lpm) 65 L/sec   Mean Airway Pressure (cmH2O) 12 cmH20   Plateau Pressure (cm H2O) 15 cm H2O   Driving Pressure 7   I:E Ratio 1:2.50   Flow Sensitivity 3 L/min   Static Compliance (L/cm H2O) 69   Backup Apnea On   Backup Rate 20 Breaths Per Minute   Backup Vt 450   Backup I Time 20   Vent Alarm Settings   High Pressure (cmH2O) 40 cmH2O   Low Minute Volume (lpm) 7 L/min   High Minute Volume (lpm) 15 L/min   Low Exhaled Vt (ml) 350 mL   High Exhaled Vt (ml) 800 mL   RR High (bpm) 30 br/min   Apnea (secs) 20 secs   Additional Respiratoray Assessments   Humidification Source Heated wire   Humidification Temp 37   Circuit Condensation Not drained   Ambu Bag With Mask At Bedside Yes   ETT    Placement Date/Time: 04/10/24 1000   Present on Admission/Arrival: No  Placed By: Licensed provider  Placement Verified By: Chest X-ray;Capnometry;Auscultation  Preoxygenation: Yes  Airway Type: Cuffed  Airway Tube Size: 8 mm  Location: Oral  Secured ...   Secured At 25 cm   Measured From Lips   Secured By Commercial tube reid   Site Assessment Dry       
   04/13/24 0952   Patient Observation   Pulse 52   Respirations 22   Vent Information   Equipment Changed Humidification   Vent Mode AC/VC   Ventilator Settings   Vt (Set, mL) 450 mL   Resp Rate (Set) 20 bpm   PEEP/CPAP (cmH2O) 8   FiO2  45 %   Peak Inspiratory Flow (Set) 65 L/sec   Vent Patient Data (Readings)   Vt (Measured) 453 mL   Peak Inspiratory Pressure (cmH2O) 23 cmH2O   Rate Measured 22 br/min   Minute Volume (L/min) 10.3 Liters   Peak Inspiratory Flow (lpm) 65 L/sec   Mean Airway Pressure (cmH2O) 13 cmH20   Plateau Pressure (cm H2O) 15 cm H2O   Driving Pressure 7   I:E Ratio 1:2.40   Flow Sensitivity 3 L/min   Backup Apnea On   Backup Rate 20 Breaths Per Minute   Backup Vt 450   Vent Alarm Settings   High Pressure (cmH2O) 7 cmH2O   Low Minute Volume (lpm) 15 L/min   High Minute Volume (lpm) 15 L/min   Low Exhaled Vt (ml) 350 mL   High Exhaled Vt (ml) 800 mL   RR High (bpm) 30 br/min   Apnea (secs) 20 secs   Additional Respiratoray Assessments   Humidification Source Heated wire   Humidification Temp 37   Ambu Bag With Mask At Bedside Yes   ETT    Placement Date/Time: 04/10/24 1000   Present on Admission/Arrival: No  Placed By: Licensed provider  Placement Verified By: Chest X-ray;Capnometry;Auscultation  Preoxygenation: Yes  Airway Type: Cuffed  Airway Tube Size: 8 mm  Location: Oral  Secured ...   Secured At 25 cm   Measured From Lips   Secured By Commercial tube reid   Site Assessment Dry   Patient Transport   Time Spent Transporting 16-30   Transport Ventillation Type Transport vent   Transport From ICU   Transport Destination CT scan   Transport Destination ICU   Emergency Equipment Included Yes       
   04/13/24 1134   Patient Observation   Pulse 60   Respirations 21   SpO2 99 %   Vent Information   Vent Mode AC/VC   Ventilator Settings   Vt (Set, mL) 450 mL   Resp Rate (Set) 20 bpm   PEEP/CPAP (cmH2O) 8   FiO2  45 %   Peak Inspiratory Flow (Set) 65 L/sec   Vent Patient Data (Readings)   Vt (Measured) 438 mL   Peak Inspiratory Pressure (cmH2O) 21 cmH2O   Rate Measured 21 br/min   Minute Volume (L/min) 9.51 Liters   Peak Inspiratory Flow (lpm) 65 L/sec   Mean Airway Pressure (cmH2O) 13 cmH20   Plateau Pressure (cm H2O) 15 cm H2O   Driving Pressure 7   I:E Ratio 1:2.50   Flow Sensitivity 3 L/min   Backup Apnea On   Backup Rate 20 Breaths Per Minute   Backup Vt 450   Vent Alarm Settings   High Pressure (cmH2O) 40 cmH2O   Low Minute Volume (lpm) 7 L/min   High Minute Volume (lpm) 15 L/min   Low Exhaled Vt (ml) 350 mL   High Exhaled Vt (ml) 800 mL   RR High (bpm) 30 br/min   Apnea (secs) 20 secs   Additional Respiratoray Assessments   Humidification Source Heated wire   Humidification Temp 37   Circuit Condensation Not drained   Ambu Bag With Mask At Bedside Yes   Airway Clearance   Suction ET Tube   Suction Device Inline suction catheter   Sputum Method Obtained Endotracheal   Sputum Amount Small   Sputum Color/Odor Yellow   Sputum Consistency Thick   ETT    Placement Date/Time: 04/10/24 1000   Present on Admission/Arrival: No  Placed By: Licensed provider  Placement Verified By: Chest X-ray;Capnometry;Auscultation  Preoxygenation: Yes  Airway Type: Cuffed  Airway Tube Size: 8 mm  Location: Oral  Secured ...   Secured At 25 cm   Measured From Lips   Secured By Commercial tube reid   Site Assessment Dry       
   04/16/24 0759   Encounter Summary   Encounter Overview/Reason  Palliative Care   Service Provided For: Family   Referral/Consult From: Palliative Care   Palliative Care   Type Palliative Care, Initial/Spiritual Assessment     This is note from 3/11- called patient's son Preston to inquire about spiritual needs. Preston appreciated the call but says no spiritual needs.     will not reach out again unless requested to.     Chaplain Bea Bess, [facility], [facility] Contact at Ext: [phone]  
  Palliative Care Department  Palliative Care Progress Note  Provider: Bing Moody   [phone]    Hospital Day: 14  Date of Initial Consult: 04/16/2024  Referring Provider: Elio Alonso MD   Palliative Medicine was consulted for assistance with: Assist with goals of care and Symptom Management    Chief Complaint: Preston Bess is a 78 y.o. male with chief complaint of fall    HPI:   Preston Bess is a 78 y.o. male with significant medical history of atrial fibrillation, hypertension, squamous cell skin cancer, malignant neoplasm of the glottis, history of blood clots, alcohol abuse, who was admitted to [facility] on 4/10/2024 trauma team, fall, seizures.  It is reported that the patient had an unwitnessed fall and was found down on the concrete floor in abasement.  On arrival to the ED the patient was reported to be hypertensive with SBP > 200 and seizing and was given Ativan, Keppra and was emergently intubated and placed on mechanical ventilation.  Patient was evaluated by trauma services and admitted into the SICU.  Labs and imaging were obtained.  Imaging showed no acute intracranial abnormality and an incidental 2 mm GRANT aneurysm and an L1 compression, wedge fracture.  Neurosurgery consulted and no surgical intervention planned at this time. Neurology consulted. Patient underwent EEG on 4- showing diffuse slowing with some right frontal sharp activity concerning for possible epileptic discharges without status epilepticus.  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 Cardiology was consulted. Palliative medicine was consulted to assist with goals of care and symptom management.     ASSESSMENT/PLAN:     Pertinent Hospital Diagnoses:  Current medical issues leading to Palliative Medicine involvement include   Active Hospital Problems    Diagnosis Date Noted    Fall [W19.XXXA] 06/04/2022     Priority: Medium    Trauma [T14.90XA] 06/03/2022     Priority: 
  Palliative Care Department  Palliative Care Progress Note  Provider: Bing Moody   [phone]    Hospital Day: 9  Date of Initial Consult: 04/16/2024  Referring Provider: Elio Alonso MD   Palliative Medicine was consulted for assistance with: Assist with goals of care and Symptom Management    Chief Complaint: Preston Bess is a 78 y.o. male with chief complaint of fall    HPI:   Preston Bess is a 78 y.o. male with significant medical history of atrial fibrillation, hypertension, squamous cell skin cancer, malignant neoplasm of the glottis, history of blood clots, alcohol abuse, who was admitted to [facility] on 4/10/2024 trauma team, fall, seizures.  It is reported that the patient had an unwitnessed fall and was found down on the concrete floor in abasement.  On arrival to the ED the patient was reported to be hypertensive with SBP > 200 and seizing and was given Ativan, Keppra and was emergently intubated and placed on mechanical ventilation.  Patient was evaluated by trauma services and admitted into the SICU.  Labs and imaging were obtained.  Imaging showed no acute intracranial abnormality and an incidental 2 mm GRANT aneurysm and an L1 compression, wedge fracture.  Neurosurgery consulted and no surgical intervention planned at this time. Neurology consulted. Patient underwent EEG on 4- showing diffuse slowing with some right frontal sharp activity concerning for possible epileptic discharges without status epilepticus.  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 Cardiology was consulted. Palliative medicine was consulted to assist with goals of care and symptom management.     ASSESSMENT/PLAN:     Pertinent Hospital Diagnoses:  Current medical issues leading to Palliative Medicine involvement include   Active Hospital Problems    Diagnosis Date Noted    Fall [W19.XXXA] 06/04/2022     Priority: Medium    Trauma [T14.90XA] 06/03/2022     Priority: 
  Palliative Care Department  Palliative Care Progress Note  Provider: Bing Moody DO  [phone]    Hospital Day: 13  Date of Initial Consult: 04/16/2024  Referring Provider: Elio Alonso MD   Palliative Medicine was consulted for assistance with: Assist with goals of care and Symptom Management    Chief Complaint: Preston Bess is a 78 y.o. male with chief complaint of fall    HPI:   Preston Bess is a 78 y.o. male with significant medical history of atrial fibrillation, hypertension, squamous cell skin cancer, malignant neoplasm of the glottis, history of blood clots, alcohol abuse, who was admitted to [facility] on 4/10/2024 trauma team, fall, seizures.  It is reported that the patient had an unwitnessed fall and was found down on the concrete floor in abasement.  On arrival to the ED the patient was reported to be hypertensive with SBP > 200 and seizing and was given Ativan, Keppra and was emergently intubated and placed on mechanical ventilation.  Patient was evaluated by trauma services and admitted into the SICU.  Labs and imaging were obtained.  Imaging showed no acute intracranial abnormality and an incidental 2 mm GRANT aneurysm and an L1 compression, wedge fracture.  Neurosurgery consulted and no surgical intervention planned at this time. Neurology consulted. Patient underwent EEG on 4- showing diffuse slowing with some right frontal sharp activity concerning for possible epileptic discharges without status epilepticus.  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 Cardiology was consulted. Palliative medicine was consulted to assist with goals of care and symptom management.     ASSESSMENT/PLAN:     Pertinent Hospital Diagnoses:  Current medical issues leading to Palliative Medicine involvement include   Active Hospital Problems    Diagnosis Date Noted    Fall [W19.XXXA] 06/04/2022     Priority: Medium    Trauma [T14.90XA] 06/03/2022     Priority: 
  Physician Progress Note      PATIENT:               NAS LIN  [facility] #:                  [id number]  :                       1945  ADMIT DATE:       4/10/2024 9:56 PM  DISCH DATE:  RESPONDING  PROVIDER #:        Kavita Subramanian MD          QUERY TEXT:    Dear Provider,    Pt presents with seizure activity.  Pt noted to have  B/p   210/82--211/108--180/117--204/144 . If possible, please document in progress   notes and discharge summary if you are evaluating and/or treating any of the   following:    The medical record reflects the following:  Risk Factors: Hx HTN  Clinical Indicators: B/p 210/82--211/108--180/117--204/144  Treatment: IV hydralazine 10 mg given x 3, IV labetalol 10 mg given x 1,   Cozaar daily    Thank you,  Nellie Friedman RN  Clinical Documentation Integrity  [phone]    Hypertensive Urgency: Defined as SBP of >180 OR DBP >120 w/o associated organ   damage. S/s may or may not be present, but can include severe headache, SOB,   epistaxis, severe anxiety. Tx: adjustment of oral BP medication; IV meds not   usually required.  Hypertensive Emergency: SBP of >180 OR DBP >120 w/ associated organ damage   (CVA, MI, acute CHF, ELZA, encephalopathy, pulmonary edema, and unstable   angina). Documentation should include specific organ affected.  Requires   immediate treatment, usually with IV meds.  Hypertensive Crisis, unspecified: SBP of > 180 OR DBP > 120 and includes   damage to blood vessels, including inflammation, leakage of fluid or blood and   can cause stroke, headache, heart failure and eclampsia.  Source: [employer] Training Guide and Quick Reference Guide  Options provided:  -- Hypertensive Crisis  -- Hypertensive Emergency  -- Hypertensive Urgency  -- Other - I will add my own diagnosis  -- Disagree - Not applicable / Not valid  -- Disagree - Clinically unable to determine / Unknown  -- Refer to Clinical Documentation Reviewer    PROVIDER RESPONSE TEXT:    This patient is in 
 Preston Bess is a 78 y.o. right handed male     Patient with a past history of alcohol abuse quitting 10 years ago dementia A-fib and hypertension.    He presented as a trauma after being found down and unresponsive.  Initial reports did mention a fall down the stairs however per family/nursing this did not occur.    Patient was found to have seizure-like activity and was titrated on Keppra and Vimpat as well as Cerebyx. Now maintained on Versed drip with EEG monitoring.    EEG on 4/11 demonstrated moderate to severe encephalopathy with epileptiform activity without the presence of status epilepticus.  However repeat yesterday demonstrated possible burst suppression but eventually identified as generalized epileptiform activity-he was placed on a continuous EEG and Versed was increased     Titration protocol ordered-thankfully repeat EEG does show seizure improvement therefore we held Versed     After Versed was discontinued patient was waking up to verbal stimulation and appeared to be following simple commands and nodding appropriately    Plan is for extubation    Allergies as of 04/10/2024    (No Known Allergies)     Objective:     /63   Pulse 63   Temp 99.8 °F (37.7 °C) (Bladder)   Resp 18   Ht 1.803 m (5' 11\")   Wt 88.4 kg (194 lb 14.2 oz)   SpO2 100%   BMI 27.18 kg/m²      General appearance: Opens eyes to verbal stimulation  Extremities: no cyanosis or edema  Pulses: 2+ and symmetric  Skin: no rashes or lesions    Mental Status: Awakens to verbal stimulation    Does not appear to be nodding today    But continues to follow a few simple commands ( right hand and wiggle right foot)    Cranial Nerves:  I: smell    II: visual acuity     II: visual fields Bilateral threat   II: pupils RADHA   III,VII: ptosis None   III,IV,VI: extraocular muscles  Follows examiner around the room   V: mastication    V: facial light touch sensation     V,VII: corneal reflex  Present   VII: facial muscle function 
 Preston Bess is a 78 y.o. right handed male     Patient with a past history of alcohol abuse quitting 10 years ago dementia A-fib and hypertension.    He presented as a trauma after falling down the stairs he was unresponsive.  Patient was found to have seizure-like activity and was titrated on Keppra and Vimpat as well as Cerebyx.  Now maintained on Versed drip with EEG obtained this morning.    EEG on 4/11 demonstrated moderate to severe encephalopathy with epileptiform activity without the presence of status epilepticus.    Staff is weaning Versed and MRI was obtained demonstrated bilateral embolic appearing infarcts with CTAs demonstrated a 2 mm GRANT aneurysm most likely incidental finding      Allergies as of 04/10/2024    (No Known Allergies)     Objective:     /81   Pulse 54   Temp 98.8 °F (37.1 °C) (Bladder)   Resp 20   Ht 1.803 m (5' 11\")   Wt 80.8 kg (178 lb 2.1 oz)   SpO2 98%   BMI 24.84 kg/m²      General appearance: Not awake not following command  Head: Normocephalic, without obvious abnormality, atraumatic  Extremities: no cyanosis or edema  Pulses: 2+ and symmetric  Skin: no rashes or lesions    Mental Status: Not awake-remains slightly sedated on vent    Cranial Nerves:  I: smell    II: visual acuity     II: visual fields No threat   II: pupils RADHA   III,VII: ptosis None   III,IV,VI: extraocular muscles  Doll's present   V: mastication    V: facial light touch sensation     V,VII: corneal reflex  Present   VII: facial muscle function - upper     VII: facial muscle function - lower Normal   VIII: hearing    IX: soft palate elevation  Normal   IX,X: gag reflex    XI: trapezius strength     XI: sternocleidomastoid strength    XI: neck extension strength     XII: tongue strength       Withdraws minimally to noxious stimulation in arms  No purposeful movement or withdrawal in legs    Normal bulk without spasticity    DTR:   Barely elicited throughout  No Alejandro's no 
 Preston Bess is a 78 y.o. right handed male     Patient with a past history of alcohol abuse quitting 10 years ago dementia A-fib and hypertension.    He presented as a trauma after falling down the stairs he was unresponsive.  Patient was found to have seizure-like activity and was titrated on Keppra and Vimpat as well as Cerebyx.  Now maintained on Versed drip with EEG obtained this morning.    EEG on 4/11 demonstrated moderate to severe encephalopathy with epileptiform activity without the presence of status epilepticus.  However repeat yesterday demonstrated possible burst suppression but eventually identified as generalized epileptiform activity-he was placed on a continuous EEG and Versed was increased     Titration protocol ordered-thankfully repeat EEG does show seizure improvement therefore we held Versed -now held for almost 20 hours    Family updated at the bedside yesterday     Allergies as of 04/10/2024    (No Known Allergies)     Objective:     /64   Pulse (!) 48   Temp 99.7 °F (37.6 °C)   Resp 16   Ht 1.803 m (5' 11\")   Wt 81.6 kg (179 lb 14.3 oz)   SpO2 97%   BMI 25.09 kg/m²      General appearance: Opens eyes to verbal stimulation  Head: Normocephalic, without obvious abnormality, atraumatic  Extremities: no cyanosis or edema  Pulses: 2+ and symmetric  Skin: no rashes or lesions    Mental Status: Awake to verbal stimulation    Appears to nod appropriately    Cranial Nerves:  I: smell    II: visual acuity     II: visual fields Bilateral threat   II: pupils RADHA   III,VII: ptosis None   III,IV,VI: extraocular muscles  Follows examiner around the room   V: mastication    V: facial light touch sensation     V,VII: corneal reflex  Present   VII: facial muscle function - upper     VII: facial muscle function - lower Normal   VIII: hearing    IX: soft palate elevation  Normal   IX,X: gag reflex    XI: trapezius strength     XI: sternocleidomastoid strength    XI: neck extension strength   
 Preston Bess is a 78 y.o. right handed male     Patient with a past history of alcohol abuse quitting 10 years ago dementia A-fib and hypertension.    He presented as a trauma after falling down the stairs he was unresponsive.  Patient was found to have seizure-like activity and was titrated on Keppra and Vimpat as well as Cerebyx.  Now maintained on Versed drip with EEG obtained this morning.    EEG on 4/11 demonstrated moderate to severe encephalopathy with epileptiform activity without the presence of status epilepticus.  However repeat yesterday demonstrated possible burst suppression but eventually identified as generalized epileptiform activity-he was placed on a continuous EEG and Versed was increased     Titration protocol ordered-thankfully repeat EEG does show seizure improvement therefore we will hold Versed at this time    Family was in the room this morning I did update them on his MRI his EEG findings     All questions answered at this time      Allergies as of 04/10/2024    (No Known Allergies)     Objective:     BP (!) 158/79   Pulse 68   Temp 97.9 °F (36.6 °C) (Axillary)   Resp 20   Ht 1.803 m (5' 11\")   Wt 82.8 kg (182 lb 8.7 oz)   SpO2 94%   BMI 25.46 kg/m²      General appearance: Not awake not following command  Head: Normocephalic, without obvious abnormality, atraumatic  Extremities: no cyanosis or edema  Pulses: 2+ and symmetric  Skin: no rashes or lesions    Mental Status: Not awake-remains slightly sedated on vent    Cranial Nerves:  I: smell    II: visual acuity     II: visual fields No threat   II: pupils RADHA   III,VII: ptosis None   III,IV,VI: extraocular muscles  Doll's present   V: mastication    V: facial light touch sensation     V,VII: corneal reflex  Present   VII: facial muscle function - upper     VII: facial muscle function - lower Normal   VIII: hearing    IX: soft palate elevation  Normal   IX,X: gag reflex    XI: trapezius strength     XI: sternocleidomastoid 
 Preston Bess is a 78 y.o. right handed male     Patient with a past history of alcohol abuse quitting 10 years ago dementia A-fib and hypertension.    He presented as a trauma after falling down the stairs he was unresponsive.  Patient was found to have seizure-like activity and was titrated on Keppra and Vimpat as well as Cerebyx. Now maintained on Versed drip with EEG obtained this morning.    EEG on 4/11 demonstrated moderate to severe encephalopathy with epileptiform activity without the presence of status epilepticus.  However repeat yesterday demonstrated possible burst suppression but eventually identified as generalized epileptiform activity-he was placed on a continuous EEG and Versed was increased     Titration protocol ordered-thankfully repeat EEG does show seizure improvement therefore we held Versed - now held for almost 20 hours    Allergies as of 04/10/2024    (No Known Allergies)     Objective:     /70   Pulse 91   Temp 100.4 °F (38 °C) (Bladder)   Resp 18   Ht 1.803 m (5' 11\")   Wt 86.9 kg (191 lb 9.3 oz)   SpO2 96%   BMI 26.72 kg/m²      General appearance: Opens eyes to verbal stimulation  Head: Normocephalic, without obvious abnormality, atraumatic  Extremities: no cyanosis or edema  Pulses: 2+ and symmetric  Skin: no rashes or lesions    Mental Status: Awake to verbal stimulation    Does not appear to be nodding today    But continues to follow a few simple command    Cranial Nerves:  I: smell    II: visual acuity     II: visual fields Bilateral threat   II: pupils RADHA   III,VII: ptosis None   III,IV,VI: extraocular muscles  Follows examiner around the room   V: mastication    V: facial light touch sensation     V,VII: corneal reflex  Present   VII: facial muscle function - upper     VII: facial muscle function - lower Normal   VIII: hearing    IX: soft palate elevation  Normal   IX,X: gag reflex    XI: trapezius strength     XI: sternocleidomastoid strength    XI: neck 
4 Eyes Skin Assessment     NAME:  Preston Bess  YOB: 1945  MEDICAL RECORD NUMBER:  [id number]    The patient is being assessed for  Admission    I agree that at least one RN has performed a thorough Head to Toe Skin Assessment on the patient. ALL assessment sites listed below have been assessed.      Areas assessed by both nurses:    Head, Face, Ears, Shoulders, Back, Chest, Arms, Elbows, Hands, Sacrum. Buttock, Coccyx, Ischium, Legs. Feet and Heels, and Under Medical Devices             Abrasions L shoulder x2, scalp, L shin  Redness, blanchable, gluteal cleft  Ecchymosis R rib/flank  Right nare missing (old wound)  No left ear     Does the Patient have a Wound? No noted wound(s)       Cirilo Prevention initiated by RN: Yes  Wound Care Orders initiated by RN: No    Pressure Injury (Stage 3,4, Unstageable, DTI, NWPT, and Complex wounds) if present, place Wound referral order by RN under : No    New Ostomies, if present place, Ostomy referral order under : No     Nurse 1 eSignature: {Esignature:[id number]}    **SHARE this note so that the co-signing nurse can place an eSignature**    Nurse 2 eSignature: Electronically signed by Kaela Willis RN on 4/10/24 at 11:02 PM EDT   
[facility]  SURGICAL INTENSIVE CARE UNIT    CRITICAL CARE ATTENDING PROGRESS NOTE    I have examined the patient, reviewed the record, and discussed the case with the APN/  Resident. I have reviewed all relevant labs and imaging data.    Please refer to the  APN/ resident's note. I agree with the  assessment and plan with the following corrections/ additions. The following summarizes my clinical findings and independent assessment.     CC: fall down stairs    HOSPITAL COURSE:  4/10 trauma team--seizures and intubated in TB  4/11 minimal movement  4/12 nystagmus noted overnight.  Versed drip started  4/13 no events overnight  4/14-Dilantin added by neurology yesterday  EXAM:  Intubated  Heavily sedated  Pupils equal round reactive light  Lungs coarse  Heart regular rate rhythm  Abdomen soft nontender nondistended    LABS/ IMAGING:  -I personally reviewed the patient's Labs from 4/14/2024  CBC:   Lab Results   Component Value Date/Time    WBC 11.5 04/14/2024 04:23 AM    RBC 3.93 04/14/2024 04:23 AM    HGB 11.6 04/14/2024 04:23 AM    HCT 35.2 04/14/2024 04:23 AM    MCV 89.6 04/14/2024 04:23 AM    MCH 29.5 04/14/2024 04:23 AM    MCHC 33.0 04/14/2024 04:23 AM    RDW 15.5 04/14/2024 04:23 AM     04/14/2024 04:23 AM    MPV 11.7 04/14/2024 04:23 AM     CMP:    Lab Results   Component Value Date/Time     04/14/2024 04:23 AM    K 4.6 04/14/2024 04:23 AM    K 3.8 10/18/2022 03:35 PM     04/14/2024 04:23 AM    CO2 21 04/14/2024 04:23 AM    BUN 24 04/14/2024 04:23 AM    CREATININE 0.6 04/14/2024 04:23 AM    GFRAA >60 08/30/2022 11:28 AM    LABGLOM >90 04/14/2024 04:23 AM    GLUCOSE 159 04/14/2024 04:23 AM    PROT 5.7 04/14/2024 04:23 AM    LABALBU 2.4 04/14/2024 04:23 AM    CALCIUM 7.9 04/14/2024 04:23 AM    BILITOT 1.3 04/14/2024 04:23 AM    ALKPHOS 106 04/14/2024 04:23 AM    AST 30 04/14/2024 04:23 AM    ALT 30 04/14/2024 04:23 AM        -I personally reviewed the patient's chest x-ray 
[facility]  PROGRESS NOTE  ATTENDING NOTE    TRAUMA/CRITICAL CARE    MECHANISM OF INJURY:  fall down stairs    CC:  seizures      HPI  rauma team.    Patient is a 78-year-old male who initially presented via EMS due to altered mental status after a fall down the stairs at his home onto the concrete basement which was unwitnessed per EMS.  Patient was found to have seizure-like activity.  Patient was given 2 mg of Ativan and Keppra due to seizure-like activity.  Patient was intubated in the trauma bay due to altered mental status.    Patient Active Problem List   Diagnosis    Essential hypertension    Atrial fibrillation (HCC)    Hypokalemia    Chronic obstructive pulmonary disease (HCC)    Hepatic abscess    Portal vein thrombosis    History of squamous cell carcinoma of skin    Generalized osteoarthritis of multiple sites    History of cholecystectomy    Malignant neoplasm of skin of parts of face    Urinary frequency    Trauma    Fall    Acute blood loss anemia    Seizure (HCC)    Closed wedge compression fracture of L1 vertebra (HCC)    Anterior communicating artery aneurysm    Cerebrovascular accident (CVA) due to bilateral embolism of middle cerebral arteries (HCC)    Acute respiratory failure with hypoxia (HCC)    Oropharyngeal dysphagia       OVERNIGHT EVENTS:  Urinary retention--fofana replaced    HOSPITAL COURSE:  4/10 trauma team--seizures and intubated in TB  4/11 minimal movement  4/12 nystagmus noted overnight.  Versed drip started  4/13 no events overnight  4/14-Dilantin added by neurology yesterday  4/15--c-collar removed, statin started, echo; cards c/s for bradycarida  4/16--neuro wants versed @4mg/h x 24h; d/c fofana, palliative care c/s; guaifenesin   4/17--versed gtt stopped, Tlov started, poor prognosis per neurology    BP (!) 164/86   Pulse 97   Temp 99 °F (37.2 °C) (Bladder)   Resp 19   Ht 1.803 m (5' 11\")   Wt 82.8 kg (182 lb 8.7 oz)   SpO2 96%   BMI 25.46 kg/m² 
C collar cleared per practice management guidelines for intubated trauma patient with negative CT c spine imaging. No palpable step offs or deformities. Ok to discontinue C collar at this time     Elio Alonso MD PGY 3   
[facility]  PROGRESS NOTE  ATTENDING NOTE    TRAUMA/CRITICAL CARE    MECHANISM OF INJURY:  fall down stairs    CC:  seizures      HPI  rauma team.    Patient is a 78-year-old male who initially presented via EMS due to altered mental status after a fall down the stairs at his home onto the concrete basement which was unwitnessed per EMS.  Patient was found to have seizure-like activity.  Patient was given 2 mg of Ativan and Keppra due to seizure-like activity.  Patient was intubated in the trauma bay due to altered mental status.    Patient Active Problem List   Diagnosis    Essential hypertension    Atrial fibrillation (HCC)    Hypokalemia    Chronic obstructive pulmonary disease (HCC)    Hepatic abscess    Portal vein thrombosis    History of squamous cell carcinoma of skin    Generalized osteoarthritis of multiple sites    History of cholecystectomy    Malignant neoplasm of skin of parts of face    Urinary frequency    Trauma    Fall    Acute blood loss anemia    Seizure (HCC)    Closed wedge compression fracture of L1 vertebra (HCC)    Anterior communicating artery aneurysm       OVERNIGHT EVENTS:  No issues overnight    HOSPITAL COURSE:  4/10 trauma team--seizures and intubated in TB  4/11 minimal movement  4/12 nystagmus noted overnight.  Versed drip started  4/13 no events overnight  4/14-Dilantin added by neurology yesterday  4/15--c-collar removed, statin started, echo; cards c/s for bradycarida    BP (!) 160/84   Pulse 61   Temp 99.3 °F (37.4 °C) (Bladder)   Resp 20   Ht 1.803 m (5' 11\")   Wt 80.8 kg (178 lb 2.1 oz)   SpO2 95%   BMI 24.84 kg/m²   Physical Exam  Constitutional:       Comments: sedated   HENT:      Head: Normocephalic and atraumatic.      Nose: Nose normal.      Mouth/Throat:      Mouth: Mucous membranes are moist.      Pharynx: Oropharynx is clear.   Eyes:      Extraocular Movements: Extraocular movements intact.      Pupils: Pupils are equal, round, and reactive 
Chart reviewed. Family has decided to transition into hospice services. The patient will [facility] the [facility] today.   
Comprehensive Nutrition Assessment    Type and Reason for Visit:  Initial (New TF)    Nutrition Recommendations/Plan:     Continue NPO, Start Tube Feeding     Standard with fiber (Jevity 1.5) @ 45 ml/hr + 1 protein mod daily.   Will provide: 1080 ml tv, 1620 kcals, 68 gm pro (1720 kcals & 94 gm pro w/ mod), 820 ml free water     Malnutrition Assessment:  Malnutrition Status:  Insufficient data (04/11/24 1455)    Context:  Acute Illness     Findings of the 6 clinical characteristics of malnutrition:  Energy Intake:  Mild decrease in energy intake (Comment) (NADEEN PTA intakes)  Weight Loss:  Unable to assess (no wt hx within past year)     Body Fat Loss:  Unable to assess   Muscle Mass Loss:  Unable to assess   Fluid Accumulation:  No significant fluid accumulation    Strength:  Not Performed    Nutrition Assessment:    Pt admit 2/2 trauma s/p fall found down on basement floor +seizure like activity. Noted lumbar compression fx. PMHx COPD, Skin CA, & Previous Dysphagia. Pt currently intubated w/ plans to start EN support. Will provide TF recs & monitor.    Nutrition Related Findings:    Pt intubated, sedation held, MAP WNL, +I/O's, no edema, active BS, OGT clamped Wound Type: None       Current Nutrition Intake & Therapies:    Average Meal Intake: NPO    Current Tube Feeding (TF) Orders:  Feeding Route: Orogastric  Formula: Standard with Fiber  Schedule: Continuous  Feeding Regimen: 20 ml/hr, not started yet  Water Flushes: 30 ml q 6 hr= 120 ml water  Goal TF & Flush Orders Provides: 480 ml tv, 720 kcals, 30 gm pro, 364 ml free water, 484 ml total water w/ flushes    Anthropometric Measures:  Height: 180.3 cm (5' 11\")  Ideal Body Weight (IBW): 172 lbs (78 kg)    Admission Body Weight: 67.6 kg (149 lb) (4/10 first measured)  Current Body Weight: 67.6 kg (149 lb) (4/10 actual), 86.6 % IBW.    Current BMI (kg/m2): 20.8  Usual Body Weight:  (No recent wt hx within past year. Noted 152lb 1/17/23)                     
Comprehensive Nutrition Assessment    Type and Reason for Visit:  Reassess    Nutrition Recommendations/Plan:   Continue NPO  Continue current TF   Add 1 protein modular daily via OGT to better meet estimated nutrient needs    Will provide 1080 ml tv, 1620 kcals, 69 gm pro (1720 kcals, 95gm pro w/ mod) 821 ml free water, 1721 ml total fluids     Malnutrition Assessment:  Malnutrition Status:  Insufficient data (04/11/24 1455)    Context:  Acute Illness     Findings of the 6 clinical characteristics of malnutrition:  Energy Intake:  Mild decrease in energy intake (Comment) (NADEEN PTA intakes)  Weight Loss:  Unable to assess (no wt hx within past year)     Body Fat Loss:  Unable to assess     Muscle Mass Loss:  Unable to assess    Fluid Accumulation:  No significant fluid accumulation     Strength:  Not Performed    Nutrition Assessment:    Pt remains intubated now off sedation, TF running at goal via OGT. Admit 2/2 trauma s/p fall down stairs onto basement floor +seizures. Noted lumbar compression fx, CVA, & concussion. PMHx COPD, skin CA, previous dysphagia, remote ETOH abuse. Will provide updated TF recs and continue to monitor.    Nutrition Related Findings:    pt intubated off sedation, OGT w/ TF, active BS, diarrhea, +1-2 edema, +I/Os 17.5L, MAP WNL Wound Type: None       Current Nutrition Intake & Therapies:    Average Meal Intake: NPO     Diet NPO  ADULT TUBE FEEDING; Nasogastric; Standard with Fiber; Continuous; 20; Yes; 10; Q 4 hours; 45; 150; Q 4 hours  Current Tube Feeding (TF) Orders:  Feeding Route: Orogastric  Formula: Standard with Fiber  Schedule: Continuous  Feeding Regimen: 45 ml/hr  Additives/Modulars: None  Water Flushes: 150 ml q 4 hrs = 900 ml water  Current TF & Flush Orders Provides: 1080 ml tv, 1620 kcals, 69 gm pro, 821 ml free water, 1721 ml total fluids  Goal TF & Flush Orders Provides: TF running at goal  Additional Calorie Sources:  none    Anthropometric Measures:  Height: 180.3 cm 
Consult received and chart reviewed. Call placed to Daughter Liz she can meet at the pt's bedside at 10 am. HOTV will meet with the family in the AM.    Electronically signed by Roxane Garrido RN on 4/23/2024 at 5:59 PM  [phone]  
Consult to cardiology complete via [employer]  
Continuous EEG started and running. Report to follow.Naomie Rothman    
[facility]  SURGICAL INTENSIVE CARE UNIT    CRITICAL CARE ATTENDING PROGRESS NOTE    I have examined the patient, reviewed the record, and discussed the case with the APN/  Resident. I have reviewed all relevant labs and imaging data.    Please refer to the  APN/ resident's note. I agree with the  assessment and plan with the following corrections/ additions. The following summarizes my clinical findings and independent assessment.     CC: fall down stairs    HOSPITAL COURSE:  4/10 trauma team--seizures and intubated in TB  4/11 minimal movement  4/12 nystagmus noted overnight.  Versed drip started  EXAM:  Intubated  Heavily sedated  Pupils equal round reactive light  Lungs coarse  Heart regular rate rhythm  Abdomen soft nontender nondistended    LABS/ IMAGING:  -I personally reviewed the patient's Labs from 4/12/2024  CBC:   Lab Results   Component Value Date/Time    WBC 17.3 04/12/2024 04:31 AM    RBC 4.29 04/12/2024 04:31 AM    HGB 12.7 04/12/2024 04:31 AM    HCT 37.5 04/12/2024 04:31 AM    MCV 87.4 04/12/2024 04:31 AM    MCH 29.6 04/12/2024 04:31 AM    MCHC 33.9 04/12/2024 04:31 AM    RDW 14.7 04/12/2024 04:31 AM     04/11/2024 04:21 AM    MPV 11.5 04/12/2024 04:31 AM     CMP:    Lab Results   Component Value Date/Time     04/12/2024 04:31 AM    K 3.5 04/12/2024 04:31 AM    K 3.8 10/18/2022 03:35 PM     04/12/2024 04:31 AM    CO2 18 04/12/2024 04:31 AM    BUN 17 04/12/2024 04:31 AM    CREATININE 0.7 04/12/2024 04:31 AM    GFRAA >60 08/30/2022 11:28 AM    LABGLOM >90 04/12/2024 04:31 AM    GLUCOSE 123 04/12/2024 04:31 AM    PROT 6.2 04/12/2024 04:31 AM    LABALBU 3.0 04/12/2024 04:31 AM    CALCIUM 8.5 04/12/2024 04:31 AM    BILITOT 1.1 04/12/2024 04:31 AM    ALKPHOS 76 04/12/2024 04:31 AM    AST 39 04/12/2024 04:31 AM    ALT 15 04/12/2024 04:31 AM        -I personally reviewed the patient's chest x-ray from today and my interpretation is good position of ET 
DAILY VENTILATOR WEANING ASSESSMENT PERFORMED    P/FIO2 Ratio =  188        (<100= do not Wean)                  Cs =53                          (<32= Instability)  Plat. Pressure = 14  MV =9.78  RSBI =48    Instabilities:       Cardiovascular =       CNS =       Respiratory =       Metabolic =    Parameters    no    Wean per protocol  no    Ask Physician for a weaning plan yes    Additional Comments:     Performed by Cyrus Castillo RCP RRT      Reference Table:    Cardiovascular     CNS      1. Mean BP less than or equal to 75   1. Neuromuscular blockade  2. Heart Rate greater than 130   2. RASS of -3, -4, -5  3. Myocardial Ischemia    3. RASS of +3, +4  4. Mechanical Assist Device    4. ICP greater than 15 or             Intracranial Hypertension         Respiratory      Metabolic  1. PEEP equal to or greater than 10cm/H20  1.Temp. (8hrs) less than 95 or > 103  2. Respiratory Rate greater than 35   2. WBC < 5000 or > 10567  3. Minute Volume greater than 15L  4. pH less than 7.30  5. Deteriorating chest X-ray        
[facility]  PROGRESS NOTE  ATTENDING NOTE    TRAUMA/CRITICAL CARE    MECHANISM OF INJURY:  fall down stairs    CC:  seizures      HPI  rauma team.    Patient is a 78-year-old male who initially presented via EMS due to altered mental status after a fall down the stairs at his home onto the concrete basement which was unwitnessed per EMS.  Patient was found to have seizure-like activity.  Patient was given 2 mg of Ativan and Keppra due to seizure-like activity.  Patient was intubated in the trauma bay due to altered mental status.    Patient Active Problem List   Diagnosis    Essential hypertension    Atrial fibrillation (HCC)    Hypokalemia    Chronic obstructive pulmonary disease (HCC)    Hepatic abscess    Portal vein thrombosis    History of squamous cell carcinoma of skin    Generalized osteoarthritis of multiple sites    History of cholecystectomy    Malignant neoplasm of skin of parts of face    Urinary frequency    Trauma    Fall    Acute blood loss anemia    Seizure (HCC)    Closed wedge compression fracture of L1 vertebra (HCC)    Anterior communicating artery aneurysm    Cerebrovascular accident (CVA) due to bilateral embolism of middle cerebral arteries (HCC)    Acute respiratory failure with hypoxia (HCC)    Oropharyngeal dysphagia       OVERNIGHT EVENTS:  No issues overnight    HOSPITAL COURSE:  4/10 trauma team--seizures and intubated in TB  4/11 minimal movement  4/12 nystagmus noted overnight.  Versed drip started  4/13 no events overnight  4/14-Dilantin added by neurology yesterday  4/15--c-collar removed, statin started, echo; cards c/s for bradycarida  4/16--neuro wants versed @4mg/h x 24h; d/c fofana, palliative care c/s; guaifenesin     /72   Pulse 64   Temp 98.9 °F (37.2 °C) (Axillary)   Resp 19   Ht 1.803 m (5' 11\")   Wt 80.8 kg (178 lb 2.1 oz)   SpO2 92%   BMI 24.84 kg/m²   Physical Exam  Constitutional:       Comments: sedated   HENT:      Head: Normocephalic 
Dr. Bates notified of patients respiratory status, NTS initiated. Approved by daughters.  
EEG completed. Report to follow.  Naomie Rothman 4/11/2024     
EEG completed. Report to follow.  Naomie Rothman 4/15/2024     
Garcia catheter inserted using sterile technique per physician's order. Garcia Catheter Indication: Urinary Retention Upon insertion, 175   mL of urine returned. Securing device applied. Garcia bag is hanging below the level of the bladder, safety clip attached to the bed sheet, tamper seal is intact, drainage bag is not on the floor, lack of dependent loop in tubing, and the drainage bag is less than half full.   
Left message with son Preston to complete MRI screening  
MRI screening needs completed, thank you.  
Met with daughter and son-in-law at the bedside. Hospice philosophy and services discussed. All questions answered. Pt is symptomatic and received comfort medications. Call placed to JESUS Montoya, she has accepted the pt for GIP level of care. Family is to proceed with comfort measures. Please discontinue Ivs and tube feed and change code status to DNRCC. Please leave all IV sites and fofana cath intact. Pt will transport with PAS @ . N2N provided by [facility] liaison. Transport packet delivered nurses desk.     Electronically signed by Roxane Garrido RN on 4/24/2024 at 1:10 PM  [phone]  
Neurology consult sent to Dr. Villarreal via [employer].  
Neurosurg progress note  VITALS:  /83   Pulse 73   Temp (!) 101.7 °F (38.7 °C) (Bladder)   Resp 20   Ht 1.803 m (5' 11\")   Wt 88.4 kg (194 lb 14.2 oz)   SpO2 94%   BMI 27.18 kg/m²   24HR INTAKE/OUTPUT:    Intake/Output Summary (Last 24 hours) at 4/23/2024 0927  Last data filed at 4/23/2024 0900  Gross per 24 hour   Intake 2091.53 ml   Output 2366 ml   Net -274.47 ml     XR CHEST PORTABLE    Result Date: 4/11/2024  EXAMINATION: ONE XRAY VIEW OF THE CHEST 4/11/2024 8:29 am COMPARISON: 04/10/2024 HISTORY: ORDERING SYSTEM PROVIDED HISTORY: intubated TECHNOLOGIST PROVIDED HISTORY: Reason for exam:->intubated FINDINGS: Portable chest reveals lines and tubes to be in satisfactory position.  The heart is borderline enlarged.  Chronic changes seen throughout the lung fields bilaterally.  Minimal atelectatic changes seen at the left lung base stable and unchanged.  No definite pleural effusion.  Degenerative changes seen within the spine.  No new focal parenchymal opacification present.     Chronic changes seen throughout the lung fields with lines and tubes in satisfactory position.  Minimal residual markings at the left lung base stable and unchanged.     CT CERVICAL SPINE WO CONTRAST    Result Date: 4/11/2024  EXAMINATION: CT OF THE CERVICAL SPINE WITHOUT CONTRAST 4/10/2024 10:18 pm TECHNIQUE: CT of the cervical spine was performed without the administration of intravenous contrast. Multiplanar reformatted images are provided for review. Automated exposure control, iterative reconstruction, and/or weight based adjustment of the mA/kV was utilized to reduce the radiation dose to as low as reasonably achievable. COMPARISON: None. HISTORY: ORDERING SYSTEM PROVIDED HISTORY: Trauma TECHNOLOGIST PROVIDED HISTORY: Reason for exam:->Trauma What reading provider will be dictating this exam?->CRC FINDINGS: BONES/ALIGNMENT: No acute fracture.  Multilevel degenerative changes. SOFT TISSUES: There is no prevertebral soft 
Neurosurg progress note  VITALS:  BP (!) 117/56   Pulse 80   Temp (!) 102 °F (38.9 °C) (Bladder)   Resp 20   Ht 1.803 m (5' 11\")   Wt 86.9 kg (191 lb 9.3 oz)   SpO2 100%   BMI 26.72 kg/m²   24HR INTAKE/OUTPUT:    Intake/Output Summary (Last 24 hours) at 4/20/2024 0841  Last data filed at 4/20/2024 0800  Gross per 24 hour   Intake 3372.14 ml   Output 4585 ml   Net -1212.86 ml     XR CHEST PORTABLE    Result Date: 4/11/2024  EXAMINATION: ONE XRAY VIEW OF THE CHEST 4/11/2024 8:29 am COMPARISON: 04/10/2024 HISTORY: ORDERING SYSTEM PROVIDED HISTORY: intubated TECHNOLOGIST PROVIDED HISTORY: Reason for exam:->intubated FINDINGS: Portable chest reveals lines and tubes to be in satisfactory position.  The heart is borderline enlarged.  Chronic changes seen throughout the lung fields bilaterally.  Minimal atelectatic changes seen at the left lung base stable and unchanged.  No definite pleural effusion.  Degenerative changes seen within the spine.  No new focal parenchymal opacification present.     Chronic changes seen throughout the lung fields with lines and tubes in satisfactory position.  Minimal residual markings at the left lung base stable and unchanged.     CT CERVICAL SPINE WO CONTRAST    Result Date: 4/11/2024  EXAMINATION: CT OF THE CERVICAL SPINE WITHOUT CONTRAST 4/10/2024 10:18 pm TECHNIQUE: CT of the cervical spine was performed without the administration of intravenous contrast. Multiplanar reformatted images are provided for review. Automated exposure control, iterative reconstruction, and/or weight based adjustment of the mA/kV was utilized to reduce the radiation dose to as low as reasonably achievable. COMPARISON: None. HISTORY: ORDERING SYSTEM PROVIDED HISTORY: Trauma TECHNOLOGIST PROVIDED HISTORY: Reason for exam:->Trauma What reading provider will be dictating this exam?->CRC FINDINGS: BONES/ALIGNMENT: No acute fracture.  Multilevel degenerative changes. SOFT TISSUES: There is no prevertebral 
Neurosurg progress note  VITALS:  BP (!) 95/52   Pulse 56   Temp 98.9 °F (37.2 °C) (Axillary)   Resp 19   Ht 1.803 m (5' 11\")   Wt 80.8 kg (178 lb 2.1 oz)   SpO2 94%   BMI 24.84 kg/m²   24HR INTAKE/OUTPUT:    Intake/Output Summary (Last 24 hours) at 4/16/2024 1407  Last data filed at 4/16/2024 1402  Gross per 24 hour   Intake 3146.11 ml   Output 815 ml   Net 2331.11 ml     XR CHEST PORTABLE    Result Date: 4/11/2024  EXAMINATION: ONE XRAY VIEW OF THE CHEST 4/11/2024 8:29 am COMPARISON: 04/10/2024 HISTORY: ORDERING SYSTEM PROVIDED HISTORY: intubated TECHNOLOGIST PROVIDED HISTORY: Reason for exam:->intubated FINDINGS: Portable chest reveals lines and tubes to be in satisfactory position.  The heart is borderline enlarged.  Chronic changes seen throughout the lung fields bilaterally.  Minimal atelectatic changes seen at the left lung base stable and unchanged.  No definite pleural effusion.  Degenerative changes seen within the spine.  No new focal parenchymal opacification present.     Chronic changes seen throughout the lung fields with lines and tubes in satisfactory position.  Minimal residual markings at the left lung base stable and unchanged.     CT CERVICAL SPINE WO CONTRAST    Result Date: 4/11/2024  EXAMINATION: CT OF THE CERVICAL SPINE WITHOUT CONTRAST 4/10/2024 10:18 pm TECHNIQUE: CT of the cervical spine was performed without the administration of intravenous contrast. Multiplanar reformatted images are provided for review. Automated exposure control, iterative reconstruction, and/or weight based adjustment of the mA/kV was utilized to reduce the radiation dose to as low as reasonably achievable. COMPARISON: None. HISTORY: ORDERING SYSTEM PROVIDED HISTORY: Trauma TECHNOLOGIST PROVIDED HISTORY: Reason for exam:->Trauma What reading provider will be dictating this exam?->CRC FINDINGS: BONES/ALIGNMENT: No acute fracture.  Multilevel degenerative changes. SOFT TISSUES: There is no prevertebral soft 
OCCUPATIONAL THERAPY    Date:2024  Patient Name: Preston Bess  MRN: [id number]  : 1945  Room: [address] [place] [address]-A              Chart reviewed. Pt not following commands for participation.   Will re-attempt at later time. Thank you for consult.    Kayley Morales, OTR/L [id number]     
OCCUPATIONAL THERAPY    Date:2024  Patient Name: Preston Bess  MRN: [id number]  : 1945  Room: [address] [place] [address]-A              Chart reviewed. Pt on hold; not following commands at this time.  Will re-attempt at later time. Thank you for consult.    Kayley Morales, OTR/L [id number]     
OCCUPATIONAL THERAPY    Date:2024  Patient Name: Preston Bess  MRN: [id number]  : 1945  Room: [facility]A              Chart reviewed. Pt on hold for medical status.  Will re-attempt at later time. Thank you for consult.    Kayley Morales, OTR/L [id number]     
OCCUPATIONAL THERAPY    Date:2024  Patient Name: Preston Bess  MRN: [id number]  : 1945  Room: [facility]A              Chart reviewed. Pt not appropriate for therapy at this time.   Will discontinue OT order. Please re-order when indicated. Thank you.    Kayley Morales, OTR/L [id number]     
Palliative Medicine Social Work     Patient Name: Preston Bess    Age: 78 y.o.    Marital Status:      Status: no    Next of Kin: daughter Liz Donaldson [phone]  son Preston Bess and daughter Anna    Additional Support: son in law    Minor Children: no    Advanced Directives: no    Confirm Code Status: full    Current Goals of Care: to be reviewed      Mental Health History: no    Substance Abuse:history of etoh abuse    Indications of Abuse/Neglect: no    Financial Concerns: no    Living Situation: resides with his son Preston, per daughter he was somewhat independent    Physical Care Needs Met: pt intubated, sedate in icu.      Emotional Needs Met: daughter Liz and son in law at bedside    Assessment: 79 yo  male admitted from home after a fall down the stairs at his home onto the concrete basement, seizing, mri shows embolic appearing infarcts. Pt for continuous eeg.  LSW met with daughter Sharri and daly Purcell at bedside. Daughter briefly mentioned pt having a stroke and seizures. States her brother and sister have been updated as well. She was waiting for neurology update when I visited.  Reviewed with PM team.        
Patient admitted to SICU with the following belongings:  Pants and Shoes. The following belongings admitted with the patient were sent home with patient's daughter Liz Donaldson.  
Patient admitted to SICU with the following belongings:  Shoes and Jacket. The following belongings admitted with the patient, None, were sent home with the patient's family.    
Patient admitted with a temperature within the mild hypothermia range (95-98.5 degrees Fahrenheit). The following interventions have been implemented Warm Environment, Warm Blankets, and Forced Air [place]. Temperature will be monitored every 15 minutes until goal temperature of 98.6 degrees Fahrenheit has been recorded.   
Patient has been made DNR-CC and plans for discharge to hospice today.     Neurology will sign off- please call with questions or concerns     
Patient started to exhibit seizure activity. Versed drip increased to ordered rate of 6mg/hr per Dr. Villarreal. Secure message sent to Dr. Villarreal to notify him of the patient's status. No further orders at this time.   
Patient was extubated to 4 liters/min via nasal cannula. Breath Sounds post extubation were diminished. Stridor was not present post extubation. SPO2 was 94%.      Performed by  Cyrus Castillo RCP  
Pharmacy Consultation Note  (Antibiotic Dosing and Monitoring)    Initial consult date: 4/15/24  Consulting physician/provider: Dr. Alonso  Drug: Vancomycin  Indication: Pneumonia    Age/  Gender Height Weight IBW  Allergy Information   78 y.o./male 180.3 cm (5' 10.98\") 68 kg (149 lb 14.6 oz)     Ideal body weight: 75.3 kg (166 lb 0.1 oz)  Adjusted ideal body weight: 77.5 kg (170 lb 13.7 oz)   Patient has no known allergies.      Renal Function:  Recent Labs     04/13/24  0428 04/14/24  0423 04/15/24  0409   BUN 25* 24* 21   CREATININE 0.6* 0.6* 0.5*       Intake/Output Summary (Last 24 hours) at 4/15/2024 1042  Last data filed at 4/15/2024 1007  Gross per 24 hour   Intake 3698.6 ml   Output 886 ml   Net 2812.6 ml       Vancomycin Monitoring:  Trough:  No results for input(s): \"VANCOTROUGH\" in the last 72 hours.  Random:  No results for input(s): \"VANCORANDOM\" in the last 72 hours.    Vancomycin Administration Times:  Recent vancomycin administrations        No vancomycin IV orders with administrations found.                    Assessment:  Patient is a 78 y.o. male who has been initiated on vancomycin  Estimated Creatinine Clearance: 134 mL/min (A) (based on SCr of 0.5 mg/dL (L)).    Plan:  Will give vancomycin 1750 mg IV once, then 1250 mg IV Q24H  Will check vancomycin levels when appropriate  Will continue to monitor renal function   Pharmacy to follow      Ines Jackson 4/15/2024 10:42 AM        
Pharmacy Consultation Note  (Antibiotic Dosing and Monitoring)    Initial consult date: 4/15/24  Consulting physician/provider: Dr. Alonso  Drug: Vancomycin  Indication: Pneumonia    Age/  Gender Height Weight IBW  Allergy Information   78 y.o./male 180.3 cm (5' 10.98\") 68 kg (149 lb 14.6 oz)     Ideal body weight: 75.3 kg (166 lb 0.1 oz)  Adjusted ideal body weight: 77.5 kg (170 lb 13.7 oz)   Patient has no known allergies.      Renal Function:  Recent Labs     04/14/24  0423 04/15/24  0409 04/16/24  0355   BUN 24* 21 22   CREATININE 0.6* 0.5* 0.5*         Intake/Output Summary (Last 24 hours) at 4/16/2024 0731  Last data filed at 4/16/2024 0700  Gross per 24 hour   Intake 3820.77 ml   Output 895 ml   Net 2925.77 ml         Vancomycin Monitoring:  Trough:  No results for input(s): \"VANCOTROUGH\" in the last 72 hours.  Random:  No results for input(s): \"VANCORANDOM\" in the last 72 hours.    Vancomycin Administration Times:  Recent vancomycin administrations                     vancomycin (VANCOCIN) 1,750 mg in sodium chloride 0.9 % 500 mL IVPB (mg) 1,750 mg New Bag 04/15/24 1141                    Assessment:  Patient is a 78 y.o. male who has been initiated on vancomycin  Estimated Creatinine Clearance: 134 mL/min (A) (based on SCr of 0.5 mg/dL (L)).    Plan:  Will give vancomycin 1250 mg IV Q24H  Will check vancomycin trough 4/17   Will continue to monitor renal function   Pharmacy to follow      Ines Jackson 4/16/2024 7:31 AM        
Pharmacy Consultation Note  (Antibiotic Dosing and Monitoring)    Initial consult date: 4/15/24  Consulting physician/provider: Dr. Alonso  Drug: Vancomycin  Indication: Pneumonia    Age/  Gender Height Weight IBW  Allergy Information   78 y.o./male 180.3 cm (5' 10.98\") 68 kg (149 lb 14.6 oz)     Ideal body weight: 75.3 kg (166 lb 0.1 oz)  Adjusted ideal body weight: 77.8 kg (171 lb 9 oz)   Patient has no known allergies.      Renal Function:  Recent Labs     04/16/24  0355 04/17/24  0411 04/18/24  0453   BUN 22 22 18   CREATININE 0.5* 0.5* 0.5*         Intake/Output Summary (Last 24 hours) at 4/18/2024 0742  Last data filed at 4/18/2024 0700  Gross per 24 hour   Intake 3029.71 ml   Output 1225 ml   Net 1804.71 ml         Vancomycin Monitoring:  Trough:    Recent Labs     04/17/24  1023   VANCOTROUGH 4.6*       Random:  No results for input(s): \"VANCORANDOM\" in the last 72 hours.    Vancomycin Administration Times:  Recent vancomycin administrations                     vancomycin (VANCOCIN) 1,250 mg in sodium chloride 0.9 % 250 mL IVPB (mg) 1,250 mg New Bag 04/18/24 0505    vancomycin (VANCOCIN) 1,750 mg in sodium chloride 0.9 % 500 mL IVPB (mg) 1,750 mg New Bag 04/17/24 1533    vancomycin (VANCOCIN) 1,250 mg in sodium chloride 0.9 % 250 mL IVPB (mg) 1,250 mg New Bag 04/16/24 1136    vancomycin (VANCOCIN) 1,750 mg in sodium chloride 0.9 % 500 mL IVPB (mg) 1,750 mg New Bag 04/15/24 1141                    Assessment:  Patient is a 78 y.o. male who has been initiated on vancomycin  Estimated Creatinine Clearance: 134 mL/min (A) (based on SCr of 0.5 mg/dL (L)).    Plan:  Will continue vancomycin 1250 mg IV Q12H  Will check vancomycin trough 4/19   Will continue to monitor renal function   Pharmacy to follow      Ines Jackson 4/18/2024 7:42 AM        
Pharmacy Consultation Note  (Antibiotic Dosing and Monitoring)    Initial consult date: 4/15/24  Consulting physician/provider: Dr. Alonso  Drug: Vancomycin  Indication: Pneumonia    Age/  Gender Height Weight IBW  Allergy Information   78 y.o./male 180.3 cm (5' 10.98\") 68 kg (149 lb 14.6 oz)     Ideal body weight: 75.3 kg (166 lb 0.1 oz)  Adjusted ideal body weight: 78.3 kg (172 lb 9.9 oz)   Patient has no known allergies.      Renal Function:  Recent Labs     04/15/24  0409 04/16/24  0355 04/17/24  0411   BUN 21 22 22   CREATININE 0.5* 0.5* 0.5*         Intake/Output Summary (Last 24 hours) at 4/17/2024 1434  Last data filed at 4/17/2024 1400  Gross per 24 hour   Intake 1181.79 ml   Output 1120 ml   Net 61.79 ml       Vancomycin Monitoring:  Trough:    Recent Labs     04/17/24  1023   VANCOTROUGH 4.6*     Random:  No results for input(s): \"VANCORANDOM\" in the last 72 hours.    Vancomycin Administration Times:  Recent vancomycin administrations                     vancomycin (VANCOCIN) 1,250 mg in sodium chloride 0.9 % 250 mL IVPB (mg) 1,250 mg New Bag 04/16/24 1136    vancomycin (VANCOCIN) 1,750 mg in sodium chloride 0.9 % 500 mL IVPB (mg) 1,750 mg New Bag 04/15/24 1141                    Assessment:  Patient is a 78 y.o. male who has been initiated on vancomycin  Estimated Creatinine Clearance: 134 mL/min (A) (based on SCr of 0.5 mg/dL (L)).    Plan:  Will re-load with vancomycin 1750 mg IV once then will increase to vancomycin 1250 mg IV Q12H  Will check vancomycin trough when appropriate  Will continue to monitor renal function   Pharmacy to follow      Ines Jackson 4/17/2024 9:16 AM        
Pharmacy Consultation Note  (Antibiotic Dosing and Monitoring)    Initial consult date: 4/15/24  Consulting physician/provider: Dr. Alonso  Drug: Vancomycin  Indication: Pneumonia    Age/  Gender Height Weight IBW  Allergy Information   78 y.o./male 180.3 cm (5' 10.98\") 68 kg (149 lb 14.6 oz)     Ideal body weight: 75.3 kg (166 lb 0.1 oz)  Adjusted ideal body weight: 79.9 kg (176 lb 3.8 oz)   Patient has no known allergies.      Renal Function:  Recent Labs     04/17/24  0411 04/18/24  0453 04/19/24  0400   BUN 22 18 16   CREATININE 0.5* 0.5* 0.5*         Intake/Output Summary (Last 24 hours) at 4/19/2024 1358  Last data filed at 4/19/2024 1354  Gross per 24 hour   Intake 3855.11 ml   Output 5115 ml   Net -1259.89 ml         Vancomycin Monitoring:  Trough:    Recent Labs     04/17/24  1023 04/19/24  0400   VANCOTROUGH 4.6* 9.0       Random:  No results for input(s): \"VANCORANDOM\" in the last 72 hours.    Vancomycin Administration Times:  Recent vancomycin administrations                     vancomycin (VANCOCIN) 1,250 mg in sodium chloride 0.9 % 250 mL IVPB (mg) 1,250 mg New Bag 04/19/24 0509     1,250 mg New Bag 04/18/24 1631     1,250 mg New Bag  0505    vancomycin (VANCOCIN) 1,750 mg in sodium chloride 0.9 % 500 mL IVPB (mg) 1,750 mg New Bag 04/17/24 1533                  Assessment:  Patient is a 78 y.o. male who has been initiated on vancomycin for PNA (CAP).  Estimated Creatinine Clearance: 134 mL/min (A) (based on SCr of 0.5 mg/dL (L)).  4/19: day #5 vancomycin, day #7 amp/sulb.  WBC WNL, Temp of 100.6F today; Cr stable, good UO (1.5 mL/kg/hr) documented but received  IV furosemide today and on 4/18/2024.  Vanc trough 9 this morning.    Plan:  Continue vancomycin 1250 mg q12h.  Will re-check vancomycin if vanco continues beyond the weekend.   Will continue to monitor renal function.   Pharmacy to follow.    Evan Bender, PharmD  4/19/2024  2:02 PM  [phone]          
Pharmacy Consultation Note  (Antibiotic Dosing and Monitoring)    Initial consult date: 4/15/24  Consulting physician/provider: Dr. Alonso  Drug: Vancomycin  Indication: Pneumonia (+MRSA)    Age/  Gender Height Weight IBW  Allergy Information   78 y.o./male 180.3 cm (5' 10.98\") 68 kg (149 lb 14.6 oz)     Ideal body weight: 75.3 kg (166 lb 0.1 oz)  Adjusted ideal body weight: 78.5 kg (173 lb 2.4 oz)   Patient has no known allergies.      Renal Function:  Recent Labs     04/19/24  0400 04/20/24  0416 04/20/24  1044   BUN 16 12 12   CREATININE 0.5* 0.4* 0.4*         Intake/Output Summary (Last 24 hours) at 4/20/2024 1221  Last data filed at 4/20/2024 1216  Gross per 24 hour   Intake 3183.19 ml   Output 3305 ml   Net -121.81 ml         Vancomycin Monitoring:  Trough:    Recent Labs     04/19/24  0400   VANCOTROUGH 9.0       Random:  No results for input(s): \"VANCORANDOM\" in the last 72 hours.    Vancomycin Administration Times:    Recent vancomycin administrations                     vancomycin (VANCOCIN) 1,250 mg in sodium chloride 0.9 % 250 mL IVPB (mg) 1,250 mg New Bag 04/20/24 0437     1,250 mg New Bag 04/19/24 1637     1,250 mg New Bag  0509     1,250 mg New Bag 04/18/24 1631     1,250 mg New Bag  0505    vancomycin (VANCOCIN) 1,750 mg in sodium chloride 0.9 % 500 mL IVPB (mg) 1,750 mg New Bag 04/17/24 1533                  Assessment:  Patient is a 78 y.o. male who has been initiated on vancomycin for PNA (CAP).  Estimated Creatinine Clearance: 167 mL/min (A) (based on SCr of 0.4 mg/dL (L)).  4/19: day #5 vancomycin, day #7 amp/sulb.  WBC WNL, Temp of 100.6F today; Cr stable, good UO (1.5 mL/kg/hr) documented but received  IV furosemide today and on 4/18/2024.  Vanc trough 9 this morning.  4/20: day #6 vanco; amp/sulb doses completed today.  Vanco to stop after tomorrow's (4/21) doses given.    Plan:  Continue vancomycin 1250 mg q12h.  Will re-check vancomycin if vanco continues beyond the weekend.   Will continue 
Pharmacy Consultation Note  (Antibiotic Dosing and Monitoring)    Initial consult date: 4/15/24  Consulting physician/provider: Dr. Alonso  Drug: Vancomycin  Indication: Pneumonia (+MRSA)    Age/  Gender Height Weight IBW  Allergy Information   78 y.o./male 180.3 cm (5' 10.98\") 68 kg (149 lb 14.6 oz)     Ideal body weight: 75.3 kg (166 lb 0.1 oz)  Adjusted ideal body weight: 80.5 kg (177 lb 8.9 oz)   Patient has no known allergies.      Renal Function:  Recent Labs     04/20/24  0416 04/20/24  1044 04/21/24  0531   BUN 12 12 12   CREATININE 0.4* 0.4* 0.5*         Intake/Output Summary (Last 24 hours) at 4/21/2024 0827  Last data filed at 4/21/2024 0600  Gross per 24 hour   Intake 2938.76 ml   Output 2600 ml   Net 338.76 ml         Vancomycin Monitoring:  Trough:    Recent Labs     04/19/24  0400   VANCOTROUGH 9.0       Random:  No results for input(s): \"VANCORANDOM\" in the last 72 hours.    Vancomycin Administration Times:    Recent vancomycin administrations                     vancomycin (VANCOCIN) 1,250 mg in sodium chloride 0.9 % 250 mL IVPB (mg) 1,250 mg New Bag 04/21/24 0530     1,250 mg New Bag 04/20/24 1639     1,250 mg New Bag  0437     1,250 mg New Bag 04/19/24 1637     1,250 mg New Bag  0509     1,250 mg New Bag 04/18/24 1631                  Assessment:  Patient is a 78 y.o. male who has been initiated on vancomycin for PNA (CAP).  Estimated Creatinine Clearance: 134 mL/min (A) (based on SCr of 0.5 mg/dL (L)).  4/19: day #5 vancomycin, day #7 amp/sulb.  WBC WNL, Temp of 100.6F today; Cr stable, good UO (1.5 mL/kg/hr) documented but received  IV furosemide today and on 4/18/2024.  Vanc trough 9 this morning.  4/20: day #6 vanco; amp/sulb doses completed today.  Vanco to stop after tomorrow's (4/21) doses given.  4/21: day #7 of 7 for vanco.  Last dose tonight unless re-ordered/continued. Still with tem (100.5F @ 2200 on 4/20) and increase in WBC.    Plan:  Continue vancomycin 1250 mg q12h.  Will re-check 
Physical Therapy  Physical Therapy Attempt    Name: Preston Bess  : 1945  MRN: [id number]      Date of Service: 2024  Chart reviewed.  Spoke with RN - pt does not follow commands to participate in skilled PT at this time.  Will re-attempt as able.    Salazar Brooks, PT, DPT  [id number]      
Physical Therapy  Physical Therapy Attempt    Name: Preston Bess  : 1945  MRN: [id number]      Date of Service: 2024  Chart reviewed.  Pt is not medically appropriate for skilled PT at this time - RN confirmed.  Will re-attempt as able.    Salazar Brooks, PT, DPT  [id number]      
Physical Therapy  Physical Therapy Attempt    Name: Preston Bess  : 1945  MRN: [id number]      Date of Service: 2024  Chart reviewed.  Spoke with RN - pt is not appropriate for skilled PT.  Will discontinue PT order at this time.  Please re-consult when pt can participate in skilled PT.    Salazar Brooks, PT, DPT  [id number]      
Physical Therapy  Physical Therapy Attempt    Name: Preston Bess  : 1945  MRN: [id number]      Date of Service: 2024  Chart reviewed.  Spoke with RN - pt does not follow commands at this time; only opened eyes to noxious stimulation.  Will re-attempt as able.    Salazar Brooks, PT, DPT  [id number]      
Power chg 2 lumen picc Placement 4/14/2024    Product number: [id number]   Lot Number: 12/31/2024      Ultrasound: yes/sonosite   Right Brachial vein:                Upper Arm Circumference: (CM) 27    Size:(FR)/GUAGE 5.5 fr.    Exposed Length: (CM) 4    Internal Length: (CM) 36   Cut: (CM) 15   Vein Measurement: 0.53              Lidocaine Given: yes 1 %. 3 ml.  Kenrick Maldonado RN  4/14/2024  3:53 PM                         
[facility]  Neurology follow up     Date:  4/14/2024  Patient Name:  Preston Bess  YOB: 1945  MRN: [id number]     PCP:  Jemal Lau MD   Referring:  No ref. provider found      Chief Complaint: unresponsive, seizures     History obtained from: chart     Assessment    New onset seizures with concern for status epilepticus EEG on 4/11 with a moderate to severe encephalopathy with epileptiform activity without presence of status epilepticus. Initial CT was negative as well as repeat. MRI brain is pending. No clinical seizure activity witnessed on exam at this time and tolerating lower dose of Versed at 4 mg/h at this time    Hx of alcohol abuse (quit 10 years ago)     Dementia at baseline       Plan  Wean versed as tolerated.   Fosphenytoin 100 mg PE q8h  Trend levels daily - corrected level 16.2 today   Continue Vimpat 200 mg BID   Continue Keppra 2000 mg BID   There are no EEG capabilities over the weekend due to staffing related issues - if concern persists for potentially non convulsive status epilepticus would recommend transfer to tertiary care center   EEG order placed for Monday am  MRI brain WWO contrast  Discussed above plan with Dr. Villarreal   Neurology will follow   No family available at bedside for update at this time- please notify me with questions. Discussed with RN        History of Present Illness:  Preston Bess is a 78 y.o.  male presenting for evaluation of seizures.    PMH significant for alcohol abuse (quit 10 years ago), dementia, hx of blood clots, cancer, Afib, HTN    Patient presented as a trauma after a fall down the stairs at home and was unresponsive. Fall was unwitnessed. Patient noted to have seizure like activity. Given 2 mg Ativan in the ED and Keppra. Intubated. CT head was unrevealing. CTA with no significant stenosis or LVO- incidental GRANT aneurysm.  BP on arrival was 210/82. In SICU started on Versed and fentanyl. Patient was noted 
[place] [facility]  SURGICAL INTENSIVE CARE UNIT    CRITICAL CARE ATTENDING PROGRESS NOTE    I have examined the patient, reviewed the record, and discussed the case with the APN/  Resident. I have reviewed all relevant labs and imaging data.    Please refer to the  APN/ resident's note. I agree with the  assessment and plan with the following corrections/ additions. The following summarizes my clinical findings and independent assessment.     CC: fall down stairs    HOSPITAL COURSE:  4/10 trauma team--seizures and intubated in TB  4/11 minimal movement    EXAM:  Intubated  Grimaces to pain  Pupils equal round reactive light  Lungs coarse  Heart regular rate rhythm  Abdomen soft nontender nondistended    LABS/ IMAGING:  -I personally reviewed the patient's Labs from 4/11/2024  CBC:   Lab Results   Component Value Date/Time    WBC 11.1 04/11/2024 04:21 AM    RBC 4.58 04/11/2024 04:21 AM    HGB 13.3 04/11/2024 04:21 AM    HCT 39.8 04/11/2024 04:21 AM    MCV 86.9 04/11/2024 04:21 AM    MCH 29.0 04/11/2024 04:21 AM    MCHC 33.4 04/11/2024 04:21 AM    RDW 13.9 04/11/2024 04:21 AM     04/11/2024 04:21 AM    MPV 11.2 04/11/2024 04:21 AM     CMP:    Lab Results   Component Value Date/Time     04/11/2024 04:21 AM    K 3.3 04/11/2024 04:21 AM    K 3.8 10/18/2022 03:35 PM     04/11/2024 04:21 AM    CO2 21 04/11/2024 04:21 AM    BUN 12 04/11/2024 04:21 AM    CREATININE 0.7 04/11/2024 04:21 AM    GFRAA >60 08/30/2022 11:28 AM    LABGLOM >90 04/11/2024 04:21 AM    GLUCOSE 67 04/11/2024 04:21 AM    PROT 7.0 04/11/2024 04:21 AM    LABALBU 3.8 04/11/2024 04:21 AM    CALCIUM 8.5 04/11/2024 04:21 AM    BILITOT 0.9 04/11/2024 04:21 AM    ALKPHOS 89 04/11/2024 04:21 AM    AST 33 04/11/2024 04:21 AM    ALT 13 04/11/2024 04:21 AM        -I personally reviewed the patient's chest x-ray from today and my interpretation is good position of ET tube    ASSESSMENT:  Principal Problem:    Trauma  Active Problems:   
SURGICAL INTENSIVE CARE  PROGRESS NOTE    Date of admission:  4/10/2024  Reason for ICU transfer:  Trauma team, intubated    SUBJECTIVE:  No issues overnight, intermittently will move extremities     HOSPITAL COURSE:  4/15/24  - remains intubated and sedated, for EEG today      PHYSICAL EXAM:  BP (!) 95/52   Pulse 52   Temp 98.4 °F (36.9 °C) (Bladder)   Resp 16   Ht 1.803 m (5' 11\")   Wt 80.8 kg (178 lb 2.1 oz)   SpO2 100%   BMI 24.84 kg/m²     GENERAL:  Critically ill appearing  HEAD:  Normocephalic, atraumatic.   EYES:   No scleral icterus. PERRLA.  LUNGS:  mechanically ventilated   CARDIOVASCULAR: RR  ABDOMEN:  Soft, non-distended, non-tender. No guarding, rigidity, rebound.  EXTREMITIES:   MAEx4 intermittently to pain. Atraumatic. No LE edema.  SKIN:  Warm and dry  NEUROLOGIC:  GCS 6     ASSESSMENT / PLAN:  Neuro:  Acute CVA in BL occipital lobes, AMS likely 2/2 to CVA, hx of EtOH abuse, seizures?- continue vimpat, keppra, fosphenytoin, neuro consulted, EEG today, cont versed gtt, weaning as able   CV: maintain normotension, nini likely multifactorial, intracranial injury and possibly vimpat, will discuss with neuro. Invasive monitoring of hemodynamics.   Pulm:  Vent weaning as able   GI: on tube feeds  Renal: no acute issues. Monitor UOP & Electrolytes.  Endocrine: on SDS, begin weaning today. ISS goal BG <180   MSK: No acute issues. PT/OT.  Heme: monitor cbc  ID: Aspiration pna, + respiratory cx for MRSA and e coli   ABX: unasyn and vanc   DVT proph:  SCDS  GI proph:  protonix   Glucose protocol: SSI  Ancillary consults: Neuro, neurosrg   Family Update: As available    Disposition: Continue ICU     Elio Alonso MD  Resident, PGY-3  4/15/2024  10:42 AM        
SURGICAL INTENSIVE CARE  PROGRESS NOTE    Date of admission:  4/10/2024  Reason for ICU transfer:  Trauma team, intubated    SUBJECTIVE:  No issues overnight, intermittently will move extremities     HOSPITAL COURSE:  4/15/24  - remains intubated and sedated, for EEG today  4/16- remains intubated and sedated      PHYSICAL EXAM:  BP (!) 165/83   Pulse 59   Temp 98.6 °F (37 °C) (Bladder)   Resp 17   Ht 1.803 m (5' 11\")   Wt 80.8 kg (178 lb 2.1 oz)   SpO2 98%   BMI 24.84 kg/m²     GENERAL:  Critically ill appearing  HEAD:  Normocephalic, atraumatic.   EYES:   No scleral icterus. PERRLA.  LUNGS:  mechanically ventilated   CARDIOVASCULAR: RR  ABDOMEN:  Soft, non-distended, non-tender. No guarding, rigidity, rebound.  EXTREMITIES:   MAEx4 intermittently to pain. Atraumatic. No LE edema.  SKIN:  Warm and dry  NEUROLOGIC:  GCS 5    ASSESSMENT / PLAN:  Neuro:  Acute CVA in BL occipital lobes, AMS likely 2/2 to CVA, hx of EtOH abuse, seizures?- continue vimpat, keppra, fosphenytoin, neuro consulted, EEG today, stop versed today   CV: maintain normotension, nini likely multifactorial, intracranial injury and possibly vimpat. Invasive monitoring of hemodynamics. Cards consulted, echo pending   Pulm:  Vent weaning as able   GI: on tube feeds  Renal: no acute issues. Monitor UOP & Electrolytes.  Endocrine: on SDS, begin weaning today. ISS goal BG <180   MSK: No acute issues. PT/OT.  Heme: monitor cbc  ID: Aspiration pna, + respiratory cx for MRSA and e coli   ABX: unasyn and vanc   DVT proph:  SCDS  GI proph:  protonix   Glucose protocol: SSI  Ancillary consults: Neuro, neurosrg, palliative  Family Update: As available    Disposition: Continue ICU     Elio Alonso MD  Resident, PGY-3  4/16/2024  9:33 AM        
SURGICAL INTENSIVE CARE  PROGRESS NOTE    Date of admission:  4/10/2024  Reason for ICU transfer:  Trauma team, intubated    SUBJECTIVE:  No issues overnight, intermittently will move extremities     HOSPITAL COURSE:  4/15/24  - remains intubated and sedated, for EEG today  4/16- remains intubated and sedated  4/18- waking up more, intermittently following commands.     PHYSICAL EXAM:  BP (!) 146/66   Pulse 52   Temp 99.5 °F (37.5 °C) (Bladder)   Resp 16   Ht 1.803 m (5' 11\")   Wt 81.6 kg (179 lb 14.3 oz)   SpO2 93%   BMI 25.09 kg/m²     GENERAL:  Critically ill appearing  HEAD:  Normocephalic, atraumatic.   EYES:   No scleral icterus. PERRLA.  LUNGS:  mechanically ventilated   CARDIOVASCULAR: RR  ABDOMEN:  Soft, non-distended, non-tender. No guarding, rigidity, rebound.  EXTREMITIES:   MAEx4 intermittently to pain. Atraumatic. No LE edema.  SKIN:  Warm and dry  NEUROLOGIC:  opens eyes intermittently     ASSESSMENT / PLAN:  Neuro:  Acute CVA in BL occipital lobes, AMS likely 2/2 to CVA, hx of EtOH abuse, seizures?- continue vimpat, keppra, fosphenytoin, neuro consulted, versed off  CV: maintain normotension, nini likely multifactorial, intracranial injury and possibly vimpat. Invasive monitoring of hemodynamics. Cards consulted, echo pending   Pulm:  Vent weaning as able   GI: on tube feeds  Renal: no acute issues. Monitor UOP & Electrolytes.  Endocrine: on SDS, begin weaning today. ISS goal BG <180   MSK: No acute issues. PT/OT.  Heme: monitor cbc  ID: Aspiration pna, + respiratory cx for MRSA and e coli   ABX: unasyn and vanc   DVT proph:  SCDS  GI proph:  protonix   Glucose protocol: SSI  Ancillary consults: Neuro, neurosrg, palliative  Family Update: As available    Disposition: Continue ICU     Elio Alonso MD  Resident, PGY-3  4/18/2024  2:58 PM        
SURGICAL INTENSIVE CARE  PROGRESS NOTE    Date of admission:  4/10/2024  Reason for ICU transfer:  found down, seizures      HOSPITAL COURSE:  4/22/24  - resting in bed, will open eyes, does not move left UE      PHYSICAL EXAM:  BP (!) 153/97   Pulse 59   Temp 98.4 °F (36.9 °C) (Bladder)   Resp 20   Ht 1.803 m (5' 11\")   Wt 88.4 kg (194 lb 14.2 oz)   SpO2 99%   BMI 27.18 kg/m²     GENERAL:  NAD  HEAD:  Normocephalic, atraumatic.   EYES:   No scleral icterus. PERRLA.  LUNGS:  No increased work of breathing. CTAB.  CARDIOVASCULAR: Warm throughout. Regular rate  ABDOMEN:  Soft, nontender, nondistended,  No guarding, rigidity, rebound.  EXTREMITIES:   flaccid LUE  SKIN:  Warm and dry  NEUROLOGIC:  GCS 10T    ASSESSMENT / PLAN:  Neuro:  seizures - neurology following, currently on keppra BID, vimpat, fosphenytoin - plan to wean as outpatient    Stroke - L hemiparesis, continue ASA, statin  L1 wedge fx  CV: No acute issues - Monitor hemodynamics.     Continue Tlov for Afib  Pulm: acute respiratory insufficiency - pt is no to intubation, pulmonary hygiene, incentive spirometry    MRSA/E coli in sputum - continue unasyn  GI: hypoalbuminemia - continue tube feeds, continue bowel regimen. Zofran PRN.   Renal: No acute issues. Monitor UOP & Electrolytes.  Endocrine: No acute issues. Monitor glucose, keep <180  MSK: No acute issues. PT/OT.  Heme: acute blood loss anemia - Hb 10.4 from 10.5, monitor  ID: continue Unasyn per above    Pain/Analgesia: As needed  Bowel regimen: Glycolax, senokot  DVT proph:  Lovenox  Glucose protocol: As needed  Mouth/eye care: As needed per patient  Urine:   fofana, placed 4/17. Keep in place for fluid monitoring.  Ancillary consults: Neurology  Family Update: As available    Disposition: Continue ICU care    Electronically signed by Guillermina Carson DO on 4/22/2024 at 3:12 PM        
SURGICAL INTENSIVE CARE  PROGRESS NOTE    Date of admission:  4/10/2024  Reason for ICU transfer:  found down, seizures      HOSPITAL COURSE:  4/22/24  - resting in bed, will open eyes, does not move left UE  4/23: febrile overnight      PHYSICAL EXAM:  /85   Pulse 66   Temp 100.4 °F (38 °C) (Bladder)   Resp 22   Ht 1.803 m (5' 11\")   Wt 88.4 kg (194 lb 14.2 oz)   SpO2 100%   BMI 27.18 kg/m²     GENERAL:  NAD  HEAD:  Normocephalic, atraumatic.   EYES:   No scleral icterus.   LUNGS:  No increased work of breathing  CARDIOVASCULAR: Warm throughout. Regular rate  ABDOMEN:  Soft, nontender, nondistended,  No guarding, rigidity, rebound.  EXTREMITIES:   flaccid LUE  SKIN:  Warm and dry      ASSESSMENT / PLAN:  Neuro:  seizures - neurology following, currently on keppra BID, vimpat, fosphenytoin - plan to wean as outpatient    Stroke - L hemiparesis, continue ASA, statin  L1 wedge fx  CV: No acute issues - Monitor hemodynamics.     Continue Tlov for Afib  Pulm: acute respiratory insufficiency - pt is no to intubation, pulmonary hygiene, incentive spirometry    MRSA/E coli in sputum - unasyn changed   Respiratory cultures growing Serratia/Klebsiella - changed abx to Cefepime, changed to Rocephin per Neurology  GI: hypoalbuminemia - continue tube feeds, continue bowel regimen. Zofran PRN.   Renal: No acute issues. Monitor UOP & Electrolytes.  Endocrine: No acute issues. Monitor glucose, keep <180  MSK: No acute issues. PT/OT.  Heme: acute blood loss anemia - Hb 10.48 from 10.4, monitor  ID: Rocephin for respiratory cultures    Pain/Analgesia: As needed  Bowel regimen: Glycolax, senokot  DVT proph:  Lovenox  Glucose protocol: As needed  Mouth/eye care: As needed per patient  Urine:   fofana, placed 4/17. Keep in place for fluid monitoring.  Ancillary consults: Neurology  Family Update: As available    Disposition: Continue ICU care    Electronically signed by Guillermina Carson DO on 4/23/2024 at 11:24 AM        
SURGICAL INTENSIVE CARE  PROGRESS NOTE    Date of admission:  4/10/2024  Reason for ICU transfer:  found down, seizures      HOSPITAL COURSE:  4/22/24  - resting in bed, will open eyes, does not move left UE  4/23: febrile overnight  4/24: febrile overnight. Hospice discussion with family today.      PHYSICAL EXAM:  BP (!) 163/84   Pulse 97   Temp (!) 102.3 °F (39.1 °C) (Bladder)   Resp 24   Ht 1.803 m (5' 11\")   Wt 88.4 kg (194 lb 14.2 oz)   SpO2 99%   BMI 27.18 kg/m²     GENERAL:  NAD  HEAD:  Normocephalic, atraumatic.   EYES:   No scleral icterus.   LUNGS:  No increased work of breathing  CARDIOVASCULAR: Warm throughout. Regular rate  ABDOMEN:  Soft, nontender, nondistended,  No guarding, rigidity, rebound.  EXTREMITIES:   flaccid LUE  SKIN:  Warm and dry      ASSESSMENT / PLAN:  Neuro:  seizures - neurology following, currently on keppra BID, vimpat, fosphenytoin - neurology currently weaning vimpat   Stroke - L hemiparesis, continue ASA, statin  L1 wedge fx - NSGY consulted  CV: No acute issues - Monitor hemodynamics.     Continue Tlov for Afib  Pulm: acute respiratory insufficiency - pt is no to intubation, pulmonary hygiene, incentive spirometry    MRSA/E coli in sputum - unasyn changed   Respiratory cultures growing Serratia/Klebsiella - currently on Rocephin per Neurology recommendations for neuroprotective   CXR ordered due to fevers  GI: hypoalbuminemia - continue tube feeds, continue bowel regimen. Zofran PRN.   Renal: No acute issues. Monitor UOP & Electrolytes.  Endocrine: No acute issues. Monitor glucose, keep <180  MSK: No acute issues. PT/OT when able  Heme: acute blood loss anemia - Hb 10.48 from 10.4, monitor  ID: Rocephin for respiratory cultures   Fevers of unknown origin - ordered CXR, pending hospice discussion possible UA/blood cultures    Pain/Analgesia: As needed  Bowel regimen: Glycolax, senokot  DVT proph:  Lovenox  Glucose protocol: As needed  Mouth/eye care: As needed per 
[facility]  PROGRESS NOTE  ATTENDING NOTE    TRAUMA/CRITICAL CARE    MECHANISM OF INJURY:  fall down stairs    CC:  seizures      HPI  rauma team.    Patient is a 78-year-old male who initially presented via EMS due to altered mental status after a fall down the stairs at his home onto the concrete basement which was unwitnessed per EMS.  Patient was found to have seizure-like activity.  Patient was given 2 mg of Ativan and Keppra due to seizure-like activity.  Patient was intubated in the trauma bay due to altered mental status.    Patient Active Problem List   Diagnosis    Essential hypertension    Atrial fibrillation (HCC)    Hypokalemia    Chronic obstructive pulmonary disease (HCC)    Hepatic abscess    Portal vein thrombosis    History of squamous cell carcinoma of skin    Generalized osteoarthritis of multiple sites    History of cholecystectomy    Malignant neoplasm of skin of parts of face    Urinary frequency    Trauma    Fall    Acute blood loss anemia    Seizure (HCC)    Closed wedge compression fracture of L1 vertebra (HCC)    Anterior communicating artery aneurysm    Cerebrovascular accident (CVA) due to bilateral embolism of middle cerebral arteries (HCC)    Acute respiratory failure with hypoxia (HCC)    Oropharyngeal dysphagia       OVERNIGHT EVENTS:  Slower to respond today    HOSPITAL COURSE:  4/10 trauma team--seizures and intubated in TB  4/11 minimal movement  4/12 nystagmus noted overnight.  Versed drip started  4/13 no events overnight  4/14-Dilantin added by neurology yesterday  4/15--c-collar removed, statin started, echo; cards c/s for bradycarida  4/16--neuro wants versed @4mg/h x 24h; d/c fofana, palliative care c/s; guaifenesin   4/17--versed gtt stopped, Tlov started, poor prognosis per neurology  4/18--started following commands, but very slow to respond; lasix    /66   Pulse 70   Temp (!) 100.8 °F (38.2 °C) (Bladder)   Resp 17   Ht 1.803 m (5' 11\")   Wt 
[facility]  PROGRESS NOTE  ATTENDING NOTE    TRAUMA/CRITICAL CARE    MECHANISM OF INJURY:  fall down stairs    CC:  seizures      HPI  rauma team.    Patient is a 78-year-old male who initially presented via EMS due to altered mental status after a fall down the stairs at his home onto the concrete basement which was unwitnessed per EMS.  Patient was found to have seizure-like activity.  Patient was given 2 mg of Ativan and Keppra due to seizure-like activity.  Patient was intubated in the trauma bay due to altered mental status.    Patient Active Problem List   Diagnosis    Essential hypertension    Atrial fibrillation (HCC)    Hypokalemia    Chronic obstructive pulmonary disease (HCC)    Hepatic abscess    Portal vein thrombosis    History of squamous cell carcinoma of skin    Generalized osteoarthritis of multiple sites    History of cholecystectomy    Malignant neoplasm of skin of parts of face    Urinary frequency    Trauma    Fall    Acute blood loss anemia    Seizure (HCC)    Closed wedge compression fracture of L1 vertebra (HCC)    Anterior communicating artery aneurysm    Cerebrovascular accident (CVA) due to bilateral embolism of middle cerebral arteries (HCC)    Acute respiratory failure with hypoxia (HCC)    Oropharyngeal dysphagia       OVERNIGHT EVENTS:  Still having some fevers    HOSPITAL COURSE:  4/10 trauma team--seizures and intubated in TB  4/11 minimal movement  4/12 nystagmus noted overnight.  Versed drip started  4/13 no events overnight  4/14-Dilantin added by neurology yesterday  4/15--c-collar removed, statin started, echo; cards c/s for bradycarida  4/16--neuro wants versed @4mg/h x 24h; d/c fofana, palliative care c/s; guaifenesin   4/17--versed gtt stopped, Tlov started, poor prognosis per neurology  4/18--started following commands, but very slow to respond; lasix  4/20 self diuresing, copious thick sputum--3% nebs restarted  4/21--unasyn restarted, extubated    BP 
[facility]  SURGICAL INTENSIVE CARE UNIT    CRITICAL CARE ATTENDING PROGRESS NOTE    I have examined the patient, reviewed the record, and discussed the case with the APN/  Resident. I have reviewed all relevant labs and imaging data.    Please refer to the  APN/ resident's note. I agree with the  assessment and plan with the following corrections/ additions. The following summarizes my clinical findings and independent assessment.     CC: fall down stairs    HOSPITAL COURSE:  4/10 trauma team--seizures and intubated in TB  4/11 minimal movement  4/12 nystagmus noted overnight.  Versed drip started  4/13 no events overnight  EXAM:  Intubated  Heavily sedated  Pupils equal round reactive light  Lungs coarse  Heart regular rate rhythm  Abdomen soft nontender nondistended    LABS/ IMAGING:  -I personally reviewed the patient's Labs from 4/13/2024  CBC:   Lab Results   Component Value Date/Time    WBC 12.9 04/13/2024 04:28 AM    RBC 3.71 04/13/2024 04:28 AM    HGB 10.9 04/13/2024 04:28 AM    HCT 32.8 04/13/2024 04:28 AM    MCV 88.4 04/13/2024 04:28 AM    MCH 29.4 04/13/2024 04:28 AM    MCHC 33.2 04/13/2024 04:28 AM    RDW 15.0 04/13/2024 04:28 AM     04/13/2024 04:28 AM    MPV 11.9 04/13/2024 04:28 AM     CMP:    Lab Results   Component Value Date/Time     04/13/2024 04:28 AM    K 3.9 04/13/2024 04:28 AM    K 3.8 10/18/2022 03:35 PM     04/13/2024 04:28 AM    CO2 21 04/13/2024 04:28 AM    BUN 25 04/13/2024 04:28 AM    CREATININE 0.6 04/13/2024 04:28 AM    GFRAA >60 08/30/2022 11:28 AM    LABGLOM >90 04/13/2024 04:28 AM    GLUCOSE 157 04/13/2024 04:28 AM    PROT 5.5 04/13/2024 04:28 AM    LABALBU 2.5 04/13/2024 04:28 AM    CALCIUM 8.1 04/13/2024 04:28 AM    BILITOT 1.6 04/13/2024 04:28 AM    ALKPHOS 90 04/13/2024 04:28 AM    AST 32 04/13/2024 04:28 AM    ALT 20 04/13/2024 04:28 AM        -I personally reviewed the patient's chest x-ray from today and my interpretation is good 
Spontaneous Parameters performed    VT = 491 ml  f = 18  B/M  Ve = 8.30 L/M  NIF = -24  cmH2O  VC = 1.641 L  RSBI = 37      Performed by Cyrus Castillo RCP  
Surgical Intensive Care Unit  Daily Progress Note  Date of admission:  4/10/2024  Reason for ICU transfer:  Found down, seizures    Hospital Course:  4/10: Trauma team. Patient is a 78-year-old male who initially presented via EMS due to altered mental status after a fall down the stairs at his home onto the concrete basement which was unwitnessed per EMS.  Patient was found to have seizure-like activity.  Patient was given 2 mg of Ativan and Keppra due to seizure-like activity.  Patient was intubated in the trauma bay due to altered mental status. Imaging revealed L1 wedge compression fracture. Neurology consulted, EEG ordered, continue Keppra.   4/11: Patient with no acute events overnight. Minimal response to pain, no gag reflex but cough present. No new findings on tertiary exam. NSGY consulted for compression fracture. Will hold sedation to assess neuro status.   4/12: Patient with continued seizure activity in the afternoon, now on versed drip. Tmax 100.5 overnight. Straight cathed once.     Physical Exam:  /69   Pulse 56   Temp 97.2 °F (36.2 °C) (Axillary)   Resp 20   Ht 1.803 m (5' 11\")   Wt 71.4 kg (157 lb 6.5 oz)   SpO2 96%   BMI 21.95 kg/m²     General: Intubated, sedated  HEENT: Trachea midline, no masses, Pupils equal round. Right nostril with ulceration   Chest: Intubated, mechanically ventilated. Left shoulder with several open sores that are in various states of healing  Cardiovascular: Extremities warm, well perfused  Abdomen:  Soft and non distended.  No tenderness, guarding, rebound, or rigidity   Extremities: Moves all 4 extremities, No pedal edema. LLE shin scab.     ASSESSMENT / PLAN:  Neuro: Acute pain, multimodal pain control. Seizure activity, given 2 g keppra IV, continued BID. With continued seizure activity, started on versed gtt. Neurology consulted, EEG ordered.   Incidental 2 mm GRANT aneurysm - will need outpatient follow up  CV: Hx HTN, resume home losartan. HR near normal 
[facility]  SURGICAL INTENSIVE CARE UNIT (SICU)  ATTENDING PHYSICIAN CRITICAL CARE PROGRESS NOTE     I have personally examined the patient, personally reviewed the record, and personally discussed the case with the resident. I have personally reviewed all relevant labs and imaging data. I am actively managing this patient's medications.  Please refer to the resident's note. I agree with the assessment and plan with the following corrections/additions. The following summarizes my clinical findings and independent assessment.     CC: critical care management after found down; seizure    Hospital Course/Overnight Events:  4/10 trauma team--seizures and intubated in TB  4/11 minimal movement  4/12 nystagmus noted overnight.  Versed drip started  4/13 no events overnight  4/14-Dilantin added by neurology yesterday  4/15--c-collar removed, statin started, echo; cards c/s for bradycarida  4/16--neuro wants versed @4mg/h x 24h; d/c fofana, palliative care c/s; guaifenesin   4/17--versed gtt stopped, Tlov started, poor prognosis per neurology  4/18--started following commands, but very slow to respond; lasix  4/20 self diuresing, copious thick sputum--3% nebs restarted  4/21--unasyn restarted, extubated  4/22--nothing new overnight  4/23--febrile overnight (Tmax 102.0)    Medications   Scheduled Meds:    calcium gluconate  2,000 mg IntraVENous Once    ampicillin-sulbactam  3,000 mg IntraVENous Q6H    fosphenytoin  100 mg PE IntraVENous Q12H    potassium & sodium phosphates  1 packet Oral 4x Daily    ipratropium 0.5 mg-albuterol 2.5 mg  1 Dose Inhalation Q4H WA RT    enoxaparin  1 mg/kg SubCUTAneous BID    guaiFENesin  200 mg Oral Q6H    atorvastatin  40 mg Oral Nightly    aspirin  81 mg Oral Daily    lansoprazole  30 mg Per G Tube BID    thiamine  100 mg Oral Daily    levETIRAcetam  2,000 mg Per NG tube BID    folic acid  1 mg Oral Daily    losartan  50 mg Oral QAM    sodium chloride flush  5-40 mL 
Twitching/muscle tremors noted left clavicular area.Patient awake, nodding head to questions, and holding up right thumb to command. Dr. Alonso at bedside to assess patient, no new orders.   
  Additional Respiratoray Assessments   Humidification Source Heated wire   Humidification Temp 37   Ambu Bag With Mask At Bedside Yes   ETT    Placement Date/Time: 04/10/24 1000   Present on Admission/Arrival: No  Placed By: Licensed provider  Placement Verified By: Chest X-ray;Capnometry;Auscultation  Preoxygenation: Yes  Airway Type: Cuffed  Airway Tube Size: 8 mm  Location: Oral  Secured ...   Secured At 25 cm   Measured From Lips       
  II: visual fields Bilateral threat   II: pupils RADHA   III,VII: ptosis None   III,IV,VI: extraocular muscles  Follows examiner around the room   V: mastication    V: facial light touch sensation     V,VII: corneal reflex  Present   VII: facial muscle function - upper     VII: facial muscle function - lower Normal   VIII: hearing    IX: soft palate elevation  Normal   IX,X: gag reflex    XI: trapezius strength     XI: sternocleidomastoid strength    XI: neck extension strength     XII: tongue strength        with right hand but not left    Normal bulk without spasticity    DTR:   No Alejandro's no Babinski    Laboratory/Radiology:     CBC with Differential:    Lab Results   Component Value Date/Time    WBC 14.9 04/22/2024 05:07 AM    RBC 3.52 04/22/2024 05:07 AM    HGB 10.4 04/22/2024 05:07 AM    HCT 32.4 04/22/2024 05:07 AM     04/22/2024 05:07 AM    MCV 92.0 04/22/2024 05:07 AM    MCH 29.5 04/22/2024 05:07 AM    MCHC 32.1 04/22/2024 05:07 AM    RDW 14.4 04/22/2024 05:07 AM    METASPCT 1 04/18/2024 04:53 AM    LYMPHOPCT 7 04/22/2024 05:07 AM    MONOPCT 8 04/22/2024 05:07 AM    MYELOPCT 3 04/18/2024 04:53 AM    BASOPCT 0 04/22/2024 05:07 AM    MONOSABS 1.17 04/22/2024 05:07 AM    LYMPHSABS 1.09 04/22/2024 05:07 AM    EOSABS 0.15 04/22/2024 05:07 AM    BASOSABS 0.06 04/22/2024 05:07 AM     CMP:    Lab Results   Component Value Date/Time     04/22/2024 05:07 AM    K 3.9 04/22/2024 05:07 AM    K 3.8 10/18/2022 03:35 PM     04/22/2024 05:07 AM    CO2 24 04/22/2024 05:07 AM    BUN 10 04/22/2024 05:07 AM    CREATININE 0.5 04/22/2024 05:07 AM    GFRAA >60 08/30/2022 11:28 AM    LABGLOM >90 04/22/2024 05:07 AM    GLUCOSE 100 04/22/2024 05:07 AM    PROT 5.9 04/22/2024 05:07 AM    CALCIUM 8.1 04/22/2024 05:07 AM    BILITOT 0.6 04/22/2024 05:07 AM    ALKPHOS 211 04/22/2024 05:07 AM    AST 73 04/22/2024 05:07 AM    ALT 88 04/22/2024 05:07 AM     MRI Brain:  Scattered acute infarcts involving the bilateral 
 3 mL IntraVENous 2 times per day    artificial tears   Both Eyes Q4H    Or    Polyvinyl Alcohol-Povidone PF  1 drop Both Eyes Q4H    senna  1 tablet Oral Nightly    finasteride  5 mg Oral Daily    [Held by provider] amLODIPine  5 mg Oral Daily    lacosamide (VIMPAT) 200 mg in sodium chloride 0.9 % 70 mL IVPB  200 mg IntraVENous BID    polyethylene glycol  17 g Oral Daily    chlorhexidine  15 mL Mouth/Throat BID     Continuous Infusions:    sodium chloride      dextrose       PRN Meds: sodium chloride (Inhalant), white petrolatum, sodium chloride flush, sodium chloride, heparin flush, medicated lip balm, acetaminophen, glucose, dextrose bolus **OR** dextrose bolus, glucagon (rDNA), dextrose, ondansetron **OR** ondansetron, labetalol, hydrALAZINE    Physical Examination      Vitals:  /68   Pulse 54   Temp (!) 101.7 °F (38.7 °C) (Bladder)   Resp 19   Ht 1.803 m (5' 11\")   Wt 88.4 kg (194 lb 14.2 oz)   SpO2 98%   BMI 27.18 kg/m²   Temp (24hrs), Av.9 °F (37.7 °C), Min:99 °F (37.2 °C), Max:101.7 °F (38.7 °C)      I/O (24Hr):    Intake/Output Summary (Last 24 hours) at 2024 1215  Last data filed at 2024 0951  Gross per 24 hour   Intake 2334.19 ml   Output 2840 ml   Net -505.81 ml       Physical Exam  Constitutional:       Comments: Intermittent eye opening  Intermittently moves RUE/RLE  Not following commands   Cardiovascular:      Rate and Rhythm: Normal rate and regular rhythm.      Heart sounds: Normal heart sounds. No murmur heard.  Pulmonary:      Effort: Pulmonary effort is normal. No respiratory distress.      Breath sounds: Normal breath sounds. No wheezing.   Abdominal:      General: Bowel sounds are normal. There is no distension.      Palpations: Abdomen is soft.      Tenderness: There is no abdominal tenderness.   Musculoskeletal:      Comments: Flaccid on left   Skin:     General: Skin is warm and dry.         Labs/Imaging/Diagnostics   Labs:  personally reviewed  CBC:  Recent Labs 
1721 ml total fluids  Goal TF & Flush Orders Provides: TF running at goal    Anthropometric Measures:  Height: 180.3 cm (5' 10.98\")  Ideal Body Weight (IBW): 172 lbs (78 kg)    Admission Body Weight: 67.6 kg (149 lb) (4/10 first measured)  Current Body Weight: 67.6 kg (149 lb) (4/10 actual admit wt as CBW elevated), 86.6 % IBW.    Current BMI (kg/m2): 20.8  Usual Body Weight:  (No recent wt hx within past year. Noted 152lb 1/17/23)                       BMI Categories: Underweight (BMI less than 22) age over 65    Estimated Daily Nutrient Needs:  Energy Requirements Based On: Formula  Weight Used for Energy Requirements: Admission  Energy (kcal/day): 1700-1800kcal (MSJ x1.2SF)  Weight Used for Protein Requirements: Admission  Protein (g/day): 80-95 (1.2-1.4 gm/kg admit wt)     Fluid (ml/day): per critical care    Nutrition Diagnosis:   Inadequate oral intake related to cognitive or neurological impairment as evidenced by NPO or clear liquid status due to medical condition, nutrition support - enteral nutrition    Nutrition Interventions:   Food and/or Nutrient Delivery: Continue NPO, Continue Current Tube Feeding (add 1 protein modular daily to better meet estimated nutrient needs)  Nutrition Education/Counseling: Education not appropriate  Coordination of Nutrition Care: Continue to monitor while inpatient       Goals:  Previous Goal Met: Goal(s) Achieved  Goals: Tolerate nutrition support at goal rate (to continue)       Nutrition Monitoring and Evaluation:   Behavioral-Environmental Outcomes: None Identified  Food/Nutrient Intake Outcomes: Enteral Nutrition Intake/Tolerance  Physical Signs/Symptoms Outcomes: Biochemical Data, GI Status, Diarrhea, Fluid Status or Edema, Hemodynamic Status, Nutrition Focused Physical Findings, Skin, Weight    Discharge Planning:    Too soon to determine     Fuad Murcia RD  Contact: ext [phone]     
81.6 kg (179 lb 14.3 oz)   SpO2 93%   BMI 25.09 kg/m²   Physical Exam  Constitutional:       Comments: sedated   HENT:      Head: Normocephalic and atraumatic.      Nose: Nose normal.      Mouth/Throat:      Mouth: Mucous membranes are moist.      Pharynx: Oropharynx is clear.   Eyes:      Extraocular Movements: Extraocular movements intact.      Pupils: Pupils are equal, round, and reactive to light.   Cardiovascular:      Rate and Rhythm: Normal rate and regular rhythm.      Pulses: Normal pulses.      Heart sounds: Normal heart sounds.   Pulmonary:      Effort: Pulmonary effort is normal.      Breath sounds: Normal breath sounds.   Abdominal:      General: There is no distension.      Palpations: Abdomen is soft.      Tenderness: There is no abdominal tenderness.   Musculoskeletal:         General: No tenderness or signs of injury.      Cervical back: Normal range of motion and neck supple.   Skin:     General: Skin is warm and dry.   Neurological:      Comments: Intubated, sedated  Nonpurposeful mvmt with RUE         Lines:    Fofana:  yes - Continue fofana catheter for managing strict I and Os in this critically ill patient.     Central line:  no  PICC:  yes - right brachial 4/14/    I have reviewed the laboratory studies    CXR:  tubes and lines in good position; pulmonary edema, left pleural effusion    ASSESSMENT/PLAN:   Neuro: concussion with LOC--monitor for post concussive symptoms  Seizures--neurology following, c/w vimpat, keppra, cerebryx  Stroke--ASA, statin, check echo, neurology following  Cardio:  bradycardia--echo, EKG, cards c/s  HTN--resume cozaar at lower dose  Atrial fibrillation with embolic strokes--Tlov  Respiratory: acute respiratory failure--vent management, pulmonary toilet, duonebs  Productive cough--guaifenesin  Pulmonary edema--lasix  GI:  dysphagia--c/w TFs  FEN: No active issues   Renal:  urinary retention--fofana--proscar  Heme:  acute blood loss anemia vs. Dilutational 
  Skin:     General: Skin is warm and dry.         Labs/Imaging/Diagnostics   Labs:  personally reviewed  CBC:  Recent Labs     04/22/24  0507 04/23/24  0345 04/24/24  0401   WBC 14.9* 15.2* 14.8*   RBC 3.52* 3.61* 3.58*   HGB 10.4* 10.8* 10.7*   HCT 32.4* 33.0* 32.6*   MCV 92.0 91.4 91.1   RDW 14.4 14.2 14.2    353 403       CHEMISTRIES:  Recent Labs     04/22/24  0507 04/23/24  0345 04/24/24  0401    139 140   K 3.9 4.3 3.9    103 102   CO2 24 22 23   BUN 10 11 12   CREATININE 0.5* 0.5* 0.5*   GLUCOSE 100* 116* 106*   PHOS 3.3 3.6 3.3   MG 1.8 2.0 1.8       PT/INR:No results for input(s): \"PROTIME\", \"INR\" in the last 72 hours.  APTT:No results for input(s): \"APTT\" in the last 72 hours.  LIVER PROFILE:  Recent Labs     04/22/24  0507 04/23/24  0345 04/24/24  0401   AST 73* 74* 60*   ALT 88* 91* 80*   BILITOT 0.6 0.6 0.7   ALKPHOS 211* 289* 308*         Imaging Last 24 Hours:  personally reviewed/interpreted      Patient Active Problem List    Diagnosis Date Noted    Acute blood loss anemia     Fall 06/04/2022    Trauma 06/03/2022    Acute ischemic stroke (HCC) 04/23/2024    Hypoalbuminemia 04/22/2024    Electrolyte imbalance 04/22/2024    Hypocalcemia 04/22/2024    Elevated LFTs 04/22/2024    Cerebrovascular accident (CVA) due to bilateral embolism of middle cerebral arteries (HCC) 04/15/2024    Acute respiratory failure with hypoxia (HCC) 04/15/2024    Oropharyngeal dysphagia 04/15/2024    Seizure (HCC) 04/11/2024    Closed wedge compression fracture of L1 vertebra (HCC) 04/11/2024    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    
04/23/2024 03:45 AM    ALT 91 04/23/2024 03:45 AM     MRI Brain:  Scattered acute infarcts involving the bilateral posterior cingulate gyri,  right occipital lobe and to lesser extent the left occipital lobe. There is  also restricted diffusion throughout the right hippocampus and amygdala which  may also reflect additional area of ischemia within the posterior  circulation, however underlying encephalitis is not excluded.  No  corresponding abnormal enhancement.  No acute intracranial hemorrhage or  significant mass effect.                CTA HEAD:  1. No large vessel occlusion or significant stenosis.  2. 2 mm anterior communicating artery ANEURYSM.    CTA NECK:  1. No dissection or significant stenosis of the major arteries of the neck.  2. Ground-glass opacities in the partially visualized right upper lobe,  likely infectious/inflammatory.    EEG 4/12/24  Abnormal EEG with presence of diffuse slowing with some right frontal sharp activity concerning for possible epileptiform discharges without status epilepticus. Clinical correlation is indicated with recommendation for repeat EEG after patient weaned off sedation.     EEG 4/15/24  Impression---abnormal portable comatose EEG with a burst suppression pattern  Comments----burst suppression pattern reflects diffuse destructive lesions versus medications' effect.  Clinical correlation was highly advised.    Echo       Left Ventricle: Normal left ventricular systolic function with a visually estimated EF of 65 - 70%. Left ventricle size is normal. Increased wall thickness. Mild posterior thickening. Normal wall motion.    Right Ventricle: Right ventricle is mildly dilated.    Aortic Valve: Mild sclerosis of the aortic valve cusp. No regurgitation. No stenosis.    Tricuspid Valve: The estimated RVSP is 43 mmHg.    Interatrial Septum: Agitated saline study was negative with and without provocation.    Image quality is good.     Finalized by Aleksandar Leggett MD on Tue Apr 
61*         Pertinent imaging studies:    -TTE 2021: EF 65%, no valvular abnormalities  -ECG personally reviewed with slow ventricular response (45 BPM)  -Telemetry personally reviewed on 4/15/2024, no significant pauses more than 3 seconds.    ASSESSMENT/PLAN:      78-year-old male with past medical history of persistent/permanent atrial fibrillation (not on AC), bradycardia, EtOH (with prior encephalopathy),? 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  EEG performed showing diffuse slowing with right frontal sharp activity concerning for possible epileptic form discharges without status epilepticus.  MRI brain with bilateral embolic appearing infarcts.  Cardiology consulted for bradycardia.     Bradycardia:  Patient with atrial fibrillation with slow ventricular response.  No significant pauses more than 3 seconds.  Reviewed his prior ECGs with baseline heart rate in the 50s-70s.  Etiology possibly due to acute intracranial embolic events vs medication induced  Avoid tres blocking agents  TTE with normal EF and no major valvular abnormalities  Continue on telemetry  Can consider Atropine and/or dopamine if symptomatic bradycardia with pauses longer than 5 seconds or if bradycardia causing hypotension  Persistent A.fib:  Not on AC at baseline  Consider Anticoagulation if ok from neurology stand point  If continues with contraindications to anticoagulation then can consider sending him post discharge to my clinic for consideration of watchman device implantation.  Avoiding tres blocking agents 2/2 bradycardia      Aleksandar Leggett MD  Interventional Cardiology/ Structural heart disease      Cardiology to follow peripherally. Please call back with questions or concerns.      I spent a total 
Dilutational anemia--monitor  Endocrine:  adrenal insufficiency--steroid wean  ID: MRSA and e coli tracheitis--change antibiotics to vanc, unasyn x 7 days  Tracheitis--check sputum culture, resume 3% nebs      DVT/GI ppx:  lovenox, PPI    CC TIME:  I spent 36min managing this patients critical issues which are a constant threat to life excluding time teaching and performing procedures.      Discussed with daughter at bedside, plan for family meeting tomorrow morning.  They don't think he would want a tracheostomy      Kavita Subramanian MD, MSc, FACS  4/20/2024  2:52 PM    
No  corresponding abnormal enhancement.  No acute intracranial hemorrhage or  significant mass effect.                CTA HEAD:  1. No large vessel occlusion or significant stenosis.  2. 2 mm anterior communicating artery ANEURYSM.    CTA NECK:  1. No dissection or significant stenosis of the major arteries of the neck.  2. Ground-glass opacities in the partially visualized right upper lobe,  likely infectious/inflammatory.    EEG 4/12/24  Abnormal EEG with presence of diffuse slowing with some right frontal sharp activity concerning for possible epileptiform discharges without status epilepticus. Clinical correlation is indicated with recommendation for repeat EEG after patient weaned off sedation.     EEG 4/15/24  Impression---abnormal portable comatose EEG with a burst suppression pattern  Comments----burst suppression pattern reflects diffuse destructive lesions versus medications' effect.  Clinical correlation was highly advised.    I personally reviewed the patient's lab and imaging studies at this time.    Assessment:     New onset seizure status post fall down the stairs   MRI does demonstrate bihemispheric acute embolic appearing infarcts -will need to rule out A-fib or other cardiac dysrhythmia   CTAs without stenosis or occlusion but with incidental 2 mm GRANT aneurysm      Plan:     Echo with bubble pending    Continue 4 mg of Versed for now and will continue to monitor EEG closely  Hopefully begin to wean Versed soon     Keppra 2000 mg twice a day Vimpat 200 mg twice a day and Cerebyx 100 mg TID --recent adjusted level 10.7    Will continue to monitor    KISHORE Andrews - CNS  11:59 AM  4/16/2024  
posterior  circulation, however underlying encephalitis is not excluded.  No  corresponding abnormal enhancement.  No acute intracranial hemorrhage or  significant mass effect.                CTA HEAD:  1. No large vessel occlusion or significant stenosis.  2. 2 mm anterior communicating artery ANEURYSM.    CTA NECK:  1. No dissection or significant stenosis of the major arteries of the neck.  2. Ground-glass opacities in the partially visualized right upper lobe,  likely infectious/inflammatory.    EEG 4/12/24  Abnormal EEG with presence of diffuse slowing with some right frontal sharp activity concerning for possible epileptiform discharges without status epilepticus. Clinical correlation is indicated with recommendation for repeat EEG after patient weaned off sedation.     EEG 4/15/24  Impression---abnormal portable comatose EEG with a burst suppression pattern  Comments----burst suppression pattern reflects diffuse destructive lesions versus medications' effect.  Clinical correlation was highly advised.    I personally reviewed the patient's lab and imaging studies at this time.    Assessment:     New onset seizure -found unresponsive on floor   MRI does demonstrate bihemispheric acute embolic appearing infarcts -will need to rule out A-fib or other cardiac dysrhythmia   CTAs without stenosis or occlusion but with incidental 2 mm GRANT aneurysm    Cognitively improved-following some commands today and nodding appropriately at times for myself as well as staff      Plan:     Continue to hold Versed    Continue Keppra 2000 mg twice a day Vimpat 200 mg twice a day and Cerebyx will be decreased to twice a day    Will continue to monitor    KISHORE Andrews - CNS  8:04 AM  4/20/2024  
size. No pericardial effusion.  Coronary calcification. Layering contrast centrally thought to be within left atrial appendage. MEDIASTINUM: No significant lymphadenopathy. LUNGS: Endotracheal tube terminates well above the sam.  Opacification of the lower lobe bronchi with debris.  Right lower lobe atelectatic change. Mild irregular opacification in the periphery of the right upper lobe.  No pleural effusion or pneumothorax. ADDITIONAL FINDINGS: None. ABDOMEN AND PELVIS: LIVER: Unremarkable. BILIARY: Cholecystectomy.  Pneumobilia. SPLEEN: Unremarkable. PANCREAS: Grossly unremarkable. ADRENALS: Unremarkable. KIDNEYS: Symmetric enhancement. No hydronephrosis.  Suspected exophytic right renal cysts. GI: Enteric tube extends to the gastric body.  No bowel obstruction. Appendix is nondistended.  Colonic diverticula.  Questionable thickening sigmoid colon.  No pneumoperitoneum. LYMPH NODES: No significant lymphadenopathy. VESSELS: Abdominal aorta is nonaneurysmal with moderate atherosclerotic calcification. PELVIS: Bladder is distended.  Prostate is enlarged. BONES: Postsurgical changes left femur.  No acute fracture. ADDITIONAL FINDINGS: None. THORACOLUMBAR SPINE: Mild anterior wedging L1 vertebral body is new from 2022 but of indeterminate acuity.  No other vertebral compression deformity. Multilevel degenerative changes of the spine.  Neural foraminal narrowing L5-S1.     Opacification of the lower lobe bronchi with debris, correlate for aspiration.  Right lower lobe atelectatic change. Mild irregular opacification in the periphery of the right upper lobe, could be infectious/inflammatory. Question mild thickening of the sigmoid colon.  Probably underdistention but cannot exclude colitis. Mild anterior wedging L1 vertebral body, new from 2022 but of indeterminate acuity. Pneumobilia. Endotracheal and enteric tubes in place. Additional observations as above.     CT ABDOMEN PELVIS W IV CONTRAST Additional Contrast? 
     Endotracheal and enteric tubes in place.      Additional observations as above.         CT LUMBAR SPINE WO CONTRAST   Final Result   Opacification of the lower lobe bronchi with debris, correlate for   aspiration.  Right lower lobe atelectatic change. Mild irregular   opacification in the periphery of the right upper lobe, could be   infectious/inflammatory.      Question mild thickening of the sigmoid colon.  Probably underdistention but   cannot exclude colitis.      Mild anterior wedging L1 vertebral body, new from 2022 but of indeterminate   acuity.      Pneumobilia.      Endotracheal and enteric tubes in place.      Additional observations as above.         CT THORACIC SPINE WO CONTRAST   Final Result   Opacification of the lower lobe bronchi with debris, correlate for   aspiration.  Right lower lobe atelectatic change. Mild irregular   opacification in the periphery of the right upper lobe, could be   infectious/inflammatory.      Question mild thickening of the sigmoid colon.  Probably underdistention but   cannot exclude colitis.      Mild anterior wedging L1 vertebral body, new from 2022 but of indeterminate   acuity.      Pneumobilia.      Endotracheal and enteric tubes in place.      Additional observations as above.         CTA HEAD W CONTRAST   Final Result   CT HEAD:      1. No acute intracranial abnormality.   2. Mild-to-moderate cerebral volume loss and chronic microvascular ischemic   CTA HEAD:      1. No large vessel occlusion or significant stenosis.   2. 2 mm anterior communicating artery ANEURYSM.   CTA NECK:      1. No dissection or significant stenosis of the major arteries of the neck.   2. Ground-glass opacities in the partially visualized right upper lobe,   likely infectious/inflammatory.         CTA NECK W CONTRAST   Final Result   CT HEAD:      1. No acute intracranial abnormality.   2. Mild-to-moderate cerebral volume loss and chronic microvascular ischemic   CTA HEAD:      1. No 
Q12H    guaiFENesin  200 mg Oral Q6H    sodium chloride (Inhalant)  4 mL Nebulization Q4H    atorvastatin  40 mg Oral Nightly    aspirin  81 mg Oral Daily    lansoprazole  30 mg Per G Tube BID    thiamine  100 mg Oral Daily    levETIRAcetam  2,000 mg Per NG tube BID    folic acid  1 mg Oral Daily    losartan  50 mg Oral QAM    lidocaine PF  5 mL IntraDERmal Once    sodium chloride flush  5-40 mL IntraVENous 2 times per day    heparin flush  3 mL IntraVENous 2 times per day    artificial tears   Both Eyes Q4H    Or    Polyvinyl Alcohol-Povidone PF  1 drop Both Eyes Q4H    ampicillin-sulbactam  3,000 mg IntraVENous Q6H    senna  1 tablet Oral Nightly    fosphenytoin  100 mg PE IntraVENous Q8H    finasteride  5 mg Oral Daily    [Held by provider] amLODIPine  5 mg Oral Daily    lacosamide (VIMPAT) 200 mg in sodium chloride 0.9 % 70 mL IVPB  200 mg IntraVENous BID    polyethylene glycol  17 g Oral Daily    chlorhexidine  15 mL Mouth/Throat BID     sodium chloride flush, sodium chloride, heparin flush, medicated lip balm, acetaminophen, glucose, dextrose bolus **OR** dextrose bolus, glucagon (rDNA), dextrose, ondansetron **OR** ondansetron, labetalol, hydrALAZINE    Home Medications  Medications Prior to Admission: omeprazole (PRILOSEC) 40 MG delayed release capsule, TAKE 1 CAPSULE BY MOUTH EVERY MORNING BEFORE BREAKFAST  tamsulosin (FLOMAX) 0.4 MG capsule, TAKE ONE CAPSULE BY MOUTH EVERY MORNING  losartan (COZAAR) 100 MG tablet, TAKE 1 TABLET BY MOUTH EVERY MORNING  glycopyrrolate (ROBINUL) 1 MG tablet, TAKE ONE-HALF TABLET BY MOUTH TWICE A DAY  amLODIPine (NORVASC) 5 MG tablet, TAKE ONE TABLET BY MOUTH EVERY DAY  oxybutynin (DITROPAN) 5 MG tablet, TAKE 1 TABLET BY MOUTH TWO TIMES A DAY  Lifitegrast (XIIDRA) 5 % SOLN, Apply 1 drop to eye 2 times daily     ASSESSMENT / PLAN:    Neuro:   No sedation currently   Seizures  neurology following, c/w vimpat, keppra, cerebryx  CVA   ASA, statin, neurology following  Cardio:  
active tremulous or seizure-like activity visualized.    Objective data reviewed: labs, images, records, medication use, vitals, and chart    MDM/Time:  The total encounter time on this service date was 25 minutes, which was spent performing a face-to-face encounter and personally completing the provider-level activities documented in the note.  This includes time spent prior to the visit and after the visit in direct care of the patient.  This time does not include time spent in any separately reportable services.    KISHORE Perez - CNP  Palliative Medicine    Patient and the plan of care discussed with the other IDT members of Palliative Care Team, and with consultants, Primary Attending, patient, family, and floor nurses, as appropriate and available.    Thank you for allowing Palliative Medicine to participate in the care of Preston Bess.    Note: This report was completed using computer[employer]aff[employer] voiced recognition software.  Every effort has been made to ensure accuracy; however, inadvertent computerized transcription errors may be present.    
abnormal enhancement.  No acute intracranial hemorrhage or  significant mass effect.    MR LUMBAR SPINE:    Multilevel degenerative changes without high-grade canal or neural foraminal  stenosis.    Mild remote compression fracture deformity of L1.  No acute osseous findings  within the lumbar spine.       MDM/Time:  The total encounter time on this service date was 35 minutes, which was spent performing a face-to-face encounter and personally completing the provider-level activities documented in the note.  This includes time spent prior to the visit and after the visit in direct care of the patient.  This time does not include time spent in any separately reportable services.    Janice Herrera, KISHORE - CNP  Palliative Medicine    Patient and the plan of care discussed with the other IDT members of Palliative Care Team, and with consultants, Primary Attending, patient, family, and floor nurses, as appropriate and available.    Thank you for allowing Palliative Medicine to participate in the care of Preston Bess.    Note: This report was completed using computerMdundo voiced recognition software.  Every effort has been made to ensure accuracy; however, inadvertent computerized transcription errors may be present.      
Ground-glass opacities in the partially visualized right upper lobe,   likely infectious/inflammatory.         XR PELVIS (1-2 VIEWS)   Final Result   No acute abnormality of the pelvis.         XR CHEST PORTABLE   Final Result   1.  Endotracheal tube and enteric tube as above.      2.  Background coarsened interstitial markings.  Atherosclerotic disease.   Fullness of the bilateral pulmonary priscila.  No acute cardiopulmonary pathology   seen.         XR CHEST PORTABLE    (Results Pending)   CT HEAD WO CONTRAST    (Results Pending)   MRI BRAIN W WO CONTRAST    (Results Pending)         I have personally reviewed the following images:     CT head      Other Testing Results    EEG 4/11/24 (Dr. Villarreal)   General Impression  Abnormal EEG with presence of diffuse slowing with some right frontal sharp activity concerning for possible epileptiform discharges without status epilepticus.  Clinical correlation is indicated with recommendation for repeat EEG after patient weaned off sedation.     Jonny Villarreal MD      Electronically signed by ABE Ruvalcaba on 4/13/2024 at 9:37 AM     
grossly clear.  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 SOFT TISSUES/SKULL: No acute abnormality of the visualized skull or soft tissues. CTA NECK: AORTIC ARCH/ARCH VESSELS: No dissection or arterial injury.  No significant stenosis of the brachiocephalic or subclavian arteries. CAROTID ARTERIES: Calcified atherosclerosis in the proximal left internal carotid artery resulting in approximately 30% stenosis.  Calcified atherosclerosis along the right carotid bulb.  No significant stenosis. VERTEBRAL ARTERIES: No dissection, arterial injury, or significant stenosis. SOFT TISSUES: Postoperative soft tissue defects are seen in the left lateral neck. BONES: No acute osseous abnormality. MISCELLANEOUS: Ground-glass opacities in the partially visualized right upper lobe, likely infectious/inflammatory.  Moderate emphysematous changes. CTA HEAD: ANTERIOR CIRCULATION: No significant stenosis of the intracranial internal carotid, anterior cerebral, or middle cerebral arteries.  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 POSTERIOR CIRCULATION: No significant stenosis of the vertebral, basilar, or posterior cerebral arteries. No aneurysm. OTHER: No dural venous sinus thrombosis on this non-dedicated study.     CT HEAD: 1. No acute intracranial abnormality. 2. Mild-to-moderate cerebral volume loss and chronic microvascular ischemic CTA HEAD: 1. No large vessel occlusion or significant stenosis. 2. 2 mm anterior communicating artery ANEURYSM. CTA NECK: 1. No dissection or significant stenosis of the major arteries of the neck. 2. Ground-glass opacities in the partially visualized right upper lobe, likely infectious/inflammatory.     CTA NECK W CONTRAST    Result Date: 4/10/2024  EXAMINATION: CTA OF THE HEAD WITH CONTRAST; CT OF THE HEAD WITHOUT CONTRAST; CTA OF THE NECK 4/10/2024 10:18 pm: TECHNIQUE: CTA of the head/brain was performed with the administration of intravenous contrast. 
Reason for exam:->trauma FINDINGS: No evidence of pelvic fracture.  Bilateral hips demonstrate normal alignment. No focal osseous lesion.  Old healed left proximal femur fracture status post ORIF noted.  SI joints are symmetric.     No acute abnormality of the pelvis.     XR CHEST PORTABLE    Result Date: 4/10/2024  EXAMINATION: ONE XRAY VIEW OF THE CHEST 4/10/2024 10:14 pm COMPARISON: Chest series from October 18, 2022 HISTORY: ORDERING SYSTEM PROVIDED HISTORY: trauma TECHNOLOGIST PROVIDED HISTORY: Reason for exam:->trauma FINDINGS: Endotracheal tube extends to the midthoracic trachea. Enteric tube extends subdiaphragmatic.  Side port is just below the GE junction. Left costophrenic angle not included in the field of view. Symmetric lung inflation with background coarsened interstitial markings.  No airspace disease, pleural effusion, or pneumothorax.  Atherosclerotic disease in the aortic arch.  Fullness of the bilateral pulmonary priscila.  Normal pulmonary vascularity.  Osseous mineralization appears slightly decreased. No acute osseous or soft tissue findings seen.  There are surgical clips which project over the left neck soft tissues.     1.  Endotracheal tube and enteric tube as above. 2.  Background coarsened interstitial markings.  Atherosclerotic disease. Fullness of the bilateral pulmonary priscila.  No acute cardiopulmonary pathology seen.     CBC:   Lab Results   Component Value Date/Time    WBC 11.9 04/15/2024 04:09 AM    RBC 4.05 04/15/2024 04:09 AM    HGB 11.8 04/15/2024 04:09 AM    HCT 37.2 04/15/2024 04:09 AM    MCV 91.9 04/15/2024 04:09 AM    MCH 29.1 04/15/2024 04:09 AM    MCHC 31.7 04/15/2024 04:09 AM    RDW 15.7 04/15/2024 04:09 AM     04/15/2024 04:09 AM    MPV 11.9 04/15/2024 04:09 AM     BMP:    Lab Results   Component Value Date/Time     04/15/2024 04:09 AM    K 4.1 04/15/2024 04:09 AM    K 3.8 10/18/2022 03:35 PM     04/15/2024 04:09 AM    CO2 22 04/15/2024 04:09 AM    BUN 21 
Reason for exam:->trauma FINDINGS: No evidence of pelvic fracture.  Bilateral hips demonstrate normal alignment. No focal osseous lesion.  Old healed left proximal femur fracture status post ORIF noted.  SI joints are symmetric.     No acute abnormality of the pelvis.     XR CHEST PORTABLE    Result Date: 4/10/2024  EXAMINATION: ONE XRAY VIEW OF THE CHEST 4/10/2024 10:14 pm COMPARISON: Chest series from October 18, 2022 HISTORY: ORDERING SYSTEM PROVIDED HISTORY: trauma TECHNOLOGIST PROVIDED HISTORY: Reason for exam:->trauma FINDINGS: Endotracheal tube extends to the midthoracic trachea. Enteric tube extends subdiaphragmatic.  Side port is just below the GE junction. Left costophrenic angle not included in the field of view. Symmetric lung inflation with background coarsened interstitial markings.  No airspace disease, pleural effusion, or pneumothorax.  Atherosclerotic disease in the aortic arch.  Fullness of the bilateral pulmonary priscila.  Normal pulmonary vascularity.  Osseous mineralization appears slightly decreased. No acute osseous or soft tissue findings seen.  There are surgical clips which project over the left neck soft tissues.     1.  Endotracheal tube and enteric tube as above. 2.  Background coarsened interstitial markings.  Atherosclerotic disease. Fullness of the bilateral pulmonary priscila.  No acute cardiopulmonary pathology seen.     CBC:   Lab Results   Component Value Date/Time    WBC 12.9 04/13/2024 04:28 AM    RBC 3.71 04/13/2024 04:28 AM    HGB 10.9 04/13/2024 04:28 AM    HCT 32.8 04/13/2024 04:28 AM    MCV 88.4 04/13/2024 04:28 AM    MCH 29.4 04/13/2024 04:28 AM    MCHC 33.2 04/13/2024 04:28 AM    RDW 15.0 04/13/2024 04:28 AM     04/13/2024 04:28 AM    MPV 11.9 04/13/2024 04:28 AM     BMP:    Lab Results   Component Value Date/Time     04/13/2024 04:28 AM    K 3.9 04/13/2024 04:28 AM    K 3.8 10/18/2022 03:35 PM     04/13/2024 04:28 AM    CO2 21 04/13/2024 04:28 AM    BUN 25 
Cerebrovascular accident (CVA) due to bilateral embolism of middle cerebral arteries (HCC)    Acute respiratory failure with hypoxia (HCC)    Oropharyngeal dysphagia     Plan:Continue current care  Evan Romo MD M.D.

## 2024-04-24 NOTE — PLAN OF CARE
Problem: Cardiovascular - Adult  Goal: Maintains optimal cardiac output and hemodynamic stability  Outcome: Progressing     Problem: Neurosensory - Adult  Goal: Remains free of injury related to seizures activity  Outcome: Progressing     Problem: Pain  Goal: Verbalizes/displays adequate comfort level or baseline comfort level  Outcome: Progressing     Problem: ABCDS Injury Assessment  Goal: Absence of physical injury  Outcome: Progressing     
  Problem: Discharge Planning  Goal: Discharge to home or other facility with appropriate resources  4/11/2024 1015 by Ning Rodriguez RN  Outcome: Progressing  4/10/2024 2326 by Marcel Arredondo RN  Outcome: Progressing  Flowsheets (Taken 4/10/2024 2230 by Gray Delcid RN)  Discharge to home or other facility with appropriate resources:   Identify barriers to discharge with patient and caregiver   Arrange for needed discharge resources and transportation as appropriate   Identify discharge learning needs (meds, wound care, etc)   Refer to discharge planning if patient needs post-hospital services based on physician order or complex needs related to functional status, cognitive ability or social support system     Problem: Safety - Adult  Goal: Free from fall injury  4/11/2024 1015 by Ning Rodriguez RN  Outcome: Progressing  4/10/2024 2326 by Marcel Arredondo RN  Outcome: Progressing  Flowsheets (Taken 4/10/2024 2309 by Kaela Willis, RN)  Free From Fall Injury: Instruct family/caregiver on patient safety     Problem: Skin/Tissue Integrity  Goal: Absence of new skin breakdown  4/11/2024 1015 by Ning Rodriguez RN  Outcome: Progressing  4/10/2024 2326 by Marcel Arredondo RN  Outcome: Progressing     Problem: ABCDS Injury Assessment  Goal: Absence of physical injury  4/11/2024 1015 by Ning Rodriguez RN  Outcome: Progressing  4/10/2024 2326 by Marcel Arredondo RN  Outcome: Progressing     Problem: Pain  Goal: Verbalizes/displays adequate comfort level or baseline comfort level  4/11/2024 1015 by Ning Rodriguez RN  Outcome: Progressing  Flowsheets (Taken 4/11/2024 1000)  Verbalizes/displays adequate comfort level or baseline comfort level: (pt is unable to verbalize due to et tube and rn will continue to monitor for pain) --  4/10/2024 2326 by Marcel Arredondo RN  Outcome: Progressing     Problem: Neurosensory - Adult  Goal: Achieves stable or improved neurological status  Outcome: Progressing   
  Problem: Discharge Planning  Goal: Discharge to home or other facility with appropriate resources  4/21/2024 0932 by Ning Rodriguez RN  Outcome: Progressing  4/21/2024 0024 by Arlette Santos RN  Outcome: Progressing     Problem: Safety - Adult  Goal: Free from fall injury  4/21/2024 0932 by Ning Rodriguez RN  Outcome: Progressing  4/21/2024 0024 by Arlette Santos RN  Outcome: Progressing     Problem: Skin/Tissue Integrity  Goal: Absence of new skin breakdown  4/21/2024 0932 by Ning Rodriguez RN  Outcome: Progressing  4/21/2024 0024 by Arlette Santos RN  Outcome: Progressing     Problem: ABCDS Injury Assessment  Goal: Absence of physical injury  4/21/2024 0932 by Ning Rodriguez RN  Outcome: Progressing  4/21/2024 0024 by Arlette Santos RN  Outcome: Progressing     Problem: Pain  Goal: Verbalizes/displays adequate comfort level or baseline comfort level  4/21/2024 0932 by Ning Rodriguez RN  Outcome: Progressing  4/21/2024 0024 by Arlette Santos RN  Outcome: Progressing     Problem: Neurosensory - Adult  Goal: Achieves stable or improved neurological status  4/21/2024 0932 by Ning Rodriguez RN  Outcome: Progressing  4/21/2024 0024 by Arlette Santos RN  Outcome: Progressing  Goal: Absence of seizures  4/21/2024 0024 by Arlette Santos RN  Outcome: Progressing  Goal: Remains free of injury related to seizures activity  4/21/2024 0024 by Arlette Santos RN  Outcome: Progressing  Goal: Achieves maximal functionality and self care  4/21/2024 0024 by Arlette Santos RN  Outcome: Progressing     Problem: Respiratory - Adult  Goal: Achieves optimal ventilation and oxygenation  4/21/2024 0932 by Ning Rodriguez RN  Outcome: Progressing  4/21/2024 0848 by Cyrus Castillo RCP  Outcome: Progressing  4/21/2024 0024 by Arlette Santos RN  Outcome: Progressing  4/20/2024 2028 by Mi Garcia RCP  Outcome: Progressing     Problem: Cardiovascular - Adult  Goal: Maintains optimal cardiac output and hemodynamic 
  Problem: Discharge Planning  Goal: Discharge to home or other facility with appropriate resources  4/21/2024 2311 by Arlette Santos RN  Outcome: Progressing  4/21/2024 0932 by Ning Rodriguez RN  Outcome: Progressing     Problem: Safety - Adult  Goal: Free from fall injury  4/21/2024 2311 by Arlette Santos RN  Outcome: Progressing  4/21/2024 0932 by Ning Rodriguez RN  Outcome: Progressing     Problem: Skin/Tissue Integrity  Goal: Absence of new skin breakdown  Description: 1.  Monitor for areas of redness and/or skin breakdown  2.  Assess vascular access sites hourly  3.  Every 4-6 hours minimum:  Change oxygen saturation probe site  4.  Every 4-6 hours:  If on nasal continuous positive airway pressure, respiratory therapy assess nares and determine need for appliance change or resting period.  4/21/2024 2311 by Arlette Santos RN  Outcome: Progressing  4/21/2024 0932 by Ning Rodriguez RN  Outcome: Progressing     Problem: ABCDS Injury Assessment  Goal: Absence of physical injury  4/21/2024 2311 by Arlette Santos RN  Outcome: Progressing  4/21/2024 0932 by Ning Rodriguez RN  Outcome: Progressing     Problem: Pain  Goal: Verbalizes/displays adequate comfort level or baseline comfort level  4/21/2024 2311 by Arlette Santos RN  Outcome: Progressing  4/21/2024 0932 by Ning Rodriguez RN  Outcome: Progressing     Problem: Neurosensory - Adult  Goal: Achieves stable or improved neurological status  4/21/2024 2311 by Arlette Santos RN  Outcome: Progressing  4/21/2024 0932 by Ning Rodriguez RN  Outcome: Progressing  Goal: Absence of seizures  Outcome: Progressing  Goal: Remains free of injury related to seizures activity  Outcome: Progressing  Goal: Achieves maximal functionality and self care  Outcome: Progressing     Problem: Respiratory - Adult  Goal: Achieves optimal ventilation and oxygenation  4/21/2024 2311 by Arlette Santos RN  Outcome: Progressing  4/21/2024 0932 by Ning Rodriguez RN  Outcome: 
  Problem: Discharge Planning  Goal: Discharge to home or other facility with appropriate resources  4/22/2024 1016 by Kim Yuan RN  Outcome: Progressing     Problem: Safety - Adult  Goal: Free from fall injury  4/22/2024 1016 by Kim Yuan RN  Outcome: Progressing     Problem: Skin/Tissue Integrity  Goal: Absence of new skin breakdown  Description: 1.  Monitor for areas of redness and/or skin breakdown  2.  Assess vascular access sites hourly  3.  Every 4-6 hours minimum:  Change oxygen saturation probe site  4.  Every 4-6 hours:  If on nasal continuous positive airway pressure, respiratory therapy assess nares and determine need for appliance change or resting period.  4/22/2024 1016 by iKm Yuan RN  Outcome: Progressing     Problem: ABCDS Injury Assessment  Goal: Absence of physical injury  4/22/2024 1016 by Kim Yuan RN  Outcome: Progressing     Problem: Pain  Goal: Verbalizes/displays adequate comfort level or baseline comfort level  4/22/2024 1016 by Kim Yuan RN  Outcome: Progressing     Problem: Neurosensory - Adult  Goal: Achieves stable or improved neurological status  4/22/2024 1016 by Kim Yuan RN  Outcome: Progressing     Problem: Neurosensory - Adult  Goal: Absence of seizures  4/22/2024 1016 by Kim Yuan RN  Outcome: Progressing     Problem: Neurosensory - Adult  Goal: Remains free of injury related to seizures activity  4/22/2024 1016 by Kim Yuan RN  Outcome: Progressing     Problem: Neurosensory - Adult  Goal: Achieves maximal functionality and self care  4/22/2024 1016 by Kim Yuan RN  Outcome: Progressing     Problem: Respiratory - Adult  Goal: Achieves optimal ventilation and oxygenation  4/22/2024 1016 by Kim Yuan RN  Outcome: Progressing     Problem: Cardiovascular - Adult  Goal: Maintains optimal cardiac output and hemodynamic stability  4/22/2024 1016 by Kim Yuan RN  Outcome: Progressing     Problem: Cardiovascular - 
  Problem: Discharge Planning  Goal: Discharge to home or other facility with appropriate resources  Outcome: Not Progressing     Problem: Neurosensory - Adult  Goal: Achieves maximal functionality and self care  Outcome: Not Progressing     Problem: Musculoskeletal - Adult  Goal: Return mobility to safest level of function  Outcome: Not Progressing  Goal: Return ADL status to a safe level of function  Outcome: Not Progressing     Problem: Infection - Adult  Goal: Absence of infection at discharge  Outcome: Not Progressing  Goal: Absence of infection during hospitalization  Outcome: Not Progressing     Problem: Discharge Planning  Goal: Discharge to home or other facility with appropriate resources  Outcome: Not Progressing     Problem: Neurosensory - Adult  Goal: Achieves maximal functionality and self care  Outcome: Not Progressing     Problem: Musculoskeletal - Adult  Goal: Return mobility to safest level of function  Outcome: Not Progressing  Goal: Return ADL status to a safe level of function  Outcome: Not Progressing     Problem: Infection - Adult  Goal: Absence of infection at discharge  Outcome: Not Progressing  Goal: Absence of infection during hospitalization  Outcome: Not Progressing     
  Problem: Discharge Planning  Goal: Discharge to home or other facility with appropriate resources  Outcome: Progressing     Problem: Safety - Adult  Goal: Free from fall injury  4/15/2024 0849 by Esvin Madrid RN  Outcome: Progressing  4/15/2024 0059 by Angy Hong RN  Outcome: Progressing     Problem: Skin/Tissue Integrity  Goal: Absence of new skin breakdown  Description: 1.  Monitor for areas of redness and/or skin breakdown  2.  Assess vascular access sites hourly  3.  Every 4-6 hours minimum:  Change oxygen saturation probe site  4.  Every 4-6 hours:  If on nasal continuous positive airway pressure, respiratory therapy assess nares and determine need for appliance change or resting period.  4/15/2024 0849 by Esvin Madrid RN  Outcome: Progressing  4/15/2024 0059 by Angy Hong RN  Outcome: Progressing     Problem: ABCDS Injury Assessment  Goal: Absence of physical injury  4/15/2024 0849 by Esvin Madrid RN  Outcome: Progressing  4/15/2024 0059 by Angy Hong RN  Outcome: Progressing     Problem: Pain  Goal: Verbalizes/displays adequate comfort level or baseline comfort level  Outcome: Progressing     Problem: Neurosensory - Adult  Goal: Achieves stable or improved neurological status  4/15/2024 0849 by Esvin Madrid RN  Outcome: Progressing  4/15/2024 0059 by Angy Hong RN  Outcome: Progressing  Goal: Absence of seizures  Outcome: Progressing  Goal: Remains free of injury related to seizures activity  Outcome: Progressing  Goal: Achieves maximal functionality and self care  Outcome: Progressing     Problem: Respiratory - Adult  Goal: Achieves optimal ventilation and oxygenation  Outcome: Progressing     Problem: Cardiovascular - Adult  Goal: Maintains optimal cardiac output and hemodynamic stability  Outcome: Progressing  Goal: Absence of cardiac dysrhythmias or at baseline  Outcome: Progressing     Problem: Skin/Tissue Integrity - Adult  Goal: Skin integrity 
  Problem: Discharge Planning  Goal: Discharge to home or other facility with appropriate resources  Outcome: Progressing     Problem: Safety - Adult  Goal: Free from fall injury  Outcome: Progressing     Problem: Skin/Tissue Integrity  Goal: Absence of new skin breakdown  Description: 1.  Monitor for areas of redness and/or skin breakdown  2.  Assess vascular access sites hourly  3.  Every 4-6 hours minimum:  Change oxygen saturation probe site  4.  Every 4-6 hours:  If on nasal continuous positive airway pressure, respiratory therapy assess nares and determine need for appliance change or resting period.  Outcome: Progressing     Problem: ABCDS Injury Assessment  Goal: Absence of physical injury  4/12/2024 0823 by Esvin Madrid RN  Outcome: Progressing  4/12/2024 0039 by Angy Hong RN  Outcome: Progressing     Problem: Pain  Goal: Verbalizes/displays adequate comfort level or baseline comfort level  4/12/2024 0823 by Esvin Madrid RN  Outcome: Progressing  4/12/2024 0039 by Angy Hong RN  Outcome: Progressing     Problem: Neurosensory - Adult  Goal: Achieves stable or improved neurological status  Outcome: Progressing  Goal: Absence of seizures  4/12/2024 0823 by Esvin Madrid RN  Outcome: Progressing  4/12/2024 0039 by Angy Hong RN  Outcome: Progressing  Goal: Remains free of injury related to seizures activity  4/12/2024 0823 by Esvin Madrid RN  Outcome: Progressing  4/12/2024 0039 by Angy Hong RN  Outcome: Progressing  Goal: Achieves maximal functionality and self care  Outcome: Progressing     Problem: Respiratory - Adult  Goal: Achieves optimal ventilation and oxygenation  Outcome: Progressing     Problem: Cardiovascular - Adult  Goal: Maintains optimal cardiac output and hemodynamic stability  4/12/2024 0823 by Esvin Madrid RN  Outcome: Progressing  4/12/2024 0039 by Angy Hong RN  Outcome: Progressing  Goal: Absence of cardiac dysrhythmias 
  Problem: Discharge Planning  Goal: Discharge to home or other facility with appropriate resources  Outcome: Progressing     Problem: Safety - Adult  Goal: Free from fall injury  Outcome: Progressing     Problem: Skin/Tissue Integrity  Goal: Absence of new skin breakdown  Description: 1.  Monitor for areas of redness and/or skin breakdown  2.  Assess vascular access sites hourly  3.  Every 4-6 hours minimum:  Change oxygen saturation probe site  4.  Every 4-6 hours:  If on nasal continuous positive airway pressure, respiratory therapy assess nares and determine need for appliance change or resting period.  Outcome: Progressing     Problem: ABCDS Injury Assessment  Goal: Absence of physical injury  Outcome: Progressing     Problem: Neurosensory - Adult  Goal: Absence of seizures  Outcome: Progressing     Problem: Respiratory - Adult  Goal: Achieves optimal ventilation and oxygenation  Outcome: Progressing     Problem: Cardiovascular - Adult  Goal: Maintains optimal cardiac output and hemodynamic stability  Outcome: Progressing     Problem: Cardiovascular - Adult  Goal: Absence of cardiac dysrhythmias or at baseline  Outcome: Progressing     Problem: Genitourinary - Adult  Goal: Absence of urinary retention  Outcome: Progressing     Problem: Pain  Goal: Verbalizes/displays adequate comfort level or baseline comfort level  Outcome: Not Progressing     Problem: Neurosensory - Adult  Goal: Achieves stable or improved neurological status  Outcome: Not Progressing  Goal: Achieves maximal functionality and self care  Outcome: Not Progressing     
  Problem: Discharge Planning  Goal: Discharge to home or other facility with appropriate resources  Outcome: Progressing     Problem: Safety - Adult  Goal: Free from fall injury  Outcome: Progressing     Problem: Skin/Tissue Integrity  Goal: Absence of new skin breakdown  Description: 1.  Monitor for areas of redness and/or skin breakdown  2.  Assess vascular access sites hourly  3.  Every 4-6 hours minimum:  Change oxygen saturation probe site  4.  Every 4-6 hours:  If on nasal continuous positive airway pressure, respiratory therapy assess nares and determine need for appliance change or resting period.  Outcome: Progressing     Problem: ABCDS Injury Assessment  Goal: Absence of physical injury  Outcome: Progressing     Problem: Pain  Goal: Verbalizes/displays adequate comfort level or baseline comfort level  Outcome: Progressing     Problem: Neurosensory - Adult  Goal: Achieves stable or improved neurological status  Outcome: Progressing  Goal: Absence of seizures  Outcome: Progressing  Goal: Remains free of injury related to seizures activity  Outcome: Progressing  Goal: Achieves maximal functionality and self care  Outcome: Progressing     Problem: Respiratory - Adult  Goal: Achieves optimal ventilation and oxygenation  4/16/2024 0742 by Esvin Madrid RN  Outcome: Progressing  4/15/2024 1919 by Tanya Schofield RN  Outcome: Progressing     Problem: Cardiovascular - Adult  Goal: Maintains optimal cardiac output and hemodynamic stability  4/16/2024 0742 by Esvin Madrid RN  Outcome: Progressing  4/15/2024 1919 by Tanya Schofield RN  Outcome: Progressing  Goal: Absence of cardiac dysrhythmias or at baseline  Outcome: Progressing     Problem: Skin/Tissue Integrity - Adult  Goal: Skin integrity remains intact  Outcome: Progressing  Goal: Incisions, wounds, or drain sites healing without S/S of infection  Outcome: Progressing  Goal: Oral mucous membranes remain intact  Outcome: Progressing   
  Problem: Discharge Planning  Goal: Discharge to home or other facility with appropriate resources  Outcome: Progressing     Problem: Safety - Adult  Goal: Free from fall injury  Outcome: Progressing     Problem: Skin/Tissue Integrity  Goal: Absence of new skin breakdown  Description: 1.  Monitor for areas of redness and/or skin breakdown  2.  Assess vascular access sites hourly  3.  Every 4-6 hours minimum:  Change oxygen saturation probe site  4.  Every 4-6 hours:  If on nasal continuous positive airway pressure, respiratory therapy assess nares and determine need for appliance change or resting period.  Outcome: Progressing     Problem: ABCDS Injury Assessment  Goal: Absence of physical injury  Outcome: Progressing     Problem: Pain  Goal: Verbalizes/displays adequate comfort level or baseline comfort level  Outcome: Progressing     Problem: Neurosensory - Adult  Goal: Achieves stable or improved neurological status  Outcome: Progressing  Goal: Absence of seizures  Outcome: Progressing  Goal: Remains free of injury related to seizures activity  Outcome: Progressing  Goal: Achieves maximal functionality and self care  Outcome: Progressing     Problem: Respiratory - Adult  Goal: Achieves optimal ventilation and oxygenation  4/21/2024 0024 by Arlette Santos RN  Outcome: Progressing  4/20/2024 2028 by Mi Garcia RCP  Outcome: Progressing  4/20/2024 1124 by Cyrus Castillo RCP  Outcome: Progressing     Problem: Cardiovascular - Adult  Goal: Maintains optimal cardiac output and hemodynamic stability  Outcome: Progressing  Goal: Absence of cardiac dysrhythmias or at baseline  Outcome: Progressing     Problem: Skin/Tissue Integrity - Adult  Goal: Skin integrity remains intact  Outcome: Progressing  Goal: Incisions, wounds, or drain sites healing without S/S of infection  Outcome: Progressing  Goal: Oral mucous membranes remain intact  Outcome: Progressing     Problem: Musculoskeletal - Adult  Goal: Return mobility to 
  Problem: Discharge Planning  Goal: Discharge to home or other facility with appropriate resources  Outcome: Progressing     Problem: Safety - Adult  Goal: Free from fall injury  Outcome: Progressing  Flowsheets (Taken 4/10/2024 2309 by Kaela Willis, RN)  Free From Fall Injury: Instruct family/caregiver on patient safety     Problem: Skin/Tissue Integrity  Goal: Absence of new skin breakdown  Description: 1.  Monitor for areas of redness and/or skin breakdown  2.  Assess vascular access sites hourly  3.  Every 4-6 hours minimum:  Change oxygen saturation probe site  4.  Every 4-6 hours:  If on nasal continuous positive airway pressure, respiratory therapy assess nares and determine need for appliance change or resting period.  Outcome: Progressing     Problem: ABCDS Injury Assessment  Goal: Absence of physical injury  Outcome: Progressing     Problem: Pain  Goal: Verbalizes/displays adequate comfort level or baseline comfort level  Outcome: Progressing     
  Problem: Discharge Planning  Goal: Discharge to home or other facility with appropriate resources  Recent Flowsheet Documentation  Taken 4/13/2024 0800 by Naomie Diaz, RN  Discharge to home or other facility with appropriate resources: Identify barriers to discharge with patient and caregiver     Problem: Safety - Adult  Goal: Free from fall injury  4/13/2024 2033 by Angy Hong, RN  Outcome: Progressing     Problem: Skin/Tissue Integrity  Goal: Absence of new skin breakdown  Description: 1.  Monitor for areas of redness and/or skin breakdown  2.  Assess vascular access sites hourly  3.  Every 4-6 hours minimum:  Change oxygen saturation probe site  4.  Every 4-6 hours:  If on nasal continuous positive airway pressure, respiratory therapy assess nares and determine need for appliance change or resting period.  4/13/2024 2033 by Angy Hong, RN  Outcome: Progressing     Problem: ABCDS Injury Assessment  Goal: Absence of physical injury  4/13/2024 2033 by Angy Hong, RN  Outcome: Progressing       Problem: Pain  Goal: Verbalizes/displays adequate comfort level or baseline comfort level  4/13/2024 2033 by Angy Hong, RN  Outcome: Progressing  
  Problem: Respiratory - Adult  Goal: Achieves optimal ventilation and oxygenation  4/20/2024 0047 by Mi Garcia RCP  Outcome: Progressing     
  Problem: Respiratory - Adult  Goal: Achieves optimal ventilation and oxygenation  4/20/2024 1124 by Cyrus Castillo RCP  Outcome: Progressing     
  Problem: Respiratory - Adult  Goal: Achieves optimal ventilation and oxygenation  4/20/2024 2028 by Mi Garcia RCP  Outcome: Progressing     
  Problem: Respiratory - Adult  Goal: Achieves optimal ventilation and oxygenation  4/21/2024 0848 by Cyrus Castillo RCP  Outcome: Progressing     
  Problem: Respiratory - Adult  Goal: Achieves optimal ventilation and oxygenation  Outcome: Progressing     
  Problem: Safety - Adult  Goal: Free from fall injury  4/14/2024 1000 by Naomie Diaz RN  Outcome: Progressing  4/13/2024 2033 by Angy Hong RN  Outcome: Progressing     Problem: ABCDS Injury Assessment  Goal: Absence of physical injury  4/14/2024 1000 by Naomie Diaz RN  Outcome: Progressing  Flowsheets (Taken 4/14/2024 0959)  Absence of Physical Injury: Implement safety measures based on patient assessment  4/13/2024 2033 by Angy Hong RN  Outcome: Progressing     Problem: Pain  Goal: Verbalizes/displays adequate comfort level or baseline comfort level  4/14/2024 1000 by Naomie Diaz RN  Outcome: Progressing  4/13/2024 2033 by Angy Hong RN  Outcome: Progressing     Problem: Respiratory - Adult  Goal: Achieves optimal ventilation and oxygenation  Outcome: Progressing     Problem: Cardiovascular - Adult  Goal: Maintains optimal cardiac output and hemodynamic stability  Outcome: Progressing  Flowsheets (Taken 4/14/2024 0800)  Maintains optimal cardiac output and hemodynamic stability: Monitor blood pressure and heart rate  Goal: Absence of cardiac dysrhythmias or at baseline  Outcome: Progressing  Flowsheets (Taken 4/14/2024 0800)  Absence of cardiac dysrhythmias or at baseline: Monitor cardiac rate and rhythm     Problem: Gastrointestinal - Adult  Goal: Minimal or absence of nausea and vomiting  Outcome: Progressing  Goal: Maintains or returns to baseline bowel function  Outcome: Progressing  Flowsheets (Taken 4/14/2024 0800)  Maintains or returns to baseline bowel function: Assess bowel function  Goal: Maintains adequate nutritional intake  Outcome: Progressing  Flowsheets (Taken 4/14/2024 0800)  Maintains adequate nutritional intake: Monitor intake and output, weight and lab values     Problem: Genitourinary - Adult  Goal: Absence of urinary retention  Outcome: Progressing  Flowsheets (Taken 4/14/2024 0800)  Absence of urinary retention: Monitor intake/output and perform 
  Problem: Safety - Adult  Goal: Free from fall injury  4/18/2024 2112 by Angy Hong RN  Outcome: Progressing    Problem: Skin/Tissue Integrity  Goal: Absence of new skin breakdown  Description: 1.  Monitor for areas of redness and/or skin breakdown  2.  Assess vascular access sites hourly  3.  Every 4-6 hours minimum:  Change oxygen saturation probe site  4.  Every 4-6 hours:  If on nasal continuous positive airway pressure, respiratory therapy assess nares and determine need for appliance change or resting period.  4/18/2024 2112 by Angy Hong, RN  Outcome: Progressing    Problem: ABCDS Injury Assessment  Goal: Absence of physical injury  4/18/2024 2112 by Angy Hong RN  Outcome: Progressing    Problem: Pain  Goal: Verbalizes/displays adequate comfort level or baseline comfort level  4/18/2024 2112 by Angy Hong RN  Outcome: Progressing    Problem: Safety - Adult  Goal: Free from fall injury  4/18/2024 2112 by Angy Hong RN  Outcome: Progressing  =     
  Problem: Safety - Adult  Goal: Free from fall injury  Outcome: Progressing     Problem: Skin/Tissue Integrity  Goal: Absence of new skin breakdown  Description: 1.  Monitor for areas of redness and/or skin breakdown  2.  Assess vascular access sites hourly  3.  Every 4-6 hours minimum:  Change oxygen saturation probe site  4.  Every 4-6 hours:  If on nasal continuous positive airway pressure, respiratory therapy assess nares and determine need for appliance change or resting period.  Outcome: Progressing     Problem: ABCDS Injury Assessment  Goal: Absence of physical injury  Outcome: Progressing     Problem: Neurosensory - Adult  Goal: Achieves stable or improved neurological status  Outcome: Progressing     
  Problem: Safety - Adult  Goal: Free from fall injury  Outcome: Progressing     Problem: Skin/Tissue Integrity  Goal: Absence of new skin breakdown  Description: 1.  Monitor for areas of redness and/or skin breakdown  2.  Assess vascular access sites hourly  3.  Every 4-6 hours minimum:  Change oxygen saturation probe site  4.  Every 4-6 hours:  If on nasal continuous positive airway pressure, respiratory therapy assess nares and determine need for appliance change or resting period.  Outcome: Progressing     Problem: ABCDS Injury Assessment  Goal: Absence of physical injury  Outcome: Progressing  Flowsheets (Taken 4/13/2024 1048)  Absence of Physical Injury: Implement safety measures based on patient assessment     Problem: Pain  Goal: Verbalizes/displays adequate comfort level or baseline comfort level  Outcome: Progressing     Problem: Respiratory - Adult  Goal: Achieves optimal ventilation and oxygenation  4/13/2024 1048 by Naomie Diaz RN  Outcome: Progressing  4/13/2024 0821 by Erin Lima RCP  Outcome: Progressing     Problem: Cardiovascular - Adult  Goal: Maintains optimal cardiac output and hemodynamic stability  Outcome: Progressing  Flowsheets (Taken 4/13/2024 0800)  Maintains optimal cardiac output and hemodynamic stability: Monitor blood pressure and heart rate  Goal: Absence of cardiac dysrhythmias or at baseline  Outcome: Progressing  Flowsheets (Taken 4/13/2024 0800)  Absence of cardiac dysrhythmias or at baseline: Monitor cardiac rate and rhythm     Problem: Gastrointestinal - Adult  Goal: Minimal or absence of nausea and vomiting  Outcome: Progressing  Goal: Maintains or returns to baseline bowel function  Outcome: Progressing  Flowsheets (Taken 4/13/2024 0800)  Maintains or returns to baseline bowel function: Assess bowel function  Goal: Maintains adequate nutritional intake  Outcome: Progressing  Flowsheets (Taken 4/13/2024 0800)  Maintains adequate nutritional intake: Monitor intake and 
  Problem: Skin/Tissue Integrity  Goal: Absence of new skin breakdown  Description: 1.  Monitor for areas of redness and/or skin breakdown  2.  Assess vascular access sites hourly  3.  Every 4-6 hours minimum:  Change oxygen saturation probe site  4.  Every 4-6 hours:  If on nasal continuous positive airway pressure, respiratory therapy assess nares and determine need for appliance change or resting period.  4/12/2024 2028 by Angy Hogn, RN  Outcome: Progressing     Problem: ABCDS Injury Assessment  Goal: Absence of physical injury  4/12/2024 2028 by Angy Hong, RN  Outcome: Progressing       Problem: Pain  Goal: Verbalizes/displays adequate comfort level or baseline comfort level  4/12/2024 2028 by Angy Hong, RN  Outcome: Progressing       
  Problem: Skin/Tissue Integrity  Goal: Absence of new skin breakdown  Description: 1.  Monitor for areas of redness and/or skin breakdown  2.  Assess vascular access sites hourly  3.  Every 4-6 hours minimum:  Change oxygen saturation probe site  4.  Every 4-6 hours:  If on nasal continuous positive airway pressure, respiratory therapy assess nares and determine need for appliance change or resting period.  Outcome: Progressing     Problem: ABCDS Injury Assessment  Goal: Absence of physical injury  Outcome: Progressing     Problem: Neurosensory - Adult  Goal: Achieves stable or improved neurological status  Outcome: Progressing     Problem: Neurosensory - Adult  Goal: Absence of seizures  Outcome: Progressing     
1935 by Teressa Mariano RN  Outcome: Not Progressing  4/22/2024 1016 by Kim Yuan RN  Outcome: Progressing     Problem: Skin/Tissue Integrity - Adult  Goal: Oral mucous membranes remain intact  4/22/2024 1935 by Teressa Mariano RN  Outcome: Not Progressing  4/22/2024 1016 by Kim Yuan RN  Outcome: Progressing     Problem: Gastrointestinal - Adult  Goal: Maintains or returns to baseline bowel function  4/22/2024 1935 by Teressa Mariano RN  Outcome: Not Progressing     Problem: Gastrointestinal - Adult  Goal: Maintains adequate nutritional intake  4/22/2024 1935 by Teressa Mariano RN  Outcome: Not Progressing  4/22/2024 1016 by Kim Yuan RN  Outcome: Progressing     Problem: Genitourinary - Adult  Goal: Absence of urinary retention  4/22/2024 1935 by Teressa Mariano RN  Outcome: Progressing  4/22/2024 1016 by Kim Yuan RN  Outcome: Progressing  Goal: Urinary catheter remains patent  4/22/2024 1935 by Teressa Mariano RN  Outcome: Progressing  4/22/2024 1016 by Kim Yuan RN  Outcome: Progressing     Problem: Infection - Adult  Goal: Absence of infection during hospitalization  4/22/2024 1935 by Teressa Mariano RN  Outcome: Not Progressing  4/22/2024 1016 by Kim Yuan RN  Outcome: Progressing     Problem: Nutrition Deficit:  Goal: Optimize nutritional status  4/22/2024 1935 by Teressa Mariano RN  Outcome: Not Progressing  4/22/2024 1016 by Kim Yuan RN  Outcome: Progressing     Problem: Chronic Conditions and Co-morbidities  Goal: Patient's chronic conditions and co-morbidity symptoms are monitored and maintained or improved  4/22/2024 1935 by Teressa Mariano RN  Outcome: Not Progressing  4/22/2024 1016 by Kim Yuan RN  Outcome: Progressing     Problem: Anxiety  Goal: Will report anxiety at manageable levels  Description: INTERVENTIONS:  1. Administer medication as ordered  2. Teach and rehearse alternative coping skills  3. Provide emotional support with 1:1 
integrity remains intact  Recent Flowsheet Documentation  Taken 4/15/2024 0850 by Esvin Madrdi, RN  Skin Integrity Remains Intact:   Monitor for areas of redness and/or skin breakdown   Assess vascular access sites hourly   Every 4-6 hours minimum: Change oxygen saturation probe site  4/15/2024 0849 by Esvin Madrid, RN  Outcome: Progressing     
mobility to safest level of function  4/16/2024 0742 by Esvin Madrid RN  Outcome: Progressing  Goal: Maintain proper alignment of affected body part  4/16/2024 0742 by Esvin Madrid RN  Outcome: Progressing  Goal: Return ADL status to a safe level of function  4/16/2024 0742 by Esvin Madrid RN  Outcome: Progressing     Problem: Genitourinary - Adult  Goal: Absence of urinary retention  4/16/2024 1947 by Tanya Schofield RN  Outcome: Not Progressing  4/16/2024 0742 by Esvin Madrid RN  Outcome: Progressing  Goal: Urinary catheter remains patent  4/16/2024 0742 by Esvin Madrid RN  Outcome: Progressing     Problem: Infection - Adult  Goal: Absence of infection at discharge  4/16/2024 0742 by Esvin Madrid RN  Outcome: Progressing  Goal: Absence of infection during hospitalization  4/16/2024 0742 by Esvin Madird RN  Outcome: Progressing  Goal: Absence of fever/infection during anticipated neutropenic period  4/16/2024 0742 by Esvin Madrid RN  Outcome: Progressing     4/16/2024 0742 by Esvin Madrid RN  Outcome: Progressing  Goal: Remains free of injury related to seizures activity  4/16/2024 0742 by Esvin Madrid RN  Outcome: Progressing  Goal: Achieves maximal functionality and self care  4/16/2024 0742 by Esvin Madrid RN  Outcome: Progressing     4/16/2024 0742 by Esvin Madrid RN  Outcome: Progressing     Problem: Pain  Goal: Verbalizes/displays adequate comfort level or baseline comfort level  4/16/2024 1947 by Tanya Schofield RN  Outcome: Progressing  4/16/2024 0742 by Esvin Madrid RN  Outcome: Progressing     
from Ethics, Palliative Care or initiate Family Care Conference as is appropriate  Outcome: Progressing     Problem: Discharge Planning  Goal: Discharge to home or other facility with appropriate resources  4/19/2024 2114 by Arlette Santos RN  Outcome: Progressing  4/19/2024 1026 by Ebony Altman RN  Outcome: Not Progressing     Problem: Neurosensory - Adult  Goal: Achieves maximal functionality and self care  4/19/2024 2114 by Arlette Santos RN  Outcome: Progressing  4/19/2024 1026 by Ebony Altman RN  Outcome: Not Progressing     Problem: Musculoskeletal - Adult  Goal: Return mobility to safest level of function  4/19/2024 2114 by Arlette Santos RN  Outcome: Progressing  4/19/2024 1026 by Ebony Altman RN  Outcome: Not Progressing  Goal: Return ADL status to a safe level of function  4/19/2024 2114 by Arlette Santos RN  Outcome: Progressing  4/19/2024 1026 by Ebony Altman RN  Outcome: Not Progressing     Problem: Infection - Adult  Goal: Absence of infection at discharge  4/19/2024 2114 by Arlette Santos RN  Outcome: Progressing  4/19/2024 1026 by Ebony Altman RN  Outcome: Not Progressing  Goal: Absence of infection during hospitalization  4/19/2024 2114 by Arlette Santos RN  Outcome: Progressing  4/19/2024 1026 by Ebony Altman RN  Outcome: Not Progressing

## 2024-04-24 NOTE — CARE COORDINATION
Transition of care update. Discharge order noted. Roxane OROSCO from [facility] here speaking to daughter Liz and  at the bedside. Per Roxane OROSCO, patient is appropriate for [facility], and is working on a potential d/c to [facility] today in agreement with the family.  Electronically signed by Lawson Murray RN on 4/24/2024 at 2:27 PM

## 2024-04-28 LAB
MICROORGANISM SPEC CULT: NORMAL
MICROORGANISM SPEC CULT: NORMAL
SERVICE CMNT-IMP: NORMAL
SERVICE CMNT-IMP: NORMAL
SPECIMEN DESCRIPTION: NORMAL
SPECIMEN DESCRIPTION: NORMAL

## 2025-04-17 NOTE — TELEPHONE ENCOUNTER
Last Appointment:  3/10/2021  Future Appointments   Date Time Provider Sindy Grider   3/31/2021  7:00 AM Janis Griffith MD [facility]   4/12/2021 10:30 AM SEB CT2 BD SEBZ CT [facility] Radiolog   4/13/2021  9:30 AM Beata Montgomery III, MD COL SURG [facility]      Patient seen thru walk in he was given eye drops. They are gone and he is requesting a refill. Requests a call.     Electronically signed by Ester Meehan LPN on 6/90/7196 at 33:91 AM DISPLAY PLAN FREE TEXT

## 2025-06-26 NOTE — PROGRESS NOTES
CLINICAL PHARMACY NOTE: MEDS TO [address] Select Patient?: No  Total # of Prescriptions Filled: 2   The following medications were delivered to the patient:  · OXYCODONE 5MG  ·  MG      Total # of Interventions Completed: 2  Time Spent (min): 30    Additional Documentation: 37.3

## (undated) DEVICE — 6619 2 PTNT ISO SYS INCISE AREA&LT;(&GT;&&LT;)&GT;P: Brand: STERI-DRAPE™ IOBAN™ 2

## (undated) DEVICE — TOWEL,OR,DSP,ST,BLUE,STD,6/PK,12PK/CS: Brand: MEDLINE

## (undated) DEVICE — CONNECTOR IRRIGATION AUXILIARY H2O JET W/ PRT MTL THRD HYDR

## (undated) DEVICE — RETRIEVAL BALLOON CATHETER: Brand: EXTRACTOR™ PRO RX

## (undated) DEVICE — GAUZE,SPONGE,4"X4",8PLY,STRL,LF,10/TRAY: Brand: MEDLINE

## (undated) DEVICE — SUCTION IRRIGATOR: Brand: ENDOWRIST

## (undated) DEVICE — SYSTEM BX CAP BILI RAP EXCHG CAP LOK DEV COMPATIBLE W/ OLY

## (undated) DEVICE — SOLUTION IV 1000ML 0.9% SOD CHL PH 5 INJ USP VIAFLX PLAS

## (undated) DEVICE — INSUFFLATION NEEDLE TO ESTABLISH PNEUMOPERITONEUM.: Brand: INSUFFLATION NEEDLE

## (undated) DEVICE — KIT,ANTI FOG,W/SPONGE & FLUID,SOFT PACK: Brand: MEDLINE

## (undated) DEVICE — BLADELESS OBTURATOR: Brand: WECK VISTA

## (undated) DEVICE — GLOVE SURG SZ 6 THK91MIL LTX FREE SYN POLYISOPRENE ANTI

## (undated) DEVICE — Z INACTIVE USE 2641839 CLIP INT M L POLYMER LOK LIG HEM O LOK

## (undated) DEVICE — SOLUTION IV IRRIG WATER 1000ML POUR BRL 2F7114

## (undated) DEVICE — INSTRUMENT CLAMP TOWEL LARGE REUSABLE

## (undated) DEVICE — INTENDED FOR TISSUE SEPARATION, AND OTHER PROCEDURES THAT REQUIRE A SHARP SURGICAL BLADE TO PUNCTURE OR CUT.: Brand: BARD-PARKER ® STAINLESS STEEL BLADES

## (undated) DEVICE — INSUFFLATION TUBING SET WITH FILTER, FUNNEL CONNECTOR AND LUER LOCK: Brand: JOSNOE MEDICAL INC

## (undated) DEVICE — BIT DRL L L266MM DIA16MM FEM FLX CANN QUIK CPL

## (undated) DEVICE — SOLUTION IV IRRIG POUR BRL 0.9% SODIUM CHL 2F7124

## (undated) DEVICE — GOWN,SIRUS,POLYRNF,BRTHSLV,2XL,18/CS: Brand: MEDLINE

## (undated) DEVICE — APPLICATOR MEDICATED 26 CC SOLUTION HI LT ORNG CHLORAPREP

## (undated) DEVICE — DRAPE,LAP,CHOLE,W/TROUGHS,STERILE: Brand: MEDLINE

## (undated) DEVICE — GOWN,SIRUS,FABRNF,XL,20/CS: Brand: MEDLINE

## (undated) DEVICE — CHLORAPREP 26ML ORANGE

## (undated) DEVICE — TOTAL TRAY, DB, 100% SILI FOLEY, 16FR 10: Brand: MEDLINE

## (undated) DEVICE — ELECTRODE PT RET AD L9FT HI MOIST COND ADH HYDRGEL CORDED

## (undated) DEVICE — GLOVE SURG SZ 9 L12IN FNGR THK13MIL WHT ISOLEX POLYISOPRENE

## (undated) DEVICE — BITEBLOCK 54FR W/ DENT RIM BLOX

## (undated) DEVICE — SPHINCTEROTOME: Brand: DREAMTOME™ RX 44

## (undated) DEVICE — BIT DRL L500MM DIA6X9MM CANN STP L QUIK CPL FOR DH DC TFN

## (undated) DEVICE — PLUMEPORT LAPAROSCOPIC SMOKE FILTRATION DEVICE: Brand: PLUMEPORT ACTIV

## (undated) DEVICE — CANNULA SEAL

## (undated) DEVICE — DRAPE,REIN 53X77,STERILE: Brand: MEDLINE

## (undated) DEVICE — GLOVE SURG SZ 75 L12IN FNGR THK94MIL TRNSLUC YEL LTX

## (undated) DEVICE — CONTAINER SPEC 60ML PH 7NEUTRAL BUFF FRMLN RDY TO USE

## (undated) DEVICE — ADHESIVE SKIN CLSR 0.7ML TOP DERMBND ADV

## (undated) DEVICE — [HIGH FLOW INSUFFLATOR,  DO NOT USE IF PACKAGE IS DAMAGED,  KEEP DRY,  KEEP AWAY FROM SUNLIGHT,  PROTECT FROM HEAT AND RADIOACTIVE SOURCES.]: Brand: PNEUMOSURE

## (undated) DEVICE — DRESSING HYDROFIBER AQUACEL AG ADVANTAGE 3.5X6 IN

## (undated) DEVICE — FENESTRATED BIPOLAR FORCEPS: Brand: ENDOWRIST

## (undated) DEVICE — FRACTURE TABLE: Brand: MEDLINE INDUSTRIES, INC.

## (undated) DEVICE — FORCEP BX GRASPING 2.8 MMX280 CM CUP TIP STR RADIAL JAW 4

## (undated) DEVICE — GLOVE SURG SZ 65 THK91MIL LTX FREE SYN POLYISOPRENE

## (undated) DEVICE — GLOVE ORANGE PI 8 1/2   MSG9085

## (undated) DEVICE — SCISSORS SURG DIA8MM MPLR CRV ENDOWRIST

## (undated) DEVICE — ROD RMR L950MM DIA2.5MM W/ EXTN BALL TIP

## (undated) DEVICE — TISSUE RETRIEVAL SYSTEM: Brand: INZII RETRIEVAL SYSTEM

## (undated) DEVICE — DOUBLE BASIN SET: Brand: MEDLINE INDUSTRIES, INC.

## (undated) DEVICE — BLOCK BITE 60FR RUBBER ADLT DENTAL

## (undated) DEVICE — GUIDEWIRE ORTH L400MM DIA3.2MM FOR TFN

## (undated) DEVICE — GRADUATE TRIANG MEASURE 1000ML BLK PRNT

## (undated) DEVICE — COVER,TABLE,60X90,STERILE: Brand: MEDLINE

## (undated) DEVICE — FORCEPS BX L160CM JAW DIA2.4MM YEL L CAP W/ NDL DISP RAD

## (undated) DEVICE — SYRINGE 20ML LL S/C 50

## (undated) DEVICE — WARMER SCP 2 ANTIFOG LAP DISP

## (undated) DEVICE — NEEDLE HYPO 25GA L1.5IN BLU POLYPR HUB S STL REG BVL STR

## (undated) DEVICE — PACK PROCEDURE SURG GEN CUST

## (undated) DEVICE — LARGE HEM-O-LOK CLIP APPLIER: Brand: ENDOWRIST

## (undated) DEVICE — Z DUP USE 2641840 CLIP INT L POLYMER LOK LIG HEM O LOK

## (undated) DEVICE — SPONGE GZ W4XL4IN RAYON POLY FILL CVR W/ NONWOVEN FAB

## (undated) DEVICE — ARM DRAPE

## (undated) DEVICE — TIP COVER ACCESSORY

## (undated) DEVICE — STAPLER SKIN H3.9MM WIRE DIA0.58MM CRWN 6.9MM 35 STPL ROT

## (undated) DEVICE — PROGRASP FORCEPS: Brand: ENDOWRIST

## (undated) DEVICE — MEDIUM-LARGE CLIP APPLIER: Brand: ENDOWRIST

## (undated) DEVICE — BIT DRL L145MM DIA4.2MM ST 3 FLUT NDL PNT QUIK CPL FOR FEM

## (undated) DEVICE — DEFENDO AIR WATER SUCTION AND BIOPSY VALVE KIT FOR  OLYMPUS: Brand: DEFENDO AIR/WATER/SUCTION AND BIOPSY VALVE